# Patient Record
Sex: FEMALE | Race: WHITE | NOT HISPANIC OR LATINO | Employment: OTHER | URBAN - NONMETROPOLITAN AREA
[De-identification: names, ages, dates, MRNs, and addresses within clinical notes are randomized per-mention and may not be internally consistent; named-entity substitution may affect disease eponyms.]

---

## 2017-01-12 ENCOUNTER — HOSPITAL ENCOUNTER (OUTPATIENT)
Dept: LAB | Facility: HOSPITAL | Age: 57
Discharge: HOME | End: 2017-01-12
Attending: INTERNAL MEDICINE

## 2017-01-12 LAB
ALBUMIN SERPL-MCNC: 3.7 G/DL (ref 3.5–5)
ANION GAP SERPL CALC-SCNC: 12 MMOL/L (ref 10–20)
APPEARANCE UR: CLEAR
BASOPHILS # BLD AUTO: 0.07 THOUS (ref 0–0.2)
BASOPHILS NFR BLD AUTO: 1.2 % (ref 0–2.5)
BILIRUB UR QL STRIP: NEGATIVE
BUN SERPL-MCNC: 12 MG/DL (ref 7–20)
CALCIUM SERPL-MCNC: 9.7 MG/DL (ref 8.4–10.2)
CHLORIDE SERPL-SCNC: 103 MMOL/L (ref 98–107)
COLOR UR: YELLOW
CONV ABS IMM GRAN: 0.06 THOUS (ref 0–0.6)
CONV BACTERIA IN URINE MICRO: NORMAL
CONV CO2: 31 MMOL/L (ref 26–30)
CONV HYALINE CASTS IN URINE MICRO: NORMAL
CONV IMMATURE GRAN: 1.1 % (ref 0–2.5)
CONV PROTEIN IN URINE BY AUTOMATED TEST STRIP: NEGATIVE
CONV UROBILINOGEN IN URINE BY AUTOMATED TEST STRIP: 0.2
CREAT 24H UR-MCNC: 172.8 MG/DL
CREAT UR-MCNC: 1.1 MG/DL (ref 0.6–1.3)
EOSINOPHIL # BLD AUTO: 0.19 THOUS (ref 0–0.7)
EOSINOPHIL # BLD AUTO: 3.4 % (ref 0–7)
ERYTHROCYTE [DISTWIDTH] IN BLOOD BY AUTOMATED COUNT: 12.6 % (ref 11.5–14.5)
GFR SERPL CREATININE-BSD FRML MDRD: 51 ML/MIN
GLUCOSE SERPL-MCNC: 96 MG/DL (ref 74–98)
GLUCOSE UR QL: NEGATIVE
HCT VFR BLD AUTO: 46 % (ref 37–47)
HGB BLD-MCNC: 15.6 G/DL (ref 12–16)
HGB UR QL STRIP: NEGATIVE
KETONES UR QL STRIP: NEGATIVE
LEUKOCYTE ESTERASE UR QL STRIP: NORMAL
LYMPHOCYTES # BLD AUTO: 2.01 THOUS (ref 0.6–3.4)
LYMPHOCYTES NFR BLD AUTO: 35.8 % (ref 10–50)
MCH RBC QN AUTO: 30.5 UUG (ref 27–31)
MCHC RBC AUTO-ENTMCNC: 34.3 G/DL (ref 30–37)
MCV RBC AUTO: 88.9 FL (ref 81–99)
MONOCYTES # BLD AUTO: 0.49 THOUS (ref 0–0.9)
MONOCYTES NFR BLD MANUAL: 8.7 % (ref 0–12)
NEUTROPHILS # BLD MANUAL: 2.8 THOUS (ref 2–6.9)
NEUTROPHILS NFR BLD AUTO: 49.8 % (ref 37–80)
NITRITE UR QL STRIP: NEGATIVE
PH UR: 5.5 [PH] (ref 5–8)
PHOSPHATE SERPL-MCNC: 3.6 MG/DL (ref 2.5–4.5)
PLATELET # BLD AUTO: 268 THOUS (ref 130–400)
POTASSIUM SERPL-SCNC: 4.4 MMOL/L (ref 3.5–5.1)
RBC # BLD AUTO: 5.12 M/UL (ref 4.2–5.4)
RBC #/AREA URNS HPF: NORMAL /[HPF] (ref 0–3)
SODIUM SERPL-SCNC: 142 MMOL/L (ref 137–145)
SP GR UR: 1.02 (ref 1–1.03)
SQUAMOUS SPT QL MICRO: NORMAL (ref 0–3)
WBC # BLD AUTO: 5.6 THOUS (ref 4.8–10.8)
WBC #/AREA URNS HPF: NORMAL /[HPF] (ref 0–3)

## 2017-01-13 LAB — REF LAB TEST RESULTS: NORMAL

## 2017-01-17 ENCOUNTER — OFFICE VISIT (OUTPATIENT)
Dept: UROLOGY | Facility: CLINIC | Age: 57
End: 2017-01-17

## 2017-01-17 VITALS
BODY MASS INDEX: 38.77 KG/M2 | HEIGHT: 67 IN | OXYGEN SATURATION: 97 % | HEART RATE: 86 BPM | DIASTOLIC BLOOD PRESSURE: 83 MMHG | WEIGHT: 247 LBS | SYSTOLIC BLOOD PRESSURE: 138 MMHG

## 2017-01-17 DIAGNOSIS — N20.0 NEPHROLITHIASIS: ICD-10-CM

## 2017-01-17 DIAGNOSIS — R35.0 URINARY FREQUENCY: Primary | ICD-10-CM

## 2017-01-17 LAB
BILIRUB BLD-MCNC: NEGATIVE MG/DL
CLARITY, POC: CLEAR
COLOR UR: YELLOW
GLUCOSE UR STRIP-MCNC: NEGATIVE MG/DL
KETONES UR QL: NEGATIVE
LEUKOCYTE EST, POC: NEGATIVE
NITRITE UR-MCNC: NEGATIVE MG/ML
PH UR: 6 [PH] (ref 5–8)
PROT UR STRIP-MCNC: NEGATIVE MG/DL
RBC # UR STRIP: NEGATIVE /UL
SP GR UR: 1.01 (ref 1–1.03)
UROBILINOGEN UR QL: NORMAL

## 2017-01-17 PROCEDURE — 99203 OFFICE O/P NEW LOW 30 MIN: CPT | Performed by: UROLOGY

## 2017-01-17 PROCEDURE — 81003 URINALYSIS AUTO W/O SCOPE: CPT | Performed by: UROLOGY

## 2017-01-17 RX ORDER — LOSARTAN POTASSIUM AND HYDROCHLOROTHIAZIDE 25; 100 MG/1; MG/1
1 TABLET ORAL DAILY
COMMUNITY
Start: 2017-01-06 | End: 2019-03-15

## 2017-01-17 RX ORDER — WARFARIN SODIUM 5 MG/1
5 TABLET ORAL TAKE AS DIRECTED
COMMUNITY
Start: 2017-01-06 | End: 2018-09-20

## 2017-01-17 RX ORDER — CYCLOSPORINE 0.5 MG/ML
1 EMULSION OPHTHALMIC EVERY 12 HOURS
COMMUNITY
Start: 2017-01-12

## 2017-01-17 RX ORDER — SITAGLIPTIN 50 MG/1
50 TABLET, FILM COATED ORAL DAILY
COMMUNITY
Start: 2017-01-01 | End: 2021-05-03

## 2017-01-17 RX ORDER — INSULIN GLARGINE 100 [IU]/ML
60 INJECTION, SOLUTION SUBCUTANEOUS 2 TIMES DAILY
Status: ON HOLD | COMMUNITY
Start: 2017-01-02 | End: 2019-02-18 | Stop reason: ALTCHOICE

## 2017-01-17 NOTE — MR AVS SNAPSHOT
Sue Lynn Collett   2017 1:30 PM   Office Visit    Dept Phone:  [phone]   Encounter #:  [id number]    Provider:  Paul Torres MD   Department:  [facility] UROLOGY                Your Full Care Plan              Your Updated Medication List          This list is accurate as of: 17  2:50 PM.  Always use your most recent med list.                JANUVIA 50 MG tablet   Generic drug:  SITagliptin       LANTUS 100 UNIT/ML injection   Generic drug:  insulin glargine       losartan-hydrochlorothiazide 100-25 MG per tablet   Commonly known as:  HYZAAR       metoprolol tartrate 25 MG tablet   Commonly known as:  LOPRESSOR       RESTASIS 0.05 % ophthalmic emulsion   Generic drug:  cycloSPORINE       warfarin 5 MG tablet   Commonly known as:  COUMADIN               We Performed the Following     CT Abdomen Pelvis Stone Protocol     POC Urinalysis Dipstick, Automated       You Were Diagnosed With        Codes Comments    Urinary frequency    -  Primary ICD-10-CM: R35.0  ICD-9-CM: 788.41     Nephrolithiasis     ICD-10-CM: N20.0  ICD-9-CM: 592.0       Instructions     None    Patient Instructions History      Upcoming Appointments     Visit Type Date Time Department    NEW PATIENT 2017  1:30 PM [facility]      [employer] Signup     [facility] allows you to send messages to your doctor, view your test results, renew your prescriptions, schedule appointments, and more. To sign up, go to [employer] and click on the Sign Up Now link in the New User? box. Enter your [employer] Activation Code exactly as it appears below along with the last four digits of your Social Security Number and your Date of Birth () to complete the sign-up process. If you do not sign up before the expiration date, you must request a new code.    [employer] Activation Code: [email or web address]  Expires: 2017  2:49 PM    If you have questions, you can email  [email or web address] or call [phone] to talk to our MyChart staff. Remember, [employer] is NOT to be used for urgent needs. For medical emergencies, dial 911.               Other Info from Your Visit           Allergies     No Known Allergies      Reason for Visit     Nephrolithiasis PATIENT WAS REF FOR POSSIBLE KIDNEY STONE SEEN ON RENAL ULTRASOUND. PATIENT STATES SHE HAS HAD RECENT URINARY FREQUENCY.      Vital Signs     Blood Pressure Pulse Height Weight Oxygen Saturation Body Mass Index    138/83 86 67\" (170.2 cm) 247 lb (112 kg) 97% 38.69 kg/m2    Smoking Status                   Never Smoker           Problems and Diagnoses Noted     Urinary frequency    -  Primary    Nephrolithiasis          Results     POC Urinalysis Dipstick, Automated      Component Value Standard Range & Units    Color Yellow Yellow, Straw, Dark Yellow, Dayana    Clarity, UA Clear Clear    Glucose, UA Negative Negative, 1000 mg/dL (3+) mg/dL    Bilirubin Negative Negative    Ketones, UA Negative Negative    Specific Gravity  1.015 1.005 - 1.030    Blood, UA Negative Negative    pH, Urine 6.0 5.0 - 8.0    Protein, POC Negative Negative mg/dL    Urobilinogen, UA Normal Normal    Leukocytes Negative Negative    Nitrite, UA Negative Negative                    "

## 2017-01-17 NOTE — PROGRESS NOTES
Chief Complaint  Nephrolithiasis (PATIENT WAS REF FOR POSSIBLE KIDNEY STONE SEEN ON RENAL ULTRASOUND. PATIENT STATES SHE HAS HAD RECENT URINARY FREQUENCY.)      HPI Sue Lynn Collett is a 56 y.o.female who is referred for evaluation of kidney stones.  She's never had a kidney stone in the past but currently has multiple medical complaints and recently had a renal ultrasound.  This revealed a 13 mm echogenic focus in the mid left kidney that might be due to a kidney stone there was no hydronephrosis suggesting obstruction and therefore this is likely not the etiology of her low back pain.  The patient also has a past history of chronic renal insufficiency and has been warned to avoid intravenous contrast.    Vitals:    01/17/17 1340   BP: 138/83   Pulse: 86   SpO2: 97%       Past Medical History  Past Medical History   Diagnosis Date   • Diabetes mellitus    • High cholesterol    • Hypertension        Past Surgical History  Past Surgical History   Procedure Laterality Date   • [place] tooth extraction     • Tubal abdominal ligation     • Gallbladder surgery         Medications  has a current medication list which includes the following prescription(s): januvia, lantus, losartan-hydrochlorothiazide, metoprolol tartrate, restasis, and warfarin.    Allergies  No Known Allergies    Social History  Social History     Social History   • Marital status:      Spouse name: N/A   • Number of children: N/A   • Years of education: N/A     Occupational History   •  Disabled     Social History Main Topics   • Smoking status: Never Smoker   • Smokeless tobacco: Never Used   • Alcohol use No   • Drug use: No   • Sexual activity: Defer     Other Topics Concern   • Not on file     Social History Narrative   • No narrative on file       Family History  Family History   Problem Relation Age of Onset   • Heart disease Father    • Heart attack Father    • Stroke Mother    • Hypertension Mother    • Hyperlipidemia Mother    • Kidney  disease Mother        Review of Systems  Review of Systems  Positive for fatigue and weight gain weight loss itching visual decrease in disturbances wheezing urinary frequency back pain joint pain joint swelling muscle weakness muscle pain anxiety depression appetite changes easy bruising.  Physical Exam  Physical Exam    Labs recent and today in the office:  Results for orders placed or performed in visit on 01/17/17   POC Urinalysis Dipstick, Automated   Result Value Ref Range    Color Yellow Yellow, Straw, Dark Yellow, Dayana    Clarity, UA Clear Clear    Glucose, UA Negative Negative, 1000 mg/dL (3+) mg/dL    Bilirubin Negative Negative    Ketones, UA Negative Negative    Specific Gravity  1.015 1.005 - 1.030    Blood, UA Negative Negative    pH, Urine 6.0 5.0 - 8.0    Protein, POC Negative Negative mg/dL    Urobilinogen, UA Normal Normal    Leukocytes Negative Negative    Nitrite, UA Negative Negative         Assessment & Plan  The patient's urine is negative for blood despite the fact she is taking Coumadin.  This would be evidence against a renal calculus but she certainly deserves definitive evaluation as recommended by the renal ultrasound.  CT scan without contrast is ordered and she will return for treatment of kidney stones if present.  Otherwise she needs to see nephrology for renal insufficiency.

## 2017-07-12 ENCOUNTER — HOSPITAL ENCOUNTER (OUTPATIENT)
Dept: GENERAL RADIOLOGY | Facility: HOSPITAL | Age: 57
Discharge: HOME | End: 2017-07-12
Admitting: NURSE PRACTITIONER

## 2017-07-12 ENCOUNTER — TRANSCRIBE ORDERS (OUTPATIENT)
Dept: ADMINISTRATIVE | Facility: HOSPITAL | Age: 57
End: 2017-07-12

## 2017-07-12 ENCOUNTER — APPOINTMENT (OUTPATIENT)
Dept: LAB | Facility: HOSPITAL | Age: 57
End: 2017-07-12

## 2017-07-12 DIAGNOSIS — G89.29 CHRONIC LOW BACK PAIN WITH SCIATICA, SCIATICA LATERALITY UNSPECIFIED, UNSPECIFIED BACK PAIN LATERALITY: Primary | ICD-10-CM

## 2017-07-12 DIAGNOSIS — N18.30 CHRONIC KIDNEY DISEASE, STAGE III (MODERATE) (HCC): ICD-10-CM

## 2017-07-12 DIAGNOSIS — M54.40 CHRONIC LOW BACK PAIN WITH SCIATICA, SCIATICA LATERALITY UNSPECIFIED, UNSPECIFIED BACK PAIN LATERALITY: Primary | ICD-10-CM

## 2017-07-12 DIAGNOSIS — R32 UNSPECIFIED URINARY INCONTINENCE: ICD-10-CM

## 2017-07-12 LAB
ALBUMIN SERPL-MCNC: 3.7 G/DL (ref 3.5–5)
ALBUMIN/GLOB SERPL: 1.4 G/DL (ref 1–2)
ALP SERPL-CCNC: 70 U/L (ref 38–126)
ALT SERPL W P-5'-P-CCNC: 47 U/L (ref 13–69)
ANION GAP SERPL CALCULATED.3IONS-SCNC: 15 MMOL/L
AST SERPL-CCNC: 25 U/L (ref 15–46)
BASOPHILS # BLD AUTO: 0.06 10*3/MM3 (ref 0–0.2)
BASOPHILS NFR BLD AUTO: 1.2 % (ref 0–2.5)
BILIRUB SERPL-MCNC: 0.6 MG/DL (ref 0.2–1.3)
BILIRUB UR QL STRIP: NEGATIVE
BUN BLD-MCNC: 11 MG/DL (ref 7–20)
BUN/CREAT SERPL: 9.2 (ref 7.1–23.5)
CALCIUM SPEC-SCNC: 9.6 MG/DL (ref 8.4–10.2)
CHLORIDE SERPL-SCNC: 104 MMOL/L (ref 98–107)
CLARITY UR: CLEAR
CO2 SERPL-SCNC: 29 MMOL/L (ref 26–30)
COLOR UR: YELLOW
CREAT BLD-MCNC: 1.2 MG/DL (ref 0.6–1.3)
DEPRECATED RDW RBC AUTO: 42 FL (ref 37–54)
EOSINOPHIL # BLD AUTO: 0.16 10*3/MM3 (ref 0–0.7)
EOSINOPHIL NFR BLD AUTO: 3.3 % (ref 0–7)
ERYTHROCYTE [DISTWIDTH] IN BLOOD BY AUTOMATED COUNT: 13.2 % (ref 11.5–14.5)
GFR SERPL CREATININE-BSD FRML MDRD: 46 ML/MIN/1.73
GLOBULIN UR ELPH-MCNC: 2.7 GM/DL
GLUCOSE BLD-MCNC: 110 MG/DL (ref 74–98)
GLUCOSE UR STRIP-MCNC: NEGATIVE MG/DL
HCT VFR BLD AUTO: 44 % (ref 37–47)
HGB BLD-MCNC: 14.9 G/DL (ref 12–16)
HGB UR QL STRIP.AUTO: NEGATIVE
IMM GRANULOCYTES # BLD: 0.02 10*3/MM3 (ref 0–0.06)
IMM GRANULOCYTES NFR BLD: 0.4 % (ref 0–0.6)
KETONES UR QL STRIP: NEGATIVE
LEUKOCYTE ESTERASE UR QL STRIP.AUTO: NEGATIVE
LYMPHOCYTES # BLD AUTO: 1.87 10*3/MM3 (ref 0.6–3.4)
LYMPHOCYTES NFR BLD AUTO: 38.7 % (ref 10–50)
MCH RBC QN AUTO: 29.9 PG (ref 27–31)
MCHC RBC AUTO-ENTMCNC: 33.9 G/DL (ref 30–37)
MCV RBC AUTO: 88.2 FL (ref 81–99)
MONOCYTES # BLD AUTO: 0.38 10*3/MM3 (ref 0–0.9)
MONOCYTES NFR BLD AUTO: 7.9 % (ref 0–12)
NEUTROPHILS # BLD AUTO: 2.34 10*3/MM3 (ref 2–6.9)
NEUTROPHILS NFR BLD AUTO: 48.5 % (ref 37–80)
NITRITE UR QL STRIP: NEGATIVE
NRBC BLD MANUAL-RTO: 0 /100 WBC (ref 0–0)
PH UR STRIP.AUTO: 5.5 [PH] (ref 5–8)
PLATELET # BLD AUTO: 243 10*3/MM3 (ref 130–400)
PMV BLD AUTO: 10.7 FL (ref 6–12)
POTASSIUM BLD-SCNC: 4 MMOL/L (ref 3.5–5.1)
PROT SERPL-MCNC: 6.4 G/DL (ref 6.3–8.2)
PROT UR QL STRIP: NEGATIVE
RBC # BLD AUTO: 4.99 10*6/MM3 (ref 4.2–5.4)
SODIUM BLD-SCNC: 144 MMOL/L (ref 137–145)
SP GR UR STRIP: 1.02 (ref 1–1.03)
UROBILINOGEN UR QL STRIP: NORMAL
WBC NRBC COR # BLD: 4.83 10*3/MM3 (ref 4.8–10.8)

## 2017-07-12 PROCEDURE — 80053 COMPREHEN METABOLIC PANEL: CPT | Performed by: NURSE PRACTITIONER

## 2017-07-12 PROCEDURE — 81003 URINALYSIS AUTO W/O SCOPE: CPT | Performed by: NURSE PRACTITIONER

## 2017-07-12 PROCEDURE — 85025 COMPLETE CBC W/AUTO DIFF WBC: CPT | Performed by: NURSE PRACTITIONER

## 2017-07-12 PROCEDURE — 72100 X-RAY EXAM L-S SPINE 2/3 VWS: CPT

## 2017-07-12 PROCEDURE — 36415 COLL VENOUS BLD VENIPUNCTURE: CPT | Performed by: NURSE PRACTITIONER

## 2017-07-14 ENCOUNTER — TRANSCRIBE ORDERS (OUTPATIENT)
Dept: ADMINISTRATIVE | Facility: HOSPITAL | Age: 57
End: 2017-07-14

## 2017-07-14 DIAGNOSIS — N18.30 CHRONIC KIDNEY DISEASE, STAGE 3 (MODERATE): ICD-10-CM

## 2017-07-14 DIAGNOSIS — M54.40 LUMBAGO WITH SCIATICA, UNSPECIFIED SIDE: Primary | ICD-10-CM

## 2017-07-14 DIAGNOSIS — R32 URINARY INCONTINENCE, UNSPECIFIED TYPE: ICD-10-CM

## 2017-11-13 ENCOUNTER — HOSPITAL ENCOUNTER (OUTPATIENT)
Dept: MRI IMAGING | Facility: HOSPITAL | Age: 57
Discharge: HOME | End: 2017-11-13

## 2017-11-13 ENCOUNTER — HOSPITAL ENCOUNTER (OUTPATIENT)
Dept: MRI IMAGING | Facility: HOSPITAL | Age: 57
Discharge: HOME | End: 2017-11-13
Admitting: NURSE PRACTITIONER

## 2017-11-13 ENCOUNTER — OFFICE VISIT (OUTPATIENT)
Dept: NEUROLOGY | Facility: CLINIC | Age: 57
End: 2017-11-13

## 2017-11-13 VITALS
HEART RATE: 85 BPM | HEIGHT: 66 IN | WEIGHT: 245.8 LBS | DIASTOLIC BLOOD PRESSURE: 74 MMHG | SYSTOLIC BLOOD PRESSURE: 118 MMHG | OXYGEN SATURATION: 98 % | BODY MASS INDEX: 39.5 KG/M2

## 2017-11-13 DIAGNOSIS — M54.42 CHRONIC BILATERAL LOW BACK PAIN WITH BILATERAL SCIATICA: ICD-10-CM

## 2017-11-13 DIAGNOSIS — G40.109 LOCALIZATION-RELATED SYMPTOMATIC EPILEPSY AND EPILEPTIC SYNDROMES WITH SIMPLE PARTIAL SEIZURES, NOT INTRACTABLE, WITHOUT STATUS EPILEPTICUS (HCC): ICD-10-CM

## 2017-11-13 DIAGNOSIS — M54.41 CHRONIC BILATERAL LOW BACK PAIN WITH BILATERAL SCIATICA: ICD-10-CM

## 2017-11-13 DIAGNOSIS — I69.30 SEQUELAE, POST-STROKE: ICD-10-CM

## 2017-11-13 DIAGNOSIS — G89.29 CHRONIC BILATERAL LOW BACK PAIN WITH BILATERAL SCIATICA: ICD-10-CM

## 2017-11-13 DIAGNOSIS — I69.30 SEQUELAE, POST-STROKE: Primary | ICD-10-CM

## 2017-11-13 LAB
CREAT BLD-MCNC: 1.3 MG/DL (ref 0.6–1.3)
GFR SERPL CREATININE-BSD FRML MDRD: 42 ML/MIN/1.73

## 2017-11-13 PROCEDURE — A9577 INJ MULTIHANCE: HCPCS | Performed by: NURSE PRACTITIONER

## 2017-11-13 PROCEDURE — 70553 MRI BRAIN STEM W/O & W/DYE: CPT

## 2017-11-13 PROCEDURE — 82565 ASSAY OF CREATININE: CPT | Performed by: NURSE PRACTITIONER

## 2017-11-13 PROCEDURE — 0 GADOBENATE DIMEGLUMINE 529 MG/ML SOLUTION: Performed by: NURSE PRACTITIONER

## 2017-11-13 PROCEDURE — 72148 MRI LUMBAR SPINE W/O DYE: CPT

## 2017-11-13 PROCEDURE — 99214 OFFICE O/P EST MOD 30 MIN: CPT | Performed by: NURSE PRACTITIONER

## 2017-11-13 RX ORDER — ZINC GLUCONATE 50 MG
TABLET ORAL
Refills: 0 | COMMUNITY
Start: 2017-10-01 | End: 2017-11-29

## 2017-11-13 RX ORDER — BENZONATATE 100 MG/1
CAPSULE ORAL
Refills: 0 | COMMUNITY
Start: 2017-09-08 | End: 2017-11-29

## 2017-11-13 RX ORDER — CIPROFLOXACIN 500 MG/1
TABLET, FILM COATED ORAL
Refills: 0 | COMMUNITY
Start: 2017-11-07 | End: 2017-11-21

## 2017-11-13 RX ORDER — ERGOCALCIFEROL 1.25 MG/1
50000 CAPSULE ORAL WEEKLY
COMMUNITY

## 2017-11-13 RX ORDER — CYANOCOBALAMIN (VITAMIN B-12) 1000 MCG
TABLET, SUBLINGUAL SUBLINGUAL
Refills: 0 | COMMUNITY
Start: 2017-10-01 | End: 2017-11-29

## 2017-11-13 RX ADMIN — GADOBENATE DIMEGLUMINE 15 ML: 529 INJECTION, SOLUTION INTRAVENOUS at 14:30

## 2017-11-13 RX ADMIN — GADOBENATE DIMEGLUMINE 5 ML: 529 INJECTION, SOLUTION INTRAVENOUS at 14:30

## 2017-11-13 NOTE — PROGRESS NOTES
Subjective   Sue Lynn Collett is a 57 y.o. female     Chief Complaint   Patient presents with   • Pain     NP/Pt is here for Neuropathy. Pt states that when she stands for long periods of time she gets a pain down her head and into her neck and she states that the pain is also in her right leg but the pain does not go above her knee.    • Numbness     Pt states that within the last 2 to 3 months she has lost the feeling of urination. Pt states that she will occasionally be standing and feel the numbness and will urinate on herself. Pt states that she does not feel the urge to urinate.        History of Present Illness     Pt new to neurology est for intermittent loss of sensation hip to knees but not below knees.  Episodes weakness now using a walker Sudden onset 2-3 months.  + incontinence with twisting to the right has genital numbness. + LE loss of sensation can lay flat with back pillow and feeling will return  C/O headache January or February severe states occipital bone still sore and sxs have evoloved since.   + Lupus + Factor V uses warfarin    The following portions of the patient's history were reviewed and updated as appropriate: allergies, current medications, past family history, past medical history, past social history, past surgical history and problem list.    Review of Systems   Constitutional: Positive for activity change and fatigue. Negative for chills and fever.   HENT: Negative for congestion, ear pain, hearing loss, rhinorrhea and sore throat.    Eyes: Negative for pain, discharge and redness.   Respiratory: Positive for shortness of breath. Negative for cough, wheezing and stridor.    Cardiovascular: Negative for chest pain, palpitations and leg swelling.        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   Endocrine: Negative for cold intolerance, heat intolerance and polyphagia.   Genitourinary: Positive for  "urgency. Negative for dysuria, flank pain and frequency.        Incontinence   Musculoskeletal: Positive for arthralgias, back pain, gait problem, neck pain and neck stiffness. Negative for joint swelling and myalgias.   Skin: Negative for pallor, rash and wound.   Allergic/Immunologic: Negative for environmental allergies.   Neurological: Positive for weakness, numbness and headaches. Negative for dizziness, tremors, seizures, syncope, facial asymmetry, speech difficulty and light-headedness.   Hematological: Negative for adenopathy.   Psychiatric/Behavioral: Positive for decreased concentration. Negative for confusion and hallucinations. The patient is nervous/anxious.        Objective         Vitals:    11/13/17 1016   BP: 118/74   Pulse: 85   SpO2: 98%   Weight: 245 lb 12.8 oz (111 kg)   Height: 66\" (167.6 cm)     GENERAL: Patient is pleasant, cooperative, appears to be stated age.  Body habitus is endomorphic.  HEAD:  Head is normocephalic and atraumatic.    NECK: Neck are non-tender without thyromegaly or adenopathy.  Carotic upstrokes are 1+/4.  No cranial or cervical bruits.  The neck is supple with a full range of motion.   CARDIOVASCULAR:  Regular rate and rhythm with normal S1 and S2 without rub or gallop.  RESPIRATORY:  Clear to auscultation without wheezes or crackle   PSYCH:  Higher cortical function/mental status:  The patient is alert.  She  is oriented x3 to time, place and person.  Recent and the remote memory appear normal.  The patient has a good fund of knowledge.  There is no visual or auditory hallucination or suicidal or homicidal ideation.  SPEECH:There is no gross evidence of aphasia, dysarthria or agnosia.      CRANIAL NERVES:   Extraocular movements During the exam positive nystagmus noted patient quit following the finger correctly quit speaking and had multiple rapid eye blinking.  This appears the patient has had a seizure in clinic today she has resolved symptoms.  The face is " symmetric. palate elevate symmetrically, Tongue midline, positive gag reflex. The remainder of the cranial nerves are intact and symmetrical.    MOTOR: Decreased strength bilateral lower extremities.  Decreased strength bilateral hands.  DTR: Decreased DTR right patella left patellar within normal limits.  SENSATION: Decreased light touch, vibration and pinprick touch bilateral knee to hip area.    COORDINATION AND GAIT:  The patient walks with a wide based gait shuffled walk with limp.     Results for orders placed or performed in visit on 07/12/17   Comprehensive Metabolic Panel   Result Value Ref Range    Glucose 110 (H) 74 - 98 mg/dL    BUN 11 7 - 20 mg/dL    Creatinine 1.20 0.60 - 1.30 mg/dL    Sodium 144 137 - 145 mmol/L    Potassium 4.0 3.5 - 5.1 mmol/L    Chloride 104 98 - 107 mmol/L    CO2 29.0 26.0 - 30.0 mmol/L    Calcium 9.6 8.4 - 10.2 mg/dL    Total Protein 6.4 6.3 - 8.2 g/dL    Albumin 3.70 3.50 - 5.00 g/dL    ALT (SGPT) 47 13 - 69 U/L    AST (SGOT) 25 15 - 46 U/L    Alkaline Phosphatase 70 38 - 126 U/L    Total Bilirubin 0.6 0.2 - 1.3 mg/dL    eGFR Non African Amer 46 (L) >60 mL/min/1.73    Globulin 2.7 gm/dL    A/G Ratio 1.4 1.0 - 2.0 g/dL    BUN/Creatinine Ratio 9.2 7.1 - 23.5    Anion Gap 15.0 mmol/L   Urinalysis With / Culture If Indicated   Result Value Ref Range    Color, UA Yellow Yellow, Straw    Appearance, UA Clear Clear    pH, UA 5.5 5.0 - 8.0    Specific Gravity, UA 1.020 1.005 - 1.030    Glucose, UA Negative Negative    Ketones, UA Negative Negative    Bilirubin, UA Negative Negative    Blood, UA Negative Negative    Protein, UA Negative Negative    Leuk Esterase, UA Negative Negative    Nitrite, UA Negative Negative    Urobilinogen, UA 0.2 E.U./dL 0.2 - 1.0 E.U./dL   CBC Auto Differential   Result Value Ref Range    WBC 4.83 4.80 - 10.80 10*3/mm3    RBC 4.99 4.20 - 5.40 10*6/mm3    Hemoglobin 14.9 12.0 - 16.0 g/dL    Hematocrit 44.0 37.0 - 47.0 %    MCV 88.2 81.0 - 99.0 fL    MCH 29.9  27.0 - 31.0 pg    MCHC 33.9 30.0 - 37.0 g/dL    RDW 13.2 11.5 - 14.5 %    RDW-SD 42.0 37.0 - 54.0 fl    MPV 10.7 6.0 - 12.0 fL    Platelets 243 130 - 400 10*3/mm3    Neutrophil % 48.5 37.0 - 80.0 %    Lymphocyte % 38.7 10.0 - 50.0 %    Monocyte % 7.9 0.0 - 12.0 %    Eosinophil % 3.3 0.0 - 7.0 %    Basophil % 1.2 0.0 - 2.5 %    Immature Grans % 0.4 0.0 - 0.6 %    Neutrophils, Absolute 2.34 2.00 - 6.90 10*3/mm3    Lymphocytes, Absolute 1.87 0.60 - 3.40 10*3/mm3    Monocytes, Absolute 0.38 0.00 - 0.90 10*3/mm3    Eosinophils, Absolute 0.16 0.00 - 0.70 10*3/mm3    Basophils, Absolute 0.06 0.00 - 0.20 10*3/mm3    Immature Grans, Absolute 0.02 0.00 - 0.06 10*3/mm3    nRBC 0.0 0.0 - 0.0 /100 WBC         I have personally reviewed the above labs and imaging studies.    Assessment/Plan   1.  Status post CVA appears patient had seizure in clinic today.  We will schedule an MRI of her brain stat.  Patient has resolved Dr. Oconnor I did see patient in clinic as well.  Schedule and EEG    2.  Lumbar pain with lower extremity neuropathy/radiculopathy inability to control urine certain movements.  We will get an MRI of her lumbar stat.  This did start again 2-3 months ago.    Note: Patient was provided with a wheelchair her daughter is with her and they're going to the hospital now to have these procedures performed.  Thank you

## 2017-11-16 ENCOUNTER — HOSPITAL ENCOUNTER (OUTPATIENT)
Dept: SLEEP MEDICINE | Facility: HOSPITAL | Age: 57
Setting detail: THERAPIES SERIES
End: 2017-11-16

## 2017-11-16 DIAGNOSIS — M54.41 CHRONIC BILATERAL LOW BACK PAIN WITH BILATERAL SCIATICA: Primary | ICD-10-CM

## 2017-11-16 DIAGNOSIS — G89.29 CHRONIC BILATERAL LOW BACK PAIN WITH BILATERAL SCIATICA: Primary | ICD-10-CM

## 2017-11-16 DIAGNOSIS — G40.109 LOCALIZATION-RELATED SYMPTOMATIC EPILEPSY AND EPILEPTIC SYNDROMES WITH SIMPLE PARTIAL SEIZURES, NOT INTRACTABLE, WITHOUT STATUS EPILEPTICUS (HCC): ICD-10-CM

## 2017-11-16 DIAGNOSIS — M54.42 CHRONIC BILATERAL LOW BACK PAIN WITH BILATERAL SCIATICA: Primary | ICD-10-CM

## 2017-11-16 DIAGNOSIS — R32 INCONTINENCE OF URINE IN FEMALE: ICD-10-CM

## 2017-11-16 PROCEDURE — 95816 EEG AWAKE AND DROWSY: CPT

## 2017-11-16 PROCEDURE — 95816 EEG AWAKE AND DROWSY: CPT | Performed by: PSYCHIATRY & NEUROLOGY

## 2017-11-21 ENCOUNTER — OFFICE VISIT (OUTPATIENT)
Dept: NEUROLOGY | Facility: CLINIC | Age: 57
End: 2017-11-21

## 2017-11-21 VITALS
OXYGEN SATURATION: 97 % | HEART RATE: 83 BPM | HEIGHT: 66 IN | DIASTOLIC BLOOD PRESSURE: 80 MMHG | BODY MASS INDEX: 39.37 KG/M2 | WEIGHT: 245 LBS | SYSTOLIC BLOOD PRESSURE: 132 MMHG

## 2017-11-21 DIAGNOSIS — M54.41 CHRONIC BILATERAL LOW BACK PAIN WITH BILATERAL SCIATICA: ICD-10-CM

## 2017-11-21 DIAGNOSIS — I63.39 CEREBROVASCULAR ACCIDENT (CVA) DUE TO THROMBOSIS OF OTHER CEREBRAL ARTERY (HCC): Primary | ICD-10-CM

## 2017-11-21 DIAGNOSIS — M54.42 CHRONIC BILATERAL LOW BACK PAIN WITH BILATERAL SCIATICA: ICD-10-CM

## 2017-11-21 DIAGNOSIS — G89.29 CHRONIC BILATERAL LOW BACK PAIN WITH BILATERAL SCIATICA: ICD-10-CM

## 2017-11-21 PROCEDURE — 99214 OFFICE O/P EST MOD 30 MIN: CPT | Performed by: NURSE PRACTITIONER

## 2017-11-21 RX ORDER — TOPIRAMATE 100 MG/1
100 CAPSULE, EXTENDED RELEASE ORAL DAILY
Qty: 30 CAPSULE | Refills: 3 | Status: SHIPPED | OUTPATIENT
Start: 2017-11-21 | End: 2018-03-20

## 2017-11-21 NOTE — PROGRESS NOTES
Subjective   Sue L Collett is a 57 y.o. female     Chief Complaint   Patient presents with   • Follow-up     Pt is here for a 1 week Fu for post stroke. Pt states that she has some numbness and tingling in the right side of her face and states that she has noticed the right eye wants to wander off at times. Pt states that she has a MRI of the lumbar and brain, and an EEG completed and is here for the test results.        History of Present Illness   Patient is a pleasant 57-year-old female in clinic today to follow-up for her MRI of her brain and lumbar.  The brain was within acceptable limits and did not show a stroke.  Patient still having numbness and tingling episodically on the right side of her face and her eyes will stare off.  We will plan to get an EEG for patient.  I reviewed patient's lumbar MRI in clinic as noted below patient has been having significant difficulties walking lifting her knee and having urinary incontinence.      PAST HX: Pt new to neurology est for intermittent loss of sensation hip to knees but not below knees.  Episodes weakness now using a walker Sudden onset 2-3 months.  + incontinence with twisting to the right has genital numbness. + LE loss of sensation can lay flat with back pillow and feeling will return  C/O headache January or February severe states occipital bone still sore and sxs have evoloved since.   + Lupus + Factor V uses warfarin    The following portions of the patient's history were reviewed and updated as appropriate: allergies, current medications, past family history, past medical history, past social history, past surgical history and problem list.    Review of Systems  Constitutional: Positive for activity change and fatigue. Negative for chills and fever.   HENT: Negative for congestion, ear pain, hearing loss, rhinorrhea and sore throat.    Eyes: Negative for pain, discharge and redness.   Respiratory: Positive for shortness of breath. Negative for cough,  "wheezing and stridor.    Cardiovascular: Negative for chest pain, palpitations and leg swelling.        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   Endocrine: Negative for cold intolerance, heat intolerance and polyphagia.   Genitourinary: Positive for urgency. Negative for dysuria, flank pain and frequency.        Incontinence   Musculoskeletal: Positive for arthralgias, back pain, gait problem, neck pain and neck stiffness. Negative for joint swelling and myalgias.   Skin: Negative for pallor, rash and wound.   Allergic/Immunologic: Negative for environmental allergies.   Neurological: Positive for weakness, numbness and headaches. Negative for dizziness, tremors, seizures, syncope, facial asymmetry, speech difficulty and light-headedness.   Hematological: Negative for adenopathy.   Psychiatric/Behavioral: Positive for decreased concentration. Negative for confusion and hallucinations. The patient is nervous/anxious.    Objective         Vitals:    11/21/17 0955   BP: 132/80   Pulse: 83   SpO2: 97%   Weight: 245 lb (111 kg)   Height: 66\" (167.6 cm)     GENERAL: Patient is pleasant, cooperative, appears to be stated age.  Body habitus is endomorphic.  HEAD:  Head is normocephalic and atraumatic.    NECK: Neck are non-tender without thyromegaly or adenopathy.  Carotic upstrokes are 1+/4.  No cranial or cervical bruits.  The neck is supple with a full range of motion.   CARDIOVASCULAR:  Regular rate and rhythm with normal S1 and S2 without rub or gallop.  RESPIRATORY:  Clear to auscultation without wheezes or crackle   PSYCH:  Higher cortical function/mental status:  The patient is alert.  She  is oriented x3 to time, place and person.  Recent and the remote memory appear normal.  The patient has a good fund of knowledge.  There is no visual or auditory hallucination or suicidal or homicidal ideation.  SPEECH:There is no gross " evidence of aphasia, dysarthria or agnosia.      MOTOR: Patient difficult ambulation secondary to pain and inability to lift feet well without shuffling No involuntary movements noted.        Results for orders placed or performed during the hospital encounter of 11/13/17   Creatinine, Serum   Result Value Ref Range    Creatinine 1.30 0.60 - 1.30 mg/dL    eGFR Non African Amer 42 (L) >60 mL/min/1.73       Mri Brain With & Without Contrast    Result Date: 11/13/2017  Narrative: PROCEDURE: MRI BRAIN W WO CONTRAST-  HISTORY: + stroke, probable sz; I69.30-Unspecified sequelae of cerebral infarction; G40.297-Yilevogmyjkl-nfddyxg (focal) (partial) symptomatic epilepsy and epileptic syndromes with simple partial seizures, not intractable, without status epilepticus  PROCEDURE: Multiplanar multisequence imaging of the brain was performed with and without intravenous contrast.  COMPARISON: No prior imaging is available at this time for comparison.  FINDINGS: The midbrain, theodore, cerebellum and craniocervical junction are unremarkable. The sella and pituitary gland are within normal limits.  There is a nonspecific 3 mm focus of increased T2 signal in the left frontal lobe. There is no mass, mass effect or midline shift. There is no hydrocephalus. There are no areas of restricted diffusion. There is no pathologic contrast enhancement.   The major intracranial vasculature demonstrates the expected flow related signal. The paranasal sinuses are clear.      Impression: Small nonspecific focus of increased T2 signal in the left frontal lobe.       This report was finalized on 11/13/2017 3:02 PM by González King DO.    Mri Lumbar Spine Without Contrast    Result Date: 11/13/2017  Narrative: PROCEDURE: MRI LUMBAR SPINE WO CONTRAST-  HISTORY: +lumbar pain with LE radiculopathy; M54.42-Lumbago with sciatica, left side; M54.41-Lumbago with sciatica, right side; G89.29-Other chronic pain  COMPARISON:July 12, 2017  TECHNIQUE: Multiplanar  multisequence imaging of the lumbar spine was performed without intravenous contrast.  FINDINGS: The lumbar vertebral bodies maintain a normal height, alignment and signal intensity.  Mild degenerative endplate changes are noted. The spinal cord terminates at the T12-L1 level. No conus lesions are present. The paraspinal soft tissues are unremarkable.  L1-2: The disc is normal. The central canal is patent. The neural foramen are patent.  L2-3: The disc is normal. The central canal is patent. The neural foramen are patent.  L3-4: The disc is normal. The central canal is patent. The neural foramen are patent. Mild degenerative facet changes.  L4-5: There is a broad-based disc extrusion which abuts thecal sac anteriorly and contributes to a moderate to severe central canal stenosis. Mild degenerative facet changes and thickening of the ligament flavum. Mild narrowing of the bilateral neural foramen.  L5-S1: Mild disc bulge, mild degenerative facets. Central canal is patent, mild narrowing of the right neural foramen.      Impression: Spondylosis and stenosis most pronounced with disc herniation at L4-5.  This report was finalized on 11/13/2017 4:16 PM by González King DO.      I have personally reviewed the above labs and imaging studies.    Assessment/Plan     1.  Status post CVA appears patient had seizure in clinic today.  EEG was within normal limits.  Trokendi for 50 mg      2.  Lumbar pain with lower extremity neuropathy/radiculopathy inability to control urine certain movements.   patient with severe stenosis in the lumbar on MRI.  Neurosurgery consult placed.

## 2017-11-29 ENCOUNTER — OFFICE VISIT (OUTPATIENT)
Dept: NEUROSURGERY | Facility: CLINIC | Age: 57
End: 2017-11-29

## 2017-11-29 VITALS — WEIGHT: 244 LBS | HEIGHT: 66 IN | TEMPERATURE: 97.2 F | BODY MASS INDEX: 39.21 KG/M2

## 2017-11-29 DIAGNOSIS — M48.062 SPINAL STENOSIS OF LUMBAR REGION WITH NEUROGENIC CLAUDICATION: Primary | ICD-10-CM

## 2017-11-29 DIAGNOSIS — M51.36 BULGING LUMBAR DISC: ICD-10-CM

## 2017-11-29 DIAGNOSIS — M51.36 DEGENERATIVE DISC DISEASE, LUMBAR: ICD-10-CM

## 2017-11-29 DIAGNOSIS — M47.816 FACET ARTHRITIS OF LUMBAR REGION: ICD-10-CM

## 2017-11-29 PROCEDURE — 99203 OFFICE O/P NEW LOW 30 MIN: CPT | Performed by: NEUROLOGICAL SURGERY

## 2017-11-29 RX ORDER — WARFARIN SODIUM 7.5 MG/1
7.5 TABLET ORAL TAKE AS DIRECTED
COMMUNITY
End: 2021-01-04

## 2017-11-29 NOTE — PROGRESS NOTES
"Patient: Sue L Collett  : 1960    Primary Care Provider: Kvng Alarcon MD    Requesting Provider: As above      History    Chief Complaint: Back pain with numbness in the thighs associated with walking and standing intolerance.    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  Her symptoms began in January of this year and there is concern that she may have had a light stroke is precipitated some generalized weakness on the right side.  She notes a \"drawing\" in her right hand.  If she stands or walks her low back pain extends all the way up into her head.  She's had several bouts of urinary incontinence but it is not an ongoing issue.  When she stands or walks too long she gets numbness in her thighs.  Sitting and lying down or well-tolerated.    Review of Systems   Constitutional: Positive for fatigue. Negative for activity change, appetite change, chills, diaphoresis, fever and unexpected weight change.   HENT: Positive for rhinorrhea. Negative for congestion, dental problem, drooling, ear discharge, ear pain, facial swelling, hearing loss, mouth sores, nosebleeds, postnasal drip, sinus pressure, sneezing, sore throat, tinnitus, trouble swallowing and voice change.    Eyes: Positive for photophobia. Negative for pain, discharge, redness, itching and visual disturbance.   Respiratory: Positive for cough and shortness of breath. Negative for apnea, choking, chest tightness, wheezing and stridor.    Cardiovascular: Positive for leg swelling. Negative for chest pain and palpitations.   Gastrointestinal: Negative for abdominal distention, abdominal pain, anal bleeding, blood in stool, constipation, diarrhea, nausea, rectal pain and vomiting.   Endocrine: Positive for polydipsia and polyuria. Negative for cold intolerance, heat intolerance and polyphagia.   Genitourinary: Negative for " "decreased urine volume, difficulty urinating, dysuria, enuresis, flank pain, frequency, genital sores, hematuria and urgency.   Musculoskeletal: Positive for arthralgias, back pain, joint swelling, myalgias and neck stiffness. Negative for gait problem and neck pain.   Skin: Negative for color change, pallor, rash and wound.   Allergic/Immunologic: Positive for food allergies. Negative for environmental allergies and immunocompromised state.   Neurological: Positive for dizziness, weakness, light-headedness and numbness. Negative for tremors, seizures, syncope, facial asymmetry, speech difficulty and headaches.   Hematological: Negative for adenopathy. Does not bruise/bleed easily.   Psychiatric/Behavioral: Positive for confusion. Negative for agitation, behavioral problems, decreased concentration, dysphoric mood, hallucinations, self-injury, sleep disturbance and suicidal ideas. The patient is not nervous/anxious and is not hyperactive.        The patient's past medical history, past surgical history, family history, and social history have been reviewed at length in the electronic medical record.    Physical Exam:   Temp 97.2 °F (36.2 °C)  Ht 66\" (167.6 cm)  Wt 244 lb (111 kg)  BMI 39.38 kg/m2  CONSTITUTIONAL: Patient is well-nourished, pleasant and appears stated age.  CV: Heart regular rate and rhythm without murmur, rub, or gallop.  PULMONARY: Lungs are clear to ascultation.  MUSCULOSKELETAL:  Straight leg raising is negative.  Devin's Sign is negative.  ROM in back is normal.  Tenderness in the back to palpation is not observed.  NEUROLOGICAL:  Orientation, memory, attention span, language function, and cognition have been examined and are intact.  Strength is intact in the lower extremities to direct testing.  Muscle tone is normal throughout.  Station and gait are normal.  Sensation is intact to light touch testing throughout.  Deep tendon reflexes are 1+ in the upper extremities, 2+ at the right knee " and absent elsewhere in her legs.  Yumiko signs are negative.  Coordination is intact.      Medical Decision Making    Data Review:   MRI of the lumbar spine dated 11/13/17 demonstrates degenerative disc disease and some facet arthropathy.  There is a generous disc extrusion at L4-5 that in conjunction with facet and ligamentous hypertrophy fashions a significant amount of lumbar stenosis.  MRI of the brain is unrevealing.    Diagnosis:   1.  Lumbar stenosis with neurogenic claudication.  2.  Lumbar degenerative disc disease.  3.  Lumbar degenerative joint disease.    Treatment Options:   I have referred the patient for physical therapy.  She'll follow-up in several weeks to check on her progress.  Surgery is an option but would not be an uncomplicated scenario given her chronic anticoagulation and history of DVT.       Diagnosis Plan   1. Spinal stenosis of lumbar region with neurogenic claudication  Ambulatory Referral to Physical Therapy Evaluate and treat   2. Bulging lumbar disc     3. Degenerative disc disease, lumbar     4. Facet arthritis of lumbar region         Scribed for Fantasma Huitron MD by Kami Dial CMA on 11/29/2017 at 9:51 AM    I, Dr. Huitron, personally performed the services described in the documentation, as scribed in my presence, and it is both accurate and complete.

## 2017-11-30 PROBLEM — M54.41 CHRONIC BILATERAL LOW BACK PAIN WITH BILATERAL SCIATICA: Status: ACTIVE | Noted: 2017-11-30

## 2017-11-30 PROBLEM — I63.9 CEREBROVASCULAR ACCIDENT (CVA) DUE TO THROMBOSIS: Status: ACTIVE | Noted: 2017-11-30

## 2017-11-30 PROBLEM — G89.29 CHRONIC BILATERAL LOW BACK PAIN WITH BILATERAL SCIATICA: Status: ACTIVE | Noted: 2017-11-30

## 2017-11-30 PROBLEM — M54.42 CHRONIC BILATERAL LOW BACK PAIN WITH BILATERAL SCIATICA: Status: ACTIVE | Noted: 2017-11-30

## 2017-12-04 ENCOUNTER — TELEPHONE (OUTPATIENT)
Dept: NEUROLOGY | Facility: CLINIC | Age: 57
End: 2017-12-04

## 2017-12-04 NOTE — TELEPHONE ENCOUNTER
Patient called stating that all weekend she has been smelling chemical smells in her head  And heartburn she wanted to see if Tronkendi causes this side effect.     Please advise

## 2017-12-05 ENCOUNTER — TELEPHONE (OUTPATIENT)
Dept: NEUROLOGY | Facility: CLINIC | Age: 57
End: 2017-12-05

## 2017-12-05 NOTE — TELEPHONE ENCOUNTER
Pt noitfied. Pt states that she has seen her PCP today and was given a medication to help with ulcers. Pt verbalized understanding to discontinue the Trokendi until her symptoms resolve.

## 2017-12-05 NOTE — TELEPHONE ENCOUNTER
Topamax was to change the taste of foods.  It may end up causing different types of smells.  Patient may stop the Trokendi until symptoms resolve.  As for heartburn patient is a 57-year-old female and she needs to be evaluated by her PCP or the ER to assure that there is no coronary artery disease.

## 2017-12-15 ENCOUNTER — TREATMENT (OUTPATIENT)
Dept: PHYSICAL THERAPY | Facility: CLINIC | Age: 57
End: 2017-12-15

## 2017-12-15 DIAGNOSIS — G89.29 CHRONIC MIDLINE LOW BACK PAIN WITHOUT SCIATICA: Primary | ICD-10-CM

## 2017-12-15 DIAGNOSIS — M54.50 CHRONIC MIDLINE LOW BACK PAIN WITHOUT SCIATICA: Primary | ICD-10-CM

## 2017-12-15 PROCEDURE — 97161 PT EVAL LOW COMPLEX 20 MIN: CPT | Performed by: PHYSICAL THERAPIST

## 2017-12-15 PROCEDURE — G8978 MOBILITY CURRENT STATUS: HCPCS | Performed by: PHYSICAL THERAPIST

## 2017-12-15 PROCEDURE — G8979 MOBILITY GOAL STATUS: HCPCS | Performed by: PHYSICAL THERAPIST

## 2017-12-15 PROCEDURE — 97530 THERAPEUTIC ACTIVITIES: CPT | Performed by: PHYSICAL THERAPIST

## 2017-12-15 NOTE — PROGRESS NOTES
Physical Therapy Initial Evaluation and Plan of Care      Patient: Sue L Collett   : 1960  Diagnosis/ICD-10 Code:  No primary diagnosis found.  Referring practitioner: Fantasma Huitron MD    Subjective Evaluation    History of Present Illness  Mechanism of injury: Pt reports she started having pain in her back in her 30s.  Pt has a blood clotting disease.  She had a stroke when she was about 50 years old.      Pt had injections in the back within the past 7 years.  Pt had minimal effectiveness.    Pt has only had HHPT after her stroke and has never had any OPPT for her back.    Pt reports she sits in a recliner at home.     Pt reports she can walk but has frequent falls.  She fell Tuesday.     Pt reports she sleeps on her belly.          Patient Occupation: disabled Pain  Current pain ratin (sitting at rest.  )  At worst pain rating: 10 (lumbar spine B,  In the R hip and LE. )  Location: Pt reports the pain is primarily in the lumbar spine.   Alleviating factors: Pt is non specific.  Exacerbated by: Pt is very non specific.  Progression: worsening    Treatments  Previous treatment: injection treatment and home therapy           Objective       Postural Observations  Seated posture: poor  Standing posture: poor  Correction of posture: makes symptoms worse        Neurological Testing   Sensation     Lumbar   Left   Diminished: light touch    Right   Diminished: light touch    Reflexes   Left   Patellar (L4): normal (2+)    Right   Patellar (L4): normal (2+)    Active Range of Motion     Additional Active Range of Motion Details  Trunk ROM is moderately + limited and guarded.  Pt is apprehensive and upset to perform testing.        Passive Range of Motion     Additional Passive Range of Motion Details  Pt with moderate elevated pain with testing.  Pt's guarding is not a limiting factor.  Pt with SKTC minimally restricted.     Strength/Myotome Testing     Left Ankle/Foot   Dorsiflexion: 4+    Right  Ankle/Foot   Dorsiflexion: 4    Tests     Lumbar     Left   Negative passive SLR.     Right   Negative passive SLR.     Additional Tests Details  Transfers are slow and moderately guarded.  She is independent with sit to stand transfers.    Ambulation   Weight-Bearing Status   Distance in feet: 100  Assistive device used: none    Ambulation: Level Surfaces   Ambulation without assistive device: independent    Observational Gait   Gait: antalgic and asymmetric   Decreased walking speed and stride length.   Base of support: increased         Assessment & Plan     Assessment  Impairments: abnormal muscle tone, abnormal or restricted ROM, activity intolerance, lacks appropriate home exercise program and pain with function  Assessment details: Patient is a 57 year old female who comes to physical therapy with chronic lumbar pain. She does not seem to be interested in PT and almost belligerent during the exam with history taking and pain questions. I instructed her on the purpose of OPPT and our strategy to improve her status and left it up to her if she would like to continue with PT treatments.    The patient currently has pain, decreased ROM, decreased strength, and inability to perform all essential functional activities. Pt will benefit from skilled PT services to address the above issues.     Prognosis: poor  Functional Limitations: carrying objects, lifting, pulling, pushing, uncomfortable because of pain, sitting, standing, stooping and unable to perform repetitive tasks  Goals  Plan Goals:   SHORT TERM GOALS:     2 weeks  1. Pt independent with HEP  2. Pt to demonstrate trunk AROM 25-50% of expected norms to allow for improved ability to perform ADL's  3. Pt to demonstrate bilateral hip strength 4-/5 in all planes to improved stability of the core/trunk     LONG TERM GOALS:   6 weeks  1. Pt to demonstrate trunk AROM 50-75% of expected norms to allow for improved ability to perform functional activities  2. Pt to  demonstrate ability to perform transfers without elevated pain.  3. Pt to report being able to work in the home for 20 minutes before rest needed.   4. Pt to report cessation of pain/numbness/tingling into the bilateral leg to show decreased nerve compression      Plan  Therapy options: will be seen for skilled physical therapy services  Planned modality interventions: cryotherapy, thermotherapy (hydrocollator packs) and electrical stimulation/Russian stimulation  Planned therapy interventions: abdominal trunk stabilization, manual therapy, spinal/joint mobilization, soft tissue mobilization, strengthening, stretching, therapeutic activities, functional ROM exercises, flexibility, body mechanics training, neuromuscular re-education and postural training  Treatment plan discussed with: patient  Plan details: Pt will be seen 1-2 x / week.  Assess Pt response to PREP, posture and body mechanics.         Manual Therapy:         mins  64376;  Therapeutic Exercise:    4     mins  77905;     Neuromuscular Valeria:        mins  90820;    Therapeutic Activity:     14     mins  55677;     Gait Training:           mins  11962;     Ultrasound:          mins  12418;    Electrical Stimulation:         mins  43256 ( );  Dry Needling          mins self-pay    Timed Treatment:   18   mins   Total Treatment:     48   mins    PT SIGNATURE: Barry Valerio, PT   DATE TREATMENT INITIATED: 12/15/2017    Medicare Initial Certification  Certification Period: 3/15/2018  I certify that the therapy services are furnished while this patient is under my care.  The services outlined above are required by this patient, and will be reviewed every 90 days.     PHYSICIAN: Fantasma Huitron MD      DATE:     Please sign and return via fax to  .. Thank you, [facility].

## 2017-12-20 ENCOUNTER — TRANSCRIBE ORDERS (OUTPATIENT)
Dept: ADMINISTRATIVE | Facility: HOSPITAL | Age: 57
End: 2017-12-20

## 2017-12-20 ENCOUNTER — LAB (OUTPATIENT)
Dept: LAB | Facility: HOSPITAL | Age: 57
End: 2017-12-20

## 2017-12-20 DIAGNOSIS — N39.0 URINARY TRACT INFECTION WITHOUT HEMATURIA, SITE UNSPECIFIED: Primary | ICD-10-CM

## 2017-12-20 DIAGNOSIS — M54.40 LUMBAGO WITH SCIATICA, UNSPECIFIED SIDE: ICD-10-CM

## 2017-12-20 DIAGNOSIS — R32 URINARY INCONTINENCE, UNSPECIFIED TYPE: ICD-10-CM

## 2017-12-20 DIAGNOSIS — N18.30 CHRONIC KIDNEY DISEASE, STAGE 3 (MODERATE): ICD-10-CM

## 2017-12-20 LAB
BILIRUB UR QL STRIP: NEGATIVE
CLARITY UR: CLEAR
COLOR UR: YELLOW
GLUCOSE UR STRIP-MCNC: ABNORMAL MG/DL
HGB UR QL STRIP.AUTO: NEGATIVE
KETONES UR QL STRIP: NEGATIVE
LEUKOCYTE ESTERASE UR QL STRIP.AUTO: NEGATIVE
NITRITE UR QL STRIP: NEGATIVE
PH UR STRIP.AUTO: 5.5 [PH] (ref 5–8)
PROT UR QL STRIP: NEGATIVE
SP GR UR STRIP: >=1.03 (ref 1–1.03)
UROBILINOGEN UR QL STRIP: ABNORMAL

## 2017-12-20 PROCEDURE — 81003 URINALYSIS AUTO W/O SCOPE: CPT

## 2017-12-29 ENCOUNTER — TREATMENT (OUTPATIENT)
Dept: PHYSICAL THERAPY | Facility: CLINIC | Age: 57
End: 2017-12-29

## 2017-12-29 DIAGNOSIS — M54.50 CHRONIC MIDLINE LOW BACK PAIN WITHOUT SCIATICA: Primary | ICD-10-CM

## 2017-12-29 DIAGNOSIS — G89.29 CHRONIC MIDLINE LOW BACK PAIN WITHOUT SCIATICA: Primary | ICD-10-CM

## 2017-12-29 PROCEDURE — 97110 THERAPEUTIC EXERCISES: CPT | Performed by: PHYSICAL THERAPIST

## 2017-12-29 NOTE — PROGRESS NOTES
Physical Therapy Daily Progress Note      Visit # : 2    Sue Collett reports 3/10 pain today at rest.  Pt reports she has been trying to do more around the house that can help her back.         Objective Pt present to PT today with minimal distress.    Pt ambulation with moderate antalgia noted in the R LE.      Pt able to perform all exercises.        See Exercise, Manual, and Modality Logs for complete treatment.     Assessment/Plan  Pt is able to perform exercises today.       Progress per Plan of Care             Manual Therapy:         mins  45490;  Therapeutic Exercise:    42     mins  57146;     Neuromuscular Valeria:        mins  96562;    Therapeutic Activity:          mins  22137;     Gait Training:        ___  mins  99943;     Ultrasound:          mins  57390;    Electrical Stimulation:         mins  82630 ( );  Dry Needling          mins self-pay    Timed Treatment:   42   mins   Total Treatment:     49   mins    Barry Valerio, PT  Physical Therapist

## 2018-03-20 ENCOUNTER — OFFICE VISIT (OUTPATIENT)
Dept: NEUROLOGY | Facility: CLINIC | Age: 58
End: 2018-03-20

## 2018-03-20 VITALS
HEIGHT: 66 IN | SYSTOLIC BLOOD PRESSURE: 118 MMHG | BODY MASS INDEX: 39.37 KG/M2 | DIASTOLIC BLOOD PRESSURE: 68 MMHG | WEIGHT: 245 LBS | OXYGEN SATURATION: 98 % | HEART RATE: 72 BPM

## 2018-03-20 DIAGNOSIS — R56.9 SEIZURE-LIKE ACTIVITY (HCC): Primary | ICD-10-CM

## 2018-03-20 PROCEDURE — 99214 OFFICE O/P EST MOD 30 MIN: CPT | Performed by: NURSE PRACTITIONER

## 2018-03-20 RX ORDER — ZINC GLUCONATE 50 MG
400 TABLET ORAL DAILY
Refills: 0 | COMMUNITY
Start: 2018-01-30 | End: 2019-02-05

## 2018-03-20 RX ORDER — FLUTICASONE PROPIONATE 50 MCG
1 SPRAY, SUSPENSION (ML) NASAL DAILY
Refills: 0 | COMMUNITY
Start: 2018-01-02

## 2018-03-20 RX ORDER — CHOLECALCIFEROL (VITAMIN D3) 125 MCG
500 CAPSULE ORAL DAILY
Refills: 0 | COMMUNITY
Start: 2018-01-30 | End: 2018-09-20

## 2018-03-20 NOTE — PROGRESS NOTES
"Subjective   Sue L Collett is a 57 y.o. female     Chief Complaint   Patient presents with   • Follow-up     Pt is here for a 3 month FU since her stroke. Pt states that she smells odd smells from time to time and still experiences \"episodes\". She c/o memory problems and confusion at times.        History of Present Illness     PAST HX:Patient is a pleasant 57-year-old female in clinic today to follow-up for her MRI of her brain and lumbar.  The brain was within acceptable limits and did not show a stroke.  Patient still having numbness and tingling episodically on the right side of her face and her eyes will stare off.  We will plan to get an EEG for patient.  I reviewed patient's lumbar MRI in clinic as noted below patient has been having significant difficulties walking lifting her knee and having urinary incontinence.         Pt new to neurology est for intermittent loss of sensation hip to knees but not below knees.  Episodes weakness now using a walker Sudden onset 2-3 months.  + incontinence with twisting to the right has genital numbness. + LE loss of sensation can lay flat with back pillow and feeling will return  C/O headache January or February severe states occipital bone still sore and sxs have evoloved since.   + Lupus + Factor V uses warfarin       The following portions of the patient's history were reviewed and updated as appropriate: allergies, current medications, past family history, past medical history, past social history, past surgical history and problem list.    Review of Systems  Constitutional: Positive for activity change and fatigue. Negative for chills and fever.   HENT: Negative for congestion, ear pain, hearing loss, rhinorrhea and sore throat.    Eyes: Negative for pain, discharge and redness.   Respiratory: Positive for shortness of breath. Negative for cough, wheezing and stridor.    Cardiovascular: Negative for chest pain, palpitations and leg swelling.        + Factor V with hx " "DVt. On anticoagulation   Gastrointestinal: Negative for abdominal pain, constipation, nausea and vomiting.   Endocrine: Negative for cold intolerance, heat intolerance and polyphagia.   Genitourinary: Positive for urgency. Negative for dysuria, flank pain and frequency.        Incontinence   Musculoskeletal: Positive for arthralgias, back pain, gait problem, neck pain and neck stiffness. Negative for joint swelling and myalgias.   Skin: Negative for pallor, rash and wound.   Allergic/Immunologic: Negative for environmental allergies.   Neurological: Positive for weakness, numbness and headaches. Negative for dizziness, tremors, seizures, syncope, facial asymmetry, speech difficulty and light-headedness.   Hematological: Negative for adenopathy.   Psychiatric/Behavioral: Positive for decreased concentration. Negative for confusion and hallucinations. The patient is nervous/anxious.    Objective         Vitals:    03/20/18 0924   BP: 118/68   Pulse: 72   SpO2: 98%   Weight: 111 kg (245 lb)   Height: 167.6 cm (66\")     GENERAL: Patient is pleasant, cooperative, appears to be stated age.  Body habitus is endomorphic.  SKIN AND EXTREMITIES:  No skin rashes or lesions are noted.  No cyanosis, clubbing or edema of the extremities.    HEAD:  Head is normocephalic and atraumatic.    NECK: Neck are non-tender without thyromegaly or adenopathy.  Carotic upstrokes are 1+/4.  No cranial or cervical bruits.  The neck is supple with a full range of motion.   ENT: palate elevate symmetrically, no evidence of high arch palate, tongue midline erythema in posterior pharynx, Mallampati Classification Class III   CARDIOVASCULAR:  Regular rate and rhythm with normal S1 and S2 without rub or gallop.  RESPIRATORY:  Clear to auscultation without wheezes or crackle   BACK:  Back is straight without midline defect.    PSYCH:  Higher cortical function/mental status:  The patient is alert.  She  is oriented x3 to time, place and person.  " Recent and the remote memory appear normal.  The patient has a good fund of knowledge.  There is no visual or auditory hallucination or suicidal or homicidal ideation.  SPEECH:There is no gross evidence of aphasia, dysarthria or agnosia.      CRANIAL NERVES:  Pupils are 4mm, equal round reactive to light, reacting briskly to 2mm without afferent pupillary defect.  Visual fields are intact to confrontation testing.  Fundoscopic examination reveals sharp disk margins with normal vasculature.  No papilledema, hemorrhages or exudates.  Extraocular movements are full and smooth with normal pursuits and saccades.  No nystagmus noted.  The face is symmetric. palate elevate symmetrically, Tongue midline, positive gag reflex. The remainder of the cranial nerves are intact and symmetrical.    MOTOR: Strength is 5/5 throughout with normal tone and bulk  No involuntary movements noted.    DTR: are 2/4 and symmetrical in the upper extremities, 2/4 and symmetrical at the knees and 1/4 and symmetrical at the Achilles tendon.  Plantar responses were down-going bilaterally.    SENSATION:  Intact to pinprick, light touch, vibration and proprioception.  Coordination:  The patient normally performs finger-nose-finger, heel-to-knee-to-shin and rapid alternating movements in symmetrical fashion.    COORDINATION AND GAIT:  The patient walks with a narrow-based gait.  Patient is able to heel-toe and tandem walk forward and backwards without difficulty.  Romberg and monopedal  Romberg are negative.    MUSCULOSKELETAL: Range of motion normal, no clubbing, cyanosis, or edema.  No joint swelling.     Results for orders placed or performed in visit on 12/20/17   Urinalysis With / Culture If Indicated   Result Value Ref Range    Color, UA Yellow Yellow, Straw    Appearance, UA Clear Clear    pH, UA 5.5 5.0 - 8.0    Specific Gravity, UA >=1.030 1.005 - 1.030    Glucose,  mg/dL (1+) (A) Negative    Ketones, UA Negative Negative    Bilirubin,  UA Negative Negative    Blood, UA Negative Negative    Protein, UA Negative Negative    Leuk Esterase, UA Negative Negative    Nitrite, UA Negative Negative    Urobilinogen, UA 0.2 E.U./dL 0.2 - 1.0 E.U./dL         I have personally reviewed the above labs and imaging studies.    Assessment/Plan   1.  Status post CVA appears patient had seizure in clinic today.  EEG was within normal limits.  Trokendi for 50 mg      2.  Lumbar pain with lower extremity neuropathy/radiculopathy inability to control urine certain movements.   patient with severe stenosis in the lumbar on MRI.  Neurosurgery consult placed.

## 2018-05-17 ENCOUNTER — PRIOR AUTHORIZATION (OUTPATIENT)
Dept: NEUROLOGY | Facility: CLINIC | Age: 58
End: 2018-05-17

## 2018-05-17 DIAGNOSIS — R56.9 SEIZURE-LIKE ACTIVITY (HCC): Primary | ICD-10-CM

## 2018-05-17 NOTE — TELEPHONE ENCOUNTER
"Can you order another EEG taht is \"awake or sleep\" and/or video EEG monitoring. Once this is ordered please let me know and I will cancel the old order.     Thank you.   "

## 2018-05-17 NOTE — TELEPHONE ENCOUNTER
"THIS PT HAD AN ORDER FOR AN AMBULATORY EEG, WE ARE UNABLE TO SCHEDULE THIS TYPE OF EEG. IT WILL NEED TO BE CANCELED AND REORDERED AS EITHER AN \"EEG AWAKE OR ASLEEP\" OR \"VIDEO MONITORING EEG\" DEPENDING ON WHICH ONE YOU ARE WANTING THE PATIENT TO HAVE.  "

## 2018-09-20 ENCOUNTER — OFFICE VISIT (OUTPATIENT)
Dept: SURGERY | Facility: CLINIC | Age: 58
End: 2018-09-20

## 2018-09-20 VITALS
TEMPERATURE: 97.8 F | OXYGEN SATURATION: 98 % | BODY MASS INDEX: 40.18 KG/M2 | DIASTOLIC BLOOD PRESSURE: 64 MMHG | WEIGHT: 250 LBS | HEART RATE: 75 BPM | HEIGHT: 66 IN | SYSTOLIC BLOOD PRESSURE: 100 MMHG

## 2018-09-20 DIAGNOSIS — E04.1 THYROID NODULE: Primary | ICD-10-CM

## 2018-09-20 PROCEDURE — 99204 OFFICE O/P NEW MOD 45 MIN: CPT | Performed by: SURGERY

## 2018-09-25 ENCOUNTER — PROCEDURE VISIT (OUTPATIENT)
Dept: SURGERY | Facility: CLINIC | Age: 58
End: 2018-09-25

## 2018-09-25 VITALS
SYSTOLIC BLOOD PRESSURE: 118 MMHG | BODY MASS INDEX: 40.18 KG/M2 | HEART RATE: 64 BPM | OXYGEN SATURATION: 98 % | TEMPERATURE: 97.8 F | HEIGHT: 66 IN | WEIGHT: 250 LBS | DIASTOLIC BLOOD PRESSURE: 78 MMHG | RESPIRATION RATE: 18 BRPM

## 2018-09-25 DIAGNOSIS — E04.1 THYROID NODULE: Primary | ICD-10-CM

## 2018-09-26 ENCOUNTER — TELEPHONE (OUTPATIENT)
Dept: SURGERY | Facility: CLINIC | Age: 58
End: 2018-09-26

## 2018-09-26 NOTE — TELEPHONE ENCOUNTER
----- Message from Janeth Tan MD sent at 9/26/2018  3:03 PM EDT -----  Please have this patient come back for follow up next week as I will need to discuss her FNA with her.  Emphasize that it is not bad news.

## 2018-10-02 ENCOUNTER — OFFICE VISIT (OUTPATIENT)
Dept: SURGERY | Facility: CLINIC | Age: 58
End: 2018-10-02

## 2018-10-02 VITALS
HEART RATE: 90 BPM | RESPIRATION RATE: 18 BRPM | HEIGHT: 66 IN | BODY MASS INDEX: 40.18 KG/M2 | DIASTOLIC BLOOD PRESSURE: 68 MMHG | TEMPERATURE: 99 F | WEIGHT: 250 LBS | SYSTOLIC BLOOD PRESSURE: 122 MMHG | OXYGEN SATURATION: 98 %

## 2018-10-02 DIAGNOSIS — E04.1 THYROID NODULE: Primary | ICD-10-CM

## 2018-10-02 PROCEDURE — 99213 OFFICE O/P EST LOW 20 MIN: CPT | Performed by: SURGERY

## 2018-10-02 NOTE — PROGRESS NOTES
Subjective   Sue L Collett is a 58 y.o. female.   Chief Complaint   Patient presents with   • Follow-up     fine needle aspiration thyroid.     History of Present Illness   Mrs. Collett returns to the office today for follow-up after undergoing an ultrasound-guided fine-needle aspiration of the thyroid gland on 9/25/2018.  Her symptoms are unchanged.  Pathology from the right-sided nodule demonstrates an atypical follicular lesion of undetermined significance.  There was scant/absent colloid material which is a worrisome feature.  Close follow-up with a repeat FNA was recommended.  Alternatively, PCR testing for oncogene mutations were also offered.  FNA of the isthmus was nondiagnostic.  The patient has had no postprocedural complications.    The following portions of the patient's history were reviewed and updated as appropriate: allergies, current medications, past family history, past medical history, past social history, past surgical history and problem list.    Review of Systems   Constitutional: Negative for chills, fever and unexpected weight change.   HENT: Positive for trouble swallowing and voice change. Negative for hearing loss.    Eyes: Negative for visual disturbance.   Respiratory: Negative for apnea, cough, chest tightness, shortness of breath and wheezing.    Cardiovascular: Negative for chest pain, palpitations and leg swelling.   Gastrointestinal: Negative for abdominal distention, abdominal pain, anal bleeding, blood in stool, constipation, diarrhea, nausea, rectal pain and vomiting.   Endocrine: Negative for cold intolerance and heat intolerance.   Genitourinary: Negative for difficulty urinating, dysuria and flank pain.   Musculoskeletal: Negative for back pain and gait problem.   Skin: Negative for color change, rash and wound.   Neurological: Negative for dizziness, syncope, speech difficulty, weakness, light-headedness, numbness and headaches.   Hematological: Negative for adenopathy. Does  "not bruise/bleed easily.   Psychiatric/Behavioral: Negative for confusion. The patient is not nervous/anxious.        Objective    /68   Pulse 90   Temp 99 °F (37.2 °C) (Temporal Artery )   Resp 18   Ht 167.6 cm (66\")   Wt 113 kg (250 lb)   SpO2 98%   BMI 40.35 kg/m²     Physical Exam   Constitutional: She is oriented to person, place, and time. She appears well-developed and well-nourished.   HENT:   Head: Normocephalic and atraumatic.   Eyes: No scleral icterus.   Neck: Neck supple. No tracheal deviation present. No thyromegaly present.   Cardiovascular: Regular rhythm.    Pulmonary/Chest: Effort normal.   Neurological: She is alert and oriented to person, place, and time.   Skin: Skin is warm and dry.   Psychiatric: She has a normal mood and affect. Her behavior is normal.       Pathology: Reviewed.    Assessment/Plan   Claire was seen today for follow-up.    Diagnoses and all orders for this visit:    Thyroid nodule     I discussed the pathology results with the patient.  We discussed the usual management of a follicular lesion of undetermined significance.  I discussed with the patient the options of a repeat fine-needle aspiration versus a diagnostic lobectomy.  We discussed the pros and cons of both approaches.  Alternatively, it does seem our local pathology lab is now able to offer advanced PCR molecular testing which will shed light on the additional features of the lesion in question.  I have requested these from [facility], and once these are available, I will be able to offer further recommendations to the patient.  She prefers to wait for additional testing before making a decision regarding the possibility of surgery.  I think that this is very reasonable.  I will see her back once the additional molecular studies are available.       "

## 2018-10-08 ENCOUNTER — TELEPHONE (OUTPATIENT)
Dept: SURGERY | Facility: CLINIC | Age: 58
End: 2018-10-08

## 2018-10-09 NOTE — PROGRESS NOTES
The patient presented to the office today for a scheduled ultrasound guided thyroid fine-needle aspiration.  This was done separately from the previous evaluation and management service due to the patient being on therapeutic anticoagulation.  Today, her INR was 1.3 on point of care testing.  Please refer to the separately dictated ultrasound procedure report.

## 2018-10-17 ENCOUNTER — TELEPHONE (OUTPATIENT)
Dept: SURGERY | Facility: CLINIC | Age: 58
End: 2018-10-17

## 2018-10-17 NOTE — TELEPHONE ENCOUNTER
Pt is on schedule for tomorrow and therefore she got the automated call reminding her of her appt, so she called and said Dr Tan said she would call call her with the results and she did not have to come in

## 2018-10-17 NOTE — TELEPHONE ENCOUNTER
I haven't seen her additional results yet/  [facility] was supposed to be doing molecular testing on her FNA specimen.  I called them myself the day of her last appointment.  I was expecting an addendum from them.  She does not have to come in.

## 2018-10-29 NOTE — PROGRESS NOTES
Please inform the patient that we have received her additional molecular testing from the thyroid aspirate.  This indicates a slightly increased risk of malignancy somewhere in the neighborhood of 45-60%.  The options at this point would be close follow-up with repeat biopsy in 2-3 months versus surgery to remove half of the thyroid gland.  If she wishes to follow this with repeat ultrasound and aspiration, please schedule her for a follow-up visit in 12 weeks.  If she wishes to proceed with surgery, she will need to return to the office to be counseled appropriately regarding this procedure.

## 2018-10-31 ENCOUNTER — TELEPHONE (OUTPATIENT)
Dept: SURGERY | Facility: CLINIC | Age: 58
End: 2018-10-31

## 2018-10-31 NOTE — TELEPHONE ENCOUNTER
I spoke with patient, she stated she would like to discuss results with family and call me back in 1-2 days with a decision.     ----- Message from Janeth Tan MD sent at 10/29/2018  4:38 PM EDT -----  Please inform the patient that we have received her additional molecular testing from the thyroid aspirate.  This indicates a slightly increased risk of malignancy somewhere in the neighborhood of 45-60%.  The options at this point would be close fol_  low-up with repeat biopsy in 2-3 months versus surgery to remove half of the thyroid gland.  If she wishes to follow this with repeat ultrasound and aspiration, please schedule her for a follow-up visit in 12 weeks.  If she wishes to proceed with emily_  frankie, she will need to return to the office to be counseled appropriately regarding this procedure.

## 2018-11-16 ENCOUNTER — OFFICE VISIT (OUTPATIENT)
Dept: SURGERY | Facility: CLINIC | Age: 58
End: 2018-11-16

## 2018-11-16 ENCOUNTER — HOSPITAL ENCOUNTER (OUTPATIENT)
Dept: MAMMOGRAPHY | Facility: HOSPITAL | Age: 58
Discharge: HOME | End: 2018-11-16
Payer: MEDICARE

## 2018-11-16 VITALS
SYSTOLIC BLOOD PRESSURE: 128 MMHG | OXYGEN SATURATION: 98 % | HEIGHT: 66 IN | WEIGHT: 246 LBS | HEART RATE: 89 BPM | TEMPERATURE: 97.7 F | BODY MASS INDEX: 39.53 KG/M2 | DIASTOLIC BLOOD PRESSURE: 76 MMHG

## 2018-11-16 DIAGNOSIS — E04.1 THYROID NODULE: Primary | ICD-10-CM

## 2018-11-16 DIAGNOSIS — Z12.39 BREAST CANCER SCREENING: ICD-10-CM

## 2018-11-16 PROCEDURE — 99214 OFFICE O/P EST MOD 30 MIN: CPT | Performed by: SURGERY

## 2018-11-16 PROCEDURE — 77063 BREAST TOMOSYNTHESIS BI: CPT

## 2018-11-16 RX ORDER — SODIUM CHLORIDE 9 MG/ML
50 INJECTION, SOLUTION INTRAVENOUS CONTINUOUS
Status: CANCELLED | OUTPATIENT
Start: 2018-11-16

## 2018-11-16 NOTE — PROGRESS NOTES
"Subjective   Sue L Collett is a 58 y.o. female.   Chief Complaint   Patient presents with   • Follow-up     f/u thyroid   .     History of Present Illness    Ms. Collett comes to the office today accompanied by her family, for follow-up discussion regarding the fine-needle aspiration of results from her thyroid nodule.  Her initial cytology showed FLUS.  Additional molecular testing was requested on the aspirate and this revealed a moderate risk of malignancy estimated at 45-60%.  She is here today to discuss surgery.  She continues to complain of frequent \"throat clearing\".  She does not have episodes of choking but does complain of occasional fullness in her throat when swallowing.  She reports no changes to her medical history in he interim.        The following portions of the patient's history were reviewed and updated as appropriate: allergies, current medications, past family history, past medical history, past social history, past surgical history and problem list.     Patient Active Problem List   Diagnosis   • Cerebrovascular accident (CVA) due to thrombosis (CMS/HCC)   • Chronic bilateral low back pain with bilateral sciatica   • Seizure-like activity (CMS/HCC)       Past Medical History:   Diagnosis Date   • Allergic    • Arthritis    • Asthma    • Chronic kidney disease    • Diabetes mellitus (CMS/HCC)    • DVT of leg (deep venous thrombosis) (CMS/HCC)    • Factor V deficiency (CMS/HCC)    • Headache    • High cholesterol    • Hypertension    • Lupus    • Seizures (CMS/HCC)    • Shortness of breath    • Stroke (CMS/HCC)        Past Surgical History:   Procedure Laterality Date   • ENDOSCOPY     • GALLBLADDER SURGERY     • TUBAL ABDOMINAL LIGATION     • WISDOM TOOTH EXTRACTION         Medications:     Current Outpatient Medications:   •  fluticasone (FLONASE) 50 MCG/ACT nasal spray, instill 2 sprays into each nostril once daily, Disp: , Rfl: 0  •  JANUVIA 50 MG tablet, , Disp: , Rfl:   •  LANTUS 100 " UNIT/ML injection, , Disp: , Rfl:   •  losartan-hydrochlorothiazide (HYZAAR) 100-25 MG per tablet, , Disp: , Rfl:   •  RA FOLIC ACID 400 MCG tablet, Take 400 mcg by mouth Daily., Disp: , Rfl: 0  •  RESTASIS 0.05 % ophthalmic emulsion, , Disp: , Rfl:   •  vitamin D (ERGOCALCIFEROL) 56001 units capsule capsule, Take 50,000 Units by mouth 1 (One) Time Per Week., Disp: , Rfl:   •  warfarin (COUMADIN) 7.5 MG tablet, Take 7.5 mg by mouth Take As Directed. Takes Mon, Wed, Fri, Sat, Disp: , Rfl:     Allergies:   Allergies   Allergen Reactions   • Red Dye Hives   • Azithromycin    • Erythromycin    • Prednisone    • Sulfa Antibiotics          Family History   Problem Relation Age of Onset   • Heart disease Father    • Heart attack Father    • Stroke Mother    • Hypertension Mother    • Hyperlipidemia Mother    • Kidney disease Mother        Social History     Socioeconomic History   • Marital status:      Spouse name: Not on file   • Number of children: Not on file   • Years of education: Not on file   • Highest education level: Not on file   Occupational History     Employer: DISABLED   Tobacco Use   • Smoking status: Never Smoker   • Smokeless tobacco: Never Used   Substance and Sexual Activity   • Alcohol use: No   • Drug use: No   • Sexual activity: Defer         Review of Systems   Constitutional: Negative for chills, fever and unexpected weight change.   HENT: Positive for trouble swallowing. Negative for hearing loss and voice change.    Eyes: Negative for visual disturbance.   Respiratory: Negative for apnea, cough, chest tightness, shortness of breath and wheezing.    Cardiovascular: Negative for chest pain, palpitations and leg swelling.   Gastrointestinal: Negative for abdominal distention, abdominal pain, anal bleeding, blood in stool, constipation, diarrhea, nausea, rectal pain and vomiting.   Endocrine: Negative for cold intolerance and heat intolerance.   Genitourinary: Negative for difficulty  "urinating, dysuria and flank pain.   Musculoskeletal: Positive for neck pain. Negative for back pain and gait problem.   Skin: Negative for color change, rash and wound.   Neurological: Negative for dizziness, syncope, speech difficulty, weakness, light-headedness, numbness and headaches.   Hematological: Negative for adenopathy. Does not bruise/bleed easily.   Psychiatric/Behavioral: Negative for confusion. The patient is not nervous/anxious.        Objective    /76   Pulse 89   Temp 97.7 °F (36.5 °C)   Ht 167.6 cm (66\")   Wt 112 kg (246 lb)   SpO2 98%   BMI 39.71 kg/m²     Physical Exam   Constitutional: She is oriented to person, place, and time. She appears well-developed and well-nourished.   HENT:   Head: Normocephalic and atraumatic.   Eyes: No scleral icterus.   Neck: Neck supple.   Cardiovascular: Regular rhythm.   Pulmonary/Chest: Effort normal.   Abdominal: Soft. She exhibits no distension. There is no tenderness.   Neurological: She is alert and oriented to person, place, and time.   Skin: Skin is warm and dry.   Psychiatric: She has a normal mood and affect. Her behavior is normal.     Outside Records:  I have taken the opportunity to review the patient's available outside records in paper format, scanned format and those available on Care Everywhere    Results Review:  I have reviewed the entirety of the patient's clinical lab results.  I have also personally reviewed the relevant patient imaging.       Assessment/Plan   Claire was seen today for follow-up.    Diagnoses and all orders for this visit:    Thyroid nodule  -     Case Request; Standing  -     ceFAZolin (ANCEF) 2 g in sodium chloride 0.9 % 100 mL IVPB; Infuse 2 g into a venous catheter 1 (One) Time.  -     sodium chloride 0.9 % infusion; Infuse 50 mL/hr into a venous catheter Continuous.  -     Case Request    Other orders  -     Follow Anesthesia Guidelines / Standing Orders; Future  -     Provide NPO Instructions to Patient  -     " Clorhexidine Skin Prep  -     Follow Anesthesia Guidelines / Standing Orders; Standing  -     Verify NPO Status; Standing  -     SCD (Sequential Compression Device) - Place on Patient in Pre-Op; Standing  -     Obtain Informed Consent       I had a long discussion with the patient and her family regarding the options for management at this point.  She has a nodule in the right lobe of the thyroid gland which has been proven on fine-needle aspiration to be a follicular lesion of undetermined significance with atypia noted.  Additional molecular testing on the aspirated specimen estimated the risk of malignancy be 45-60%.  This is in the moderate range, but higher than predicted based on the aspiration alone.  I discussed with the patient the option of repeating the aspiration and 3-6 months versus hemithyroidectomy.  We had a long discussion about removal of half of the thyroid gland plus the isthmus with the understanding that if final pathology demonstrated evidence of malignancy, she may require a completion thyroidectomy plus or minus a jessie dissection.  We discussed in detail the risks, benefits, and alternatives to hemithyroidectomy plus isthmusectomy.  I did explain to her that this would, in her case, removed the right side of the gland plus the isthmus, and leave the left side in place pending final pathology.  I discussed with her that if half of the thyroid gland remains in situ , the likelihood of requiring additional thyroid hormone supplementation is much lower, but is not guaranteed.  I was emphatic in explaining to her that removal of the entire thyroid gland does commit her to lifelong thyroid hormone supplementation.  We had a long discussion about the postoperative recovery, and the fact that she would need to remain in the hospital overnight.  We also discussed the risk of injury to the recurrent laryngeal nerve, and the possibility of neck hematoma requiring reoperation.  At the conclusion of  our discussion, the patient indicated that she wished to proceed with surgery.  All of her additional questions were answered to her satisfaction.  She again verbalized understanding and her desire to go forward.  Her family also verbalizes a desire to proceed with surgery rather than observation. The patient was advised to remain NPO after midnight the evening prior to surgery.  Detailed written medication instructions were provided in the after visit summary.   The patient was advised that my office will make arrangements for scheduling and preadmission testing.   She was instructed to call me 1 week before the scheduled surgery with her INR value.  At that time, I will be able to instructed regarding the holding of her Coumadin.

## 2018-11-27 ENCOUNTER — TELEPHONE (OUTPATIENT)
Dept: SURGERY | Facility: CLINIC | Age: 58
End: 2018-11-27

## 2018-11-27 NOTE — TELEPHONE ENCOUNTER
Patient called concerned about her mammogram done on 11/16/2018 that showed a small nodule @ right breast.  I spoke to Neptali at [facility] she says they are waiting on her past mammogram films to compare images. I explained to the patient that [facility] would be contacting her if she needed any addlt.views. She would not need to see Dr Tan until all test where complete. I also told her if she had not heard from them in the next few days to call us.

## 2018-11-30 ENCOUNTER — TRANSCRIBE ORDERS (OUTPATIENT)
Dept: ULTRASOUND IMAGING | Facility: HOSPITAL | Age: 58
End: 2018-11-30

## 2018-11-30 DIAGNOSIS — N63.0 BREAST NODULE: Primary | ICD-10-CM

## 2018-12-04 ENCOUNTER — HOSPITAL ENCOUNTER (OUTPATIENT)
Dept: ULTRASOUND IMAGING | Facility: HOSPITAL | Age: 58
End: 2018-12-04

## 2018-12-04 ENCOUNTER — HOSPITAL ENCOUNTER (OUTPATIENT)
Dept: ULTRASOUND IMAGING | Facility: HOSPITAL | Age: 58
Discharge: HOME | End: 2018-12-04
Admitting: NURSE PRACTITIONER

## 2018-12-04 ENCOUNTER — HOSPITAL ENCOUNTER (OUTPATIENT)
Dept: MAMMOGRAPHY | Facility: HOSPITAL | Age: 58
Discharge: HOME | End: 2018-12-04

## 2018-12-04 DIAGNOSIS — N63.0 BREAST NODULE: ICD-10-CM

## 2018-12-04 PROCEDURE — G0279 TOMOSYNTHESIS, MAMMO: HCPCS

## 2018-12-04 PROCEDURE — 77065 DX MAMMO INCL CAD UNI: CPT

## 2018-12-04 PROCEDURE — 76642 ULTRASOUND BREAST LIMITED: CPT

## 2018-12-11 PROBLEM — E04.1 THYROID NODULE: Status: ACTIVE | Noted: 2018-12-11

## 2018-12-14 ENCOUNTER — APPOINTMENT (OUTPATIENT)
Dept: PREADMISSION TESTING | Facility: HOSPITAL | Age: 58
End: 2018-12-14

## 2018-12-14 ENCOUNTER — TELEPHONE (OUTPATIENT)
Dept: SURGERY | Facility: CLINIC | Age: 58
End: 2018-12-14

## 2018-12-14 VITALS
HEART RATE: 61 BPM | BODY MASS INDEX: 39.53 KG/M2 | WEIGHT: 246 LBS | HEIGHT: 66 IN | DIASTOLIC BLOOD PRESSURE: 77 MMHG | SYSTOLIC BLOOD PRESSURE: 130 MMHG | OXYGEN SATURATION: 97 %

## 2018-12-14 LAB
ANION GAP SERPL CALCULATED.3IONS-SCNC: 9.3 MMOL/L (ref 10–20)
BUN BLD-MCNC: 17 MG/DL (ref 7–20)
BUN/CREAT SERPL: 15.5 (ref 7.1–23.5)
CALCIUM SPEC-SCNC: 9.7 MG/DL (ref 8.4–10.2)
CHLORIDE SERPL-SCNC: 100 MMOL/L (ref 98–107)
CO2 SERPL-SCNC: 31 MMOL/L (ref 26–30)
CREAT BLD-MCNC: 1.1 MG/DL (ref 0.6–1.3)
DEPRECATED RDW RBC AUTO: 39.9 FL (ref 37–54)
ERYTHROCYTE [DISTWIDTH] IN BLOOD BY AUTOMATED COUNT: 12.9 % (ref 11.5–14.5)
GFR SERPL CREATININE-BSD FRML MDRD: 51 ML/MIN/1.73
GLUCOSE BLD-MCNC: 275 MG/DL (ref 74–98)
HCT VFR BLD AUTO: 46.1 % (ref 37–47)
HGB BLD-MCNC: 15.6 G/DL (ref 12–16)
INR PPP: 1.05 (ref 0.9–1.1)
MCH RBC QN AUTO: 29.2 PG (ref 27–31)
MCHC RBC AUTO-ENTMCNC: 33.8 G/DL (ref 30–37)
MCV RBC AUTO: 86.3 FL (ref 81–99)
PLATELET # BLD AUTO: 238 10*3/MM3 (ref 130–400)
PMV BLD AUTO: 10.6 FL (ref 6–12)
POTASSIUM BLD-SCNC: 4.3 MMOL/L (ref 3.5–5.1)
PROTHROMBIN TIME: 11.7 SECONDS (ref 9.3–12.1)
RBC # BLD AUTO: 5.34 10*6/MM3 (ref 4.2–5.4)
SODIUM BLD-SCNC: 136 MMOL/L (ref 137–145)
WBC NRBC COR # BLD: 5.37 10*3/MM3 (ref 4.8–10.8)

## 2018-12-14 PROCEDURE — 85610 PROTHROMBIN TIME: CPT | Performed by: SURGERY

## 2018-12-14 PROCEDURE — 93005 ELECTROCARDIOGRAM TRACING: CPT | Performed by: SURGERY

## 2018-12-14 PROCEDURE — 85027 COMPLETE CBC AUTOMATED: CPT | Performed by: SURGERY

## 2018-12-14 PROCEDURE — 80048 BASIC METABOLIC PNL TOTAL CA: CPT | Performed by: SURGERY

## 2018-12-14 PROCEDURE — 36415 COLL VENOUS BLD VENIPUNCTURE: CPT

## 2018-12-14 NOTE — PAT
PT VERBALIZED SHE CHECKED PT/INR AT HOME YESTERDAY (12/13/2018). I SPOKE WITH PT REGARDING IN DR. LOZANO'S NOTE FROM 11/16/2018, DR. LOZANO INSTRUCTED PT TO CALL ONE WEEK PRIOR TO SURGERY WITH PT/INR RESULTS TO INSTRUCT HER FURTHER ON COUMADIN THERAPY. PT VERBALIZED UNDERSTANDING AND STATED SHE WOULD CALL TODAY. THIS RN ALSO ORDERED PT/INR PER ANESTHESIA PROTOCOL, RESULTS PENDING.

## 2018-12-21 ENCOUNTER — HOSPITAL ENCOUNTER (OUTPATIENT)
Facility: HOSPITAL | Age: 58
Discharge: HOME | End: 2018-12-23
Attending: SURGERY | Admitting: SURGERY

## 2018-12-21 ENCOUNTER — ANESTHESIA (OUTPATIENT)
Dept: PERIOP | Facility: HOSPITAL | Age: 58
End: 2018-12-21

## 2018-12-21 ENCOUNTER — ANESTHESIA EVENT (OUTPATIENT)
Dept: PERIOP | Facility: HOSPITAL | Age: 58
End: 2018-12-21

## 2018-12-21 DIAGNOSIS — I63.39 CEREBROVASCULAR ACCIDENT (CVA) DUE TO THROMBOSIS OF OTHER CEREBRAL ARTERY (HCC): Primary | ICD-10-CM

## 2018-12-21 DIAGNOSIS — E04.1 THYROID NODULE: ICD-10-CM

## 2018-12-21 LAB
GLUCOSE BLDC GLUCOMTR-MCNC: 266 MG/DL (ref 70–130)
GLUCOSE BLDC GLUCOMTR-MCNC: 289 MG/DL (ref 70–130)

## 2018-12-21 PROCEDURE — 25010000002 DEXAMETHASONE PER 1 MG: Performed by: NURSE ANESTHETIST, CERTIFIED REGISTERED

## 2018-12-21 PROCEDURE — 63710000001 INSULIN DETEMIR PER 5 UNITS: Performed by: SURGERY

## 2018-12-21 PROCEDURE — 82962 GLUCOSE BLOOD TEST: CPT

## 2018-12-21 PROCEDURE — 25010000002 FENTANYL CITRATE (PF) 250 MCG/5ML SOLUTION: Performed by: NURSE ANESTHETIST, CERTIFIED REGISTERED

## 2018-12-21 PROCEDURE — G0378 HOSPITAL OBSERVATION PER HR: HCPCS

## 2018-12-21 PROCEDURE — S0260 H&P FOR SURGERY: HCPCS | Performed by: SURGERY

## 2018-12-21 PROCEDURE — 25010000002 FENTANYL CITRATE (PF) 100 MCG/2ML SOLUTION: Performed by: NURSE ANESTHETIST, CERTIFIED REGISTERED

## 2018-12-21 PROCEDURE — 94799 UNLISTED PULMONARY SVC/PX: CPT

## 2018-12-21 PROCEDURE — A9270 NON-COVERED ITEM OR SERVICE: HCPCS | Performed by: SURGERY

## 2018-12-21 PROCEDURE — 25010000002 SUCCINYLCHOLINE PER 20 MG: Performed by: NURSE ANESTHETIST, CERTIFIED REGISTERED

## 2018-12-21 PROCEDURE — A9270 NON-COVERED ITEM OR SERVICE: HCPCS | Performed by: NURSE ANESTHETIST, CERTIFIED REGISTERED

## 2018-12-21 PROCEDURE — 25010000002 PROMETHAZINE PER 50 MG

## 2018-12-21 PROCEDURE — 63710000001 SCOPOLAMINE 1.5 MG/3DAYS PATCH 72 HOUR: Performed by: NURSE ANESTHETIST, CERTIFIED REGISTERED

## 2018-12-21 PROCEDURE — 25010000002 PROPOFOL 200 MG/20ML EMULSION: Performed by: NURSE ANESTHETIST, CERTIFIED REGISTERED

## 2018-12-21 PROCEDURE — 25010000002 ONDANSETRON PER 1 MG: Performed by: NURSE ANESTHETIST, CERTIFIED REGISTERED

## 2018-12-21 PROCEDURE — 63710000001 HYDROCODONE-ACETAMINOPHEN 7.5-325 MG TABLET: Performed by: SURGERY

## 2018-12-21 PROCEDURE — 25010000003 CEFAZOLIN SODIUM-DEXTROSE 2-3 GM-%(50ML) RECONSTITUTED SOLUTION: Performed by: SURGERY

## 2018-12-21 PROCEDURE — 60220 PARTIAL REMOVAL OF THYROID: CPT | Performed by: SURGERY

## 2018-12-21 PROCEDURE — 25010000002 ONDANSETRON PER 1 MG

## 2018-12-21 RX ORDER — SODIUM CHLORIDE 9 MG/ML
100 INJECTION, SOLUTION INTRAVENOUS CONTINUOUS
Status: DISCONTINUED | OUTPATIENT
Start: 2018-12-21 | End: 2018-12-23 | Stop reason: HOSPADM

## 2018-12-21 RX ORDER — NICOTINE POLACRILEX 4 MG
1 LOZENGE BUCCAL
Status: DISCONTINUED | OUTPATIENT
Start: 2018-12-21 | End: 2018-12-23 | Stop reason: HOSPADM

## 2018-12-21 RX ORDER — SODIUM CHLORIDE 0.9 % (FLUSH) 0.9 %
3 SYRINGE (ML) INJECTION AS NEEDED
Status: DISCONTINUED | OUTPATIENT
Start: 2018-12-21 | End: 2018-12-21 | Stop reason: HOSPADM

## 2018-12-21 RX ORDER — LIDOCAINE HYDROCHLORIDE 10 MG/ML
INJECTION, SOLUTION INFILTRATION; PERINEURAL AS NEEDED
Status: DISCONTINUED | OUTPATIENT
Start: 2018-12-21 | End: 2018-12-21 | Stop reason: HOSPADM

## 2018-12-21 RX ORDER — ONDANSETRON 2 MG/ML
4 INJECTION INTRAMUSCULAR; INTRAVENOUS ONCE AS NEEDED
Status: COMPLETED | OUTPATIENT
Start: 2018-12-21 | End: 2018-12-21

## 2018-12-21 RX ORDER — SODIUM CHLORIDE 0.9 % (FLUSH) 0.9 %
3-10 SYRINGE (ML) INJECTION AS NEEDED
Status: DISCONTINUED | OUTPATIENT
Start: 2018-12-21 | End: 2018-12-23 | Stop reason: HOSPADM

## 2018-12-21 RX ORDER — ONDANSETRON 2 MG/ML
INJECTION INTRAMUSCULAR; INTRAVENOUS
Status: COMPLETED
Start: 2018-12-21 | End: 2018-12-21

## 2018-12-21 RX ORDER — PROMETHAZINE HYDROCHLORIDE 25 MG/ML
INJECTION, SOLUTION INTRAMUSCULAR; INTRAVENOUS
Status: COMPLETED
Start: 2018-12-21 | End: 2018-12-21

## 2018-12-21 RX ORDER — DEXTROSE MONOHYDRATE 25 G/50ML
25 INJECTION, SOLUTION INTRAVENOUS
Status: DISCONTINUED | OUTPATIENT
Start: 2018-12-21 | End: 2018-12-23 | Stop reason: HOSPADM

## 2018-12-21 RX ORDER — NALOXONE HCL 0.4 MG/ML
0.1 VIAL (ML) INJECTION
Status: DISCONTINUED | OUTPATIENT
Start: 2018-12-21 | End: 2018-12-23 | Stop reason: HOSPADM

## 2018-12-21 RX ORDER — PROMETHAZINE HYDROCHLORIDE 25 MG/ML
12.5 INJECTION, SOLUTION INTRAMUSCULAR; INTRAVENOUS EVERY 6 HOURS PRN
Status: DISCONTINUED | OUTPATIENT
Start: 2018-12-21 | End: 2018-12-21 | Stop reason: HOSPADM

## 2018-12-21 RX ORDER — MORPHINE SULFATE 2 MG/ML
2 INJECTION, SOLUTION INTRAMUSCULAR; INTRAVENOUS
Status: DISCONTINUED | OUTPATIENT
Start: 2018-12-21 | End: 2018-12-21 | Stop reason: HOSPADM

## 2018-12-21 RX ORDER — FENTANYL CITRATE 50 UG/ML
INJECTION, SOLUTION INTRAMUSCULAR; INTRAVENOUS AS NEEDED
Status: DISCONTINUED | OUTPATIENT
Start: 2018-12-21 | End: 2018-12-21 | Stop reason: SURG

## 2018-12-21 RX ORDER — KETAMINE HYDROCHLORIDE 50 MG/ML
INJECTION, SOLUTION, CONCENTRATE INTRAMUSCULAR; INTRAVENOUS AS NEEDED
Status: DISCONTINUED | OUTPATIENT
Start: 2018-12-21 | End: 2018-12-21 | Stop reason: SURG

## 2018-12-21 RX ORDER — PROMETHAZINE HYDROCHLORIDE 12.5 MG/1
12.5 TABLET ORAL EVERY 6 HOURS PRN
Status: DISCONTINUED | OUTPATIENT
Start: 2018-12-21 | End: 2018-12-21 | Stop reason: HOSPADM

## 2018-12-21 RX ORDER — ONDANSETRON 2 MG/ML
INJECTION INTRAMUSCULAR; INTRAVENOUS AS NEEDED
Status: DISCONTINUED | OUTPATIENT
Start: 2018-12-21 | End: 2018-12-21 | Stop reason: SURG

## 2018-12-21 RX ORDER — HYDROCODONE BITARTRATE AND ACETAMINOPHEN 7.5; 325 MG/1; MG/1
1 TABLET ORAL EVERY 4 HOURS PRN
Status: DISCONTINUED | OUTPATIENT
Start: 2018-12-21 | End: 2018-12-23 | Stop reason: HOSPADM

## 2018-12-21 RX ORDER — SUCCINYLCHOLINE CHLORIDE 20 MG/ML
INJECTION INTRAMUSCULAR; INTRAVENOUS AS NEEDED
Status: DISCONTINUED | OUTPATIENT
Start: 2018-12-21 | End: 2018-12-21 | Stop reason: SURG

## 2018-12-21 RX ORDER — MEPERIDINE HYDROCHLORIDE 50 MG/ML
25 INJECTION INTRAMUSCULAR; INTRAVENOUS; SUBCUTANEOUS
Status: DISCONTINUED | OUTPATIENT
Start: 2018-12-21 | End: 2018-12-21 | Stop reason: HOSPADM

## 2018-12-21 RX ORDER — ONDANSETRON 2 MG/ML
4 INJECTION INTRAMUSCULAR; INTRAVENOUS EVERY 6 HOURS PRN
Status: DISCONTINUED | OUTPATIENT
Start: 2018-12-21 | End: 2018-12-23 | Stop reason: HOSPADM

## 2018-12-21 RX ORDER — SODIUM CHLORIDE 9 MG/ML
50 INJECTION, SOLUTION INTRAVENOUS CONTINUOUS
Status: DISCONTINUED | OUTPATIENT
Start: 2018-12-21 | End: 2018-12-21

## 2018-12-21 RX ORDER — DEXAMETHASONE SODIUM PHOSPHATE 4 MG/ML
INJECTION, SOLUTION INTRA-ARTICULAR; INTRALESIONAL; INTRAMUSCULAR; INTRAVENOUS; SOFT TISSUE AS NEEDED
Status: DISCONTINUED | OUTPATIENT
Start: 2018-12-21 | End: 2018-12-21 | Stop reason: SURG

## 2018-12-21 RX ORDER — ACETAMINOPHEN 325 MG/1
650 TABLET ORAL EVERY 4 HOURS PRN
Status: DISCONTINUED | OUTPATIENT
Start: 2018-12-21 | End: 2018-12-23 | Stop reason: HOSPADM

## 2018-12-21 RX ORDER — CEFAZOLIN SODIUM 2 G/50ML
2 SOLUTION INTRAVENOUS ONCE
Status: COMPLETED | OUTPATIENT
Start: 2018-12-21 | End: 2018-12-21

## 2018-12-21 RX ORDER — PROMETHAZINE HYDROCHLORIDE 25 MG/ML
6.25 INJECTION, SOLUTION INTRAMUSCULAR; INTRAVENOUS EVERY 4 HOURS PRN
Status: DISCONTINUED | OUTPATIENT
Start: 2018-12-21 | End: 2018-12-23 | Stop reason: HOSPADM

## 2018-12-21 RX ORDER — SODIUM CHLORIDE 0.9 % (FLUSH) 0.9 %
3 SYRINGE (ML) INJECTION EVERY 12 HOURS SCHEDULED
Status: DISCONTINUED | OUTPATIENT
Start: 2018-12-21 | End: 2018-12-23 | Stop reason: HOSPADM

## 2018-12-21 RX ORDER — PROPOFOL 10 MG/ML
INJECTION, EMULSION INTRAVENOUS AS NEEDED
Status: DISCONTINUED | OUTPATIENT
Start: 2018-12-21 | End: 2018-12-21 | Stop reason: SURG

## 2018-12-21 RX ORDER — PROMETHAZINE HYDROCHLORIDE 25 MG/1
12.5 SUPPOSITORY RECTAL EVERY 6 HOURS PRN
Status: DISCONTINUED | OUTPATIENT
Start: 2018-12-21 | End: 2018-12-21 | Stop reason: HOSPADM

## 2018-12-21 RX ORDER — TRIAMCINOLONE ACETONIDE 0.25 MG/G
CREAM TOPICAL 2 TIMES DAILY
COMMUNITY
End: 2021-11-09

## 2018-12-21 RX ORDER — BUPIVACAINE HCL/0.9 % NACL/PF 0.125 %
PLASTIC BAG, INJECTION (ML) EPIDURAL AS NEEDED
Status: DISCONTINUED | OUTPATIENT
Start: 2018-12-21 | End: 2018-12-21 | Stop reason: SURG

## 2018-12-21 RX ORDER — BUPIVACAINE HYDROCHLORIDE 5 MG/ML
INJECTION, SOLUTION EPIDURAL; INTRACAUDAL AS NEEDED
Status: DISCONTINUED | OUTPATIENT
Start: 2018-12-21 | End: 2018-12-21 | Stop reason: HOSPADM

## 2018-12-21 RX ORDER — MAGNESIUM HYDROXIDE 1200 MG/15ML
LIQUID ORAL AS NEEDED
Status: DISCONTINUED | OUTPATIENT
Start: 2018-12-21 | End: 2018-12-21 | Stop reason: HOSPADM

## 2018-12-21 RX ORDER — SCOLOPAMINE TRANSDERMAL SYSTEM 1 MG/1
1 PATCH, EXTENDED RELEASE TRANSDERMAL CONTINUOUS
Status: DISCONTINUED | OUTPATIENT
Start: 2018-12-21 | End: 2018-12-22

## 2018-12-21 RX ORDER — BETAMETHASONE DIPROPIONATE 0.5 MG/G
CREAM TOPICAL WEEKLY
COMMUNITY
End: 2021-10-21

## 2018-12-21 RX ADMIN — SODIUM CHLORIDE 50 ML/HR: 9 INJECTION, SOLUTION INTRAVENOUS at 12:12

## 2018-12-21 RX ADMIN — KETAMINE HYDROCHLORIDE 25 MG: 50 INJECTION, SOLUTION INTRAMUSCULAR; INTRAVENOUS at 16:41

## 2018-12-21 RX ADMIN — PROMETHAZINE HYDROCHLORIDE 12.5 MG: 25 INJECTION INTRAMUSCULAR; INTRAVENOUS at 19:27

## 2018-12-21 RX ADMIN — Medication 200 MCG: at 15:26

## 2018-12-21 RX ADMIN — Medication 200 MCG: at 17:07

## 2018-12-21 RX ADMIN — SODIUM CHLORIDE 100 ML/HR: 9 INJECTION, SOLUTION INTRAVENOUS at 20:47

## 2018-12-21 RX ADMIN — Medication 200 MCG: at 15:22

## 2018-12-21 RX ADMIN — INSULIN DETEMIR 30 UNITS: 100 INJECTION, SOLUTION SUBCUTANEOUS at 21:11

## 2018-12-21 RX ADMIN — PROPOFOL 200 MG: 10 INJECTION, EMULSION INTRAVENOUS at 14:52

## 2018-12-21 RX ADMIN — Medication 100 MCG: at 16:27

## 2018-12-21 RX ADMIN — KETAMINE HYDROCHLORIDE 25 MG: 50 INJECTION, SOLUTION INTRAMUSCULAR; INTRAVENOUS at 16:28

## 2018-12-21 RX ADMIN — FENTANYL CITRATE 250 MCG: 50 INJECTION, SOLUTION INTRAMUSCULAR; INTRAVENOUS at 15:12

## 2018-12-21 RX ADMIN — ONDANSETRON 4 MG: 2 INJECTION INTRAMUSCULAR; INTRAVENOUS at 14:52

## 2018-12-21 RX ADMIN — Medication 200 MCG: at 17:15

## 2018-12-21 RX ADMIN — Medication 200 MCG: at 15:19

## 2018-12-21 RX ADMIN — ONDANSETRON 4 MG: 2 INJECTION INTRAMUSCULAR; INTRAVENOUS at 19:09

## 2018-12-21 RX ADMIN — LIDOCAINE HYDROCHLORIDE 60 MG: 20 INJECTION, SOLUTION INTRAVENOUS at 14:52

## 2018-12-21 RX ADMIN — FENTANYL CITRATE 50 MCG: 50 INJECTION, SOLUTION INTRAMUSCULAR; INTRAVENOUS at 16:41

## 2018-12-21 RX ADMIN — CEFAZOLIN SODIUM 2 G: 2 SOLUTION INTRAVENOUS at 14:50

## 2018-12-21 RX ADMIN — DEXAMETHASONE SODIUM PHOSPHATE 4 MG: 4 INJECTION, SOLUTION INTRAMUSCULAR; INTRAVENOUS at 14:52

## 2018-12-21 RX ADMIN — Medication 200 MCG: at 16:51

## 2018-12-21 RX ADMIN — SODIUM CHLORIDE: 9 INJECTION, SOLUTION INTRAVENOUS at 15:55

## 2018-12-21 RX ADMIN — KETAMINE HYDROCHLORIDE 25 MG: 50 INJECTION, SOLUTION INTRAMUSCULAR; INTRAVENOUS at 15:54

## 2018-12-21 RX ADMIN — SUCCINYLCHOLINE CHLORIDE 100 MG: 20 INJECTION, SOLUTION INTRAMUSCULAR; INTRAVENOUS at 14:52

## 2018-12-21 RX ADMIN — HYDROCODONE BITARTRATE AND ACETAMINOPHEN 1 TABLET: 7.5; 325 TABLET ORAL at 21:10

## 2018-12-21 RX ADMIN — SCOPALAMINE 1 PATCH: 1 PATCH, EXTENDED RELEASE TRANSDERMAL at 12:15

## 2018-12-21 RX ADMIN — Medication 200 MCG: at 16:42

## 2018-12-21 RX ADMIN — Medication 100 MCG: at 17:32

## 2018-12-21 RX ADMIN — PROMETHAZINE HYDROCHLORIDE 12.5 MG: 25 INJECTION, SOLUTION INTRAMUSCULAR; INTRAVENOUS at 19:27

## 2018-12-21 NOTE — ANESTHESIA POSTPROCEDURE EVALUATION
Patient: Sue L Collett    Procedure Summary     Date:  12/21/18 Room / Location:  [facility] OR  /  [facility]    Anesthesia Start:  1457 Anesthesia Stop:  1753    Procedure:  Right thyroid lobectomy with isthmusectomy (Right Neck) Diagnosis:       Thyroid nodule      (Thyroid nodule [E04.1])    Surgeon:  Janeth Tan MD Provider:  Anthony Lazo CRNA    Anesthesia Type:  general ASA Status:  3          Anesthesia Type: general  Last vitals  BP   150/78 @ 1754 12/21/18   Temp 97.4   Pulse 73   Resp 13   SpO2 99%     Post Anesthesia Care and Evaluation    Patient location during evaluation: PACU  Patient participation: complete - patient participated  Level of consciousness: awake and sleepy but conscious  Pain management: adequate  Airway patency: patent  Anesthetic complications: No anesthetic complications  PONV Status: none  Cardiovascular status: acceptable  Respiratory status: acceptable, spontaneous ventilation and face mask  Hydration status: acceptable

## 2018-12-21 NOTE — ANESTHESIA PREPROCEDURE EVALUATION
Anesthesia Evaluation     Patient summary reviewed and Nursing notes reviewed   history of anesthetic complications: PONV  NPO Solid Status: > 8 hours  NPO Liquid Status: > 8 hours           Airway   Mallampati: II  TM distance: >3 FB  Neck ROM: full  Possible difficult intubation  Dental - normal exam   (+) poor dentition        Pulmonary - normal exam   (+) asthma, shortness of breath,   Cardiovascular - normal exam  Exercise tolerance: poor (<4 METS)    ECG reviewed  PT is on anticoagulation therapy    (+) hypertension well controlled 2 medications or greater, PVD, DVT,       Neuro/Psych  (+) seizures (lst seizure 8 months ago), TIA, CVA (mild right sided weaknes ) residual symptoms, headaches, numbness (sciatica ), psychiatric history Anxiety and Depression,     GI/Hepatic/Renal/Endo    (+)  GERD well controlled,  diabetes mellitus (FSBS 289) type 2 well controlled using insulin,     Musculoskeletal     Abdominal  - normal exam    Bowel sounds: normal.   Substance History - negative use     OB/GYN negative ob/gyn ROS         Other   (+) arthritis   history of cancer                  Anesthesia Plan    ASA 3     general   (Risks and benefits discussed including risk of aspiration, recall and dental damage. All patient questions answered. Will continue with POC.)  intravenous induction   Anesthetic plan, all risks, benefits, and alternatives have been provided, discussed and informed consent has been obtained with: patient.

## 2018-12-21 NOTE — ANESTHESIA PROCEDURE NOTES
ANESTHESIA INTUBATION  Urgency: elective    Airway not difficult    General Information and Staff    Patient location during procedure: OR  CRNA: Justice Lopez CRNA    Indications and Patient Condition  Indications for airway management: airway protection    Preoxygenated: yes  Mask difficulty assessment: 1 - vent by mask    Final Airway Details  Final airway type: endotracheal airway      Successful airway: ETT  Cuffed: yes   Successful intubation technique: direct laryngoscopy  Endotracheal tube insertion site: oral  Blade: Garret  Blade size: 3  ETT size (mm): 7.5  Cormack-Lehane Classification: grade I - full view of glottis  Placement verified by: chest auscultation and capnometry   Measured from: lips  ETT to lips (cm): 21  Number of attempts at approach: 1

## 2018-12-22 LAB
ANION GAP SERPL CALCULATED.3IONS-SCNC: 9.8 MMOL/L (ref 10–20)
BASOPHILS # BLD AUTO: 0.04 10*3/MM3 (ref 0–0.2)
BASOPHILS NFR BLD AUTO: 0.7 % (ref 0–2.5)
BUN BLD-MCNC: 11 MG/DL (ref 7–20)
BUN/CREAT SERPL: 11 (ref 7.1–23.5)
CALCIUM SPEC-SCNC: 8.1 MG/DL (ref 8.4–10.2)
CHLORIDE SERPL-SCNC: 105 MMOL/L (ref 98–107)
CO2 SERPL-SCNC: 26 MMOL/L (ref 26–30)
CREAT BLD-MCNC: 1 MG/DL (ref 0.6–1.3)
DEPRECATED RDW RBC AUTO: 41 FL (ref 37–54)
EOSINOPHIL # BLD AUTO: 0.07 10*3/MM3 (ref 0–0.7)
EOSINOPHIL NFR BLD AUTO: 1.2 % (ref 0–7)
ERYTHROCYTE [DISTWIDTH] IN BLOOD BY AUTOMATED COUNT: 13.2 % (ref 11.5–14.5)
GFR SERPL CREATININE-BSD FRML MDRD: 57 ML/MIN/1.73
GLUCOSE BLD-MCNC: 252 MG/DL (ref 74–98)
GLUCOSE BLDC GLUCOMTR-MCNC: 206 MG/DL (ref 70–130)
GLUCOSE BLDC GLUCOMTR-MCNC: 220 MG/DL (ref 70–130)
GLUCOSE BLDC GLUCOMTR-MCNC: 225 MG/DL (ref 70–130)
GLUCOSE BLDC GLUCOMTR-MCNC: 269 MG/DL (ref 70–130)
HCT VFR BLD AUTO: 40.2 % (ref 37–47)
HGB BLD-MCNC: 13.8 G/DL (ref 12–16)
IMM GRANULOCYTES # BLD AUTO: 0.03 10*3/MM3 (ref 0–0.06)
IMM GRANULOCYTES NFR BLD AUTO: 0.5 % (ref 0–0.6)
INR PPP: 1.14 (ref 0.9–1.1)
LYMPHOCYTES # BLD AUTO: 1.76 10*3/MM3 (ref 0.6–3.4)
LYMPHOCYTES NFR BLD AUTO: 30.7 % (ref 10–50)
MCH RBC QN AUTO: 29.7 PG (ref 27–31)
MCHC RBC AUTO-ENTMCNC: 34.3 G/DL (ref 30–37)
MCV RBC AUTO: 86.5 FL (ref 81–99)
MONOCYTES # BLD AUTO: 0.5 10*3/MM3 (ref 0–0.9)
MONOCYTES NFR BLD AUTO: 8.7 % (ref 0–12)
NEUTROPHILS # BLD AUTO: 3.34 10*3/MM3 (ref 2–6.9)
NEUTROPHILS NFR BLD AUTO: 58.2 % (ref 37–80)
NRBC BLD AUTO-RTO: 0 /100 WBC (ref 0–0)
PLATELET # BLD AUTO: 210 10*3/MM3 (ref 130–400)
PMV BLD AUTO: 11 FL (ref 6–12)
POTASSIUM BLD-SCNC: 3.8 MMOL/L (ref 3.5–5.1)
PROTHROMBIN TIME: 12.7 SECONDS (ref 9.3–12.1)
RBC # BLD AUTO: 4.65 10*6/MM3 (ref 4.2–5.4)
SODIUM BLD-SCNC: 137 MMOL/L (ref 137–145)
WBC NRBC COR # BLD: 5.74 10*3/MM3 (ref 4.8–10.8)

## 2018-12-22 PROCEDURE — A9270 NON-COVERED ITEM OR SERVICE: HCPCS | Performed by: SURGERY

## 2018-12-22 PROCEDURE — 63710000001 BENZOCAINE-MENTHOL 15-3.6 MG LOZENGE 18 EACH BOX: Performed by: SURGERY

## 2018-12-22 PROCEDURE — 83036 HEMOGLOBIN GLYCOSYLATED A1C: CPT | Performed by: SURGERY

## 2018-12-22 PROCEDURE — 63710000001 CALCIUM CARBONATE 500 MG CHEWABLE TABLET: Performed by: SURGERY

## 2018-12-22 PROCEDURE — 63710000001 INSULIN DETEMIR PER 5 UNITS: Performed by: SURGERY

## 2018-12-22 PROCEDURE — 80048 BASIC METABOLIC PNL TOTAL CA: CPT | Performed by: SURGERY

## 2018-12-22 PROCEDURE — 63710000001 WARFARIN 5 MG TABLET: Performed by: SURGERY

## 2018-12-22 PROCEDURE — 63710000001 HYDROCODONE-ACETAMINOPHEN 7.5-325 MG TABLET: Performed by: SURGERY

## 2018-12-22 PROCEDURE — 82962 GLUCOSE BLOOD TEST: CPT

## 2018-12-22 PROCEDURE — G0378 HOSPITAL OBSERVATION PER HR: HCPCS

## 2018-12-22 PROCEDURE — 63710000001 DIPHENHYDRAMINE PER 50 MG: Performed by: SURGERY

## 2018-12-22 PROCEDURE — 63710000001 INSULIN ASPART PER 5 UNITS: Performed by: SURGERY

## 2018-12-22 PROCEDURE — 85025 COMPLETE CBC W/AUTO DIFF WBC: CPT | Performed by: SURGERY

## 2018-12-22 PROCEDURE — 85610 PROTHROMBIN TIME: CPT | Performed by: SURGERY

## 2018-12-22 PROCEDURE — 99024 POSTOP FOLLOW-UP VISIT: CPT | Performed by: SURGERY

## 2018-12-22 PROCEDURE — 94799 UNLISTED PULMONARY SVC/PX: CPT

## 2018-12-22 RX ORDER — DIPHENHYDRAMINE HCL 12.5MG/5ML
25 LIQUID (ML) ORAL EVERY 6 HOURS PRN
Status: DISCONTINUED | OUTPATIENT
Start: 2018-12-22 | End: 2018-12-23 | Stop reason: HOSPADM

## 2018-12-22 RX ORDER — WARFARIN SODIUM 5 MG/1
5 TABLET ORAL
Status: DISCONTINUED | OUTPATIENT
Start: 2018-12-22 | End: 2018-12-23 | Stop reason: HOSPADM

## 2018-12-22 RX ORDER — CALCIUM CARBONATE 200(500)MG
3 TABLET,CHEWABLE ORAL ONCE
Status: COMPLETED | OUTPATIENT
Start: 2018-12-22 | End: 2018-12-22

## 2018-12-22 RX ADMIN — SODIUM CHLORIDE, PRESERVATIVE FREE 3 ML: 5 INJECTION INTRAVENOUS at 21:08

## 2018-12-22 RX ADMIN — INSULIN ASPART 4 UNITS: 100 INJECTION, SOLUTION INTRAVENOUS; SUBCUTANEOUS at 21:07

## 2018-12-22 RX ADMIN — WARFARIN SODIUM 5 MG: 5 TABLET ORAL at 18:04

## 2018-12-22 RX ADMIN — HYDROCODONE BITARTRATE AND ACETAMINOPHEN 1 TABLET: 7.5; 325 TABLET ORAL at 21:07

## 2018-12-22 RX ADMIN — INSULIN ASPART 4 UNITS: 100 INJECTION, SOLUTION INTRAVENOUS; SUBCUTANEOUS at 16:08

## 2018-12-22 RX ADMIN — INSULIN DETEMIR 30 UNITS: 100 INJECTION, SOLUTION SUBCUTANEOUS at 09:30

## 2018-12-22 RX ADMIN — SODIUM CHLORIDE 100 ML/HR: 9 INJECTION, SOLUTION INTRAVENOUS at 12:05

## 2018-12-22 RX ADMIN — BENZOCAINE AND MENTHOL 1 LOZENGE: 15; 3.6 LOZENGE ORAL at 16:58

## 2018-12-22 RX ADMIN — SODIUM CHLORIDE, PRESERVATIVE FREE 3 ML: 5 INJECTION INTRAVENOUS at 09:29

## 2018-12-22 RX ADMIN — DIPHENHYDRAMINE HYDROCHLORIDE 25 MG: 12.5 SOLUTION ORAL at 11:06

## 2018-12-22 RX ADMIN — HYDROCODONE BITARTRATE AND ACETAMINOPHEN 1 TABLET: 7.5; 325 TABLET ORAL at 03:42

## 2018-12-22 RX ADMIN — INSULIN DETEMIR 45 UNITS: 100 INJECTION, SOLUTION SUBCUTANEOUS at 21:08

## 2018-12-22 RX ADMIN — CALCIUM CARBONATE (ANTACID) CHEW TAB 500 MG 3 TABLET: 500 CHEW TAB at 16:08

## 2018-12-23 VITALS
HEIGHT: 66 IN | TEMPERATURE: 98.7 F | BODY MASS INDEX: 41.85 KG/M2 | OXYGEN SATURATION: 97 % | WEIGHT: 260.4 LBS | RESPIRATION RATE: 16 BRPM | HEART RATE: 79 BPM | SYSTOLIC BLOOD PRESSURE: 127 MMHG | DIASTOLIC BLOOD PRESSURE: 75 MMHG

## 2018-12-23 LAB
ANION GAP SERPL CALCULATED.3IONS-SCNC: 8.8 MMOL/L (ref 10–20)
BASOPHILS # BLD AUTO: 0.04 10*3/MM3 (ref 0–0.2)
BASOPHILS NFR BLD AUTO: 0.8 % (ref 0–2.5)
BUN BLD-MCNC: 8 MG/DL (ref 7–20)
BUN/CREAT SERPL: 8 (ref 7.1–23.5)
CALCIUM SPEC-SCNC: 8.6 MG/DL (ref 8.4–10.2)
CHLORIDE SERPL-SCNC: 107 MMOL/L (ref 98–107)
CO2 SERPL-SCNC: 27 MMOL/L (ref 26–30)
CREAT BLD-MCNC: 1 MG/DL (ref 0.6–1.3)
DEPRECATED RDW RBC AUTO: 43.1 FL (ref 37–54)
EOSINOPHIL # BLD AUTO: 0.17 10*3/MM3 (ref 0–0.7)
EOSINOPHIL NFR BLD AUTO: 3.5 % (ref 0–7)
ERYTHROCYTE [DISTWIDTH] IN BLOOD BY AUTOMATED COUNT: 13.2 % (ref 11.5–14.5)
GFR SERPL CREATININE-BSD FRML MDRD: 57 ML/MIN/1.73
GLUCOSE BLD-MCNC: 171 MG/DL (ref 74–98)
GLUCOSE BLDC GLUCOMTR-MCNC: 162 MG/DL (ref 70–130)
GLUCOSE BLDC GLUCOMTR-MCNC: 272 MG/DL (ref 70–130)
HCT VFR BLD AUTO: 42.4 % (ref 37–47)
HGB BLD-MCNC: 14.1 G/DL (ref 12–16)
IMM GRANULOCYTES # BLD AUTO: 0.01 10*3/MM3 (ref 0–0.06)
IMM GRANULOCYTES NFR BLD AUTO: 0.2 % (ref 0–0.6)
INR PPP: 1.09 (ref 0.9–1.1)
LYMPHOCYTES # BLD AUTO: 1.5 10*3/MM3 (ref 0.6–3.4)
LYMPHOCYTES NFR BLD AUTO: 31.1 % (ref 10–50)
MCH RBC QN AUTO: 29.7 PG (ref 27–31)
MCHC RBC AUTO-ENTMCNC: 33.3 G/DL (ref 30–37)
MCV RBC AUTO: 89.3 FL (ref 81–99)
MONOCYTES # BLD AUTO: 0.39 10*3/MM3 (ref 0–0.9)
MONOCYTES NFR BLD AUTO: 8.1 % (ref 0–12)
NEUTROPHILS # BLD AUTO: 2.72 10*3/MM3 (ref 2–6.9)
NEUTROPHILS NFR BLD AUTO: 56.3 % (ref 37–80)
NRBC BLD AUTO-RTO: 0 /100 WBC (ref 0–0)
PLATELET # BLD AUTO: 205 10*3/MM3 (ref 130–400)
PMV BLD AUTO: 10.3 FL (ref 6–12)
POTASSIUM BLD-SCNC: 3.8 MMOL/L (ref 3.5–5.1)
PROTHROMBIN TIME: 12.2 SECONDS (ref 9.3–12.1)
RBC # BLD AUTO: 4.75 10*6/MM3 (ref 4.2–5.4)
SODIUM BLD-SCNC: 139 MMOL/L (ref 137–145)
WBC NRBC COR # BLD: 4.83 10*3/MM3 (ref 4.8–10.8)

## 2018-12-23 PROCEDURE — 63710000001 INSULIN ASPART PER 5 UNITS: Performed by: SURGERY

## 2018-12-23 PROCEDURE — 63710000001 INSULIN DETEMIR PER 5 UNITS: Performed by: SURGERY

## 2018-12-23 PROCEDURE — G0378 HOSPITAL OBSERVATION PER HR: HCPCS

## 2018-12-23 PROCEDURE — 85025 COMPLETE CBC W/AUTO DIFF WBC: CPT | Performed by: SURGERY

## 2018-12-23 PROCEDURE — 85610 PROTHROMBIN TIME: CPT | Performed by: SURGERY

## 2018-12-23 PROCEDURE — 82962 GLUCOSE BLOOD TEST: CPT

## 2018-12-23 PROCEDURE — 80048 BASIC METABOLIC PNL TOTAL CA: CPT | Performed by: SURGERY

## 2018-12-23 PROCEDURE — 99024 POSTOP FOLLOW-UP VISIT: CPT | Performed by: SURGERY

## 2018-12-23 PROCEDURE — A9270 NON-COVERED ITEM OR SERVICE: HCPCS | Performed by: SURGERY

## 2018-12-23 RX ORDER — ACETAMINOPHEN 325 MG/1
650 TABLET ORAL EVERY 4 HOURS PRN
COMMUNITY
Start: 2018-12-23 | End: 2022-03-02 | Stop reason: SDUPTHER

## 2018-12-23 RX ORDER — DIPHENHYDRAMINE HCL 12.5MG/5ML
25 LIQUID (ML) ORAL EVERY 6 HOURS PRN
Qty: 180 ML | Refills: 0 | Status: SHIPPED | OUTPATIENT
Start: 2018-12-23 | End: 2019-02-05

## 2018-12-23 RX ORDER — DIAPER,BRIEF,INFANT-TODD,DISP
EACH MISCELLANEOUS 3 TIMES DAILY
Qty: 56 G | Refills: 1 | Status: SHIPPED | OUTPATIENT
Start: 2018-12-23 | End: 2021-11-09

## 2018-12-23 RX ADMIN — INSULIN ASPART 2 UNITS: 100 INJECTION, SOLUTION INTRAVENOUS; SUBCUTANEOUS at 06:48

## 2018-12-23 RX ADMIN — INSULIN ASPART 6 UNITS: 100 INJECTION, SOLUTION INTRAVENOUS; SUBCUTANEOUS at 11:43

## 2018-12-23 RX ADMIN — BENZOCAINE AND MENTHOL 1 LOZENGE: 15; 3.6 LOZENGE ORAL at 07:34

## 2018-12-23 RX ADMIN — INSULIN DETEMIR 45 UNITS: 100 INJECTION, SOLUTION SUBCUTANEOUS at 09:00

## 2018-12-24 LAB — HBA1C MFR BLD: 10.8 % (ref 3–6)

## 2018-12-28 ENCOUNTER — TELEPHONE (OUTPATIENT)
Dept: SURGERY | Facility: CLINIC | Age: 58
End: 2018-12-28

## 2018-12-28 NOTE — TELEPHONE ENCOUNTER
"Padma Ortega with [facility] called the office, she stated \"I was just out to see Ms. Collett and she is complaining of numbness on her right thigh which began on the day of her surgery and she wanted me to call your office, she is otherwise doing well from her surgery.\"  I told Padma that I will call Ms. Collett and talk with her about the situation.  I called Ms. Collett, she stated \"it's just the skin that it numb and the numbness started after I had my thyroid surgery, it is on the outside of my right leg from my thigh down to my knee.\"  Pt denied difficulty walking. Pt does have post op follow-up appt on 01/15/2019.   "

## 2018-12-29 LAB
LAB AP CASE REPORT: NORMAL
PATH REPORT.FINAL DX SPEC: NORMAL

## 2018-12-31 NOTE — TELEPHONE ENCOUNTER
She will need to keep her follow up appointment with me.  She stayed in the hospital for several days after her thyroidectomy and did not complain of any numbness or tingling anywhere.  Her calcium levels were also normal at discharge.  Please ask thr fron office staff to  her appointment up to next week.  If her symptoms worsen, she should contact our office or her PCP.

## 2019-01-08 ENCOUNTER — HOSPITAL ENCOUNTER (OUTPATIENT)
Facility: HOSPITAL | Age: 59
Discharge: HOME | End: 2019-01-08
Payer: MEDICARE

## 2019-01-08 LAB
A/G RATIO: 1.9 (ref 0.8–2)
ALBUMIN SERPL-MCNC: 4.2 G/DL (ref 3.4–4.8)
ALP BLD-CCNC: 108 U/L (ref 25–100)
ALT SERPL-CCNC: 30 U/L (ref 4–36)
ANION GAP SERPL CALCULATED.3IONS-SCNC: 11 MMOL/L (ref 3–16)
AST SERPL-CCNC: 25 U/L (ref 8–33)
BILIRUB SERPL-MCNC: 0.4 MG/DL (ref 0.3–1.2)
BUN BLDV-MCNC: 19 MG/DL (ref 6–20)
CALCIUM SERPL-MCNC: 10.3 MG/DL (ref 8.5–10.5)
CHLORIDE BLD-SCNC: 96 MMOL/L (ref 98–107)
CO2: 30 MMOL/L (ref 20–30)
CREAT SERPL-MCNC: 1.2 MG/DL (ref 0.4–1.2)
GFR AFRICAN AMERICAN: 55
GFR NON-AFRICAN AMERICAN: 46
GLOBULIN: 2.2 G/DL
GLUCOSE BLD-MCNC: 256 MG/DL (ref 74–106)
INR BLD: 2 (ref 0.94–1.06)
MAGNESIUM: 1.9 MG/DL (ref 1.7–2.4)
POTASSIUM SERPL-SCNC: 4.2 MMOL/L (ref 3.4–5.1)
PROTHROMBIN TIME: 19.9 SEC (ref 9.4–10.6)
SODIUM BLD-SCNC: 137 MMOL/L (ref 136–145)
TOTAL PROTEIN: 6.4 G/DL (ref 6.4–8.3)

## 2019-01-08 PROCEDURE — 83735 ASSAY OF MAGNESIUM: CPT

## 2019-01-08 PROCEDURE — 80053 COMPREHEN METABOLIC PANEL: CPT

## 2019-01-08 PROCEDURE — 36415 COLL VENOUS BLD VENIPUNCTURE: CPT

## 2019-01-08 PROCEDURE — 85610 PROTHROMBIN TIME: CPT

## 2019-01-08 NOTE — OP NOTE
PROCEDURE DATE: 12/21/2018    SURGEON: Janeth Tan MD, FACS    PREOPERATIVE DIAGNOSIS: Suspicious thyroid nodule.    POSTOPERATIVE DIAGNOSIS: Same    PROCEDURE: Right thyroid lobectomy with isthmusectomy    ANESTHESIA: Gen. endotracheal anesthesia    EBL: 25mL    SPECIMENS:    ID Type Source Tests Collected by Time   A : RIGHT THYROID LOBE AND ISTHMUS Tissue Thyroid TISSUE PATHOLOGY EXAM Dav Ortega RN 12/21/2018 1536       INDICATIONS FOR THE PROCEDURE:  Ms. Collett is a 58-year-old female patient who presented to the office with a newly discovered thyroid nodule in the right lobe.  Her workup included a fine-needle aspiration which initially showed a follicular lesion of undetermined significance with atypia noted on cytology.  Additional molecular testing was performed on the aspirate and she was found to be a moderate risk of malignancy, estimated at 45-60%.  She was seen and evaluated several times in my office and ultimately it was recommended that she undergo diagnostic lobectomy.  We discussed the risks, benefits, and alternatives to the proposed surgical procedure.  We had multiple detailed discussions regarding the usual surgical risks.  Special attention was given to her chronic anticoagulation therapy.  Ultimately, the patient provided her informed consent.  She presents today for the planned surgical procedure.    DESCRIPTION OF THE PROCEDURE:  The patient was seen and examined preoperatively in the holding area on the day of the surgical procedure.  Her history and physical examination was updated as appropriate.  The patient received appropriate preoperative antibiotics.  SCDs were placed on her legs for DVT prophylaxis.  DVT chemoprophylaxis withheld due to the high risk of postoperative hematoma.  The patient was taken to the operating room and placed supine on the operating table.  A timeout was performed using the WHO checklist.  Following the satisfactory induction of general  anesthesia, the patient's arms were tucked at her side and all bony prominences were padded.  A shoulder roll was placed and the patient was positioned in a modified beach chair position with the neck extended.  The neck was prepped and draped in the usual sterile fashion.    A skin crease incision was then planned approximately 2 fingerbreadths superior to the sternal notch.  This was placed in an existing skin fold.  Preemptive analgesia was established in the planned collar type incision with 1% lidocaine.  Once the incision was marked, the skin was incised with a #15 blade knife and this was deepened until the subcutaneous tissue was reached.  The subcutaneous tissues and the platysma were then divided with electrocautery.  Subplatysmal skin flaps were raised inferiorly extending to the level of the sternal notch, and superiorly extending to the thyroid cartilage.  The strap muscles were divided in the midline and retracted laterally.      The right thyroid lobe was then mobilized from its areolar attachments using blunt dissection and Bovie electrocautery.  The thyroid was gently retracted medially and the middle thyroid vein was identified.  There was noted to be a dense fibrotic type reaction in this area and dissection was difficult.  Careful dissection continued in this area until the middle thyroid vein couldn't be confidently dissected away from any surrounding tissue.  It was then doubly ligated with 4-0 silk sutures and divided close to the thyroid gland.  Thyroid gland was then gently retracted inferomedially.  The superior pole vessels were identified and carefully ligated with 4-0 silk sutures, and divided close to the thyroid gland.  Careful attention was taken not to injure the external branch of the superior laryngeal nerve.      Next, the thyroid gland was retracted medially and the muscles were retracted laterally.  The inferior thyroid artery was identified.  Using blunt dissection, the  recurrent nerve was identified and followed superficially along its path to aid in continued safe dissection.  The vessels to the thyroid in this vicinity were ligated with 4-0 silk ties and divided with care to avoid injury to the recurrent nerve and preserve their distal branches.  The superior and inferior parathyroid glands were identified and carefully mobilized off the thyroid gland to preserve their blood supply as well.    Was the recurrent laryngeal nerve and parathyroid glands were safely dissected free, attention was turned to mobilizing the remaining portions of the inferior pole using blunt dissection.  The trachea was exposed and the thyroid was dissected medially from its attachments to the trachea up to the ligament of Manzano using a combination of sharp dissection and electrocautery.  Small vessels were ligated using 4-0 silk suture.  Dissection was extended to the isthmus which was clamped with a Sierra clamp, divided, and then suture ligated with a 3-0 silk suture.  The edge of the transected portion of the gland was oversewn with the suture.  The specimen was then passed off the field as specimen and sent to pathology for further evaluation.    The wound was copiously irrigated and hemostasis was achieved.  The strap muscles were reapproximated with a running, locking 2-0 Vicryl suture.  The inferior most aspect of this was left open slightly to permit drainage.  The platysma was then reapproximated using interrupted 3-0 Vicryl sutures.  Additional 0.25% Marcaine was injected subcutaneously.  This was done for postoperative analgesia.  The skin was reapproximated with a running 5-0 PDS placed in a subcuticular fashion.  Mastisol and Steri-Strips were applied.  The wound was covered with a dry sterile dressing.  The patient was then awakened from anesthesia and taken to the recovery room in good condition.  At the conclusion of the procedure, the patient's cords were visualized using the glide scope  and were noted to exhibit normal vocal cord motion.  At the conclusion of the procedure, the patient seemed to have tolerated the procedure very well.  All sponge, needle, and instrument counts were correct at the conclusion of the procedure.  Dr. Tan was present and scrubbed for the procedure in its entirety.  The patient's voice was noted to be intact when she was evaluated in the recovery room.  She will be admitted to the regular nursing floor for routine postoperative care.

## 2019-01-10 ENCOUNTER — OFFICE VISIT (OUTPATIENT)
Dept: SURGERY | Facility: CLINIC | Age: 59
End: 2019-01-10

## 2019-01-10 VITALS
DIASTOLIC BLOOD PRESSURE: 78 MMHG | WEIGHT: 242.8 LBS | SYSTOLIC BLOOD PRESSURE: 130 MMHG | BODY MASS INDEX: 39.02 KG/M2 | OXYGEN SATURATION: 98 % | HEIGHT: 66 IN | HEART RATE: 75 BPM | TEMPERATURE: 97.4 F

## 2019-01-10 DIAGNOSIS — C73 PAPILLARY THYROID CARCINOMA (HCC): Primary | ICD-10-CM

## 2019-01-10 PROCEDURE — 99024 POSTOP FOLLOW-UP VISIT: CPT | Performed by: SURGERY

## 2019-01-10 NOTE — PROGRESS NOTES
Subjective   Sue L Collett is a 58 y.o. female.   Chief Complaint   Patient presents with   • Post-op     thyroidectomy     History of Present Illness   Ms.Collet returns to the office today for routine postoperative evaluation after undergoing right hemithyroidectomy with isthmusectomy on 12/21/2018.  Recall that she is a 58-year-old female patient who initially presented with a solid nodule involving the right thyroid lobe.  On repeat ultrasound she was found have multi-nodular disease and subsequently underwent fine-needle aspiration.  This revealed FLUS with atypia and further molecular testing suggested up to a 60% risk of malignancy.  She is now status post right hemithyroidectomy with isthmusectomy for diagnostic purposes s She reported no particular postoperative difficulties after being discharged home from the hospital.  She denies any tingling of her lips or face.  She reports that her previously noted a globus sensation and frequent need to clear her throat/cough has completely resolved.  She is taking Tylenol for pain and did not require a prescription for narcotic pain medication upon discharge.  She reports that she is tolerating a diet without difficulty.  She has no complaints related to her incision and she denies fever and chills.  She denies any changes related to her voice.  She reports that she is back on her home regimen of Coumadin and is following her INRs with point of care testing as instructed by her PCP.  She also reports having lab work done via her PCPs office since her surgery was completed.  She reports being told that her thyroid functions were normal.  These results are not immediately available to me.    Pathology from her lobectomy specimen revealed a multifocal papillary thyroid cancer, classic type of with the largest focus measuring 7 mm.  There was an additional 1 mm focus of tumor as well.  Margins are clear but there is a single area where the margin is 1 mm from a tumor  "focus.  There was no evidence of perineural invasion, lymphatic invasion, extrathyroidal extension.  2 incidentally discovered lymph nodes were also examined and one of these was found to have a 1 mm focus of papillary thyroid cancer.  This is [id number].      Unrelated to her recent thyroid surgery, she is complaining of an area of pain and \"numbness\" along the lateral aspect of the thigh that runs from the hip to the knee.  This has been present since hospital discharge.  She reports that this has been unchanged since its onset.  She denies any trauma to the area.  She does not have similar difficulties involving any other parts of her body.    The following portions of the patient's history were reviewed and updated as appropriate: allergies, current medications, past family history, past medical history, past social history, past surgical history and problem list.    Review of Systems    Objective    /78   Pulse 75   Temp 97.4 °F (36.3 °C)   Ht 167.6 cm (65.98\")   Wt 110 kg (242 lb 12.8 oz)   SpO2 98%   BMI 39.21 kg/m²     Physical Exam   Constitutional: She is oriented to person, place, and time. She appears well-developed and well-nourished.   HENT:   Head: Normocephalic and atraumatic.   Negative Chevostek's sign   Neck: Neck supple.   The Steri-Strips were removed from the patient's collar incision.  This is healing well without cellulitis or erythema.  There is no evidence of hematoma or seroma formation.   Cardiovascular: Regular rhythm.   Pulmonary/Chest: Effort normal.   Neurological: She is alert and oriented to person, place, and time.   Skin: Skin is warm and dry.   Psychiatric: She has a normal mood and affect. Her behavior is normal.     Pathology: Reviewed.      Assessment/Plan   Claire was seen today for post-op.    Diagnoses and all orders for this visit:    Papillary thyroid carcinoma (CMS/HCC)      Ms. Collett is a 58-year-old female patient who initially presented with a solid nodule " involving the right thyroid lobe.  On repeat ultrasound she was found have multi-nodular disease and subsequently underwent fine-needle aspiration.  This revealed FLUS with atypia and further molecular testing suggested up to a 60% risk of malignancy.  She is now status post right hemithyroidectomy with isthmusectomy for diagnostic purposes and has been found to have a classic papillary thyroid cancer, [id number].  Surgically, she is recovering as expected with no apparent postprocedural complications.  She will need thyroid function tests checked at the one month of postoperative linda, although I suspect these will be normal given that she has her left lobe completely intact.  Additionally we will need to measure a stimulated thyroglobulin level and anti-thyroglobulin antibody levels.  I have discussed with her that the next step in management for her could be approached one of 2 ways.  MARTI and NCCN and guidelines would suggest that she may be a candidate for aggressive surveillance versus completion thyroidectomy alone.  In the most aggressive scenario, he could also be argued that she should have a completion thyroidectomy with formal central neck dissection plus radioactive iodine ablation postoperatively. It is also reasonable to do only the completion thyroidectomy WITHOUT node dissection given the increased risk of RLN injury, followed by FITCH.  I personally think that the latter is preferred.  I am not comfortable with surveillance as she would have a much higher risk of recurrence and having N1a disease is associated with micronodal disease elsewhere. Obviously, FITCH is only an option to treat micronodal disease if there is no remaining thyroid gland.  I have advised her that I would like to discuss her case with my colleagues from endocrinology, and ultimately we will need to get her set up to see one of the medical endocrinologists to be prepared to provide FITCH and to manage her over the long term.   I  discussed this with her and her family in detail in the office.  I will see her back in 2 weeks, at which time she will be nearly due for her postoperative lab work.  I'm hopeful that that time I have been able to make arrangements to have her seen by endocrinology.  We will finalize our plan for ongoing management when she returns.

## 2019-01-21 ENCOUNTER — TELEPHONE (OUTPATIENT)
Dept: SURGERY | Facility: CLINIC | Age: 59
End: 2019-01-21

## 2019-01-21 NOTE — TELEPHONE ENCOUNTER
PT called has appt on Thurs , afraid of the weather conditions said Dr Tan was talking about sending her to another Dr. Can you call her and let her know what to do

## 2019-01-23 PROBLEM — E04.1 THYROID NODULE: Status: RESOLVED | Noted: 2018-12-11 | Resolved: 2019-01-23

## 2019-01-23 PROBLEM — C73 PAPILLARY THYROID CARCINOMA (HCC): Status: ACTIVE | Noted: 2019-01-23

## 2019-01-28 ENCOUNTER — TELEPHONE (OUTPATIENT)
Dept: SURGERY | Facility: CLINIC | Age: 59
End: 2019-01-28

## 2019-02-05 ENCOUNTER — OFFICE VISIT (OUTPATIENT)
Dept: SURGERY | Facility: CLINIC | Age: 59
End: 2019-02-05

## 2019-02-05 VITALS
TEMPERATURE: 96.9 F | DIASTOLIC BLOOD PRESSURE: 78 MMHG | SYSTOLIC BLOOD PRESSURE: 128 MMHG | BODY MASS INDEX: 40.05 KG/M2 | HEART RATE: 82 BPM | HEIGHT: 66 IN | OXYGEN SATURATION: 98 % | WEIGHT: 249.2 LBS

## 2019-02-05 DIAGNOSIS — C73 PAPILLARY THYROID CARCINOMA (HCC): Primary | ICD-10-CM

## 2019-02-05 PROCEDURE — 99214 OFFICE O/P EST MOD 30 MIN: CPT | Performed by: SURGERY

## 2019-02-05 RX ORDER — SODIUM CHLORIDE, SODIUM LACTATE, POTASSIUM CHLORIDE, CALCIUM CHLORIDE 600; 310; 30; 20 MG/100ML; MG/100ML; MG/100ML; MG/100ML
50 INJECTION, SOLUTION INTRAVENOUS CONTINUOUS
Status: CANCELLED | OUTPATIENT
Start: 2019-02-05

## 2019-02-05 RX ORDER — PANTOPRAZOLE SODIUM 40 MG/1
TABLET, DELAYED RELEASE ORAL
Refills: 0 | COMMUNITY
Start: 2019-01-28 | End: 2021-04-28

## 2019-02-05 NOTE — H&P (VIEW-ONLY)
Subjective   Sue L Collett is a 58 y.o. female.   Chief Complaint   Patient presents with   • Post-op     s/p thyroidectomy    .    History of Present Illness   Ms. Collett is a 58-year-old female patient who returns to the office today status post right thyroid lobectomy with isthmusectomy on 12/21/2018.  Final pathology demonstrated a papillary thyroid cancer which was of the classical type that was multifocal in nature and was found to involve 1 of 2 lymph nodes that were removed.  Margins were clear on the excised surgical specimen and she returns to discuss further treatment options.  She reports no new complaints related to her neck incision.  She reports that her preoperative globus sensation and difficulties swallowing have completely resolved.  She has no reported symptoms of hypocalcemia.      The following portions of the patient's history were reviewed and updated as appropriate: allergies, current medications, past family history, past medical history, past social history, past surgical history and problem list.     Patient Active Problem List   Diagnosis   • Cerebrovascular accident (CVA) due to thrombosis (CMS/HCC)   • Chronic bilateral low back pain with bilateral sciatica   • Seizure-like activity (CMS/HCC)   • Papillary thyroid carcinoma (CMS/HCC)       Past Medical History:   Diagnosis Date   • Allergic    • Anxiety    • Arthritis    • Asthma    • Bruises easily    • Bulging lumbar disc    • Cancer (CMS/HCC)     THYROID CANCER (MALIGNANCY)    • Chronic kidney disease    • Constipation    • COPD (chronic obstructive pulmonary disease) (CMS/HCC)    • DDD (degenerative disc disease), lumbosacral    • Depression    • Diabetes mellitus (CMS/HCC)    • Diverticulitis    • DVT of leg (deep venous thrombosis) (CMS/HCC)     X 4    • Dysphagia    • Factor V deficiency (CMS/HCC)    • GERD (gastroesophageal reflux disease)    • Headache    • History of echocardiogram    • History of exercise stress test      "WNL PER PT    • History of pneumonia 2015   • History of transfusion    • Hypertension    • Lupus    • Nausea    • Numbness in right leg     FROM HIP TO JUST ABOVE THE KNEE   • Piercing     EARS ONLY   • PONV (postoperative nausea and vomiting)     SCOPOLAMINE PATCH DIDN'T WORK   • PVD (peripheral vascular disease) (CMS/HCC)    • Sleep apnea     DOES NOT USE CPAP   • SOB (shortness of breath) on exertion    • Stroke (CMS/HCC) 2008    MILD RIGHT SIDED WEAKNESS    • Ulcer, stomach peptic    • Wears glasses     RX   • Wears glasses     READING GLASSES ONLY       Past Surgical History:   Procedure Laterality Date   • BREAST SURGERY Right     LUMPECTOMY    • COLONOSCOPY      WITH POLYP REMOVAL    • ENDOSCOPY     • ERCP     • FINE NEEDLE ASPIRATION  2018    THYROID    • GALLBLADDER SURGERY     • SKIN BIOPSY      HAND    • THYROIDECTOMY Right 12/21/2018    Procedure: Right thyroid lobectomy with isthmusectomy;  Surgeon: Janeth Tan MD;  Location: [facility];  Service: General   • TUBAL ABDOMINAL LIGATION     • WISDOM TOOTH EXTRACTION         Medications:   No current facility-administered medications for this visit.   No current outpatient medications on file.    Facility-Administered Medications Ordered in Other Visits:   •  CeFAZolin Sodium-Dextrose (ANCEF) IVPB (duplex) 2 g, 2 g, Intravenous, Once, Janeth Tan MD  •  lactated ringers infusion, 50 mL/hr, Intravenous, Continuous, Janeth Tan MD, Last Rate: 50 mL/hr at 02/18/19 0910, 50 mL/hr at 02/18/19 0910  •  Scopolamine (TRANSDERM-SCOP) 1.5 MG/3DAYS patch 1 patch, 1 patch, Transdermal, Once, Sammy Sorensen CRNA, 1 patch at 02/18/19 1037  •  sodium chloride 0.9 % flush 3 mL, 3 mL, Intravenous, PRN, Janeth Tan MD    Allergies:   Allergies   Allergen Reactions   • Prednisone Other (See Comments)     ACUTE KIDNEY FAILURE   • Azithromycin Hives   • Erythromycin Hives   • Other Nausea And Vomiting     PT. REPORTS THAT SHE IS \"ALLERGIC TO ALL " "INSULINS EXCEPT LANTUS\"   • Red Dye Hives   • Sulfa Antibiotics Hives         Family History   Problem Relation Age of Onset   • Heart disease Father    • Heart attack Father    • Stroke Mother    • Hypertension Mother    • Hyperlipidemia Mother    • Kidney disease Mother        Social History     Socioeconomic History   • Marital status:      Spouse name: Not on file   • Number of children: Not on file   • Years of education: Not on file   • Highest education level: Not on file   Occupational History     Employer: DISABLED   Tobacco Use   • Smoking status: Never Smoker   • Smokeless tobacco: Never Used   Substance and Sexual Activity   • Alcohol use: No   • Drug use: No   • Sexual activity: Defer         Review of Systems   Constitutional: Negative for chills, fever and unexpected weight change.   HENT: Negative for hearing loss, trouble swallowing and voice change.    Eyes: Negative for visual disturbance.   Respiratory: Negative for apnea, cough, chest tightness, shortness of breath and wheezing.    Cardiovascular: Negative for chest pain, palpitations and leg swelling.   Gastrointestinal: Negative for abdominal distention, abdominal pain, anal bleeding, blood in stool, constipation, diarrhea, nausea, rectal pain and vomiting.   Endocrine: Negative for cold intolerance and heat intolerance.   Genitourinary: Negative for difficulty urinating, dysuria and flank pain.   Musculoskeletal: Negative for back pain and gait problem.   Skin: Negative for color change, rash and wound.   Neurological: Negative for dizziness, syncope, speech difficulty, weakness, light-headedness, numbness and headaches.   Hematological: Negative for adenopathy. Does not bruise/bleed easily.   Psychiatric/Behavioral: Negative for confusion. The patient is not nervous/anxious.        Objective    /78   Pulse 82   Temp 96.9 °F (36.1 °C)   Ht 167.6 cm (65.98\")   Wt 113 kg (249 lb 3.2 oz)   SpO2 98%   BMI 40.25 kg/m² "     Physical Exam   Constitutional: She is oriented to person, place, and time. She appears well-developed and well-nourished.   HENT:   Head: Normocephalic and atraumatic.   Eyes: No scleral icterus.   Neck: Neck supple.   Well-healed collar incision   Cardiovascular: Regular rhythm.   Pulmonary/Chest: Effort normal.   Abdominal: Soft. She exhibits no distension. There is no tenderness.   Neurological: She is alert and oriented to person, place, and time.   Skin: Skin is warm and dry.   Psychiatric: She has a normal mood and affect. Her behavior is normal.       Assessment/Plan   Claire was seen today for post-op.    Diagnoses and all orders for this visit:    Papillary thyroid carcinoma (CMS/HCC)  -     Case Request; Standing  -     lactated ringers infusion; Infuse 50 mL/hr into a venous catheter Continuous.  -     ceFAZolin (ANCEF) 2 g in sodium chloride 0.9 % 100 mL IVPB; Infuse 2 g into a venous catheter 1 (One) Time.  -     Case Request    Other orders  -     Follow Anesthesia Guidelines / Standing Orders; Future  -     Provide NPO Instructions to Patient; Future  -     Clorhexidine Skin Prep  -     Follow Anesthesia Guidelines / Standing Orders; Standing  -     Verify NPO Status; Standing  -     SCD (Sequential Compression Device) - Place on Patient in Pre-Op; Standing  -     Verify / Perform Chlorhexidine Skin Prep if Indicated (If Not Already Completed); Standing  -     Obtain Informed Consent    I had a long and detailed discussion with Ms. Collett, regarding the options for the next step in management.  As I previously discussed with her, I think the best option for optimal care at this point would be to perform a completion thyroidectomy followed by radioactive iodine ablation.  Given that she did have positive lymph nodes, her risk for micro-metastasis in other lymph nodes is known to be high.  I think she is high risk for recurrence if we do not proceed with completion thyroidectomy followed by ablation.   I did explain to her that radioactive iodine ablation is not effective unless she has the remainder of her thyroid gland removed.  I do not think she requires central neck dissection given that this has a much higher risk of recurrent nerve injury in a reoperative neck, and I do not think that it will add much to her overall care given that she does not have obvious lymphadenopathy on either ultrasound or at the time of the initial surgical procedure.  We discussed the risks, benefits, and alternatives to this approach.  I did discuss with the patient that her case has been reviewed by endocrinology as well and we are in agreement that this is the best course of therapy for her.  This also is in line with guidelines from both the NCCN and the [employer] Thy[employer] Association.  The patient verbalized her understanding of this and is willing to proceed.  I have placed a referral to Dr. Louise Urban of endocrinology for postoperative management of radioactive iodine ablation as well as long-term thyroid hormone supplementation.  I did briefly explained to the patient that it is typically 6-8 weeks before any ablative therapy is performed and this will all be coordinated with Dr. Urban's office.  We again discussed the usual risks, benefits, and alternatives to the thyroidectomy procedure.  The patient understands these having recently undergone hemithyroidectomy.  We will plan for postoperative admission to the hospital given the need to resume the patient's chronic anticoagulation.  She knows to check her INR one-week to schedule surgery and contact me with these results so that we can make decisions regarding her Coumadin therapy.  She understands that she will need to be NPO midnight the evening prior to the surgical procedure.

## 2019-02-05 NOTE — PROGRESS NOTES
Subjective   Sue L Collett is a 58 y.o. female.   Chief Complaint   Patient presents with   • Post-op     s/p thyroidectomy    .    History of Present Illness   Ms. Collett is a 58-year-old female patient who returns to the office today status post right thyroid lobectomy with isthmusectomy on 12/21/2018.  Final pathology demonstrated a papillary thyroid cancer which was of the classical type that was multifocal in nature and was found to involve 1 of 2 lymph nodes that were removed.  Margins were clear on the excised surgical specimen and she returns to discuss further treatment options.  She reports no new complaints related to her neck incision.  She reports that her preoperative globus sensation and difficulties swallowing have completely resolved.  She has no reported symptoms of hypocalcemia.      The following portions of the patient's history were reviewed and updated as appropriate: allergies, current medications, past family history, past medical history, past social history, past surgical history and problem list.     Patient Active Problem List   Diagnosis   • Cerebrovascular accident (CVA) due to thrombosis (CMS/HCC)   • Chronic bilateral low back pain with bilateral sciatica   • Seizure-like activity (CMS/HCC)   • Papillary thyroid carcinoma (CMS/HCC)       Past Medical History:   Diagnosis Date   • Allergic    • Anxiety    • Arthritis    • Asthma    • Bruises easily    • Bulging lumbar disc    • Cancer (CMS/HCC)     THYROID CANCER ([facility]NCY)    • Chronic kidney disease    • Constipation    • COPD (chronic obstructive pulmonary disease) (CMS/HCC)    • DDD (degenerative disc disease), lumbosacral    • Depression    • Diabetes mellitus (CMS/HCC)    • Diverticulitis    • DVT of leg (deep venous thrombosis) (CMS/HCC)     X 4    • Dysphagia    • Factor V deficiency (CMS/HCC)    • GERD (gastroesophageal reflux disease)    • Headache    • History of echocardiogram    • History of exercise stress test      "WNL PER PT    • History of pneumonia 2015   • History of transfusion    • Hypertension    • Lupus    • Nausea    • Numbness in right leg     FROM HIP TO JUST ABOVE THE KNEE   • Piercing     EARS ONLY   • PONV (postoperative nausea and vomiting)     SCOPOLAMINE PATCH DIDN'T WORK   • PVD (peripheral vascular disease) (CMS/HCC)    • Sleep apnea     DOES NOT USE CPAP   • SOB (shortness of breath) on exertion    • Stroke (CMS/HCC) 2008    MILD RIGHT SIDED WEAKNESS    • Ulcer, stomach peptic    • Wears glasses     RX   • Wears glasses     READING GLASSES ONLY       Past Surgical History:   Procedure Laterality Date   • BREAST SURGERY Right     LUMPECTOMY    • COLONOSCOPY      WITH POLYP REMOVAL    • ENDOSCOPY     • ERCP     • FINE NEEDLE ASPIRATION  2018    THYROID    • GALLBLADDER SURGERY     • SKIN BIOPSY      HAND    • THYROIDECTOMY Right 12/21/2018    Procedure: Right thyroid lobectomy with isthmusectomy;  Surgeon: Janeth Tan MD;  Location: [facility];  Service: General   • TUBAL ABDOMINAL LIGATION     • WISDOM TOOTH EXTRACTION         Medications:   No current facility-administered medications for this visit.   No current outpatient medications on file.    Facility-Administered Medications Ordered in Other Visits:   •  CeFAZolin Sodium-Dextrose (ANCEF) IVPB (duplex) 2 g, 2 g, Intravenous, Once, Janeth Tan MD  •  lactated ringers infusion, 50 mL/hr, Intravenous, Continuous, Janeth Tan MD, Last Rate: 50 mL/hr at 02/18/19 0910, 50 mL/hr at 02/18/19 0910  •  Scopolamine (TRANSDERM-SCOP) 1.5 MG/3DAYS patch 1 patch, 1 patch, Transdermal, Once, Sammy Sorensen CRNA, 1 patch at 02/18/19 1037  •  sodium chloride 0.9 % flush 3 mL, 3 mL, Intravenous, PRN, Janeth Tan MD    Allergies:   Allergies   Allergen Reactions   • Prednisone Other (See Comments)     ACUTE KIDNEY FAILURE   • Azithromycin Hives   • Erythromycin Hives   • Other Nausea And Vomiting     PT. REPORTS THAT SHE IS \"ALLERGIC TO ALL " "INSULINS EXCEPT LANTUS\"   • Red Dye Hives   • Sulfa Antibiotics Hives         Family History   Problem Relation Age of Onset   • Heart disease Father    • Heart attack Father    • Stroke Mother    • Hypertension Mother    • Hyperlipidemia Mother    • Kidney disease Mother        Social History     Socioeconomic History   • Marital status:      Spouse name: Not on file   • Number of children: Not on file   • Years of education: Not on file   • Highest education level: Not on file   Occupational History     Employer: DISABLED   Tobacco Use   • Smoking status: Never Smoker   • Smokeless tobacco: Never Used   Substance and Sexual Activity   • Alcohol use: No   • Drug use: No   • Sexual activity: Defer         Review of Systems   Constitutional: Negative for chills, fever and unexpected weight change.   HENT: Negative for hearing loss, trouble swallowing and voice change.    Eyes: Negative for visual disturbance.   Respiratory: Negative for apnea, cough, chest tightness, shortness of breath and wheezing.    Cardiovascular: Negative for chest pain, palpitations and leg swelling.   Gastrointestinal: Negative for abdominal distention, abdominal pain, anal bleeding, blood in stool, constipation, diarrhea, nausea, rectal pain and vomiting.   Endocrine: Negative for cold intolerance and heat intolerance.   Genitourinary: Negative for difficulty urinating, dysuria and flank pain.   Musculoskeletal: Negative for back pain and gait problem.   Skin: Negative for color change, rash and wound.   Neurological: Negative for dizziness, syncope, speech difficulty, weakness, light-headedness, numbness and headaches.   Hematological: Negative for adenopathy. Does not bruise/bleed easily.   Psychiatric/Behavioral: Negative for confusion. The patient is not nervous/anxious.        Objective    /78   Pulse 82   Temp 96.9 °F (36.1 °C)   Ht 167.6 cm (65.98\")   Wt 113 kg (249 lb 3.2 oz)   SpO2 98%   BMI 40.25 kg/m² "     Physical Exam   Constitutional: She is oriented to person, place, and time. She appears well-developed and well-nourished.   HENT:   Head: Normocephalic and atraumatic.   Eyes: No scleral icterus.   Neck: Neck supple.   Well-healed collar incision   Cardiovascular: Regular rhythm.   Pulmonary/Chest: Effort normal.   Abdominal: Soft. She exhibits no distension. There is no tenderness.   Neurological: She is alert and oriented to person, place, and time.   Skin: Skin is warm and dry.   Psychiatric: She has a normal mood and affect. Her behavior is normal.       Assessment/Plan   Claire was seen today for post-op.    Diagnoses and all orders for this visit:    Papillary thyroid carcinoma (CMS/HCC)  -     Case Request; Standing  -     lactated ringers infusion; Infuse 50 mL/hr into a venous catheter Continuous.  -     ceFAZolin (ANCEF) 2 g in sodium chloride 0.9 % 100 mL IVPB; Infuse 2 g into a venous catheter 1 (One) Time.  -     Case Request    Other orders  -     Follow Anesthesia Guidelines / Standing Orders; Future  -     Provide NPO Instructions to Patient; Future  -     Clorhexidine Skin Prep  -     Follow Anesthesia Guidelines / Standing Orders; Standing  -     Verify NPO Status; Standing  -     SCD (Sequential Compression Device) - Place on Patient in Pre-Op; Standing  -     Verify / Perform Chlorhexidine Skin Prep if Indicated (If Not Already Completed); Standing  -     Obtain Informed Consent    I had a long and detailed discussion with Ms. Collett, regarding the options for the next step in management.  As I previously discussed with her, I think the best option for optimal care at this point would be to perform a completion thyroidectomy followed by radioactive iodine ablation.  Given that she did have positive lymph nodes, her risk for micro-metastasis in other lymph nodes is known to be high.  I think she is high risk for recurrence if we do not proceed with completion thyroidectomy followed by ablation.   I did explain to her that radioactive iodine ablation is not effective unless she has the remainder of her thyroid gland removed.  I do not think she requires central neck dissection given that this has a much higher risk of recurrent nerve injury in a reoperative neck, and I do not think that it will add much to her overall care given that she does not have obvious lymphadenopathy on either ultrasound or at the time of the initial surgical procedure.  We discussed the risks, benefits, and alternatives to this approach.  I did discuss with the patient that her case has been reviewed by endocrinology as well and we are in agreement that this is the best course of therapy for her.  This also is in line with guidelines from both the NCCN and the American Thyroid Association.  The patient verbalized her understanding of this and is willing to proceed.  I have placed a referral to Dr. Louise Urban of endocrinology for postoperative management of radioactive iodine ablation as well as long-term thyroid hormone supplementation.  I did briefly explained to the patient that it is typically 6-8 weeks before any ablative therapy is performed and this will all be coordinated with Dr. Urban's office.  We again discussed the usual risks, benefits, and alternatives to the thyroidectomy procedure.  The patient understands these having recently undergone hemithyroidectomy.  We will plan for postoperative admission to the hospital given the need to resume the patient's chronic anticoagulation.  She knows to check her INR one-week to schedule surgery and contact me with these results so that we can make decisions regarding her Coumadin therapy.  She understands that she will need to be NPO midnight the evening prior to the surgical procedure.

## 2019-02-08 ENCOUNTER — TELEPHONE (OUTPATIENT)
Dept: SURGERY | Facility: CLINIC | Age: 59
End: 2019-02-08

## 2019-02-08 NOTE — TELEPHONE ENCOUNTER
Dayday from [facility] called asking when patient's surgery is scheduled and when her appointment is with Louise Urban I gave her the info

## 2019-02-11 ENCOUNTER — APPOINTMENT (OUTPATIENT)
Dept: PREADMISSION TESTING | Facility: HOSPITAL | Age: 59
End: 2019-02-11

## 2019-02-11 ENCOUNTER — HOSPITAL ENCOUNTER (OUTPATIENT)
Dept: GENERAL RADIOLOGY | Facility: HOSPITAL | Age: 59
Discharge: HOME | End: 2019-02-11
Admitting: SURGERY

## 2019-02-11 ENCOUNTER — TELEPHONE (OUTPATIENT)
Dept: SURGERY | Facility: CLINIC | Age: 59
End: 2019-02-11

## 2019-02-11 VITALS — BODY MASS INDEX: 39.7 KG/M2 | WEIGHT: 247 LBS | HEIGHT: 66 IN

## 2019-02-11 DIAGNOSIS — C73 PAPILLARY THYROID CARCINOMA (HCC): Primary | ICD-10-CM

## 2019-02-11 LAB
ANION GAP SERPL CALCULATED.3IONS-SCNC: 11.8 MMOL/L (ref 10–20)
BUN BLD-MCNC: 17 MG/DL (ref 7–20)
BUN/CREAT SERPL: 17 (ref 7.1–23.5)
CALCIUM SPEC-SCNC: 9.7 MG/DL (ref 8.4–10.2)
CHLORIDE SERPL-SCNC: 99 MMOL/L (ref 98–107)
CO2 SERPL-SCNC: 29 MMOL/L (ref 26–30)
CREAT BLD-MCNC: 1 MG/DL (ref 0.6–1.3)
DEPRECATED RDW RBC AUTO: 40.2 FL (ref 37–54)
ERYTHROCYTE [DISTWIDTH] IN BLOOD BY AUTOMATED COUNT: 12.6 % (ref 11.5–14.5)
GFR SERPL CREATININE-BSD FRML MDRD: 57 ML/MIN/1.73
GLUCOSE BLD-MCNC: 310 MG/DL (ref 74–98)
HCT VFR BLD AUTO: 46.8 % (ref 37–47)
HGB BLD-MCNC: 16 G/DL (ref 12–16)
MCH RBC QN AUTO: 29.7 PG (ref 27–31)
MCHC RBC AUTO-ENTMCNC: 34.2 G/DL (ref 30–37)
MCV RBC AUTO: 87 FL (ref 81–99)
PLATELET # BLD AUTO: 256 10*3/MM3 (ref 130–400)
PMV BLD AUTO: 10.7 FL (ref 6–12)
POTASSIUM BLD-SCNC: 3.8 MMOL/L (ref 3.5–5.1)
RBC # BLD AUTO: 5.38 10*6/MM3 (ref 4.2–5.4)
SODIUM BLD-SCNC: 136 MMOL/L (ref 137–145)
WBC NRBC COR # BLD: 5.7 10*3/MM3 (ref 4.8–10.8)

## 2019-02-11 PROCEDURE — 71045 X-RAY EXAM CHEST 1 VIEW: CPT

## 2019-02-11 PROCEDURE — 80048 BASIC METABOLIC PNL TOTAL CA: CPT | Performed by: SURGERY

## 2019-02-11 PROCEDURE — 85027 COMPLETE CBC AUTOMATED: CPT | Performed by: SURGERY

## 2019-02-11 PROCEDURE — 36415 COLL VENOUS BLD VENIPUNCTURE: CPT

## 2019-02-11 RX ORDER — POLYETHYLENE GLYCOL 3350 17 G/17G
17 POWDER, FOR SOLUTION ORAL DAILY PRN
COMMUNITY

## 2019-02-11 RX ORDER — INSULIN GLARGINE 100 [IU]/ML
64 INJECTION, SOLUTION SUBCUTANEOUS 2 TIMES DAILY
COMMUNITY

## 2019-02-11 NOTE — NURSING NOTE
HAD ORDERED A PT/INR ON PT DUE TO TAKING WARFARIN PER PROTOCOL. WHEN BLOOD WAS DRAWN THERE WAS INSUFFICIENT BLOOD IN THE TUBE FOR TEST. CALLED TO DR LOZANO'S OFFICE TO INQUIRE IF SHE WOULD LIKE PT TO RETURN TO HAVE BLOOD REDRAWN OR WAIT UNTIL DOS TO HAVE TEST DONE. AWAIT CALL BACK.

## 2019-02-11 NOTE — TELEPHONE ENCOUNTER
Pt is scheduled for thyroidectomy on 02/18/2019 @ , she had pt/inr drawn for pre admission testing today. Quantity of blood in the vacutainer was insufficient for testing.  Do you want pt to have another venipuncture for pt/inr at this time or wait until the am of the surgery?

## 2019-02-11 NOTE — DISCHARGE INSTRUCTIONS
Patient instructed on PAT PASS information.  Patient/family member verbalized understanding of instruction/education.Patient to apply Chlorhexadine wipes  to surgical area (as instructed) the night before procedure and the AM of procedure. Wipes provided.Chlorhexidine wipes given to patient with verification sheet. Patient/Family member verbalized understanding to all teaching and instruction.Chlorhexidine wipes given to patient with verification sheet. Patient/Family member verbalized understanding to all teaching and instruction.PAT PASS GIVEN/REVIEWED WITH PT.  VERBALIZED UNDERSTANDING OF THE FOLLOWING:  DO NOT EAT, DRINK, SMOKE, USE SMOKELESS TOBACCO OR CHEW GUM AFTER MIDNIGHT THE NIGHT BEFORE SURGERY.  THIS ALSO INCLUDES HARD CANDIES AND MINTS.    DO NOT SHAVE THE AREA TO BE OPERATED ON AT LEAST 48 HOURS PRIOR TO THE PROCEDURE.  DO NOT WEAR MAKE UP OR NAIL POLISH.  DO NOT LEAVE IN ANY PIERCING OR WEAR JEWELRY THE DAY OF SURGERY.      DO NOT USE ADHESIVES IF YOU WEAR DENTURES.    DO NOT WEAR EYE CONTACTS; BRING IN YOUR GLASSES.    ONLY TAKE MEDICATION THE MORNING OF YOUR PROCEDURE IF INSTRUCTED BY YOUR SURGEON WITH ENOUGH WATER TO SWALLOW THE MEDICATION.  IF YOUR SURGEON DID NOT SPECIFY WHICH MEDICATIONS TO TAKE, YOU WILL NEED TO CALL THEIR OFFICE FOR FURTHER INSTRUCTIONS AND DO AS THEY INSTRUCT.    LEAVE ANYTHING YOU CONSIDER VALUABLE AT HOME.    YOU WILL NEED TO ARRANGE FOR SOMEONE TO DRIVE YOU HOME AFTER SURGERY.  IT IS RECOMMENDED THAT YOU DO NOT DRIVE, WORK, DRINK ALCOHOL OR MAKE MAJOR DECISIONS FOR AT LEAST 24 HOURS AFTER YOUR PROCEDURE IS COMPLETE.      THE DAY OF YOUR PROCEDURE, BRING IN THE FOLLOWING IF APPLICABLE:   PICTURE ID AND INSURANCE/MEDICARE OR MEDICAID CARDS   COPY OF ADVANCED DIRECTIVE/LIVING WILL/POWER OR    CPAP/BIPAP/INHALERS   SKIN PREP SHEET   YOUR PREADMISSION TESTING PASS (IF NOT A PHONE HISTORY)

## 2019-02-12 NOTE — NURSING NOTE
RECEIVED CALL FROM DR ZOEY WOODRUFF'S MA. WITH REGARD TO PT'S PT/INR BEING INSUFFICIENT FROM BLOOD DRAW IN THE LAB. PT HAS OWN PT/INR MACHINE AND WILL CHECK THIS HERSELF AND INFORM DR LOZANO OF RESULTS. DR LOZANO WAS OK WITH THIS SO NO FURTHER BLOOD TESTS ARE NEEDED FOR PT PRIOR TO SURGERY.

## 2019-02-12 NOTE — TELEPHONE ENCOUNTER
The patient has a point of care INR machine a home.  She can check it and call me with the results.

## 2019-02-12 NOTE — TELEPHONE ENCOUNTER
I talked with pt, I informed her that Dr. Tan asked her to check her pt/inr and call with results.  Ms. Collett stated that she will check it on 02/14/19 and call with results.

## 2019-02-18 ENCOUNTER — HOSPITAL ENCOUNTER (INPATIENT)
Facility: HOSPITAL | Age: 59
LOS: 2 days | Discharge: HOME HEALTH | End: 2019-02-20
Attending: SURGERY | Admitting: SURGERY

## 2019-02-18 ENCOUNTER — ANESTHESIA (OUTPATIENT)
Dept: PERIOP | Facility: HOSPITAL | Age: 59
End: 2019-02-18

## 2019-02-18 ENCOUNTER — ANESTHESIA EVENT (OUTPATIENT)
Dept: PERIOP | Facility: HOSPITAL | Age: 59
End: 2019-02-18

## 2019-02-18 DIAGNOSIS — C73 PAPILLARY THYROID CARCINOMA (HCC): ICD-10-CM

## 2019-02-18 LAB
CALCIUM SPEC-SCNC: 8.4 MG/DL (ref 8.4–10.2)
GLUCOSE BLDC GLUCOMTR-MCNC: 215 MG/DL (ref 70–130)
GLUCOSE BLDC GLUCOMTR-MCNC: 228 MG/DL (ref 70–130)
GLUCOSE BLDC GLUCOMTR-MCNC: 236 MG/DL (ref 70–130)

## 2019-02-18 PROCEDURE — 25010000002 ONDANSETRON PER 1 MG: Performed by: NURSE ANESTHETIST, CERTIFIED REGISTERED

## 2019-02-18 PROCEDURE — 25010000002 FENTANYL CITRATE (PF) 100 MCG/2ML SOLUTION: Performed by: NURSE ANESTHETIST, CERTIFIED REGISTERED

## 2019-02-18 PROCEDURE — 25010000002 METOCLOPRAMIDE PER 10 MG: Performed by: NURSE ANESTHETIST, CERTIFIED REGISTERED

## 2019-02-18 PROCEDURE — 63710000001 INSULIN ASPART PER 5 UNITS

## 2019-02-18 PROCEDURE — 60260 REPEAT THYROID SURGERY: CPT | Performed by: SURGERY

## 2019-02-18 PROCEDURE — 25010000002 HALOPERIDOL LACTATE PER 5 MG: Performed by: NURSE ANESTHETIST, CERTIFIED REGISTERED

## 2019-02-18 PROCEDURE — 63710000001 INSULIN DETEMIR PER 5 UNITS: Performed by: SURGERY

## 2019-02-18 PROCEDURE — 25010000003 CEFAZOLIN SODIUM-DEXTROSE 2-3 GM-%(50ML) RECONSTITUTED SOLUTION: Performed by: SURGERY

## 2019-02-18 PROCEDURE — 82310 ASSAY OF CALCIUM: CPT | Performed by: SURGERY

## 2019-02-18 PROCEDURE — 0GTG0ZZ RESECTION OF LEFT THYROID GLAND LOBE, OPEN APPROACH: ICD-10-PCS | Performed by: SURGERY

## 2019-02-18 PROCEDURE — 25010000002 SUCCINYLCHOLINE PER 20 MG: Performed by: NURSE ANESTHETIST, CERTIFIED REGISTERED

## 2019-02-18 PROCEDURE — 25010000002 PROPOFOL 200 MG/20ML EMULSION: Performed by: NURSE ANESTHETIST, CERTIFIED REGISTERED

## 2019-02-18 PROCEDURE — 82962 GLUCOSE BLOOD TEST: CPT

## 2019-02-18 PROCEDURE — 25010000002 PROMETHAZINE PER 50 MG: Performed by: NURSE ANESTHETIST, CERTIFIED REGISTERED

## 2019-02-18 PROCEDURE — 94799 UNLISTED PULMONARY SVC/PX: CPT

## 2019-02-18 RX ORDER — PANTOPRAZOLE SODIUM 40 MG/1
40 TABLET, DELAYED RELEASE ORAL
Status: DISCONTINUED | OUTPATIENT
Start: 2019-02-19 | End: 2019-02-20 | Stop reason: HOSPADM

## 2019-02-18 RX ORDER — ROCURONIUM BROMIDE 10 MG/ML
INJECTION, SOLUTION INTRAVENOUS AS NEEDED
Status: DISCONTINUED | OUTPATIENT
Start: 2019-02-18 | End: 2019-02-18 | Stop reason: SURG

## 2019-02-18 RX ORDER — FENTANYL CITRATE 50 UG/ML
INJECTION, SOLUTION INTRAMUSCULAR; INTRAVENOUS AS NEEDED
Status: DISCONTINUED | OUTPATIENT
Start: 2019-02-18 | End: 2019-02-18 | Stop reason: SURG

## 2019-02-18 RX ORDER — ONDANSETRON 2 MG/ML
4 INJECTION INTRAMUSCULAR; INTRAVENOUS EVERY 6 HOURS PRN
Status: DISCONTINUED | OUTPATIENT
Start: 2019-02-18 | End: 2019-02-20 | Stop reason: HOSPADM

## 2019-02-18 RX ORDER — MAGNESIUM HYDROXIDE 1200 MG/15ML
LIQUID ORAL AS NEEDED
Status: DISCONTINUED | OUTPATIENT
Start: 2019-02-18 | End: 2019-02-18 | Stop reason: HOSPADM

## 2019-02-18 RX ORDER — METOCLOPRAMIDE HYDROCHLORIDE 5 MG/ML
INJECTION INTRAMUSCULAR; INTRAVENOUS AS NEEDED
Status: DISCONTINUED | OUTPATIENT
Start: 2019-02-18 | End: 2019-02-18 | Stop reason: SURG

## 2019-02-18 RX ORDER — BUPIVACAINE HYDROCHLORIDE 2.5 MG/ML
INJECTION, SOLUTION EPIDURAL; INFILTRATION; INTRACAUDAL AS NEEDED
Status: DISCONTINUED | OUTPATIENT
Start: 2019-02-18 | End: 2019-02-18 | Stop reason: HOSPADM

## 2019-02-18 RX ORDER — HYDROCODONE BITARTRATE AND ACETAMINOPHEN 7.5; 325 MG/1; MG/1
1 TABLET ORAL EVERY 4 HOURS PRN
Status: DISCONTINUED | OUTPATIENT
Start: 2019-02-18 | End: 2019-02-20 | Stop reason: HOSPADM

## 2019-02-18 RX ORDER — SUCCINYLCHOLINE CHLORIDE 20 MG/ML
INJECTION INTRAMUSCULAR; INTRAVENOUS AS NEEDED
Status: DISCONTINUED | OUTPATIENT
Start: 2019-02-18 | End: 2019-02-18 | Stop reason: SURG

## 2019-02-18 RX ORDER — PROPOFOL 10 MG/ML
INJECTION, EMULSION INTRAVENOUS AS NEEDED
Status: DISCONTINUED | OUTPATIENT
Start: 2019-02-18 | End: 2019-02-18 | Stop reason: SURG

## 2019-02-18 RX ORDER — SUFENTANIL CITRATE 50 UG/ML
INJECTION EPIDURAL; INTRAVENOUS AS NEEDED
Status: DISCONTINUED | OUTPATIENT
Start: 2019-02-18 | End: 2019-02-18 | Stop reason: SURG

## 2019-02-18 RX ORDER — BUDESONIDE AND FORMOTEROL FUMARATE DIHYDRATE 160; 4.5 UG/1; UG/1
2 AEROSOL RESPIRATORY (INHALATION)
Status: DISCONTINUED | OUTPATIENT
Start: 2019-02-18 | End: 2019-02-20 | Stop reason: HOSPADM

## 2019-02-18 RX ORDER — HALOPERIDOL 5 MG/ML
INJECTION INTRAMUSCULAR AS NEEDED
Status: DISCONTINUED | OUTPATIENT
Start: 2019-02-18 | End: 2019-02-18 | Stop reason: SURG

## 2019-02-18 RX ORDER — NICOTINE POLACRILEX 4 MG
1 LOZENGE BUCCAL
Status: DISCONTINUED | OUTPATIENT
Start: 2019-02-18 | End: 2019-02-18 | Stop reason: HOSPADM

## 2019-02-18 RX ORDER — DEXTROSE MONOHYDRATE 25 G/50ML
25 INJECTION, SOLUTION INTRAVENOUS
Status: DISCONTINUED | OUTPATIENT
Start: 2019-02-18 | End: 2019-02-20 | Stop reason: HOSPADM

## 2019-02-18 RX ORDER — DEXTROSE MONOHYDRATE 25 G/50ML
25 INJECTION, SOLUTION INTRAVENOUS
Status: DISCONTINUED | OUTPATIENT
Start: 2019-02-18 | End: 2019-02-18 | Stop reason: HOSPADM

## 2019-02-18 RX ORDER — PROMETHAZINE HYDROCHLORIDE 25 MG/ML
6.25 INJECTION, SOLUTION INTRAMUSCULAR; INTRAVENOUS ONCE AS NEEDED
Status: COMPLETED | OUTPATIENT
Start: 2019-02-18 | End: 2019-02-18

## 2019-02-18 RX ORDER — PROMETHAZINE HYDROCHLORIDE 25 MG/ML
12.5 INJECTION, SOLUTION INTRAMUSCULAR; INTRAVENOUS EVERY 6 HOURS PRN
Status: DISCONTINUED | OUTPATIENT
Start: 2019-02-18 | End: 2019-02-20 | Stop reason: HOSPADM

## 2019-02-18 RX ORDER — SODIUM CHLORIDE 0.9 % (FLUSH) 0.9 %
3 SYRINGE (ML) INJECTION AS NEEDED
Status: DISCONTINUED | OUTPATIENT
Start: 2019-02-18 | End: 2019-02-18 | Stop reason: HOSPADM

## 2019-02-18 RX ORDER — MORPHINE SULFATE 4 MG/ML
4 INJECTION, SOLUTION INTRAMUSCULAR; INTRAVENOUS
Status: DISCONTINUED | OUTPATIENT
Start: 2019-02-18 | End: 2019-02-19

## 2019-02-18 RX ORDER — BUDESONIDE AND FORMOTEROL FUMARATE DIHYDRATE 160; 4.5 UG/1; UG/1
2 AEROSOL RESPIRATORY (INHALATION)
Status: DISCONTINUED | OUTPATIENT
Start: 2019-02-18 | End: 2019-02-18 | Stop reason: HOSPADM

## 2019-02-18 RX ORDER — NICOTINE POLACRILEX 4 MG
1 LOZENGE BUCCAL
Status: DISCONTINUED | OUTPATIENT
Start: 2019-02-18 | End: 2019-02-20 | Stop reason: HOSPADM

## 2019-02-18 RX ORDER — CEFAZOLIN SODIUM 2 G/50ML
2 SOLUTION INTRAVENOUS ONCE
Status: COMPLETED | OUTPATIENT
Start: 2019-02-18 | End: 2019-02-18

## 2019-02-18 RX ORDER — ACETAMINOPHEN 325 MG/1
650 TABLET ORAL EVERY 4 HOURS PRN
Status: DISCONTINUED | OUTPATIENT
Start: 2019-02-18 | End: 2019-02-18 | Stop reason: HOSPADM

## 2019-02-18 RX ORDER — LIDOCAINE HYDROCHLORIDE 10 MG/ML
INJECTION, SOLUTION EPIDURAL; INFILTRATION; INTRACAUDAL; PERINEURAL AS NEEDED
Status: DISCONTINUED | OUTPATIENT
Start: 2019-02-18 | End: 2019-02-18 | Stop reason: HOSPADM

## 2019-02-18 RX ORDER — ONDANSETRON 2 MG/ML
4 INJECTION INTRAMUSCULAR; INTRAVENOUS ONCE
Status: COMPLETED | OUTPATIENT
Start: 2019-02-18 | End: 2019-02-18

## 2019-02-18 RX ORDER — ONDANSETRON 2 MG/ML
INJECTION INTRAMUSCULAR; INTRAVENOUS AS NEEDED
Status: DISCONTINUED | OUTPATIENT
Start: 2019-02-18 | End: 2019-02-18 | Stop reason: SURG

## 2019-02-18 RX ORDER — ACETAMINOPHEN 325 MG/1
650 TABLET ORAL EVERY 4 HOURS PRN
Status: DISCONTINUED | OUTPATIENT
Start: 2019-02-18 | End: 2019-02-20 | Stop reason: HOSPADM

## 2019-02-18 RX ORDER — SCOLOPAMINE TRANSDERMAL SYSTEM 1 MG/1
1 PATCH, EXTENDED RELEASE TRANSDERMAL ONCE
Status: DISCONTINUED | OUTPATIENT
Start: 2019-02-18 | End: 2019-02-18

## 2019-02-18 RX ORDER — SODIUM CHLORIDE 9 MG/ML
50 INJECTION, SOLUTION INTRAVENOUS CONTINUOUS
Status: DISCONTINUED | OUTPATIENT
Start: 2019-02-18 | End: 2019-02-20

## 2019-02-18 RX ORDER — SODIUM CHLORIDE, SODIUM LACTATE, POTASSIUM CHLORIDE, CALCIUM CHLORIDE 600; 310; 30; 20 MG/100ML; MG/100ML; MG/100ML; MG/100ML
50 INJECTION, SOLUTION INTRAVENOUS CONTINUOUS
Status: DISCONTINUED | OUTPATIENT
Start: 2019-02-18 | End: 2019-02-18

## 2019-02-18 RX ORDER — PANTOPRAZOLE SODIUM 40 MG/1
40 TABLET, DELAYED RELEASE ORAL
Status: DISCONTINUED | OUTPATIENT
Start: 2019-02-19 | End: 2019-02-18 | Stop reason: HOSPADM

## 2019-02-18 RX ORDER — DEXAMETHASONE SODIUM PHOSPHATE 4 MG/ML
INJECTION, SOLUTION INTRA-ARTICULAR; INTRALESIONAL; INTRAMUSCULAR; INTRAVENOUS; SOFT TISSUE AS NEEDED
Status: DISCONTINUED | OUTPATIENT
Start: 2019-02-18 | End: 2019-02-18

## 2019-02-18 RX ORDER — NALOXONE HCL 0.4 MG/ML
0.4 VIAL (ML) INJECTION
Status: DISCONTINUED | OUTPATIENT
Start: 2019-02-18 | End: 2019-02-19

## 2019-02-18 RX ORDER — BUPIVACAINE HCL/0.9 % NACL/PF 0.125 %
PLASTIC BAG, INJECTION (ML) EPIDURAL AS NEEDED
Status: DISCONTINUED | OUTPATIENT
Start: 2019-02-18 | End: 2019-02-18 | Stop reason: SURG

## 2019-02-18 RX ADMIN — ROCURONIUM BROMIDE 5 MG: 10 INJECTION INTRAVENOUS at 12:17

## 2019-02-18 RX ADMIN — SUCCINYLCHOLINE CHLORIDE 140 MG: 20 INJECTION, SOLUTION INTRAMUSCULAR; INTRAVENOUS at 12:21

## 2019-02-18 RX ADMIN — Medication 100 MCG: at 12:30

## 2019-02-18 RX ADMIN — HALOPERIDOL LACTATE 0.5 MG: 5 INJECTION, SOLUTION INTRAMUSCULAR at 12:33

## 2019-02-18 RX ADMIN — PROMETHAZINE HYDROCHLORIDE 6.25 MG: 25 INJECTION INTRAMUSCULAR; INTRAVENOUS at 10:38

## 2019-02-18 RX ADMIN — SODIUM CHLORIDE, POTASSIUM CHLORIDE, SODIUM LACTATE AND CALCIUM CHLORIDE: 600; 310; 30; 20 INJECTION, SOLUTION INTRAVENOUS at 12:39

## 2019-02-18 RX ADMIN — SUFENTANIL CITRATE 10 MCG: 50 INJECTION EPIDURAL; INTRAVENOUS at 13:40

## 2019-02-18 RX ADMIN — SUFENTANIL CITRATE 10 MCG: 50 INJECTION EPIDURAL; INTRAVENOUS at 14:12

## 2019-02-18 RX ADMIN — LIDOCAINE HYDROCHLORIDE 60 MG: 20 INJECTION, SOLUTION INTRAVENOUS at 12:16

## 2019-02-18 RX ADMIN — SCOPALAMINE 1 PATCH: 1 PATCH, EXTENDED RELEASE TRANSDERMAL at 10:37

## 2019-02-18 RX ADMIN — HALOPERIDOL LACTATE 0.5 MG: 5 INJECTION, SOLUTION INTRAMUSCULAR at 13:45

## 2019-02-18 RX ADMIN — METOCLOPRAMIDE 10 MG: 5 INJECTION, SOLUTION INTRAMUSCULAR; INTRAVENOUS at 12:33

## 2019-02-18 RX ADMIN — CEFAZOLIN SODIUM 2 G: 2 SOLUTION INTRAVENOUS at 12:19

## 2019-02-18 RX ADMIN — PROPOFOL 150 MG: 10 INJECTION, EMULSION INTRAVENOUS at 12:21

## 2019-02-18 RX ADMIN — INSULIN DETEMIR 30 UNITS: 100 INJECTION, SOLUTION SUBCUTANEOUS at 22:12

## 2019-02-18 RX ADMIN — SUFENTANIL CITRATE 10 MCG: 50 INJECTION EPIDURAL; INTRAVENOUS at 13:35

## 2019-02-18 RX ADMIN — SUFENTANIL CITRATE 10 MCG: 50 INJECTION EPIDURAL; INTRAVENOUS at 13:28

## 2019-02-18 RX ADMIN — FENTANYL CITRATE 100 MCG: 50 INJECTION, SOLUTION INTRAMUSCULAR; INTRAVENOUS at 12:19

## 2019-02-18 RX ADMIN — SUFENTANIL CITRATE 10 MCG: 50 INJECTION EPIDURAL; INTRAVENOUS at 13:54

## 2019-02-18 RX ADMIN — Medication 100 MCG: at 12:36

## 2019-02-18 RX ADMIN — Medication 100 MCG: at 12:44

## 2019-02-18 RX ADMIN — SODIUM CHLORIDE 100 ML/HR: 9 INJECTION, SOLUTION INTRAVENOUS at 16:35

## 2019-02-18 RX ADMIN — ONDANSETRON 4 MG: 2 INJECTION INTRAMUSCULAR; INTRAVENOUS at 12:33

## 2019-02-18 RX ADMIN — EPHEDRINE SULFATE 10 MG: 50 INJECTION INTRAMUSCULAR; INTRAVENOUS; SUBCUTANEOUS at 13:00

## 2019-02-18 RX ADMIN — INSULIN ASPART 5 UNITS: 100 INJECTION, SOLUTION INTRAVENOUS; SUBCUTANEOUS at 16:30

## 2019-02-18 RX ADMIN — SODIUM CHLORIDE, POTASSIUM CHLORIDE, SODIUM LACTATE AND CALCIUM CHLORIDE 50 ML/HR: 600; 310; 30; 20 INJECTION, SOLUTION INTRAVENOUS at 09:10

## 2019-02-18 RX ADMIN — ONDANSETRON 4 MG: 2 INJECTION INTRAMUSCULAR; INTRAVENOUS at 10:37

## 2019-02-18 NOTE — BRIEF OP NOTE
THYROIDECTOMY  Progress Note    Sue L Collett  2/18/2019    Pre-op Diagnosis:   Papillary thyroid carcinoma (CMS/HCC) [C73]       Post-Op Diagnosis Codes:     * Papillary thyroid carcinoma (CMS/HCC) [C73]    Procedure/CPT® Codes: 68625      Procedure(s):  THYROIDECTOMY, COMPLETION    Attending Surgeon: Janeth Tan MD, FACS    First Assistant: William Estrada MD, FACS    Anesthesia: General    Staff:   Circulator: Bernie El RN; Kristy Chawla RN  Scrub Person: No Robison; Marylou Nieves; Kaylene Verdin CSA; María Tyson    Estimated Blood Loss: 50mL    Urine Voided: * No values recorded between 2/18/2019 12:11 PM and 2/18/2019  2:48 PM *    Specimens:                ID Type Source Tests Collected by Time   A : left thyroid lobe Tissue Thyroid TISSUE PATHOLOGY EXAM Bernie El RN 2/18/2019 1421         Drains:  none    Findings: dense fibrosis and scarring in the midline from prior hemithyroidectomy.  Vocal cords visualized at the conclusion of the case and noted to have normal motion bilaterally.     Complications: none      Janeth Tan MD     Date: 2/18/2019  Time: 3:18 PM

## 2019-02-18 NOTE — ANESTHESIA PROCEDURE NOTES
Airway  Urgency: elective    Airway not difficult    General Information and Staff    Patient location during procedure: OR  CRNA: Omar Vasquez CRNA    Indications and Patient Condition  Indications for airway management: airway protection    Preoxygenated: yes      Final Airway Details  Final airway type: endotracheal airway      Successful airway: ETT  Cuffed: yes   Successful intubation technique: direct laryngoscopy  Endotracheal tube insertion site: oral  Blade: Dial  Blade size: 2  ETT size (mm): 7.0  Cormack-Lehane Classification: grade I - full view of glottis  Placement verified by: chest auscultation and capnometry   Number of attempts at approach: 1

## 2019-02-18 NOTE — ANESTHESIA POSTPROCEDURE EVALUATION
Patient: Sue L Collett    Procedure Summary     Date:  02/18/19 Room / Location:  [facility] OR 3 /  [facility]    Anesthesia Start:  1217 Anesthesia Stop:  1455    Procedure:  THYROIDECTOMY, COMPLETION (N/A Neck) Diagnosis:       Papillary thyroid carcinoma (CMS/HCC)      (Papillary thyroid carcinoma (CMS/HCC) [C73])    Surgeon:  Janeth Tan MD Provider:  Omar Vasquez CRNA    Anesthesia Type:  general ASA Status:  3          Anesthesia Type: general  Last vitals  BP   138/87 (02/18/19 0838)   Temp   97.3 °F (36.3 °C) (02/18/19 0838)   Pulse   62 (02/18/19 0838)   Resp   17 (02/18/19 0838)     SpO2   99 % (02/18/19 0838)     Post Anesthesia Care and Evaluation    Patient location during evaluation: bedside  Patient participation: complete - patient participated  Level of consciousness: awake and alert  Pain score: 0  Pain management: adequate  Airway patency: patent  Anesthetic complications: No anesthetic complications  PONV Status: none  Cardiovascular status: acceptable  Respiratory status: acceptable  Hydration status: acceptable

## 2019-02-18 NOTE — ANESTHESIA POSTPROCEDURE EVALUATION
Patient: Sue L Collett    Procedure Summary     Date:  02/18/19 Room / Location:  [facility] OR 3 /  [facility]    Anesthesia Start:  1217 Anesthesia Stop:  1455    Procedure:  THYROIDECTOMY, COMPLETION (N/A Neck) Diagnosis:       Papillary thyroid carcinoma (CMS/HCC)      (Papillary thyroid carcinoma (CMS/HCC) [C73])    Surgeon:  Janeth Tan MD Provider:  Omar Vasquez CRNA    Anesthesia Type:  general ASA Status:  3          Anesthesia Type: general  Last vitals  BP   161/80 (02/18/19 1720)   Temp   97.8 °F (36.6 °C) (02/18/19 1720)   Pulse   98 (02/18/19 1720)   Resp   15 (02/18/19 1720)     SpO2   97 % (02/18/19 1720)     Anesthesia Post Evaluation

## 2019-02-19 LAB
ANION GAP SERPL CALCULATED.3IONS-SCNC: 9.9 MMOL/L (ref 10–20)
BASOPHILS # BLD AUTO: 0.02 10*3/MM3 (ref 0–0.2)
BASOPHILS NFR BLD AUTO: 0.3 % (ref 0–2.5)
BUN BLD-MCNC: 15 MG/DL (ref 7–20)
BUN/CREAT SERPL: 15 (ref 7.1–23.5)
CALCIUM SPEC-SCNC: 8.4 MG/DL (ref 8.4–10.2)
CHLORIDE SERPL-SCNC: 107 MMOL/L (ref 98–107)
CO2 SERPL-SCNC: 25 MMOL/L (ref 26–30)
CREAT BLD-MCNC: 1 MG/DL (ref 0.6–1.3)
DEPRECATED RDW RBC AUTO: 42.3 FL (ref 37–54)
EOSINOPHIL # BLD AUTO: 0 10*3/MM3 (ref 0–0.7)
EOSINOPHIL NFR BLD AUTO: 0 % (ref 0–7)
ERYTHROCYTE [DISTWIDTH] IN BLOOD BY AUTOMATED COUNT: 12.9 % (ref 11.5–14.5)
GFR SERPL CREATININE-BSD FRML MDRD: 57 ML/MIN/1.73
GLUCOSE BLD-MCNC: 240 MG/DL (ref 74–98)
GLUCOSE BLDC GLUCOMTR-MCNC: 187 MG/DL (ref 70–130)
GLUCOSE BLDC GLUCOMTR-MCNC: 218 MG/DL (ref 70–130)
GLUCOSE BLDC GLUCOMTR-MCNC: 223 MG/DL (ref 70–130)
GLUCOSE BLDC GLUCOMTR-MCNC: 238 MG/DL (ref 70–130)
HCT VFR BLD AUTO: 40.4 % (ref 37–47)
HGB BLD-MCNC: 13.7 G/DL (ref 12–16)
IMM GRANULOCYTES # BLD AUTO: 0.03 10*3/MM3 (ref 0–0.06)
IMM GRANULOCYTES NFR BLD AUTO: 0.4 % (ref 0–0.6)
INR PPP: 1.08 (ref 0.9–1.1)
LYMPHOCYTES # BLD AUTO: 1.35 10*3/MM3 (ref 0.6–3.4)
LYMPHOCYTES NFR BLD AUTO: 17.9 % (ref 10–50)
MCH RBC QN AUTO: 30.2 PG (ref 27–31)
MCHC RBC AUTO-ENTMCNC: 33.9 G/DL (ref 30–37)
MCV RBC AUTO: 89 FL (ref 81–99)
MONOCYTES # BLD AUTO: 0.37 10*3/MM3 (ref 0–0.9)
MONOCYTES NFR BLD AUTO: 4.9 % (ref 0–12)
NEUTROPHILS # BLD AUTO: 5.76 10*3/MM3 (ref 2–6.9)
NEUTROPHILS NFR BLD AUTO: 76.5 % (ref 37–80)
NRBC BLD AUTO-RTO: 0 /100 WBC (ref 0–0)
PLATELET # BLD AUTO: 229 10*3/MM3 (ref 130–400)
PMV BLD AUTO: 11 FL (ref 6–12)
POTASSIUM BLD-SCNC: 3.9 MMOL/L (ref 3.5–5.1)
PROTHROMBIN TIME: 12.1 SECONDS (ref 9.3–12.1)
RBC # BLD AUTO: 4.54 10*6/MM3 (ref 4.2–5.4)
SODIUM BLD-SCNC: 138 MMOL/L (ref 137–145)
WBC NRBC COR # BLD: 7.53 10*3/MM3 (ref 4.8–10.8)

## 2019-02-19 PROCEDURE — 63710000001 INSULIN ASPART PER 5 UNITS: Performed by: SURGERY

## 2019-02-19 PROCEDURE — 85610 PROTHROMBIN TIME: CPT | Performed by: SURGERY

## 2019-02-19 PROCEDURE — 85025 COMPLETE CBC W/AUTO DIFF WBC: CPT | Performed by: SURGERY

## 2019-02-19 PROCEDURE — 80048 BASIC METABOLIC PNL TOTAL CA: CPT | Performed by: SURGERY

## 2019-02-19 PROCEDURE — 99024 POSTOP FOLLOW-UP VISIT: CPT | Performed by: SURGERY

## 2019-02-19 PROCEDURE — 82962 GLUCOSE BLOOD TEST: CPT

## 2019-02-19 PROCEDURE — 63710000001 INSULIN DETEMIR PER 5 UNITS: Performed by: SURGERY

## 2019-02-19 RX ORDER — WARFARIN SODIUM 7.5 MG/1
7.5 TABLET ORAL
Status: DISCONTINUED | OUTPATIENT
Start: 2019-02-19 | End: 2019-02-20 | Stop reason: HOSPADM

## 2019-02-19 RX ORDER — CALCIUM CARBONATE 200(500)MG
3 TABLET,CHEWABLE ORAL 2 TIMES DAILY
Status: DISCONTINUED | OUTPATIENT
Start: 2019-02-19 | End: 2019-02-20 | Stop reason: HOSPADM

## 2019-02-19 RX ADMIN — INSULIN ASPART 5 UNITS: 100 INJECTION, SOLUTION INTRAVENOUS; SUBCUTANEOUS at 14:20

## 2019-02-19 RX ADMIN — WARFARIN SODIUM 7.5 MG: 7.5 TABLET ORAL at 18:13

## 2019-02-19 RX ADMIN — ACETAMINOPHEN 650 MG: 325 TABLET, FILM COATED ORAL at 10:50

## 2019-02-19 RX ADMIN — INSULIN DETEMIR 60 UNITS: 100 INJECTION, SOLUTION SUBCUTANEOUS at 21:55

## 2019-02-19 RX ADMIN — CALCIUM CARBONATE (ANTACID) CHEW TAB 500 MG 3 TABLET: 500 CHEW TAB at 21:57

## 2019-02-19 RX ADMIN — INSULIN ASPART 5 UNITS: 100 INJECTION, SOLUTION INTRAVENOUS; SUBCUTANEOUS at 21:56

## 2019-02-19 RX ADMIN — LINAGLIPTIN 5 MG: 5 TABLET, FILM COATED ORAL at 10:51

## 2019-02-19 RX ADMIN — INSULIN ASPART 3 UNITS: 100 INJECTION, SOLUTION INTRAVENOUS; SUBCUTANEOUS at 06:56

## 2019-02-19 RX ADMIN — PANTOPRAZOLE SODIUM 40 MG: 40 TABLET, DELAYED RELEASE ORAL at 06:56

## 2019-02-19 RX ADMIN — LOSARTAN POTASSIUM: 50 TABLET, FILM COATED ORAL at 11:12

## 2019-02-19 RX ADMIN — SODIUM CHLORIDE 100 ML/HR: 9 INJECTION, SOLUTION INTRAVENOUS at 14:16

## 2019-02-19 RX ADMIN — CALCIUM CARBONATE (ANTACID) CHEW TAB 500 MG 3 TABLET: 500 CHEW TAB at 11:12

## 2019-02-19 RX ADMIN — INSULIN ASPART 5 UNITS: 100 INJECTION, SOLUTION INTRAVENOUS; SUBCUTANEOUS at 16:54

## 2019-02-19 NOTE — PROGRESS NOTES
Continued Stay Note   [place]     Patient Name: Sue L Collett  MRN: [id number]  Today's Date: 2/19/2019    Admit Date: 2/18/2019    Discharge Plan     Row Name 02/19/19 1308       Plan    Plan  Spoke to pt and daughter in room.  Permission granted to speak in front of daughter.  Has no POA or Living Will.  Address and phone no verified.  Independent with ADLS.  Has a rollator and no other medical equipment.  Shares a home with son but pays rent of have of it.  Has no trouble with getting food in the home or paying utilites.  Plans to go home at discharge and son or daughter will transport. Denies discharge needs.          Discharge Codes    No documentation.             Therese Parker

## 2019-02-19 NOTE — PROGRESS NOTES
Adult Nutrition  Assessment/PES    Patient Name:  Sue L Collett  YOB: 1960  MRN: [id number]  Admit Date:  2/18/2019    Assessment Date:  2/19/2019    Comments:  Rec #1: Continue current diet order; Establishing and encouraging intake. Pt with Swallowing/Chewing noted on nutrition exam. Pt may benefit from SLP consult. Rec #2: Consider MVI with minerals daily. RD to follow pt. Consult RD PRN.       Reason for Assessment     Row Name 02/19/19 1413          Reason for Assessment    Reason For Assessment  diagnosis/disease state;identified at risk by screening criteria     Diagnosis  diabetes diagnosis/complications;other (see comments);renal disease Thyroid Ca, DM, CKD     Identified At Risk by Screening Criteria  BMI;difficulty chewing/swallowing             Labs/Tests/Procedures/Meds     Row Name 02/19/19 1414          Labs/Procedures/Meds    Lab Results Reviewed  reviewed, pertinent     Lab Results Comments  High: Glu        Medications    Pertinent Medications Reviewed  reviewed           Estimated/Assessed Needs     Row Name 02/19/19 1414          Calculation Measurements    Weight Used For Calculations  59.6 kg (131 lb 5.6 oz) IBW        Estimated/Assessed Needs    Additional Documentation  Fluid Requirements (Group);[employer]-St. Jeor Equation (Group);Protein Requirements (Group);Calorie Requirements (Group)        Calorie Requirements    Weight Used For Calorie Calculations  -- AF 1.2     Estimated Calorie Need Method  [employer]-St Jeor     Estimated Calorie Requirement Comment  ~5340-1102        KCAL/KG    14 Kcal/Kg (kcal)  834.12     15 Kcal/Kg (kcal)  893.7     18 Kcal/Kg (kcal)  1072.44     20 Kcal/Kg (kcal)  1191.6     25 Kcal/Kg (kcal)  1489.5     30 Kcal/Kg (kcal)  1787.4     35 Kcal/Kg (kcal)  2085.3     40 Kcal/Kg (kcal)  2383.2     45 Kcal/Kg (kcal)  2681.1     50 Kcal/Kg (kcal)  2979        [employer]-St. Jeor Equation    RMR ([employer]-St. Jeor Equation)  1182.55        Protein Requirements     Est Protein Requirement Amount (gms/kg)  1.2 gm protein 36-72 gm     Estimated Protein Requirements (gms/day)  71.5        Fluid Requirements    Estimated Fluid Requirement Method  [place]-Segar Formula     [place]-Segar Method (over 20 kg)  2691.6         Nutrition Prescription Ordered     Row Name 02/19/19 1416          Nutrition Prescription PO    Current PO Diet  Regular     Common Modifiers  Consistent Carbohydrate         Evaluation of Received Nutrient/Fluid Intake     Row Name 02/19/19 1414          Calculation Measurements    Weight Used For Calculations  59.6 kg (131 lb 5.6 oz) IBW        PO Evaluation    Number of Days PO Intake Evaluated  Insufficient Data         Evaluation of Prescribed Nutrient/Fluid Intake     Row Name 02/19/19 1414          Calculation Measurements    Weight Used For Calculations  59.6 kg (131 lb 5.6 oz) IBW             Problem/Interventions:  Problem 1     Row Name 02/19/19 1416          Nutrition Diagnoses Problem 1    Problem 1  Overweight/Obesity     Etiology (related to)  Factors Affecting Nutrition     Food Habit/Preferences  Large Meals     Signs/Symptoms (evidenced by)  BMI     BMI  Greater than 40         Problem 2     Row Name 02/19/19 1417          Nutrition Diagnoses Problem 2    Problem 2  Impaired Nutrient Utilization     Etiology (related to)  Medical Diagnosis     Endocrine  DM Type 2     Signs/Symptoms (evidenced by)  Biochemical     Specific Labs Noted  Glucose               Intervention Goal     Row Name 02/19/19 1417          Intervention Goal    General  Meet nutritional needs for age/condition     PO  Meet estimated needs;Establish PO     Weight  No significant weight loss         Nutrition Intervention     Row Name 02/19/19 1417          Nutrition Intervention    RD/Tech Action  Follow Tx progress;Care plan reviewd;Encourage intake         Nutrition Prescription     Row Name 02/19/19 1417          Nutrition Prescription PO    PO Prescription  Other  (comment) Continue current diet order     New PO Prescription Ordered?  No, recommended        Other Orders    Obtain Weight  Daily     Obtain Weight Ordered?  No, recommended         Education/Evaluation     Row Name 02/19/19 1418          Education    Education  Will Instruct as appropriate        Monitor/Evaluation    Monitor  Per protocol;I&O;PO intake;Pertinent labs;Weight           Electronically signed by:  Marguerite Cruz RD  02/19/19 2:18 PM

## 2019-02-19 NOTE — PROGRESS NOTES
LOS: 1 day   Patient Care Team:  Mattie Saldaña APRN as PCP - General (Family Medicine)  Janeth Tan MD as Surgeon (General Surgery)    Chief Complaint:  POD#1 completion thyroidectomy    Subjective     Interval History:   No events overnight.  Denies facial numbness or tingling.  Tolerating clear liquids.  Denies voice changes or hoarseness.  No nausea or vomiting.        Objective     Vital Signs  Temp:  [97.2 °F (36.2 °C)-98.4 °F (36.9 °C)] 97.7 °F (36.5 °C)  Heart Rate:  [71-98] 71  Resp:  [10-18] 16  BP: (115-166)/(63-86) 141/80       Intake/Output Summary (Last 24 hours) at 2/19/2019 0937  Last data filed at 2/19/2019 0333  Gross per 24 hour   Intake 3800 ml   Output 900 ml   Net 2900 ml         Physical Exam:  General Appearance: alert, appears stated age and cooperative  Head: normocephalic, without obvious abnormality and atraumatic. Negative Chevostek's sign  Eyes: lids and lashes normal, conjunctivae and sclerae normal, no icterus, no pallor, corneas clear and PERRLA  Neck: supple, trachea midline and no evidence of hematoma   Lungs: clear to auscultation, respirations regular, respirations even and respirations unlabored  Heart: regular rhythm & normal rate, normal S1, S2, no murmur, no gallop, no rub and no click  Extremities: moves extremities well, no edema, no cyanosis and no redness  Neurologic: Mental Status orientated to person, place, time and situation and alertness alert and awake, Cranial Nerves cranial nerves 2 - 12 grossly intact as examined  Psych: normal     Results Review:    I reviewed the patient's new clinical results.      Results from last 7 days   Lab Units 02/19/19 0536 02/18/19 2059   SODIUM mmol/L 138  --    POTASSIUM mmol/L 3.9  --    CHLORIDE mmol/L 107  --    CO2 mmol/L 25.0*  --    BUN mg/dL 15  --    CREATININE mg/dL 1.00  --    CALCIUM mg/dL 8.4 8.4   GLUCOSE mg/dL 240*  --        Results from last 7 days   Lab Units 02/19/19  0536   WBC 10*3/mm3 7.53    HEMOGLOBIN g/dL 13.7   HEMATOCRIT % 40.4   PLATELETS 10*3/mm3 229         Medication Review:     Scheduled Meds:  budesonide-formoterol 2 puff Inhalation BID - RT   calcium carbonate 3 tablet Oral BID   insulin aspart 0-14 Units Subcutaneous 4x Daily AC & at Bedtime   insulin detemir 60 Units Subcutaneous Nightly   linagliptin 5 mg Oral Daily   losartan-HCTZ (HYZAAR) 100-25 combo dose  Oral Daily   pantoprazole 40 mg Oral Q AM   warfarin 7.5 mg Oral Daily     Continuous Infusions:  sodium chloride 100 mL/hr Last Rate: 100 mL/hr (02/19/19 1416)     PRN Meds:•  acetaminophen  •  benzocaine-menthol  •  dextrose  •  dextrose  •  glucagon (human recombinant)  •  HYDROcodone-acetaminophen  •  ondansetron  •  promethazine      Assessment/Plan       Papillary thyroid carcinoma (CMS/HCC)      POD#1 from completion thyroidectomy, doing well overall.  Will supplement calcium orally and recheck tomorrow AM.  Advance diet to CC/ADA diet.  Increase levemir to home dose.  Restart lovenox/coumadin tonight and monitor closely for development of bleeding.  Continue SCDs.  Continue home meds as ordered.  Home tomorrow assuming that there is no evidence of neck hematoma and that the patient's calcium level is appropriate.      Janeth Tan MD  02/19/19  9:35 AM

## 2019-02-19 NOTE — PLAN OF CARE
Problem: Patient Care Overview  Goal: Plan of Care Review  Outcome: Ongoing (interventions implemented as appropriate)   02/19/19 0128   Coping/Psychosocial   Plan of Care Reviewed With patient   Plan of Care Review   Progress improving

## 2019-02-19 NOTE — PLAN OF CARE
Problem: Fall Risk (Adult)  Goal: Identify Related Risk Factors and Signs and Symptoms  Outcome: Outcome(s) achieved Date Met: 02/19/19

## 2019-02-20 VITALS
OXYGEN SATURATION: 100 % | BODY MASS INDEX: 40.92 KG/M2 | SYSTOLIC BLOOD PRESSURE: 138 MMHG | DIASTOLIC BLOOD PRESSURE: 78 MMHG | HEART RATE: 63 BPM | WEIGHT: 254.63 LBS | HEIGHT: 66 IN | RESPIRATION RATE: 16 BRPM | TEMPERATURE: 98.9 F

## 2019-02-20 LAB
ANION GAP SERPL CALCULATED.3IONS-SCNC: 7.4 MMOL/L (ref 10–20)
BUN BLD-MCNC: 14 MG/DL (ref 7–20)
BUN/CREAT SERPL: 12.7 (ref 7.1–23.5)
CALCIUM SPEC-SCNC: 8.9 MG/DL (ref 8.4–10.2)
CHLORIDE SERPL-SCNC: 109 MMOL/L (ref 98–107)
CO2 SERPL-SCNC: 27 MMOL/L (ref 26–30)
CREAT BLD-MCNC: 1.1 MG/DL (ref 0.6–1.3)
GFR SERPL CREATININE-BSD FRML MDRD: 51 ML/MIN/1.73
GLUCOSE BLD-MCNC: 143 MG/DL (ref 74–98)
GLUCOSE BLDC GLUCOMTR-MCNC: 123 MG/DL (ref 70–130)
GLUCOSE BLDC GLUCOMTR-MCNC: 152 MG/DL (ref 70–130)
POTASSIUM BLD-SCNC: 3.4 MMOL/L (ref 3.5–5.1)
SODIUM BLD-SCNC: 140 MMOL/L (ref 137–145)

## 2019-02-20 PROCEDURE — 99024 POSTOP FOLLOW-UP VISIT: CPT | Performed by: SURGERY

## 2019-02-20 PROCEDURE — 82962 GLUCOSE BLOOD TEST: CPT

## 2019-02-20 PROCEDURE — 80048 BASIC METABOLIC PNL TOTAL CA: CPT | Performed by: SURGERY

## 2019-02-20 RX ORDER — IBUPROFEN 600 MG/1
600 TABLET ORAL ONCE
Status: COMPLETED | OUTPATIENT
Start: 2019-02-20 | End: 2019-02-20

## 2019-02-20 RX ORDER — CALCIUM CARBONATE 200(500)MG
3 TABLET,CHEWABLE ORAL 2 TIMES DAILY
Qty: 42 TABLET | Refills: 0 | Status: SHIPPED | OUTPATIENT
Start: 2019-02-20 | End: 2019-02-27

## 2019-02-20 RX ADMIN — IBUPROFEN 600 MG: 600 TABLET ORAL at 10:39

## 2019-02-20 RX ADMIN — CALCIUM CARBONATE (ANTACID) CHEW TAB 500 MG 3 TABLET: 500 CHEW TAB at 08:32

## 2019-02-20 RX ADMIN — LINAGLIPTIN 5 MG: 5 TABLET, FILM COATED ORAL at 08:32

## 2019-02-20 RX ADMIN — ACETAMINOPHEN 650 MG: 325 TABLET, FILM COATED ORAL at 05:18

## 2019-02-20 RX ADMIN — PANTOPRAZOLE SODIUM 40 MG: 40 TABLET, DELAYED RELEASE ORAL at 05:18

## 2019-02-20 RX ADMIN — LOSARTAN POTASSIUM: 50 TABLET, FILM COATED ORAL at 08:32

## 2019-02-20 NOTE — PLAN OF CARE
Problem: Patient Care Overview  Goal: Plan of Care Review  Outcome: Ongoing (interventions implemented as appropriate)   02/20/19 0256   Coping/Psychosocial   Plan of Care Reviewed With patient   Plan of Care Review   Progress improving   OTHER   Outcome Summary No pain this shift, calcium within normal limits

## 2019-02-20 NOTE — DISCHARGE SUMMARY
Date of Discharge:  2/20/2019    Discharge Diagnosis: Papillary thyroid carcinoma (CMS/HCC) [C73]  Papillary thyroid carcinoma (CMS/HCC) [C73]    History of Present Illness: Ms. Collett is a 58-year-old female patient who underwent a right thyroid lobectomy with isthmusectomy on 12/21/2018 for diagnostic purposes after having an abnormal fine-needle aspiration of a thyroid nodule with abnormal molecular testing.  Her final pathology demonstrated papillary thyroid cancer of the classical type that was multifocal in nature and found to involve one to excise lymph nodes.  Margins were clear on the excised surgical specimen, and she was seen in follow-up in the office.  She was counseled regarding the need for completion thyroidectomy by radioactive iodine ablation for definitive management of her thyroid malignancy.         Hospital Course:  On 2/18/2019, Ms. Collett underwent a completion thyroidectomy, which was quite difficult due to fibrosis and scarring from the previous surgery.  She was admitted postoperatively with plans for close observation given the difficulty of her surgical procedure, the need for calcium monitoring, and the need to resume her chronic anticoagulation.  The first postoperative night, the patient had no immediate postoperative complications, specifically no evidence of symptomatic hypocalcemia or neck hematoma.  Her calcium levels were monitored serially.  The following morning, on postoperative day 1, the patient was started on oral calcium supplementation for mild hypocalcemia.  She was restarted on anticoagulation with Lovenox, and her home dose of Coumadin and was continued on IV fluids and pain medications.  She continued to do well without evidence of postoperative hematoma formation, even in the setting of anticoagulation.  By postoperative day #2, it was felt that she was stable for discharge home.  Arrangements were made, and was discharged home in good condition with her family.   She will follow up with me in 7-10 days, endocrinology as an outpatient.        Procedures Performed    Procedure(s):  THYROIDECTOMY, COMPLETION  -------------------       Consults:   Consults     No orders found from 1/20/2019 to 2/19/2019.          Pertinent Test Results:     Results from last 7 days   Lab Units 02/20/19  0611 02/19/19  0536 02/18/19  2059   SODIUM mmol/L 140 138  --    POTASSIUM mmol/L 3.4* 3.9  --    CHLORIDE mmol/L 109* 107  --    CO2 mmol/L 27.0 25.0*  --    BUN mg/dL 14 15  --    CREATININE mg/dL 1.10 1.00  --    CALCIUM mg/dL 8.9 8.4 8.4   GLUCOSE mg/dL 143* 240*  --        Results from last 7 days   Lab Units 02/19/19  0536   WBC 10*3/mm3 7.53   HEMOGLOBIN g/dL 13.7   HEMATOCRIT % 40.4   PLATELETS 10*3/mm3 229           Condition on Discharge:  improved    Vital Signs  Temp:  [97.4 °F (36.3 °C)-98.9 °F (37.2 °C)] 98.9 °F (37.2 °C)  Heart Rate:  [60-75] 63  Resp:  [16-18] 16  BP: ()/(50-78) 138/78    Physical Exam:  General Appearance: alert, appears stated age and cooperative  Head: normocephalic, without obvious abnormality and atraumatic  Eyes: lids and lashes normal, conjunctivae and sclerae normal, no icterus, no pallor, corneas clear and PERRLA  Neck: trachea midline, no thyromegaly and incision c/d/i with streri strips in place.  No evidence of hematoma   Lungs: clear to auscultation, respirations regular, respirations even and respirations unlabored  Heart: regular rhythm & normal rate, normal S1, S2, no murmur, no gallop, no rub and no click  Abdomen:  soft non-tender, no guarding and no rebound tenderness  Extremities: moves extremities well, no edema, no cyanosis and no redness  Neurologic: Mental Status orientated to person, place, time and situation, Cranial Nerves cranial nerves 2 - 12 grossly intact as examined  Psych: normal    Discharge Disposition  Home or Self Care    Discharge Medications     Discharge Medications      New Medications      Instructions Start Date    benzocaine-menthol 15-3.6 MG lozenge lozenge  Commonly known as:  CEPACOL   1 lozenge, Mouth/Throat, Every 2 Hours PRN      calcium carbonate 500 MG chewable tablet  Commonly known as:  TUMS   3 tablets, Oral, 2 Times Daily         Changes to Medications      Instructions Start Date   hydrocortisone 1 % ointment  What changed:    · when to take this  · reasons to take this  · additional instructions   Topical, 3 Times Daily, Apply to abdominal rash 3 times a day for itching         Continue These Medications      Instructions Start Date   acetaminophen 325 MG tablet  Commonly known as:  TYLENOL   650 mg, Oral, Every 4 Hours PRN      ADVAIR DISKUS 250-50 MCG/DOSE DISKUS  Generic drug:  fluticasone-salmeterol   inhale 1 dose by mouth twice a day, PRN, Rinse mouth after use      betamethasone dipropionate 0.05 % cream  Commonly known as:  DIPROLENE   Topical, Weekly      fluticasone 50 MCG/ACT nasal spray  Commonly known as:  FLONASE   instill 2 sprays into each nostril once daily PRN      Insulin Glargine 100 UNIT/ML injection pen  Commonly known as:  BASAGLAR KWIKPEN   64 Units, Subcutaneous, 2 Times Daily      JANUVIA 50 MG tablet  Generic drug:  SITagliptin   50 mg, Oral, Daily      losartan-hydrochlorothiazide 100-25 MG per tablet  Commonly known as:  HYZAAR   1 tablet, Oral, Daily      metroNIDAZOLE 0.75 % cream  Commonly known as:  METROCREAM   Topical, Nightly      pantoprazole 40 MG EC tablet  Commonly known as:  PROTONIX   take 1 tablet by mouth twice a day for 7 days or as directed      polyethylene glycol packet  Commonly known as:  MIRALAX   17 g, Oral, Daily PRN      RESTASIS 0.05 % ophthalmic emulsion  Generic drug:  cycloSPORINE   1 drop, Both Eyes, Every 12 Hours      triamcinolone 0.025 % cream  Commonly known as:  KENALOG   Topical, 2 Times Daily      vitamin D 76423 units capsule capsule  Commonly known as:  ERGOCALCIFEROL   50,000 Units, Oral, Weekly, ON HOLD FOR SURGERY SINCE 11/2018       warfarin 7.5 MG tablet  Commonly known as:  COUMADIN   7.5 mg, Oral, Take As Directed, Takes Mon, Tues, Wed, Thurs 7.5 mg Takes Fri, Sat, Sun 5 mg             Discharge Diet:   Diet Instructions     Advance Diet as Tolerated            Activity at Discharge:   Activity Instructions     Discharge Activity      1) No driving while taking narcotic pain medications (i.e. lortab, vicodin, percocet, hydrocodone, oxycodone)   2) Return to school / work in as tolerated.  3) May shower.  No tub soaks, submersion in water or baths for 2 weeks.  4) Do not lift / push / pull/ tug more than 10 lbs x 4 weeks.          Follow-up Appointments  Future Appointments   Date Time Provider Department [place]   3/15/2019  3:00 PM Louise Urban MD [facility] None     Additional Instructions for the Follow-ups that You Need to Schedule     Discharge Follow-up with Specialty: Dr. Tan   As directed      Specialty:  Dr. Tan    Follow Up Details:  schedule for 7-10 days in [place] office.               Test Results Pending at Discharge   Order Current Status    Tissue Pathology Exam In process           Janeth Tan MD  02/20/19  1:23 PM    Time: Discharge 25 min

## 2019-02-20 NOTE — PLAN OF CARE
Problem: Patient Care Overview  Goal: Plan of Care Review   02/20/19 1339   OTHER   Outcome Summary pt to be d/c today

## 2019-02-20 NOTE — PROGRESS NOTES
Discharge Planning Assessment  [facility]     Patient Name: Sue L Collett  MRN: [id number]  Today's Date: 2/20/2019    Admit Date: 2/18/2019    Discharge Needs Assessment    No documentation.       Discharge Plan     Row Name 02/20/19 1400       Plan    Plan  Rcvd order for home health continuation.  Called and Spoke to Nighat with [facility] and advised of patients DC.  Faxed DC summary to [phone].    Patient/Family in Agreement with Plan  yes        Destination      No service coordination in this encounter.      Durable Medical Equipment      No service coordination in this encounter.      Dialysis/Infusion      No service coordination in this encounter.      Home Medical Care - Selection Complete      Service Provider Request Status Selected Services Address Phone Number Fax Number    [facility] Selected Home Health Services [address] [place] [address] [place] [address] [phone]      [facility]      No service coordination in this encounter.        Expected Discharge Date and Time     Expected Discharge Date Expected Discharge Time    Feb 20, 2019         Demographic Summary    No documentation.       Functional Status    No documentation.       Psychosocial    No documentation.       Abuse/Neglect    No documentation.       Legal    No documentation.       Substance Abuse    No documentation.       Patient Forms    No documentation.           Zulema Foster

## 2019-02-20 NOTE — PROGRESS NOTES
Case Management Discharge Note         Destination      No service has been selected for the patient.      Durable Medical Equipment      No service has been selected for the patient.      Dialysis/Infusion      No service has been selected for the patient.      Home Medical Care - Selection Complete      Service Provider Request Status Selected Services Address Phone Number Fax Number    [facility] Selected Home Health Services [address] [place] [address] [place], [place] [id number] [phone]      [facility]      No service has been selected for the patient.             Final Discharge Disposition Code: 06 - home with home health care   [facility]   no chills/no fever

## 2019-02-20 NOTE — DISCHARGE PLACEMENT REQUEST
"  DC summary  New order    Collett, Sue L (58 y.o. Female)     Date of Birth Social Security Number Address Home Phone MRN    1960  [facility] [place] [address] [phone] [id number]    Protestant Marital Status          Evangelical        Admission Date Admission Type Admitting Provider Attending Provider Department, Room/Bed    2/18/19 Elective Janeth Tan MD Moore, Kristin M, MD [facility] SURG  4, 431/1    Discharge Date Discharge Disposition Discharge Destination         Home or Self Care              Attending Provider:  Janeth Tan MD    Allergies:  Prednisone, Azithromycin, Erythromycin, Other, Red Dye, Sulfa Antibiotics    Isolation:  None   Infection:  None   Code Status:  CPR    Ht:  167.6 cm (66\")   Wt:  115 kg (254 lb 10 oz)    Admission Cmt:  None   Principal Problem:  Papillary thyroid carcinoma (CMS/HCC) [C73]                 Active Insurance as of 2/18/2019     Primary Coverage     Payor Plan Insurance Group Employer/Plan Group    MEDICARE MEDICARE A & B      Payor Plan Address Payor Plan Phone Number Payor Plan Fax Number Effective Dates    [address] [phone]  8/1/2010 - None Entered    [facility] [id number]       Subscriber Name Subscriber Birth Date Member ID       COLLETT,SUE L 1960 [id number]           Secondary Coverage     Payor Plan Insurance Group Employer/Plan Group    [place]      Payor Plan Address Payor Plan Phone Number Payor Plan Fax Number Effective Dates    [address] [address] [phone]  1/17/2017 - None Entered    [facility] [id number]       Subscriber Name Subscriber Birth Date Member ID       COLLETT,SUE L 1960 [id number]                 Emergency Contacts      (Rel.) Home Phone Work Phone Mobile Phone    FÉLIX LUJAN (Son) [phone] -- --    Olamide Cook (Friend) [phone] -- --    FELIZ LARIOS (Daughter) [phone] -- --    LASHANDA LANE (Daughter) [phone] -- " --            Insurance Information                MEDICARE/MEDICARE A & B Phone: [phone]    Subscriber: Collett, Sue L Subscriber#: [id number]    Group#:  Precert#:         [facility]/WELLCARE MEDICAID Phone: [phone]    Subscriber: Collett, Sue L Subscriber#: [id number]    Group#:  Precert#:              Discharge Summary      Janeth Tan MD at 2/20/2019  1:23 PM            Date of Discharge:  2/20/2019    Discharge Diagnosis: Papillary thyroid carcinoma (CMS/HCC) [C73]  Papillary thyroid carcinoma (CMS/HCC) [C73]    History of Present Illness: Ms. Collett is a 58-year-old female patient who underwent a right thyroid lobectomy with isthmusectomy on 12/21/2018 for diagnostic purposes after having an abnormal fine-needle aspiration of a thyroid nodule with abnormal molecular testing.  Her final pathology demonstrated papillary thyroid cancer of the classical type that was multifocal in nature and found to involve one to excise lymph nodes.  Margins were clear on the excised surgical specimen, and she was seen in follow-up in the office.  She was counseled regarding the need for completion thyroidectomy by radioactive iodine ablation for definitive management of her thyroid malignancy.         Hospital Course:  On 2/18/2019, Ms. Collett underwent a completion thyroidectomy, which was quite difficult due to fibrosis and scarring from the previous surgery.  She was admitted postoperatively with plans for close observation given the difficulty of her surgical procedure, the need for calcium monitoring, and the need to resume her chronic anticoagulation.  The first postoperative night, the patient had no immediate postoperative complications, specifically no evidence of symptomatic hypocalcemia or neck hematoma.  Her calcium levels were monitored serially.  The following morning, on postoperative day 1, the patient was started on oral calcium supplementation for mild hypocalcemia.  She was  restarted on anticoagulation with Lovenox, and her home dose of Coumadin and was continued on IV fluids and pain medications.  She continued to do well without evidence of postoperative hematoma formation, even in the setting of anticoagulation.  By postoperative day #2, it was felt that she was stable for discharge home.  Arrangements were made, and was discharged home in good condition with her family.  She will follow up with me in 7-10 days, endocrinology as an outpatient.        Procedures Performed    Procedure(s):  THYROIDECTOMY, COMPLETION  -------------------       Consults:   Consults     No orders found from 1/20/2019 to 2/19/2019.          Pertinent Test Results:     Results from last 7 days   Lab Units 02/20/19  0611 02/19/19  0536 02/18/19  2059   SODIUM mmol/L 140 138  --    POTASSIUM mmol/L 3.4* 3.9  --    CHLORIDE mmol/L 109* 107  --    CO2 mmol/L 27.0 25.0*  --    BUN mg/dL 14 15  --    CREATININE mg/dL 1.10 1.00  --    CALCIUM mg/dL 8.9 8.4 8.4   GLUCOSE mg/dL 143* 240*  --        Results from last 7 days   Lab Units 02/19/19  0536   WBC 10*3/mm3 7.53   HEMOGLOBIN g/dL 13.7   HEMATOCRIT % 40.4   PLATELETS 10*3/mm3 229           Condition on Discharge:  improved    Vital Signs  Temp:  [97.4 °F (36.3 °C)-98.9 °F (37.2 °C)] 98.9 °F (37.2 °C)  Heart Rate:  [60-75] 63  Resp:  [16-18] 16  BP: ()/(50-78) 138/78    Physical Exam:  General Appearance: alert, appears stated age and cooperative  Head: normocephalic, without obvious abnormality and atraumatic  Eyes: lids and lashes normal, conjunctivae and sclerae normal, no icterus, no pallor, corneas clear and PERRLA  Neck: trachea midline, no thyromegaly and incision c/d/i with streri strips in place.  No evidence of hematoma   Lungs: clear to auscultation, respirations regular, respirations even and respirations unlabored  Heart: regular rhythm & normal rate, normal S1, S2, no murmur, no gallop, no rub and no click  Abdomen:  soft non-tender, no  guarding and no rebound tenderness  Extremities: moves extremities well, no edema, no cyanosis and no redness  Neurologic: Mental Status orientated to person, place, time and situation, Cranial Nerves cranial nerves 2 - 12 grossly intact as examined  Psych: normal    Discharge Disposition  Home or Self Care    Discharge Medications     Discharge Medications      New Medications      Instructions Start Date   benzocaine-menthol 15-3.6 MG lozenge lozenge  Commonly known as:  CEPACOL   1 lozenge, Mouth/Throat, Every 2 Hours PRN      calcium carbonate 500 MG chewable tablet  Commonly known as:  TUMS   3 tablets, Oral, 2 Times Daily         Changes to Medications      Instructions Start Date   hydrocortisone 1 % ointment  What changed:    · when to take this  · reasons to take this  · additional instructions   Topical, 3 Times Daily, Apply to abdominal rash 3 times a day for itching         Continue These Medications      Instructions Start Date   acetaminophen 325 MG tablet  Commonly known as:  TYLENOL   650 mg, Oral, Every 4 Hours PRN      ADVAIR DISKUS 250-50 MCG/DOSE DISKUS  Generic drug:  fluticasone-salmeterol   inhale 1 dose by mouth twice a day, PRN, Rinse mouth after use      betamethasone dipropionate 0.05 % cream  Commonly known as:  DIPROLENE   Topical, Weekly      fluticasone 50 MCG/ACT nasal spray  Commonly known as:  FLONASE   instill 2 sprays into each nostril once daily PRN      Insulin Glargine 100 UNIT/ML injection pen  Commonly known as:  BASAGLAR KWIKPEN   64 Units, Subcutaneous, 2 Times Daily      JANUVIA 50 MG tablet  Generic drug:  SITagliptin   50 mg, Oral, Daily      losartan-hydrochlorothiazide 100-25 MG per tablet  Commonly known as:  HYZAAR   1 tablet, Oral, Daily      metroNIDAZOLE 0.75 % cream  Commonly known as:  METROCREAM   Topical, Nightly      pantoprazole 40 MG EC tablet  Commonly known as:  PROTONIX   take 1 tablet by mouth twice a day for 7 days or as directed      polyethylene  glycol packet  Commonly known as:  MIRALAX   17 g, Oral, Daily PRN      RESTASIS 0.05 % ophthalmic emulsion  Generic drug:  cycloSPORINE   1 drop, Both Eyes, Every 12 Hours      triamcinolone 0.025 % cream  Commonly known as:  KENALOG   Topical, 2 Times Daily      vitamin D 07166 units capsule capsule  Commonly known as:  ERGOCALCIFEROL   50,000 Units, Oral, Weekly, ON HOLD FOR SURGERY SINCE 2018      warfarin 7.5 MG tablet  Commonly known as:  COUMADIN   7.5 mg, Oral, Take As Directed, Takes Mon, Tu, Wed, Th 7.5 mg Takes Fri, Sat, Sun 5 mg             Discharge Diet:   Diet Instructions     Advance Diet as Tolerated            Activity at Discharge:   Activity Instructions     Discharge Activity      1) No driving while taking narcotic pain medications (i.e. lortab, vicodin, percocet, hydrocodone, oxycodone)   2) Return to school / work in as tolerated.  3) May shower.  No tub soaks, submersion in water or baths for 2 weeks.  4) Do not lift / push / pull/ tug more than 10 lbs x 4 weeks.          Follow-up Appointments  Future Appointments   Date Time Provider Department Center   3/15/2019  3:00 PM Louise Urban MD [facility] None     Additional Instructions for the Follow-ups that You Need to Schedule     Discharge Follow-up with Specialty: Dr. Tan   As directed      Specialty:  Dr. Tan    Follow Up Details:  schedule for 7-10 days in [place] office.               Test Results Pending at Discharge   Order Current Status    Tissue Pathology Exam In process           Janeth Tan MD  19  1:23 PM    Time: Discharge 25 min          Electronically signed by Janeth Tan MD at 2019  1:30 PM    [facility] MED SURG  4  [address] [facility] [address]  Phone:  [phone]  Fax:   Date: 2019      Ambulatory Referral to Home Health     Patient:  Sue L Collett MRN:  [id number]   [place] [address] :  1960  SSN:    Phone:  [phone] Sex:  F      INSURANCE PAYOR PLAN GROUP # SUBSCRIBER ID   Primary:  Secondary:    MEDICARE [place] [id number]  [id number]      [id number]  [id number]      Referring Provider Information:  DEMETRICE LOZANO Phone: [phone] Fax:       Referral Information:   # Visits:  1 Referral Type: Home Health [42]   Urgency:  Routine Referral Reason: Specialty Services Required   Start Date: Feb 20, 2019 End Date:  To be determined by Insurer   Diagnosis: Papillary thyroid carcinoma (CMS/HCC) (C73 [ICD-10-CM] 193 [ICD-9-CM])      Refer to Dept:   Refer to Provider:   Refer to Facility:       Face to Face Visit Date: 2/20/2019  Follow-up Provider for Plan of Care? I treated the patient in an acute care facility and will not continue treatment after discharge.  Follow-up Provider: AARON HIDALGO [[id number]]  Reason/Clinical Findings: resume prior services  Describe mobility limitations that make leaving home difficult: resume prior services  Nursing/Therapeutic Services Requested: Skilled Nursing  Skilled nursing orders: Other (resume prior services)  Frequency: 1 Week 1     This document serves as a request of services and does not constitute Insurance authorization or approval of services.  To determine eligibility, please contact the members Insurance carrier to verify and review coverage.     If you have medical questions regarding this request for services. Please contact [facility] MED SURG  4 at [phone] between the hours of 8:00am - 5:00pm (Mon-Fri).       Authorizing Provider:Demetrice Lozano MD  Authorizing Provider's NPI: [id number]  Order Entered By: Demetrice Lozano MD 2/20/2019  1:32 PM     Electronically signed by: Demetrice Lozano MD 2/20/2019  1:32 PM

## 2019-02-21 ENCOUNTER — TELEPHONE (OUTPATIENT)
Dept: SURGERY | Facility: CLINIC | Age: 59
End: 2019-02-21

## 2019-02-21 ENCOUNTER — READMISSION MANAGEMENT (OUTPATIENT)
Dept: CALL CENTER | Facility: HOSPITAL | Age: 59
End: 2019-02-21

## 2019-02-21 NOTE — OUTREACH NOTE
Prep Survey      Responses   Facility patient discharged from?  [facility]   Is patient eligible?  Yes   Discharge diagnosis  Papillary thyroid carcinoma-thyroidectomy this visit   Does the patient have one of the following disease processes/diagnoses(primary or secondary)?  General Surgery   Does the patient have Home health ordered?  Yes   What is the [facility] health agency?   [facility]    Is there a DME ordered?  No   Prep survey completed?  Yes          Cecile Ramires RN

## 2019-02-21 NOTE — TELEPHONE ENCOUNTER
Patient called complaining with swelling in her face and neck. Per Dr. Tan, patient was instructed to take OTC Benadryl and if swelling didn't improve in an hour to call the office back. Patient verbalized understanding.

## 2019-02-22 ENCOUNTER — READMISSION MANAGEMENT (OUTPATIENT)
Dept: CALL CENTER | Facility: HOSPITAL | Age: 59
End: 2019-02-22

## 2019-02-22 LAB
LAB AP CASE REPORT: NORMAL
PATH REPORT.FINAL DX SPEC: NORMAL

## 2019-02-22 NOTE — OUTREACH NOTE
General Surgery Week 1 Survey      Responses   Facility patient discharged from?  [facility]   Does the patient have one of the following disease processes/diagnoses(primary or secondary)?  General Surgery   Is there a successful TCM telephone encounter documented?  No   Week 1 attempt successful?  No   Unsuccessful attempts  Attempt 1          Nay Howard RN

## 2019-02-25 ENCOUNTER — READMISSION MANAGEMENT (OUTPATIENT)
Dept: CALL CENTER | Facility: HOSPITAL | Age: 59
End: 2019-02-25

## 2019-02-25 NOTE — OP NOTE
PROCEDURE DATE: 2/18/19    SURGEON: Janeth Tan MD, FACS    FIRST ASSISTANT: William Estrada MD, FACS    PREOPERATIVE DIAGNOSIS: Papillary thyroid carcinoma (CMS/HCC) [C73]    POSTOPERATIVE DIAGNOSIS: Same    PROCEDURE: Completion thyroidectomy    ANESTHESIA: GETA    EBL: see anesthesia record    SPECIMENS:     ID Type Source Tests Collected by Time   A : left thyroid lobe Tissue Thyroid TISSUE PATHOLOGY EXAM Nikko Bernie RN 2/18/2019 0971           INDICATIONS FOR THE PROCEDURE:      Ms. Collett is a 58-year-old female patient who recently underwent right thyroid lobectomy with isthmusectomy for what proved to be a multifocal papillary thyroid cancer with 1 positive lymph node.  She is now being brought back to the operating room for a scheduled completion thyroidectomy for not only definitive surgical management but also in anticipation of radioactive iodine ablation given the jessie involvement.  She has been counseled extensively regarding the completion thyroidectomy procedure especially the increased risk of nerve injury and a reoperative field.  This was also discussed with her family to assist in patient decision making.  Ultimately, the patient did provide her informed consent to proceed.    DESCRIPTION OF THE PROCEDURE:     The patient was seen and examined preoperatively in the holding area on the day of the surgical procedure.  Her history and physical examination was updated as appropriate.  The patient received appropriate preoperative antibiotics.  SCDs were placed on her legs for DVT prophylaxis.  DVT chemoprophylaxis withheld due to the high risk of postoperative hematoma.  The patient was taken to the operating room and placed supine on the operating table.  A timeout was performed using the WHO checklist.  Following the satisfactory induction of general anesthesia, the patient's arms were tucked at her side and all bony prominences were padded.  A shoulder roll was placed and the patient  was positioned in a modified beach chair position with the neck extended.  The neck was prepped and draped in the usual sterile fashion.     A skin crease incision was then planned approximately 2 fingerbreadths superior to the sternal notch.  This was placed in the area of the existing scar from her recent right thyroid lobectomy with isthmusectomy.   Preemptive analgesia was established in the planned collar type incision with 1% lidocaine.  Once the incision was marked, the skin was incised with a #15 blade knife and this was deepened until the subcutaneous tissue was reached.  The subcutaneous tissues and the platysma were then divided with electrocautery.  Subplatysmal skin flaps were raised inferiorly extending to the level of the sternal notch, and superiorly extending to the thyroid cartilage.   in the midline there was dense scarring from the patient's previous procedure.  This extended slightly to the left of midline, lateral to the trachea.  This necessitated that the strap muscles be divided just off the midline and retracted laterally.       The fibrosis and scarring in the midline restricted mobility of the tissues and made the dissection increasingly difficult.  The left thyroid lobe was very small but was densely adherent to the surrounding tissue with fibrosis and vascular hypertrophy despite the fact that this compartment of the neck had not been entered during the previous operation.  Due to the difficulty of dissection, I did elect to ask for assistance from my partner, Dr. William Estrada, who served as a first assistant.  Ultimately,the left thyroid lobe was then mobilized from its dense areolar attachments using blunt dissection and Bovie electrocautery.  The thyroid was gently retracted medially and the middle thyroid vein was identified.  There was noted to be a dense fibrotic type reaction in this area and dissection was again difficult.  Careful dissection continued in this area until the  middle thyroid vein could be confidently dissected away from any surrounding tissue.   the recurrent nerve was identified and protected throughout this dissection.  It was then doubly ligated with 4-0 silk sutures and divided close to the thyroid gland.  Thyroid gland was then gently retracted inferomedially.  The superior pole vessels, which were quite enlarged, were identified and carefully ligated with 4-0 silk sutures, and divided close to the thyroid gland.  Careful attention was taken not to injure the external branch of the superior laryngeal nerve.       Next, the thyroid gland was retracted medially and the muscles were retracted laterally.  The inferior thyroid artery was identified.  Using blunt dissection, the recurrent nerve was identified and followed superficially along its path to aid in continued safe dissection.  The vessels to the thyroid in this vicinity were ligated with 4-0 silk ties and divided with care to avoid injury to the recurrent nerve and preserve their distal branches.  The superior and inferior parathyroid glands were identified and carefully mobilized off the thyroid gland to preserve their blood supply as well.  Notably, there was no gross lymphadenopathy identified.     Once the recurrent laryngeal nerve and parathyroid glands were safely dissected free, attention was turned to mobilizing the remaining portions of the inferior pole using blunt dissection.  The remaining attachments of the thyroid to the left lateral side of the trachea were divided using a combination of sharp dissection and the Harmonic scalpel. Small vessels were ligated using 4-0 silk suture.   The specimen was then passed off the field as specimen and sent to pathology for further evaluation.     The wound was copiously irrigated and hemostasis was achieved.  The strap muscles were reapproximated with a running, locking 2-0 Vicryl suture.  The inferior most aspect of this was left open slightly to permit  drainage.  The platysma was then reapproximated using interrupted 3-0 Vicryl sutures.  Additional 0.25% Marcaine was injected subcutaneously.  This was done for postoperative analgesia.  The skin was reapproximated with a running 5-0 PDS placed in a subcuticular fashion.  Mastisol and Steri-Strips were applied.  The wound was covered with a dry sterile dressing.  The patient was then awakened from anesthesia and taken to the recovery room in good condition.  At the conclusion of the procedure, the patient's cords were visualized using the glide scope and were noted to exhibit normal vocal cord motion.  At the conclusion of the procedure, the patient seemed to have tolerated the procedure very well.  All sponge, needle, and instrument counts were correct at the conclusion of the procedure.  Dr. Tan was present and scrubbed for the procedure in its entirety.  The patient's voice was noted to be intact when she was evaluated in the recovery room.  She will be admitted to the regular nursing floor for routine postoperative care.

## 2019-02-25 NOTE — OUTREACH NOTE
General Surgery Week 1 Survey      Responses   Facility patient discharged from?  [facility]   Does the patient have one of the following disease processes/diagnoses(primary or secondary)?  General Surgery   Is there a successful TCM telephone encounter documented?  No   Week 1 attempt successful?  No   Unsuccessful attempts  Attempt 2          Leonie Rousseau RN

## 2019-02-27 ENCOUNTER — HOSPITAL ENCOUNTER (OUTPATIENT)
Facility: HOSPITAL | Age: 59
Discharge: HOME | End: 2019-02-27
Payer: MEDICARE

## 2019-02-27 LAB
A/G RATIO: 1.6 (ref 0.8–2)
ALBUMIN SERPL-MCNC: 3.6 G/DL (ref 3.4–4.8)
ALP BLD-CCNC: 91 U/L (ref 25–100)
ALT SERPL-CCNC: 18 U/L (ref 4–36)
ANION GAP SERPL CALCULATED.3IONS-SCNC: 11 MMOL/L (ref 3–16)
AST SERPL-CCNC: 16 U/L (ref 8–33)
BASOPHILS ABSOLUTE: 0.1 K/UL (ref 0–0.1)
BASOPHILS RELATIVE PERCENT: 1.1 %
BILIRUB SERPL-MCNC: 0.4 MG/DL (ref 0.3–1.2)
BUN BLDV-MCNC: 10 MG/DL (ref 6–20)
CALCIUM SERPL-MCNC: 9.6 MG/DL (ref 8.5–10.5)
CHLORIDE BLD-SCNC: 97 MMOL/L (ref 98–107)
CO2: 32 MMOL/L (ref 20–30)
CREAT SERPL-MCNC: 1.2 MG/DL (ref 0.4–1.2)
EOSINOPHILS ABSOLUTE: 0.2 K/UL (ref 0–0.4)
EOSINOPHILS RELATIVE PERCENT: 3.8 %
GFR AFRICAN AMERICAN: 55
GFR NON-AFRICAN AMERICAN: 46
GLOBULIN: 2.2 G/DL
GLUCOSE BLD-MCNC: 148 MG/DL (ref 74–106)
HCT VFR BLD CALC: 42.3 % (ref 37–47)
HEMOGLOBIN: 13.7 G/DL (ref 11.5–16.5)
IMMATURE GRANULOCYTES #: 0 K/UL
IMMATURE GRANULOCYTES %: 0.5 % (ref 0–5)
LYMPHOCYTES ABSOLUTE: 2 K/UL (ref 1.5–4)
LYMPHOCYTES RELATIVE PERCENT: 36.5 %
MCH RBC QN AUTO: 29 PG (ref 27–32)
MCHC RBC AUTO-ENTMCNC: 32.4 G/DL (ref 31–35)
MCV RBC AUTO: 89.6 FL (ref 80–100)
MONOCYTES ABSOLUTE: 0.4 K/UL (ref 0.2–0.8)
MONOCYTES RELATIVE PERCENT: 8 %
NEUTROPHILS ABSOLUTE: 2.8 K/UL (ref 2–7.5)
NEUTROPHILS RELATIVE PERCENT: 50.1 %
PDW BLD-RTO: 13 % (ref 11–16)
PLATELET # BLD: 271 K/UL (ref 150–400)
PMV BLD AUTO: 10.6 FL (ref 6–10)
POTASSIUM SERPL-SCNC: 4.1 MMOL/L (ref 3.4–5.1)
RBC # BLD: 4.72 M/UL (ref 3.8–5.8)
SODIUM BLD-SCNC: 140 MMOL/L (ref 136–145)
TOTAL PROTEIN: 5.8 G/DL (ref 6.4–8.3)
WBC # BLD: 5.5 K/UL (ref 4–11)

## 2019-02-27 PROCEDURE — 85025 COMPLETE CBC W/AUTO DIFF WBC: CPT

## 2019-02-27 PROCEDURE — 80053 COMPREHEN METABOLIC PANEL: CPT

## 2019-02-28 ENCOUNTER — OFFICE VISIT (OUTPATIENT)
Dept: SURGERY | Facility: CLINIC | Age: 59
End: 2019-02-28

## 2019-02-28 ENCOUNTER — READMISSION MANAGEMENT (OUTPATIENT)
Dept: CALL CENTER | Facility: HOSPITAL | Age: 59
End: 2019-02-28

## 2019-02-28 VITALS
BODY MASS INDEX: 40.66 KG/M2 | WEIGHT: 253 LBS | HEART RATE: 57 BPM | SYSTOLIC BLOOD PRESSURE: 132 MMHG | TEMPERATURE: 97.6 F | DIASTOLIC BLOOD PRESSURE: 80 MMHG | OXYGEN SATURATION: 99 % | HEIGHT: 66 IN

## 2019-02-28 DIAGNOSIS — C73 PAPILLARY THYROID CARCINOMA (HCC): Primary | ICD-10-CM

## 2019-02-28 PROCEDURE — 99024 POSTOP FOLLOW-UP VISIT: CPT | Performed by: SURGERY

## 2019-02-28 NOTE — OUTREACH NOTE
General Surgery Week 2 Survey      Responses   Facility patient discharged from?  [facility]   Does the patient have one of the following disease processes/diagnoses(primary or secondary)?  General Surgery   Week 2 attempt successful?  Yes   Call start time  1407   Call end time  1409   Discharge diagnosis  Papillary thyroid carcinoma-thyroidectomy this visit   Meds reviewed with patient/caregiver?  Yes   Is the patient having any side effects they believe may be caused by any medication additions or changes?  No   Does the patient have all medications related to this admission filled (includes all antibiotics, pain medications, etc.)  Yes   Is the patient taking all medications as directed (includes completed medication regime)?  Yes   Does the patient have a follow up appointment scheduled with their surgeon?  Yes   Has the patient kept scheduled appointments due by today?  Yes   What is the Home health agency?   [facility]    Has home health visited the patient within 72 hours of discharge?  Yes   Home health comments  coming once a week.   Psychosocial issues?  No   Did the patient receive a copy of their discharge instructions?  Yes   Nursing interventions  Reviewed instructions with patient   What is the patient's perception of their health status since discharge?  Improving   Nursing interventions  Nurse provided patient education   Is the patient /caregiver able to teach back basic post-op care?  Lifting as instructed by MD in discharge instructions, Continue use of incentive spirometry at least 1 week post discharge   Is the patient/caregiver able to teach back signs and symptoms of incisional infection?  Increased redness, swelling or pain at the incisonal site, Incisional warmth, Fever, Pus or odor from incision, Increased drainage or bleeding   Is the patient/caregiver able to teach back steps to recovery at home?  Set small, achievable goals for return to baseline health, Weigh daily, Make a list of  questions for surgeon's appointment, Rest and rebuild strength, gradually increase activity   Is the patient/caregiver able to teach back the hierarchy of who to call/visit for symptoms/problems? PCP, Specialist, Home health nurse, Urgent Care, ED, 911  Yes   Week 2 call completed?  Yes          Regina Brown, RN

## 2019-03-06 ENCOUNTER — TELEPHONE (OUTPATIENT)
Dept: SURGERY | Facility: CLINIC | Age: 59
End: 2019-03-06

## 2019-03-06 NOTE — TELEPHONE ENCOUNTER
Padma, home health nurse called stating over all patient is doing ok, howver there is a golf ball sized mass on the left side where her inc is. There is no drainage or heat. Patient does start that she has trouble swallowing. Please advise.

## 2019-03-07 ENCOUNTER — OFFICE VISIT (OUTPATIENT)
Dept: SURGERY | Facility: CLINIC | Age: 59
End: 2019-03-07

## 2019-03-07 VITALS
WEIGHT: 249 LBS | HEART RATE: 83 BPM | BODY MASS INDEX: 40.02 KG/M2 | TEMPERATURE: 98 F | OXYGEN SATURATION: 98 % | SYSTOLIC BLOOD PRESSURE: 120 MMHG | HEIGHT: 66 IN | DIASTOLIC BLOOD PRESSURE: 82 MMHG

## 2019-03-07 DIAGNOSIS — C73 PAPILLARY THYROID CARCINOMA (HCC): Primary | ICD-10-CM

## 2019-03-07 PROCEDURE — 99024 POSTOP FOLLOW-UP VISIT: CPT | Performed by: SURGERY

## 2019-03-07 NOTE — PROGRESS NOTES
"Subjective   Sue L Collett is a 58 y.o. female.   Chief Complaint   Patient presents with   • Post-op     thyroidectomy       History of Present Illness   Ms. Collett comes the office today for evaluation of her recent thyroidectomy incision.  The home health nurse called the office requesting that she be evaluated due to concern for \"swelling of the neck\" with a \"golf ball size mass.\"  The patient herself reports that her neck feels unchanged from previously.  She denies dysphasia.  She denies voice changes.  She does have some fatigue but otherwise feels well.  She does not have any symptoms of hypocalcemia.      The following portions of the patient's history were reviewed and updated as appropriate: allergies, current medications, past family history, past medical history, past social history, past surgical history and problem list.    Review of Systems    Objective    /82   Pulse 83   Temp 98 °F (36.7 °C)   Ht 167.6 cm (65.98\")   Wt 113 kg (249 lb)   SpO2 98%   BMI 40.21 kg/m²     Physical Exam   Constitutional: She is oriented to person, place, and time. She appears well-developed and well-nourished.   HENT:   Head: Normocephalic and atraumatic.   Voice normal.    Eyes: No scleral icterus.   Neck: Neck supple.   Collar incision healing well.  No residual ecchymosis.  Minimal swelling.  Unchanged from prior.     Cardiovascular: Regular rhythm.   Pulmonary/Chest: Effort normal.   Neurological: She is alert and oriented to person, place, and time.   Skin: Skin is warm and dry.   Psychiatric: She has a normal mood and affect. Her behavior is normal.       Assessment/Plan   Claire was seen today for post-op.    Diagnoses and all orders for this visit:    Papillary thyroid carcinoma (CMS/HCC)      Sue Collett continues to do well following completion thyroidectomy.  She is scheduled to see Dr. Urban from endocrinology in mid March for potential radioactive iodine ablation.  I will see her back once she has " followed up from this visit with Dr. Urban.  We will schedule that appointment today, however, if it conflicts with her radioactive iodine treatments, we may certainly reschedule this without issue.

## 2019-03-11 RX ORDER — LIOTHYRONINE SODIUM 25 UG/1
12.5 TABLET ORAL 2 TIMES DAILY
Qty: 30 TABLET | Refills: 0 | Status: SHIPPED | OUTPATIENT
Start: 2019-03-11 | End: 2019-03-15

## 2019-03-15 ENCOUNTER — OFFICE VISIT (OUTPATIENT)
Dept: ENDOCRINOLOGY | Facility: CLINIC | Age: 59
End: 2019-03-15

## 2019-03-15 ENCOUNTER — TELEPHONE (OUTPATIENT)
Dept: INTERNAL MEDICINE | Facility: CLINIC | Age: 59
End: 2019-03-15

## 2019-03-15 VITALS
WEIGHT: 249.3 LBS | SYSTOLIC BLOOD PRESSURE: 124 MMHG | HEIGHT: 66 IN | HEART RATE: 94 BPM | OXYGEN SATURATION: 98 % | BODY MASS INDEX: 40.07 KG/M2 | DIASTOLIC BLOOD PRESSURE: 78 MMHG

## 2019-03-15 DIAGNOSIS — C73 PAPILLARY THYROID CARCINOMA (HCC): Primary | ICD-10-CM

## 2019-03-15 DIAGNOSIS — E89.0 POSTSURGICAL HYPOTHYROIDISM: ICD-10-CM

## 2019-03-15 PROCEDURE — 99205 OFFICE O/P NEW HI 60 MIN: CPT | Performed by: INTERNAL MEDICINE

## 2019-03-15 RX ORDER — TELMISARTAN AND HYDROCHLORTHIAZIDE 80; 25 MG/1; MG/1
1 TABLET ORAL DAILY
Qty: 30 TABLET | Refills: 6 | Status: SHIPPED | OUTPATIENT
Start: 2019-03-15 | End: 2020-03-14

## 2019-03-15 RX ORDER — PROMETHAZINE HYDROCHLORIDE 25 MG/1
TABLET ORAL
Refills: 0 | COMMUNITY
Start: 2019-03-09 | End: 2021-01-04

## 2019-03-15 RX ORDER — LEVOTHYROXINE SODIUM 137 UG/1
137 CAPSULE ORAL DAILY
Qty: 30 CAPSULE | Refills: 11 | Status: SHIPPED | OUTPATIENT
Start: 2019-03-15 | End: 2019-03-22 | Stop reason: CLARIF

## 2019-03-15 NOTE — PROGRESS NOTES
Thyroid Cancer (Consult )    Subjective Sue L Collett is a 58 y.o. female is being seen for consultation today at the request of Janeth Tan MD for newly diagnosed thyroid cancer,   Thyroid Cancer: papillary thyroid carcinoma.    Thyroid cancer was diagnosed in  12/2018 after thyroidectomy. Dec 21 she had first surgery and papillary thyroid cancer with lymph node involvement was discovered on surgical pathology. Multifocal papillary 7 mm and 1 mm on the right lobe and 1 mm additional focus in the left lobe. Completion thyroidectomy was done on Feb 2019. Patient recovered well from the surgery and presented for discussion on I-131 therapy     Patient didn't start taking medication in preparation for the therapy. She experiences fatigue, mood swings, swelling in the feet.         History of Present Illness  Medications    Current Outpatient Medications:   •  acetaminophen (TYLENOL) 325 MG tablet, Take 2 tablets by mouth Every 4 (Four) Hours As Needed for Mild Pain ., Disp: , Rfl:   •  ADVAIR DISKUS 250-50 MCG/DOSE DISKUS, inhale 1 dose by mouth twice a day, PRN, Rinse mouth after use, Disp: , Rfl: 0  •  benzocaine-menthol (CEPACOL) 15-3.6 MG lozenge lozenge, Dissolve 1 lozenge in the mouth Every 2 (Two) Hours As Needed for sore throat, Disp: 36 each, Rfl: 0  •  betamethasone dipropionate (DIPROLENE) 0.05 % cream, Apply  topically to the appropriate area as directed 1 (One) Time Per Week., Disp: , Rfl:   •  fluticasone (FLONASE) 50 MCG/ACT nasal spray, instill 2 sprays into each nostril once daily PRN, Disp: , Rfl: 0  •  hydrocortisone 1 % ointment, Apply  topically to the appropriate area as directed 3 (Three) Times a Day. Apply to abdominal rash 3 times a day for itching (Patient taking differently: Apply  topically to the appropriate area as directed 3 (Three) Times a Day As Needed (for itching). Apply to abdominal rash 3 times a day for itching), Disp: 56 g, Rfl: 1  •  Insulin Glargine (BASAGLAR KWIKPEN) 100  UNIT/ML injection pen, Inject 64 Units under the skin into the appropriate area as directed 2 (Two) Times a Day., Disp: , Rfl:   •  JANUVIA 50 MG tablet, Take 50 mg by mouth Daily., Disp: , Rfl:   •  metroNIDAZOLE (METROCREAM) 0.75 % cream, Apply  topically to the appropriate area as directed Every Night., Disp: , Rfl:   •  pantoprazole (PROTONIX) 40 MG EC tablet, take 1 tablet by mouth twice a day for 7 days or as directed, Disp: , Rfl: 0  •  polyethylene glycol (MIRALAX) packet, Take 17 g by mouth Daily As Needed., Disp: , Rfl:   •  RESTASIS 0.05 % ophthalmic emulsion, Administer 1 drop to both eyes Every 12 (Twelve) Hours., Disp: , Rfl:   •  triamcinolone (KENALOG) 0.025 % cream, Apply  topically to the appropriate area as directed 2 (Two) Times a Day., Disp: , Rfl:   •  vitamin D (ERGOCALCIFEROL) 60616 units capsule capsule, Take 50,000 Units by mouth 1 (One) Time Per Week. ON HOLD FOR SURGERY SINCE 11/2018, Disp: , Rfl:   •  warfarin (COUMADIN) 7.5 MG tablet, Take 7.5 mg by mouth Take As Directed. Takes Mon, Tues, Wed, Thurs 7.5 mg Takes Fri, Sat, Sun 5 mg, Disp: , Rfl:   •  levothyroxine sodium (TIROSINT) 137 MCG capsule, Take 1 capsule by mouth Daily. Start 24 hours after the radioiodine therapy/, Disp: 30 capsule, Rfl: 11  •  promethazine (PHENERGAN) 25 MG tablet, take 1/2-1 tablet by mouth every 6 hours if needed, Disp: , Rfl: 0  •  telmisartan-hydrochlorothiazide (MICARDIS HCT) 80-25 MG per tablet, Take 1 tablet by mouth Daily., Disp: 30 tablet, Rfl: 6    The following portions of the patient's history were reviewed and updated as appropriate: allergies, current medications, past family history, past medical history, past social history, past surgical history and problem list.    Review of Systems   Constitutional: Positive for fatigue and unexpected weight gain.   Eyes: Positive for visual disturbance.   Respiratory: Positive for shortness of breath.    Cardiovascular: Positive for leg swelling.    Gastrointestinal: Positive for indigestion.   Endocrine: Positive for cold intolerance.   Musculoskeletal: Positive for arthralgias, back pain and joint swelling.   Neurological: Positive for dizziness, weakness and memory problem.   All other systems reviewed and are negative.      Objective   Vitals:    03/15/19 1443   BP: 124/78   Pulse: 94   SpO2: 98%     Physical Exam   Constitutional: She is oriented to person, place, and time. She appears well-developed and well-nourished.   HENT:   Head: Normocephalic and atraumatic.   Eyes: Conjunctivae are normal.   Neck: No thyromegaly present.   Cardiovascular: Normal rate, regular rhythm, normal heart sounds and intact distal pulses.   Pulmonary/Chest: Effort normal and breath sounds normal.   Musculoskeletal: She exhibits edema (trace).   Lymphadenopathy:     She has no cervical adenopathy.   Neurological: She is alert and oriented to person, place, and time.   Psychiatric: She has a normal mood and affect. Thought content normal.         Results for orders placed or performed during the hospital encounter of 02/18/19   Calcium   Result Value Ref Range    Calcium 8.4 8.4 - 10.2 mg/dL   Protime-INR   Result Value Ref Range    Protime 12.1 9.3 - 12.1 Seconds    INR 1.08 0.90 - 1.10   Basic Metabolic Panel   Result Value Ref Range    Glucose 240 (H) 74 - 98 mg/dL    BUN 15 7 - 20 mg/dL    Creatinine 1.00 0.60 - 1.30 mg/dL    Sodium 138 137 - 145 mmol/L    Potassium 3.9 3.5 - 5.1 mmol/L    Chloride 107 98 - 107 mmol/L    CO2 25.0 (L) 26.0 - 30.0 mmol/L    Calcium 8.4 8.4 - 10.2 mg/dL    eGFR Non African Amer 57 (L) >60 mL/min/1.73    BUN/Creatinine Ratio 15.0 7.1 - 23.5    Anion Gap 9.9 (L) 10.0 - 20.0 mmol/L   CBC Auto Differential   Result Value Ref Range    WBC 7.53 4.80 - 10.80 10*3/mm3    RBC 4.54 4.20 - 5.40 10*6/mm3    Hemoglobin 13.7 12.0 - 16.0 g/dL    Hematocrit 40.4 37.0 - 47.0 %    MCV 89.0 81.0 - 99.0 fL    MCH 30.2 27.0 - 31.0 pg    MCHC 33.9 30.0 - 37.0  g/dL    RDW 12.9 11.5 - 14.5 %    RDW-SD 42.3 37.0 - 54.0 fl    MPV 11.0 6.0 - 12.0 fL    Platelets 229 130 - 400 10*3/mm3    Neutrophil % 76.5 37.0 - 80.0 %    Lymphocyte % 17.9 10.0 - 50.0 %    Monocyte % 4.9 0.0 - 12.0 %    Eosinophil % 0.0 0.0 - 7.0 %    Basophil % 0.3 0.0 - 2.5 %    Immature Grans % 0.4 0.0 - 0.6 %    Neutrophils, Absolute 5.76 2.00 - 6.90 10*3/mm3    Lymphocytes, Absolute 1.35 0.60 - 3.40 10*3/mm3    Monocytes, Absolute 0.37 0.00 - 0.90 10*3/mm3    Eosinophils, Absolute 0.00 0.00 - 0.70 10*3/mm3    Basophils, Absolute 0.02 0.00 - 0.20 10*3/mm3    Immature Grans, Absolute 0.03 0.00 - 0.06 10*3/mm3    nRBC 0.0 0.0 - 0.0 /100 WBC   Basic Metabolic Panel   Result Value Ref Range    Glucose 143 (H) 74 - 98 mg/dL    BUN 14 7 - 20 mg/dL    Creatinine 1.10 0.60 - 1.30 mg/dL    Sodium 140 137 - 145 mmol/L    Potassium 3.4 (L) 3.5 - 5.1 mmol/L    Chloride 109 (H) 98 - 107 mmol/L    CO2 27.0 26.0 - 30.0 mmol/L    Calcium 8.9 8.4 - 10.2 mg/dL    eGFR Non African Amer 51 (L) >60 mL/min/1.73    BUN/Creatinine Ratio 12.7 7.1 - 23.5    Anion Gap 7.4 (L) 10.0 - 20.0 mmol/L   POC Glucose Once   Result Value Ref Range    Glucose 228 (H) 70 - 130 mg/dL   POC Glucose Once   Result Value Ref Range    Glucose 215 (H) 70 - 130 mg/dL   POC Glucose Once   Result Value Ref Range    Glucose 236 (H) 70 - 130 mg/dL   POC Glucose Once   Result Value Ref Range    Glucose 187 (H) 70 - 130 mg/dL   POC Glucose Once   Result Value Ref Range    Glucose 218 (H) 70 - 130 mg/dL   POC Glucose Once   Result Value Ref Range    Glucose 223 (H) 70 - 130 mg/dL   POC Glucose Once   Result Value Ref Range    Glucose 238 (H) 70 - 130 mg/dL   POC Glucose Once   Result Value Ref Range    Glucose 123 70 - 130 mg/dL   POC Glucose Once   Result Value Ref Range    Glucose 152 (H) 70 - 130 mg/dL   Tissue Pathology Exam   Result Value Ref Range    Case Report       Surgical Pathology Report                         Case: [id number]                                   Authorizing Provider:  Janeth Tan MD       Collected:           02/18/2019 02:21 PM          Ordering Location:     [facility] OR Received:            02/19/2019 09:12 AM          Pathologist:           Jung Alvares DO                                                        Specimen:    Thyroid, left thyroid lobe                                                                 Final Diagnosis       See Scanned Report            Assessment/Plan   Problem List Items Addressed This Visit        Endocrine    Papillary thyroid carcinoma (CMS/HCC) - Primary    Overview     Right hemithyroidectomy 12/21/2018 showed papillary thyroid carcinoma measuring 7 mm and 1 mm with negative margins.  1 of 2+ perithyroidal lymph nodes were discovered as well. T1a(m)N1a  Completion Thyroidectomy 2/18/2019 - focal papillary thyroid carcinoma less than 1 mm in greatest dimension in the left lobe remaining thyroid demonstrated changes consistent with a multinodular goiter.         Relevant Medications    levothyroxine sodium (TIROSINT) 137 MCG capsule      Other Visit Diagnoses     Postsurgical hypothyroidism        Relevant Medications    levothyroxine sodium (TIROSINT) 137 MCG capsule        Surgical pathology results reviewed with the patient and prognosis explained, all her questions were answered.   She has multifocal disease with perythyroidal l/node involvement and I 131 therapy is reasonable. I have explained the low iodine and hypothyroid preparation. Patient is already off the medication. If she starts low iodine diet now we can schedule the test for 2 weeks from now.   We will plan the test for the week of March 25 with therapy on MOnday, followed by posttreatment scan.   Patient Instructions   Radioactive iodine therapy treatment instructions.     1)    3/15/2019    Start low-iodine diet and don’t take thyroid medication.         2)   3/25/2019 or another day that week please arrive to  [facility] Nuclear medicine department for Radioactive iodine therapy. You would also have a blood work on that day.       3)   3/29/2019 or 4/1/2019 you will have posttreatment scan that will help determine the extent of the disease    4)   Your radioactive precautions start on the day of treatment.  Please maintain radiation  safety precautions for 10-14 days. Do not exchange body fluids with anyone (no kissing, sharing utensils etc). You may use normal eating utensils and dishes/cups, but wash them before anyone else handles them. Dispose of your toothbrush after 14 days. Try to place a little bit of distance between yourself and others. One useful technique used to imagine that you have “strep throat”; the distance that others should keep from you would be similar. Do not sleep in the same bed as others for this time. There is no need for these precautions after 14 days.        5) 24 hours after the therapy you can start thyroid medication levothyroxine. Take this medication on an empty stomach at least 30 minutes prior to intake of food or hot drinks and 4 hours apart from calcium or iron supplements.  Stop low iodine diet and resume NORMAL DIET on the same day (24 hours after therapy).      I have given her written instructions on low iodine diet, schedule of the therapy and scan, radioiodine isolation precautions and postprocedural instructions.     Start levothyroxine 137 mcg daily after the therapy. Rx sent.     Follow-up in 2 months, will discuss results of the scan over the phone.     41  min  of  65 min face-to-face visit time spent for coordination of care and counselling regarding identified problems as outlined in the objective, assessment and discussion portions of the documentation.

## 2019-03-19 ENCOUNTER — HOSPITAL ENCOUNTER (OUTPATIENT)
Facility: HOSPITAL | Age: 59
Discharge: HOME | End: 2019-03-19
Payer: MEDICARE

## 2019-03-19 LAB
A/G RATIO: 1.9 (ref 0.8–2)
ALBUMIN SERPL-MCNC: 4.1 G/DL (ref 3.4–4.8)
ALP BLD-CCNC: 89 U/L (ref 25–100)
ALT SERPL-CCNC: 37 U/L (ref 4–36)
ANION GAP SERPL CALCULATED.3IONS-SCNC: 12 MMOL/L (ref 3–16)
AST SERPL-CCNC: 33 U/L (ref 8–33)
BILIRUB SERPL-MCNC: 0.6 MG/DL (ref 0.3–1.2)
BUN BLDV-MCNC: 16 MG/DL (ref 6–20)
CALCIUM SERPL-MCNC: 8.8 MG/DL (ref 8.5–10.5)
CHLORIDE BLD-SCNC: 96 MMOL/L (ref 98–107)
CO2: 27 MMOL/L (ref 20–30)
CREAT SERPL-MCNC: 1.5 MG/DL (ref 0.4–1.2)
GFR AFRICAN AMERICAN: 43
GFR NON-AFRICAN AMERICAN: 35
GLOBULIN: 2.2 G/DL
GLUCOSE BLD-MCNC: 240 MG/DL (ref 74–106)
MAGNESIUM: 2.3 MG/DL (ref 1.7–2.4)
POTASSIUM SERPL-SCNC: 4.4 MMOL/L (ref 3.4–5.1)
SODIUM BLD-SCNC: 135 MMOL/L (ref 136–145)
TOTAL PROTEIN: 6.3 G/DL (ref 6.4–8.3)

## 2019-03-19 PROCEDURE — 80053 COMPREHEN METABOLIC PANEL: CPT

## 2019-03-19 PROCEDURE — 83735 ASSAY OF MAGNESIUM: CPT

## 2019-03-22 ENCOUNTER — TELEPHONE (OUTPATIENT)
Dept: INTERNAL MEDICINE | Facility: CLINIC | Age: 59
End: 2019-03-22

## 2019-03-22 RX ORDER — LEVOTHYROXINE SODIUM 137 UG/1
137 TABLET ORAL DAILY
Qty: 30 TABLET | Refills: 11 | Status: SHIPPED | OUTPATIENT
Start: 2019-03-22 | End: 2019-06-27

## 2019-03-22 NOTE — TELEPHONE ENCOUNTER
Check w/ Dr. Urban.  I had two different dates on there referral.  When I spoke w/ Dr. Urban I thought she wanted this done In may as the posttreatment scan indicates on the referral.  I'm not sure we can get it on Monday.  I was under the the impression the pt gets the tracer dose on Monday & post treatment scan 5-7 days later.

## 2019-03-22 NOTE — TELEPHONE ENCOUNTER
Also, the date Dr. Urban gave for the post treatment scan is 5/25/2019.  She does have 3/25/2019 as getting the dose, but I just figured it was to be scheduled for 5/29/19.

## 2019-03-22 NOTE — TELEPHONE ENCOUNTER
PLEASE CALL TODAY SHE IS SUPPOSED TO HAVE RADIOACTIVE IODINE ON Monday SHE NEVER RECEIVED ANYTHING IN THE MAIL     PTS NUMBER [id number]

## 2019-03-22 NOTE — TELEPHONE ENCOUNTER
Spoke with patient and her son.  Both were very upset.  Patient stated she has been doing the Low Iodine diet for 2 weeks and is expecting to have the scan donel.  I read the notes from the office visit and it was the same as what patient was reading.  She stated she has not had any thyroid RX since her surgery,  From what I gather, Jono did not schedule the scan due to the conflicting dates?  Please advise as patient is very upset  (I will copy Kia as I am not here on Monday)

## 2019-03-26 DIAGNOSIS — C73 PAPILLARY THYROID CARCINOMA (HCC): Primary | ICD-10-CM

## 2019-03-26 NOTE — TELEPHONE ENCOUNTER
Dr. Urban,    The patient is scheduled on April 1 for dosing with the post tx scan to follow on April 8.    The patient's HH nurse questioned whether they should come out after the patient received the radioactive iodine or not.  Pt and the HH nurse would like to be advised of what precautions the pt should follow during this time.  Thank you.    Padma, [facility]  [phone]

## 2019-03-26 NOTE — TELEPHONE ENCOUNTER
Did she start eating normal food? Could you please ask what she had for dinner and what she had for breakfast? We may still be able to do the therapy if its just one meal. Otherwise if she had seafood or milk we would have to postpone by 1 week on the diet.

## 2019-03-26 NOTE — TELEPHONE ENCOUNTER
This meal have a lot of iodine, so my recommendation would be to stay on low iodine diet and off the thyroid medication for 1 more week and schedule treatment on or after April 1 with a scan 5-7 days later.     I-131 80 mCi    I have ordered [facility]BS>

## 2019-03-26 NOTE — TELEPHONE ENCOUNTER
The note has very detailed description of the date and recommendations. It was 3/25 to have treatment and 3/29 to have a scan. We can do treatment any time this week since she is prepared for it. No need for it to be Monday. Please make sure that patient still on low iodine and off thyroid.   Definitely NOT May.   Can we schedule her for tomorrow - order a dose of     Please check the note if my notes don't make sense.    Could you please call the patient asap and clarify the confusion.

## 2019-03-26 NOTE — TELEPHONE ENCOUNTER
SW pt.  Pt states last night she ate:  Cheeseburger on grill w/ a 90 Calorie Bun; A few potato chips, a couple vanilla wafers & some beans.    This morning pt age biscuit & gravy w/ two small slices of Salty cured ham.

## 2019-03-26 NOTE — TELEPHONE ENCOUNTER
Dr. Marino, Duane is saying that there should be an order for a post treatment scan.  We've got the pt set up for tomorrow for her 80 mCi.  But we need the order for the post treatment scan, which Duane said to put for April 3.

## 2019-03-27 ENCOUNTER — HOSPITAL ENCOUNTER (OUTPATIENT)
Dept: NUCLEAR MEDICINE | Facility: HOSPITAL | Age: 59
End: 2019-03-27

## 2019-03-28 ENCOUNTER — OFFICE VISIT (OUTPATIENT)
Dept: SURGERY | Facility: CLINIC | Age: 59
End: 2019-03-28

## 2019-03-28 VITALS
HEART RATE: 68 BPM | DIASTOLIC BLOOD PRESSURE: 76 MMHG | TEMPERATURE: 97.7 F | WEIGHT: 245 LBS | SYSTOLIC BLOOD PRESSURE: 126 MMHG | BODY MASS INDEX: 39.37 KG/M2 | HEIGHT: 66 IN | OXYGEN SATURATION: 98 %

## 2019-03-28 DIAGNOSIS — E89.0 S/P TOTAL THYROIDECTOMY: ICD-10-CM

## 2019-03-28 DIAGNOSIS — C73 PAPILLARY THYROID CARCINOMA (HCC): Primary | ICD-10-CM

## 2019-03-28 PROCEDURE — 99024 POSTOP FOLLOW-UP VISIT: CPT | Performed by: SURGERY

## 2019-03-29 NOTE — TELEPHONE ENCOUNTER
Yes, radiation precautions are suggesting isolation for the first few  Days at least. If she gets her dose on the 1st, then by 8th she should be fine. Before that recommendations are to limit the visits and exposure to body fluids. Radioactive iodine is excreted with sweat saliva and urine. Wear gloves and wash dishes/bedding in the end of two weeks. Soap and water take care of the radioactivity. In the first days its distance and time of exposure is important - short contacts are fine. Avoid contact with pregnant or kids.   I have included instructions to the paper she brought home after visit

## 2019-03-29 NOTE — TELEPHONE ENCOUNTER
Catherine,     I just called the patient & gave her the information from Dr. Urban below.  Can you please all the pt today & further explain.  She had more questions that I was not able to answer.  Thanks!    NCB

## 2019-04-01 ENCOUNTER — HOSPITAL ENCOUNTER (OUTPATIENT)
Dept: NUCLEAR MEDICINE | Facility: HOSPITAL | Age: 59
Discharge: HOME | End: 2019-04-01

## 2019-04-01 ENCOUNTER — LAB (OUTPATIENT)
Dept: LAB | Facility: HOSPITAL | Age: 59
End: 2019-04-01

## 2019-04-01 DIAGNOSIS — C73 PAPILLARY THYROID CARCINOMA (HCC): ICD-10-CM

## 2019-04-01 LAB
T4 FREE SERPL-MCNC: 0.47 NG/DL (ref 0.89–1.76)
TSH SERPL DL<=0.05 MIU/L-ACNC: 59.65 MIU/ML (ref 0.35–5.35)

## 2019-04-01 PROCEDURE — 84439 ASSAY OF FREE THYROXINE: CPT

## 2019-04-01 PROCEDURE — 84432 ASSAY OF THYROGLOBULIN: CPT

## 2019-04-01 PROCEDURE — 0 SODIUM IODIDE CAPSULE: Performed by: INTERNAL MEDICINE

## 2019-04-01 PROCEDURE — A9517 I131 IODIDE CAP, RX: HCPCS | Performed by: INTERNAL MEDICINE

## 2019-04-01 PROCEDURE — 79005 NUCLEAR RX ORAL ADMIN: CPT

## 2019-04-01 PROCEDURE — 84443 ASSAY THYROID STIM HORMONE: CPT

## 2019-04-01 PROCEDURE — 86800 THYROGLOBULIN ANTIBODY: CPT

## 2019-04-01 RX ADMIN — SODIUM IODIDE I 131 1 CAPSULE: 100 CAPSULE ORAL at 11:30

## 2019-04-02 LAB — REF LAB TEST RESULTS: NORMAL

## 2019-04-08 ENCOUNTER — HOSPITAL ENCOUNTER (OUTPATIENT)
Dept: NUCLEAR MEDICINE | Facility: HOSPITAL | Age: 59
Discharge: HOME | End: 2019-04-08

## 2019-04-08 DIAGNOSIS — C73 PAPILLARY THYROID CARCINOMA (HCC): ICD-10-CM

## 2019-04-08 PROCEDURE — 78018 THYROID MET IMAGING BODY: CPT

## 2019-04-09 ENCOUNTER — TELEPHONE (OUTPATIENT)
Dept: INTERNAL MEDICINE | Facility: CLINIC | Age: 59
End: 2019-04-09

## 2019-04-09 NOTE — TELEPHONE ENCOUNTER
PATIENT IS CALLING TO GET THE RESULTS OF A SCAN SHE HAD TO CHECK FOR CANCER. SHE WOULD LIKE A CALL BACK -[phone]

## 2019-04-10 PROBLEM — Z90.89 S/P TOTAL THYROIDECTOMY: Status: ACTIVE | Noted: 2019-04-10

## 2019-04-10 PROBLEM — E89.0 S/P TOTAL THYROIDECTOMY: Status: ACTIVE | Noted: 2019-04-10

## 2019-04-10 PROBLEM — Z98.890 S/P TOTAL THYROIDECTOMY: Status: ACTIVE | Noted: 2019-04-10

## 2019-04-16 ENCOUNTER — HOSPITAL ENCOUNTER (OUTPATIENT)
Facility: HOSPITAL | Age: 59
Discharge: HOME | End: 2019-04-16
Payer: MEDICARE

## 2019-04-16 LAB
A/G RATIO: 2 (ref 0.8–2)
ALBUMIN SERPL-MCNC: 4.2 G/DL (ref 3.4–4.8)
ALP BLD-CCNC: 76 U/L (ref 25–100)
ALT SERPL-CCNC: 23 U/L (ref 4–36)
ANION GAP SERPL CALCULATED.3IONS-SCNC: 10 MMOL/L (ref 3–16)
AST SERPL-CCNC: 20 U/L (ref 8–33)
BILIRUB SERPL-MCNC: 0.6 MG/DL (ref 0.3–1.2)
BUN BLDV-MCNC: 15 MG/DL (ref 6–20)
CALCIUM SERPL-MCNC: 9.6 MG/DL (ref 8.5–10.5)
CHLORIDE BLD-SCNC: 102 MMOL/L (ref 98–107)
CO2: 29 MMOL/L (ref 20–30)
CREAT SERPL-MCNC: 1.2 MG/DL (ref 0.4–1.2)
GFR AFRICAN AMERICAN: 55
GFR NON-AFRICAN AMERICAN: 46
GLOBULIN: 2.1 G/DL
GLUCOSE BLD-MCNC: 136 MG/DL (ref 74–106)
HCT VFR BLD CALC: 44.4 % (ref 37–47)
HEMOGLOBIN: 15.1 G/DL (ref 11.5–16.5)
MCH RBC QN AUTO: 29.8 PG (ref 27–32)
MCHC RBC AUTO-ENTMCNC: 34 G/DL (ref 31–35)
MCV RBC AUTO: 87.7 FL (ref 80–100)
PDW BLD-RTO: 14.3 % (ref 11–16)
PLATELET # BLD: 251 K/UL (ref 150–400)
PMV BLD AUTO: 10.7 FL (ref 6–10)
POTASSIUM SERPL-SCNC: 4.3 MMOL/L (ref 3.4–5.1)
RBC # BLD: 5.06 M/UL (ref 3.8–5.8)
SODIUM BLD-SCNC: 141 MMOL/L (ref 136–145)
TOTAL PROTEIN: 6.3 G/DL (ref 6.4–8.3)
WBC # BLD: 4.6 K/UL (ref 4–11)

## 2019-04-16 PROCEDURE — 36415 COLL VENOUS BLD VENIPUNCTURE: CPT

## 2019-04-16 PROCEDURE — 80053 COMPREHEN METABOLIC PANEL: CPT

## 2019-04-16 PROCEDURE — 85027 COMPLETE CBC AUTOMATED: CPT

## 2019-04-29 ENCOUNTER — HOSPITAL ENCOUNTER (OUTPATIENT)
Facility: HOSPITAL | Age: 59
Discharge: HOME | End: 2019-04-29
Payer: MEDICARE

## 2019-04-29 LAB
T4 FREE: 1.76 NG/DL (ref 0.89–1.76)
TSH SERPL DL<=0.05 MIU/L-ACNC: 10.64 UIU/ML (ref 0.35–5.5)
VITAMIN D 25-HYDROXY: 26.3 (ref 32–100)

## 2019-04-29 PROCEDURE — 84443 ASSAY THYROID STIM HORMONE: CPT

## 2019-04-29 PROCEDURE — 82306 VITAMIN D 25 HYDROXY: CPT

## 2019-04-29 PROCEDURE — 84439 ASSAY OF FREE THYROXINE: CPT

## 2019-04-30 LAB
T3 UPTAKE PERCENT: 1.1 TBI (ref 0.8–1.3)
T4 TOTAL: 10.4 UG/DL (ref 4.5–10.9)

## 2019-05-03 ENCOUNTER — HOSPITAL ENCOUNTER (OUTPATIENT)
Facility: HOSPITAL | Age: 59
Discharge: HOME | End: 2019-05-03
Payer: MEDICARE

## 2019-05-03 LAB
BACTERIA: ABNORMAL /HPF
BILIRUBIN URINE: NEGATIVE
BLOOD, URINE: NEGATIVE
CLARITY: ABNORMAL
COLOR: YELLOW
GLUCOSE URINE: 100 MG/DL
KETONES, URINE: NEGATIVE MG/DL
LEUKOCYTE ESTERASE, URINE: ABNORMAL
MICROSCOPIC EXAMINATION: YES
NITRITE, URINE: NEGATIVE
PH UA: 6 (ref 5–8)
PROTEIN UA: NEGATIVE MG/DL
RBC UA: ABNORMAL /HPF (ref 0–2)
SPECIFIC GRAVITY UA: 1.02 (ref 1–1.03)
URINE REFLEX TO CULTURE: YES
URINE TYPE: ABNORMAL
UROBILINOGEN, URINE: 0.2 E.U./DL
WBC UA: ABNORMAL /HPF (ref 0–5)

## 2019-05-03 PROCEDURE — 87086 URINE CULTURE/COLONY COUNT: CPT

## 2019-05-03 PROCEDURE — 81001 URINALYSIS AUTO W/SCOPE: CPT

## 2019-05-05 LAB
ORGANISM: ABNORMAL
URINE CULTURE, ROUTINE: ABNORMAL
URINE CULTURE, ROUTINE: ABNORMAL

## 2019-05-23 ENCOUNTER — APPOINTMENT (OUTPATIENT)
Dept: DIABETES SERVICES | Facility: HOSPITAL | Age: 59
End: 2019-05-23

## 2019-06-27 ENCOUNTER — OFFICE VISIT (OUTPATIENT)
Dept: ENDOCRINOLOGY | Facility: CLINIC | Age: 59
End: 2019-06-27

## 2019-06-27 VITALS
SYSTOLIC BLOOD PRESSURE: 126 MMHG | OXYGEN SATURATION: 99 % | DIASTOLIC BLOOD PRESSURE: 78 MMHG | HEART RATE: 72 BPM | BODY MASS INDEX: 40.18 KG/M2 | WEIGHT: 248.8 LBS

## 2019-06-27 DIAGNOSIS — E11.65 TYPE 2 DIABETES MELLITUS WITH HYPERGLYCEMIA, WITH LONG-TERM CURRENT USE OF INSULIN (HCC): ICD-10-CM

## 2019-06-27 DIAGNOSIS — E89.0 POSTSURGICAL HYPOTHYROIDISM: ICD-10-CM

## 2019-06-27 DIAGNOSIS — C73 PAPILLARY THYROID CARCINOMA (HCC): Primary | ICD-10-CM

## 2019-06-27 DIAGNOSIS — Z79.4 TYPE 2 DIABETES MELLITUS WITH HYPERGLYCEMIA, WITH LONG-TERM CURRENT USE OF INSULIN (HCC): ICD-10-CM

## 2019-06-27 PROBLEM — E11.9 DIABETES MELLITUS (HCC): Status: ACTIVE | Noted: 2019-06-27

## 2019-06-27 PROCEDURE — 99214 OFFICE O/P EST MOD 30 MIN: CPT | Performed by: INTERNAL MEDICINE

## 2019-06-27 RX ORDER — LEVOTHYROXINE SODIUM 0.15 MG/1
150 TABLET ORAL DAILY
Qty: 30 TABLET | Refills: 6
Start: 2019-06-27 | End: 2019-07-16 | Stop reason: DRUGHIGH

## 2019-06-27 NOTE — PROGRESS NOTES
Follow-up (Papillary thyroid carcinoma (CMS/HCC))    Subjective Sue L Collett is a 58 y.o. female is being seen for follow-up of thyroid cancer,   Thyroid Cancer: papillary thyroid carcinoma.    Thyroid cancer was diagnosed in  12/2018 after thyroidectomy. Dec 21 she had first surgery and papillary thyroid cancer with lymph node involvement was discovered on surgical pathology. Multifocal papillary 7 mm and 1 mm on the right lobe and 1 mm additional focus in the left lobe. Completion thyroidectomy was done on Feb 2019. Patient recovered well from the surgery and underwent I-131 therapy 80 mCi I-131 4/1/2019.   Postreatment scan showed thyroid bed uptake.    Levothyroxine 137 mcg was started after the procedure.   She took cytomel 10 mcg daily before that.     DM type 2 is uncontrolled. SHe is taking BAsaglar and januvia, glucose is variable.   108-109 in the morning. Late in the evening goes up to 210  Basaglar 64 units twice a day, Januvia 50 mg     C/o headaches sore throat, fatigue, hair loss, dry mouth, leg swelling and joint pains.  She doesn't feel better since the I-131 therapy. April 29 TSH was 10.3 and free T4 1.76. Patient reported that she was given a higher dose of levothyroxine 150 mcg but didn't start taking it.     .     History of Present Illness  Medications    Current Outpatient Medications:   •  acetaminophen (TYLENOL) 325 MG tablet, Take 2 tablets by mouth Every 4 (Four) Hours As Needed for Mild Pain ., Disp: , Rfl:   •  ADVAIR DISKUS 250-50 MCG/DOSE DISKUS, inhale 1 dose by mouth twice a day, PRN, Rinse mouth after use, Disp: , Rfl: 0  •  benzocaine-menthol (CEPACOL) 15-3.6 MG lozenge lozenge, Dissolve 1 lozenge in the mouth Every 2 (Two) Hours As Needed for sore throat, Disp: 36 each, Rfl: 0  •  betamethasone dipropionate (DIPROLENE) 0.05 % cream, Apply  topically to the appropriate area as directed 1 (One) Time Per Week., Disp: , Rfl:   •  fluticasone (FLONASE) 50 MCG/ACT nasal spray,  instill 2 sprays into each nostril once daily PRN, Disp: , Rfl: 0  •  hydrocortisone 1 % ointment, Apply  topically to the appropriate area as directed 3 (Three) Times a Day. Apply to abdominal rash 3 times a day for itching (Patient taking differently: Apply  topically to the appropriate area as directed 3 (Three) Times a Day As Needed (for itching). Apply to abdominal rash 3 times a day for itching), Disp: 56 g, Rfl: 1  •  Insulin Glargine (BASAGLAR KWIKPEN) 100 UNIT/ML injection pen, Inject 64 Units under the skin into the appropriate area as directed 2 (Two) Times a Day., Disp: , Rfl:   •  JANUVIA 50 MG tablet, Take 50 mg by mouth Daily., Disp: , Rfl:   •  levothyroxine (SYNTHROID, LEVOTHROID) 150 MCG tablet, Take 1 tablet by mouth Daily., Disp: 30 tablet, Rfl: 6  •  metroNIDAZOLE (METROCREAM) 0.75 % cream, Apply  topically to the appropriate area as directed Every Night., Disp: , Rfl:   •  pantoprazole (PROTONIX) 40 MG EC tablet, take 1 tablet by mouth twice a day for 7 days or as directed, Disp: , Rfl: 0  •  polyethylene glycol (MIRALAX) packet, Take 17 g by mouth Daily As Needed., Disp: , Rfl:   •  promethazine (PHENERGAN) 25 MG tablet, take 1/2-1 tablet by mouth every 6 hours if needed, Disp: , Rfl: 0  •  RESTASIS 0.05 % ophthalmic emulsion, Administer 1 drop to both eyes Every 12 (Twelve) Hours., Disp: , Rfl:   •  telmisartan-hydrochlorothiazide (MICARDIS HCT) 80-25 MG per tablet, Take 1 tablet by mouth Daily., Disp: 30 tablet, Rfl: 6  •  triamcinolone (KENALOG) 0.025 % cream, Apply  topically to the appropriate area as directed 2 (Two) Times a Day., Disp: , Rfl:   •  vitamin D (ERGOCALCIFEROL) 05779 units capsule capsule, Take 50,000 Units by mouth 1 (One) Time Per Week. ON HOLD FOR SURGERY SINCE 11/2018, Disp: , Rfl:   •  warfarin (COUMADIN) 7.5 MG tablet, Take 7.5 mg by mouth Take As Directed. Takes Mon, Tues, Wed, Thurs 7.5 mg Takes Fri, Sat, Sun 5 mg, Disp: , Rfl:     The following portions of the  patient's history were reviewed and updated as appropriate: allergies, current medications, past family history, past medical history, past social history, past surgical history and problem list.    Review of Systems   Constitutional: Positive for fatigue and unexpected weight gain.   HENT: Positive for sore throat.    Eyes: Positive for visual disturbance.   Respiratory: Positive for shortness of breath.    Cardiovascular: Positive for leg swelling.   Gastrointestinal: Positive for indigestion.   Endocrine: Positive for cold intolerance.   Musculoskeletal: Positive for arthralgias, back pain and joint swelling.   Neurological: Positive for dizziness, weakness and memory problem.   All other systems reviewed and are negative.      Objective   Vitals:    06/27/19 1127   BP: 126/78   Pulse: 72   SpO2: 99%     Physical Exam   Constitutional: She is oriented to person, place, and time. She appears well-developed and well-nourished.   HENT:   Head: Normocephalic and atraumatic.   Mouth/Throat: Posterior oropharyngeal edema and posterior oropharyngeal erythema present.   Eyes: Conjunctivae are normal.   Neck: No thyromegaly present.   Cardiovascular: Normal rate, regular rhythm, normal heart sounds and intact distal pulses.   Pulmonary/Chest: Effort normal and breath sounds normal.   Musculoskeletal: She exhibits edema (trace).   Lymphadenopathy:     She has no cervical adenopathy.   Neurological: She is alert and oriented to person, place, and time.   Psychiatric: She has a normal mood and affect. Thought content normal.         Results for orders placed or performed in visit on 04/01/19   TO  THYROGLOBULIN AND ANTI THYROGLOBULIN ANTIBODY - Miscellaneous Test   Result Value Ref Range    Miscellaneous Lab Test Result See attached report    T4, Free   Result Value Ref Range    Free T4 0.47 (L) 0.89 - 1.76 ng/dL   TSH   Result Value Ref Range    TSH 59.646 (H) 0.350 - 5.350 mIU/mL        Assessment/Plan   Problem List  Items Addressed This Visit        Endocrine    Papillary thyroid carcinoma (CMS/HCC) - Primary    Overview     Right hemithyroidectomy 12/21/2018 showed papillary thyroid carcinoma measuring 7 mm and 1 mm with negative margins.  1 of 2+ perithyroidal lymph nodes were discovered as well. T1a(m)N1a  Completion Thyroidectomy 2/18/2019 - focal papillary thyroid carcinoma less than 1 mm in greatest dimension in the left lobe remaining thyroid demonstrated changes consistent with a multinodular goiter.  3/29/2019 I-131 therapy with 85.1 mCi, thyroid bed uptake on the posttreatment scan. TG 14.2         Relevant Medications    levothyroxine (SYNTHROID, LEVOTHROID) 150 MCG tablet    Other Relevant Orders    T3    T4, Free    TSH    Diabetes mellitus (CMS/HCC)      Other Visit Diagnoses     Postsurgical hypothyroidism        Relevant Medications    levothyroxine (SYNTHROID, LEVOTHROID) 150 MCG tablet        Cont levothyroxine  At higher dose of 150  mcg daily. Repeat labs for dose adjustments at 6 weeks. Most likely her sx are related to hypothyroid state.     Repeat WBS in the end of the year with hypothyroid preparation.     Uncontrolled diabetes mellitus type 2 - continue Basaglar and start Trulicitity 0.75 mg weekly. Samples provided and side effects reviewed.   She has fasting hypoglycemia and postprandial hyperglycemia. I will see her in 2 months and simplify the regimen.

## 2019-07-09 ENCOUNTER — HOSPITAL ENCOUNTER (OUTPATIENT)
Facility: HOSPITAL | Age: 59
Discharge: HOME | End: 2019-07-09
Payer: MEDICARE

## 2019-07-09 LAB
A/G RATIO: 1.8 (ref 0.8–2)
ALBUMIN SERPL-MCNC: 4 G/DL (ref 3.4–4.8)
ALP BLD-CCNC: 84 U/L (ref 25–100)
ALT SERPL-CCNC: 26 U/L (ref 4–36)
ANION GAP SERPL CALCULATED.3IONS-SCNC: 11 MMOL/L (ref 3–16)
AST SERPL-CCNC: 20 U/L (ref 8–33)
BILIRUB SERPL-MCNC: 0.6 MG/DL (ref 0.3–1.2)
BUN BLDV-MCNC: 16 MG/DL (ref 6–20)
CALCIUM SERPL-MCNC: 9.7 MG/DL (ref 8.5–10.5)
CHLORIDE BLD-SCNC: 101 MMOL/L (ref 98–107)
CO2: 27 MMOL/L (ref 20–30)
CREAT SERPL-MCNC: 1.1 MG/DL (ref 0.4–1.2)
GFR AFRICAN AMERICAN: >59
GFR NON-AFRICAN AMERICAN: 51
GLOBULIN: 2.2 G/DL
GLUCOSE BLD-MCNC: 266 MG/DL (ref 74–106)
HBA1C MFR BLD: 8.8 %
HCT VFR BLD CALC: 44.2 % (ref 37–47)
HEMOGLOBIN: 15.3 G/DL (ref 11.5–16.5)
MCH RBC QN AUTO: 29.9 PG (ref 27–32)
MCHC RBC AUTO-ENTMCNC: 34.6 G/DL (ref 31–35)
MCV RBC AUTO: 86.5 FL (ref 80–100)
PDW BLD-RTO: 12.6 % (ref 11–16)
PLATELET # BLD: 256 K/UL (ref 150–400)
PMV BLD AUTO: 10.9 FL (ref 6–10)
POTASSIUM SERPL-SCNC: 4.6 MMOL/L (ref 3.4–5.1)
RBC # BLD: 5.11 M/UL (ref 3.8–5.8)
SODIUM BLD-SCNC: 139 MMOL/L (ref 136–145)
TOTAL PROTEIN: 6.2 G/DL (ref 6.4–8.3)
TSH SERPL DL<=0.05 MIU/L-ACNC: 0.44 UIU/ML (ref 0.35–5.5)
WBC # BLD: 4.8 K/UL (ref 4–11)

## 2019-07-09 PROCEDURE — 84443 ASSAY THYROID STIM HORMONE: CPT

## 2019-07-09 PROCEDURE — 83036 HEMOGLOBIN GLYCOSYLATED A1C: CPT

## 2019-07-09 PROCEDURE — 85027 COMPLETE CBC AUTOMATED: CPT

## 2019-07-09 PROCEDURE — 80053 COMPREHEN METABOLIC PANEL: CPT

## 2019-07-16 RX ORDER — LEVOTHYROXINE SODIUM 0.05 MG/1
50 TABLET ORAL DAILY
Qty: 30 TABLET | Refills: 5 | Status: SHIPPED | OUTPATIENT
Start: 2019-07-16 | End: 2021-04-28

## 2019-07-16 RX ORDER — LEVOTHYROXINE SODIUM 112 UG/1
112 TABLET ORAL DAILY
Qty: 30 TABLET | Refills: 5 | Status: SHIPPED | OUTPATIENT
Start: 2019-07-16 | End: 2021-04-28

## 2019-07-23 ENCOUNTER — HOSPITAL ENCOUNTER (OUTPATIENT)
Facility: HOSPITAL | Age: 59
Discharge: HOME | End: 2019-07-23
Payer: MEDICARE

## 2019-07-23 LAB
BILIRUBIN URINE: NEGATIVE
BLOOD, URINE: NEGATIVE
CLARITY: CLEAR
COLOR: YELLOW
EPITHELIAL CELLS, UA: ABNORMAL /HPF
GLUCOSE URINE: NEGATIVE MG/DL
KETONES, URINE: NEGATIVE MG/DL
LEUKOCYTE ESTERASE, URINE: ABNORMAL
MICROSCOPIC EXAMINATION: YES
NITRITE, URINE: NEGATIVE
PH UA: 5 (ref 5–8)
PROTEIN UA: NEGATIVE MG/DL
RBC UA: ABNORMAL /HPF (ref 0–2)
SPECIFIC GRAVITY UA: 1.01 (ref 1–1.03)
URINE TYPE: ABNORMAL
UROBILINOGEN, URINE: 0.2 E.U./DL
WBC UA: ABNORMAL /HPF (ref 0–5)

## 2019-07-23 PROCEDURE — 81001 URINALYSIS AUTO W/SCOPE: CPT

## 2019-07-23 PROCEDURE — 87186 SC STD MICRODIL/AGAR DIL: CPT

## 2019-07-23 PROCEDURE — 87086 URINE CULTURE/COLONY COUNT: CPT

## 2019-07-23 PROCEDURE — 87077 CULTURE AEROBIC IDENTIFY: CPT

## 2019-07-24 ENCOUNTER — TELEPHONE (OUTPATIENT)
Dept: INTERNAL MEDICINE | Facility: CLINIC | Age: 59
End: 2019-07-24

## 2019-07-24 NOTE — TELEPHONE ENCOUNTER
PATIENT IS REQUESTING A RETURN CALL FROM THIS OFFICE. SHE IS WANTING TO KNOW IF DR HARRINGTON HAS REVIEWED HER LAB RESULTS THAT WERE DONE THROUGH HER PCP OFFICE.

## 2019-07-24 NOTE — TELEPHONE ENCOUNTER
PRIMARY CARE OFFICE IS CALLING ABOUT PATIENTS INCREASED LEVOTHYROXINE MEDICATION  MCG. THEY NEED CLARIFICATION ON THIS MEDICATION. PATIENT CALLED THEIR OFFICE STATING PATIENT WAS EXPERIENCING  DIZZINESS/NAUSEA/ANXIETY SINCE WE INCREASED LEVOTHYROXINE. PCP PROVIDER TOLD PATIENT TO GO BACK TO TAKING 150 MCG TILL THEY HEAR BACK FROM OUR OFFICE ABOUT THIS MEDICATION. OFFICE NUMBER [phone]

## 2019-07-24 NOTE — TELEPHONE ENCOUNTER
Spoke with patient she stated that she recently had thyroid labs done by her PC and she changed it. Spoke with Dr. Montaño they stated patient called experiencing symptoms. I advised Dr. Montaño and Patient hat they needed to decried WHO was going to take over her Thyroidism going forward the it is difficult for 2 different drJonah Caring for the same DX with different results.  Either they needed to refer patient to us, or care for her their self. Patient expressed she wanted Dr. Urban to care for her thyroidism, I expressed they to the PCP. They stated she repeated the labs and faxed them on 7-10, we have not received the labs as of yet. I asked that they fax them directly to [phone] Endo fax  I have faxed recent notes and reason as to why Dr brandon changed her dosage

## 2019-07-24 NOTE — TELEPHONE ENCOUNTER
Just wanted to FYI you on this message. I have contacted the office to get their Fax number to fax notes and results.  Patient did NOT call or contact office about her Symptoms.    Returned Nicole call to ask for her fax number so I could send her the notes and results as to why Dr. Urban chose to increase her RX.

## 2019-07-25 NOTE — TELEPHONE ENCOUNTER
Patient notified of results, has been taking 162 for the past 6 weeks. Will continue to stay on the same dose.

## 2019-07-25 NOTE — TELEPHONE ENCOUNTER
Thyroid level is at goal of therapy for thyroid cancer (high end of thyroid function or suppressed TSH). Diabetes is uncontrolled. Cont same dose thyroid medication.

## 2019-07-27 LAB
ORGANISM: ABNORMAL
URINE CULTURE, ROUTINE: ABNORMAL
URINE CULTURE, ROUTINE: ABNORMAL

## 2019-08-08 ENCOUNTER — TELEPHONE (OUTPATIENT)
Dept: ENDOCRINOLOGY | Facility: CLINIC | Age: 59
End: 2019-08-08

## 2019-08-08 NOTE — TELEPHONE ENCOUNTER
----- Message from Louise CALDWELL MD sent at 8/7/2019 11:02 AM EDT -----  I have receive dthe updated info and labs for this patient. She does not need to incresae the dose as her last labs on 7/26/2019 showed TSH at goal of therapy at 0.44. Continue 150 mcg levothyroxine/synthroid

## 2019-08-22 PROCEDURE — 36415 COLL VENOUS BLD VENIPUNCTURE: CPT

## 2019-08-22 PROCEDURE — 84443 ASSAY THYROID STIM HORMONE: CPT

## 2019-08-23 ENCOUNTER — HOSPITAL ENCOUNTER (OUTPATIENT)
Facility: HOSPITAL | Age: 59
Discharge: HOME | End: 2019-08-23
Payer: MEDICARE

## 2019-08-23 LAB — TSH SERPL DL<=0.05 MIU/L-ACNC: 1.6 UIU/ML (ref 0.35–5.5)

## 2019-09-07 ENCOUNTER — HOSPITAL ENCOUNTER (OUTPATIENT)
Facility: HOSPITAL | Age: 59
Discharge: HOME | End: 2019-09-07
Payer: MEDICARE

## 2019-09-07 LAB
A/G RATIO: 1.8 (ref 0.8–2)
ALBUMIN SERPL-MCNC: 4.1 G/DL (ref 3.4–4.8)
ALP BLD-CCNC: 99 U/L (ref 25–100)
ALT SERPL-CCNC: 31 U/L (ref 4–36)
ANION GAP SERPL CALCULATED.3IONS-SCNC: 13 MMOL/L (ref 3–16)
AST SERPL-CCNC: 23 U/L (ref 8–33)
BILIRUB SERPL-MCNC: 0.6 MG/DL (ref 0.3–1.2)
BILIRUBIN URINE: NEGATIVE
BLOOD, URINE: NEGATIVE
BUN BLDV-MCNC: 12 MG/DL (ref 6–20)
CALCIUM SERPL-MCNC: 9.6 MG/DL (ref 8.5–10.5)
CHLORIDE BLD-SCNC: 100 MMOL/L (ref 98–107)
CLARITY: CLEAR
CO2: 26 MMOL/L (ref 20–30)
COLOR: YELLOW
CREAT SERPL-MCNC: 1.1 MG/DL (ref 0.4–1.2)
GFR AFRICAN AMERICAN: >59
GFR NON-AFRICAN AMERICAN: 50
GLOBULIN: 2.3 G/DL
GLUCOSE BLD-MCNC: 215 MG/DL (ref 74–106)
GLUCOSE URINE: NEGATIVE MG/DL
HCT VFR BLD CALC: 46.6 % (ref 37–47)
HEMOGLOBIN: 16 G/DL (ref 11.5–16.5)
KETONES, URINE: NEGATIVE MG/DL
LEUKOCYTE ESTERASE, URINE: NEGATIVE
MCH RBC QN AUTO: 30 PG (ref 27–32)
MCHC RBC AUTO-ENTMCNC: 34.3 G/DL (ref 31–35)
MCV RBC AUTO: 87.3 FL (ref 80–100)
MICROSCOPIC EXAMINATION: NORMAL
NITRITE, URINE: NEGATIVE
PDW BLD-RTO: 12.6 % (ref 11–16)
PH UA: 6.5 (ref 5–8)
PLATELET # BLD: 263 K/UL (ref 150–400)
PMV BLD AUTO: 10.8 FL (ref 6–10)
POTASSIUM SERPL-SCNC: 4.4 MMOL/L (ref 3.4–5.1)
PROTEIN UA: NEGATIVE MG/DL
RBC # BLD: 5.34 M/UL (ref 3.8–5.8)
SODIUM BLD-SCNC: 139 MMOL/L (ref 136–145)
SPECIFIC GRAVITY UA: 1.01 (ref 1–1.03)
TOTAL PROTEIN: 6.4 G/DL (ref 6.4–8.3)
URINE REFLEX TO CULTURE: NORMAL
URINE TYPE: NORMAL
UROBILINOGEN, URINE: 0.2 E.U./DL
WBC # BLD: 5.2 K/UL (ref 4–11)

## 2019-09-07 PROCEDURE — 85027 COMPLETE CBC AUTOMATED: CPT

## 2019-09-07 PROCEDURE — 36415 COLL VENOUS BLD VENIPUNCTURE: CPT

## 2019-09-07 PROCEDURE — 81003 URINALYSIS AUTO W/O SCOPE: CPT

## 2019-09-07 PROCEDURE — 80053 COMPREHEN METABOLIC PANEL: CPT

## 2019-09-23 ENCOUNTER — HOSPITAL ENCOUNTER (EMERGENCY)
Facility: HOSPITAL | Age: 59
Discharge: HOME | End: 2019-09-23
Attending: HOSPITALIST
Payer: MEDICARE

## 2019-09-23 ENCOUNTER — APPOINTMENT (OUTPATIENT)
Dept: ULTRASOUND IMAGING | Facility: HOSPITAL | Age: 59
End: 2019-09-23
Payer: MEDICARE

## 2019-09-23 VITALS
RESPIRATION RATE: 20 BRPM | HEART RATE: 82 BPM | WEIGHT: 242 LBS | OXYGEN SATURATION: 98 % | BODY MASS INDEX: 38.89 KG/M2 | HEIGHT: 66 IN | TEMPERATURE: 98.2 F | SYSTOLIC BLOOD PRESSURE: 122 MMHG | DIASTOLIC BLOOD PRESSURE: 67 MMHG

## 2019-09-23 DIAGNOSIS — I80.02 THROMBOPHLEBITIS OF SUPERFICIAL VEINS OF LEFT LOWER EXTREMITY: Primary | ICD-10-CM

## 2019-09-23 LAB
APTT: 23.6 SEC (ref 22.9–31.3)
INR BLD: 1.1 (ref 0.9–1.1)
PROTHROMBIN TIME: 10.5 SEC (ref 8.9–10.7)
REASON FOR REJECTION: NORMAL
REJECTED TEST: NORMAL

## 2019-09-23 PROCEDURE — 36415 COLL VENOUS BLD VENIPUNCTURE: CPT

## 2019-09-23 PROCEDURE — 85730 THROMBOPLASTIN TIME PARTIAL: CPT

## 2019-09-23 PROCEDURE — 99283 EMERGENCY DEPT VISIT LOW MDM: CPT

## 2019-09-23 PROCEDURE — 6360000002 HC RX W HCPCS: Performed by: HOSPITALIST

## 2019-09-23 PROCEDURE — 85610 PROTHROMBIN TIME: CPT

## 2019-09-23 PROCEDURE — 96372 THER/PROPH/DIAG INJ SC/IM: CPT

## 2019-09-23 PROCEDURE — 93971 EXTREMITY STUDY: CPT

## 2019-09-23 RX ORDER — ALBUTEROL SULFATE 90 UG/1
2 AEROSOL, METERED RESPIRATORY (INHALATION) EVERY 6 HOURS PRN
COMMUNITY
End: 2019-12-13 | Stop reason: ALTCHOICE

## 2019-09-23 RX ORDER — LEVOTHYROXINE SODIUM 0.15 MG/1
150 TABLET ORAL DAILY
COMMUNITY

## 2019-09-23 RX ORDER — FUROSEMIDE 20 MG/1
10 TABLET ORAL DAILY
COMMUNITY

## 2019-09-23 RX ORDER — WARFARIN SODIUM 6 MG/1
6 TABLET ORAL DAILY
COMMUNITY

## 2019-09-23 RX ADMIN — ENOXAPARIN SODIUM 105 MG: 120 INJECTION SUBCUTANEOUS at 12:37

## 2019-09-23 ASSESSMENT — PAIN DESCRIPTION - DESCRIPTORS: DESCRIPTORS: BURNING;SORE

## 2019-09-23 ASSESSMENT — PAIN DESCRIPTION - PAIN TYPE: TYPE: ACUTE PAIN

## 2019-09-23 ASSESSMENT — PAIN SCALES - GENERAL: PAINLEVEL_OUTOF10: 10

## 2019-09-23 ASSESSMENT — PAIN DESCRIPTION - FREQUENCY: FREQUENCY: CONTINUOUS

## 2019-09-23 ASSESSMENT — PAIN DESCRIPTION - LOCATION: LOCATION: LEG

## 2019-09-23 ASSESSMENT — PAIN DESCRIPTION - ORIENTATION: ORIENTATION: LEFT

## 2019-09-23 NOTE — ED PROVIDER NOTES
[address] [facility] ENCOUNTER      Pt Name: Song Esposito  MRN: [id number]  [place]: 1960  Date of evaluation: 9/23/2019  Provider: Bandar Coker, [facility]       Chief Complaint   Patient presents with    Leg Swelling         HISTORY OF PRESENT ILLNESS  (Location/Symptom, Timing/Onset, Context/Setting, Quality, Duration, Modifying Factors, Severity.)   Song Esposito is a 62 y.o. female who presents to the emergency department for evaluation of left lower extremity swelling and pain. Patient here for evaluation of a pain to the left lower extremity especially in the thigh inner aspect and around the knee. She states this started approximately 2 weeks ago. Patient states that she was recently taken off of her Coumadin for several days for dental procedures. Patient states that she was offered Coumadin for approximate 7 days and then the day that she actually started her Coumadin back was the day she started to develop these symptoms. His had a history of multiple clots before in the past. 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 She states a wandering where between 2 and 3 on her Coumadin. She states that she is now back on her Coumadin and that she checked her INR approximately 4 days ago at home and it was around 2.1. Patient denies any shortness of breath out of ordinary with symptoms of the leg pain. Denies any chest pain. Denies any nausea or vomiting. Denies any abdominal pain. Denies any numbness tingling weakness of extremity ordinary. She was actually sent here for evaluation by her home health nurse. Nursing notes were reviewed.     REVIEW OFSYSTEMS    (2-9 systems for level 4, 10 or more for level 5)   ROS:  General:  No fevers, no chills, no weakness  Cardiovascular:  No chest pain, no palpitations  Respiratory:  No shortness of breath, no cough, no wheezing  Gastrointestinal:  No pain, no nausea, discharged home in stable condition. Advised that she does need follow-up with her regular family physician within the next 1-2 days for reevaluation. Patient advised if her symptoms worsens or new symptoms arise she should return back to emergency department for further evaluation workup. The patient will follow-up with their PCP in 1-2 days for reevaluation. Patient advised of her symptoms worsens or new symptoms arise she should return back to emergency department for further evaluation workup. CONSULTS:  None    PROCEDURES:  Procedures    CRITICAL CARE TIME    Total Critical Care time was 0 minutes, excluding separately reportable procedures. There was a high probability of clinically significant/life threatening deterioration in the patient's condition which required my urgent intervention. FINAL IMPRESSION      1. Thrombophlebitis of superficial veins of left lower extremity          DISPOSITION/PLAN   DISPOSITION        PATIENT REFERRED TO:  NICKOLAS Dean - [facility] [place]  [address]  [phone]    In 2 days      [facility] Emergency Department  [facility] [address] [facility]  [phone]    As needed, If symptoms worsen      DISCHARGE MEDICATIONS:  New Prescriptions    No medications on file       Comment: Please note this report has been produced using speech recognition software and may contain errorsrelated to that system including errors in grammar, punctuation, and spelling, as well as words and phrases that may be inappropriate. If there are any questions or concerns please feel free to contact the dictating providerfor clarification.     Quang Madrigal DO  Attending Emergency Physician              Quang Madrigal DO  09/23/19 6032

## 2019-12-13 ENCOUNTER — HOSPITAL ENCOUNTER (EMERGENCY)
Facility: HOSPITAL | Age: 59
Discharge: HOME | End: 2019-12-13
Attending: HOSPITALIST
Payer: MEDICARE

## 2019-12-13 VITALS
BODY MASS INDEX: 38.57 KG/M2 | OXYGEN SATURATION: 94 % | HEIGHT: 66 IN | TEMPERATURE: 98.6 F | SYSTOLIC BLOOD PRESSURE: 152 MMHG | HEART RATE: 84 BPM | WEIGHT: 240 LBS | DIASTOLIC BLOOD PRESSURE: 93 MMHG | RESPIRATION RATE: 16 BRPM

## 2019-12-13 DIAGNOSIS — J10.1 INFLUENZA A: Primary | ICD-10-CM

## 2019-12-13 LAB
RAPID INFLUENZA  B AGN: NEGATIVE
RAPID INFLUENZA A AGN: POSITIVE
S PYO AG THROAT QL: NEGATIVE

## 2019-12-13 PROCEDURE — 87804 INFLUENZA ASSAY W/OPTIC: CPT

## 2019-12-13 PROCEDURE — 87880 STREP A ASSAY W/OPTIC: CPT

## 2019-12-13 PROCEDURE — 99283 EMERGENCY DEPT VISIT LOW MDM: CPT

## 2019-12-13 RX ORDER — ALBUTEROL SULFATE 90 UG/1
2 AEROSOL, METERED RESPIRATORY (INHALATION) EVERY 4 HOURS PRN
Qty: 1 INHALER | Refills: 1 | Status: SHIPPED | OUTPATIENT
Start: 2019-12-13 | End: 2020-01-12

## 2019-12-13 RX ORDER — ONDANSETRON 4 MG/1
4 TABLET, ORALLY DISINTEGRATING ORAL EVERY 4 HOURS PRN
Qty: 15 TABLET | Refills: 0 | Status: SHIPPED | OUTPATIENT
Start: 2019-12-13 | End: 2019-12-28

## 2020-03-10 ENCOUNTER — APPOINTMENT (OUTPATIENT)
Dept: DIABETES SERVICES | Facility: HOSPITAL | Age: 60
End: 2020-03-10

## 2020-07-13 ENCOUNTER — OFFICE VISIT (OUTPATIENT)
Dept: SURGERY | Facility: CLINIC | Age: 60
End: 2020-07-13

## 2020-07-13 VITALS
HEART RATE: 86 BPM | DIASTOLIC BLOOD PRESSURE: 86 MMHG | OXYGEN SATURATION: 98 % | TEMPERATURE: 97.1 F | HEIGHT: 66 IN | SYSTOLIC BLOOD PRESSURE: 134 MMHG | BODY MASS INDEX: 39.7 KG/M2 | WEIGHT: 247 LBS

## 2020-07-13 DIAGNOSIS — R20.0 RIGHT UPPER EXTREMITY NUMBNESS: Primary | ICD-10-CM

## 2020-07-13 PROCEDURE — 99214 OFFICE O/P EST MOD 30 MIN: CPT | Performed by: SURGERY

## 2020-07-13 NOTE — PROGRESS NOTES
Patient: Sue L Collett    YOB: 1960    Date: 07/13/2020    Primary Care Provider: Mattie Saldaña APRN    Chief Complaint   Patient presents with   • Numbness       SUBJECTIVE:    History of present illness:  Patient is here for an evaluation and treatment of numbness in the hands. She complains of tingling in her right hand that radiates into her arm. She has had a recent ultrasound. She complains of discoloration in her hand.     Apparently this is been present for the last several weeks to months, there is intermittent tingling in the right hand, this does not seem to be brought about by any motor movement.  She is unable to give any further history.    The following portions of the patient's history were reviewed and updated as appropriate: allergies, current medications, past family history, past medical history, past social history, past surgical history and problem list.      Review of Systems   Constitutional: Negative for chills, fever and unexpected weight change.   HENT: Negative for hearing loss, trouble swallowing and voice change.    Eyes: Negative for visual disturbance.   Respiratory: Negative for apnea, cough, chest tightness, shortness of breath and wheezing.    Cardiovascular: Negative for chest pain, palpitations and leg swelling.   Gastrointestinal: Negative for abdominal distention, abdominal pain, anal bleeding, blood in stool, constipation, diarrhea, nausea, rectal pain and vomiting.   Endocrine: Negative for cold intolerance and heat intolerance.   Genitourinary: Negative for difficulty urinating, dysuria and flank pain.   Musculoskeletal: Negative for back pain and gait problem.   Skin: Positive for color change. Negative for rash and wound.   Neurological: Negative for dizziness, syncope, speech difficulty, weakness, light-headedness, numbness and headaches.   Hematological: Negative for adenopathy. Does not bruise/bleed easily.   Psychiatric/Behavioral: Negative for  "confusion. The patient is not nervous/anxious.        Allergies:  Allergies   Allergen Reactions   • Prednisone Other (See Comments)     ACUTE KIDNEY FAILURE   • Azithromycin Hives   • Erythromycin Hives   • Other Nausea And Vomiting     PT. REPORTS THAT SHE IS \"ALLERGIC TO ALL INSULINS EXCEPT LANTUS\"   • Red Dye Hives   • Sulfa Antibiotics Hives       Medications:    Current Outpatient Medications:   •  ADVAIR DISKUS 250-50 MCG/DOSE DISKUS, inhale 1 dose by mouth twice a day, PRN, Rinse mouth after use, Disp: , Rfl: 0  •  apixaban (ELIQUIS) 2.5 MG tablet tablet, Take 2.5 mg by mouth 2 (Two) Times a Day., Disp: , Rfl:   •  Dulaglutide (TRULICITY) 0.75 MG/0.5ML solution pen-injector, Inject 0.75 mg under the skin into the appropriate area as directed 1 (One) Time Per Week., Disp: 4 pen, Rfl: 6  •  Insulin Glargine (BASAGLAR KWIKPEN) 100 UNIT/ML injection pen, Inject 64 Units under the skin into the appropriate area as directed 2 (Two) Times a Day., Disp: , Rfl:   •  levothyroxine (SYNTHROID, LEVOTHROID) 112 MCG tablet, Take 1 tablet by mouth Daily. In addition to 50 to equal 162 daily, Disp: 30 tablet, Rfl: 5  •  levothyroxine (SYNTHROID, LEVOTHROID) 50 MCG tablet, Take 1 tablet by mouth Daily. In addition to 112 to equal 162 daily, Disp: 30 tablet, Rfl: 5  •  vitamin D (ERGOCALCIFEROL) 54498 units capsule capsule, Take 50,000 Units by mouth 1 (One) Time Per Week. ON HOLD FOR SURGERY SINCE 11/2018, Disp: , Rfl:   •  acetaminophen (TYLENOL) 325 MG tablet, Take 2 tablets by mouth Every 4 (Four) Hours As Needed for Mild Pain ., Disp: , Rfl:   •  benzocaine-menthol (CEPACOL) 15-3.6 MG lozenge lozenge, Dissolve 1 lozenge in the mouth Every 2 (Two) Hours As Needed for sore throat, Disp: 36 each, Rfl: 0  •  betamethasone dipropionate (DIPROLENE) 0.05 % cream, Apply  topically to the appropriate area as directed 1 (One) Time Per Week., Disp: , Rfl:   •  fluticasone (FLONASE) 50 MCG/ACT nasal spray, instill 2 sprays into each " nostril once daily PRN, Disp: , Rfl: 0  •  hydrocortisone 1 % ointment, Apply  topically to the appropriate area as directed 3 (Three) Times a Day. Apply to abdominal rash 3 times a day for itching (Patient taking differently: Apply  topically to the appropriate area as directed 3 (Three) Times a Day As Needed (for itching). Apply to abdominal rash 3 times a day for itching), Disp: 56 g, Rfl: 1  •  JANUVIA 50 MG tablet, Take 50 mg by mouth Daily., Disp: , Rfl:   •  metroNIDAZOLE (METROCREAM) 0.75 % cream, Apply  topically to the appropriate area as directed Every Night., Disp: , Rfl:   •  pantoprazole (PROTONIX) 40 MG EC tablet, take 1 tablet by mouth twice a day for 7 days or as directed, Disp: , Rfl: 0  •  polyethylene glycol (MIRALAX) packet, Take 17 g by mouth Daily As Needed., Disp: , Rfl:   •  promethazine (PHENERGAN) 25 MG tablet, take 1/2-1 tablet by mouth every 6 hours if needed, Disp: , Rfl: 0  •  RESTASIS 0.05 % ophthalmic emulsion, Administer 1 drop to both eyes Every 12 (Twelve) Hours., Disp: , Rfl:   •  triamcinolone (KENALOG) 0.025 % cream, Apply  topically to the appropriate area as directed 2 (Two) Times a Day., Disp: , Rfl:   •  warfarin (COUMADIN) 7.5 MG tablet, Take 7.5 mg by mouth Take As Directed. Takes Mon, Tues, Wed, Thurs 7.5 mg Takes Fri, Sat, Sun 5 mg, Disp: , Rfl:     History:  Past Medical History:   Diagnosis Date   • Allergic    • Anxiety    • Arthritis    • Asthma    • Bruises easily    • Bulging lumbar disc    • Cancer (CMS/HCC)     THYROID CANCER (MALIGNANCY)    • Chronic kidney disease    • Constipation    • COPD (chronic obstructive pulmonary disease) (CMS/HCC)    • DDD (degenerative disc disease), lumbosacral    • Depression    • Diabetes mellitus (CMS/HCC)    • Diverticulitis    • DVT of leg (deep venous thrombosis) (CMS/HCC)     X 4    • Dysphagia    • Factor V deficiency (CMS/HCC)    • GERD (gastroesophageal reflux disease)    • Headache    • History of echocardiogram    •  "History of exercise stress test     WNL PER PT    • History of pneumonia 2015   • History of transfusion    • Hypertension    • Lupus (CMS/HCC)    • Nausea    • Numbness in right leg     FROM HIP TO JUST ABOVE THE KNEE   • Piercing     EARS ONLY   • PONV (postoperative nausea and vomiting)     SCOPOLAMINE PATCH DIDN'T WORK   • PVD (peripheral vascular disease) (CMS/HCC)    • Sleep apnea     DOES NOT USE CPAP   • SOB (shortness of breath) on exertion    • Stroke (CMS/HCC) 2008    MILD RIGHT SIDED WEAKNESS    • Ulcer, stomach peptic    • Wears glasses     RX   • Wears glasses     READING GLASSES ONLY       Past Surgical History:   Procedure Laterality Date   • BREAST SURGERY Right     LUMPECTOMY    • COLONOSCOPY      WITH POLYP REMOVAL    • ENDOSCOPY     • ERCP     • FINE NEEDLE ASPIRATION  2018    THYROID    • GALLBLADDER SURGERY     • SKIN BIOPSY      HAND    • THYROIDECTOMY Right 12/21/2018    Procedure: Right thyroid lobectomy with isthmusectomy;  Surgeon: Janeth Tan MD;  Location: [facility];  Service: General   • THYROIDECTOMY N/A 2/18/2019    Procedure: THYROIDECTOMY, COMPLETION;  Surgeon: Janeth aTn MD;  Location: [facility];  Service: General   • TUBAL ABDOMINAL LIGATION     • WISDOM TOOTH EXTRACTION         Family History   Problem Relation Age of Onset   • Heart disease Father    • Heart attack Father    • Stroke Mother    • Hypertension Mother    • Hyperlipidemia Mother    • Kidney disease Mother        Social History     Tobacco Use   • Smoking status: Never Smoker   • Smokeless tobacco: Never Used   Substance Use Topics   • Alcohol use: No   • Drug use: No        OBJECTIVE:    Vital Signs:   Vitals:    07/13/20 0917   BP: 134/86   Pulse: 86   Temp: 97.1 °F (36.2 °C)   SpO2: 98%   Weight: 112 kg (247 lb)   Height: 167.6 cm (65.98\")       Physical Exam:   General Appearance:    Alert, cooperative, in no acute distress   Head:    Normocephalic, without obvious abnormality, atraumatic   Eyes:     "        Lids and lashes normal, conjunctivae and sclerae normal, no   icterus, no pallor, corneas clear, PERRLA   Ears:    Ears appear intact with no abnormalities noted   Throat:   No oral lesions, no thrush, oral mucosa moist   Neck:   No adenopathy, supple, trachea midline, no thyromegaly, no   carotid bruit, no JVD   Lungs:     Clear to auscultation,respirations regular, even and                  unlabored    Heart:    Regular rhythm and normal rate, normal S1 and S2, no            murmur, no gallop, no rub, no click   Chest Wall:    No abnormalities observed   Abdomen:     Normal bowel sounds, no masses, no organomegaly, soft        non-tender, non-distended, no guarding, no rebound                Tenderness, no peritoneal signs   Extremities:   Moves all extremities well, no edema, no cyanosis, no             redness   Pulses:   Pulses palpable and equal bilaterally   Skin:   No bleeding, bruising or rash   Lymph nodes:   No palpable adenopathy   Neurologic:   Cranial nerves 2 - 12 grossly intact, sensation intact, DTR       present and equal bilaterally, certainly no evidence of neurological abnormalities on examination     Results Review:   I reviewed the patient's new clinical results.  I reviewed the patient's new imaging results and agree with the interpretation.  I reviewed the patient's other test results and agree with the interpretation    Review of Systems was reviewed and confirmed as accurate as documented by the MA.    ASSESSMENT/PLAN:    1. Right upper extremity numbness        In short, we did perform carotid ultrasound today in the office and this was normal.  I do not have a reason for the patient's intermittent right upper extremity tingling but I think she needs to follow-up with her primary care provider and I will see her back in the office only if she has further problems.  She does not need any intervention for her carotid arteries.    I discussed the patients findings and my  recommendations with patient        Electronically signed by William Estrada MD  07/14/20      Portions of this note has been scribed for William Estrada MD by Amanda Rahman. 7/14/2020  10:11

## 2020-11-16 ENCOUNTER — HOSPITAL ENCOUNTER (OUTPATIENT)
Dept: MAMMOGRAPHY | Facility: HOSPITAL | Age: 60
Discharge: HOME | End: 2020-11-16
Payer: MEDICARE

## 2020-11-16 PROCEDURE — 77063 BREAST TOMOSYNTHESIS BI: CPT

## 2020-11-20 ENCOUNTER — HOSPITAL ENCOUNTER (OUTPATIENT)
Dept: MAMMOGRAPHY | Facility: HOSPITAL | Age: 60
Discharge: HOME | End: 2020-11-20
Payer: MEDICARE

## 2020-11-20 ENCOUNTER — HOSPITAL ENCOUNTER (OUTPATIENT)
Dept: ULTRASOUND IMAGING | Facility: HOSPITAL | Age: 60
Discharge: HOME | End: 2020-11-20
Payer: MEDICARE

## 2020-11-20 PROCEDURE — 76642 ULTRASOUND BREAST LIMITED: CPT

## 2020-11-20 PROCEDURE — G0279 TOMOSYNTHESIS, MAMMO: HCPCS

## 2021-01-04 ENCOUNTER — OFFICE VISIT (OUTPATIENT)
Dept: NEUROLOGY | Facility: CLINIC | Age: 61
End: 2021-01-04

## 2021-01-04 VITALS
TEMPERATURE: 97.3 F | DIASTOLIC BLOOD PRESSURE: 80 MMHG | HEART RATE: 74 BPM | HEIGHT: 66 IN | OXYGEN SATURATION: 96 % | SYSTOLIC BLOOD PRESSURE: 130 MMHG | WEIGHT: 246.6 LBS | BODY MASS INDEX: 39.63 KG/M2

## 2021-01-04 DIAGNOSIS — E11.69 DIABETES MELLITUS TYPE 2 IN OBESE (HCC): ICD-10-CM

## 2021-01-04 DIAGNOSIS — Z86.73 HISTORY OF CVA (CEREBROVASCULAR ACCIDENT): ICD-10-CM

## 2021-01-04 DIAGNOSIS — E66.9 DIABETES MELLITUS TYPE 2 IN OBESE (HCC): ICD-10-CM

## 2021-01-04 DIAGNOSIS — Z85.850 HISTORY OF THYROID CANCER: ICD-10-CM

## 2021-01-04 DIAGNOSIS — G25.0 BENIGN HEAD TREMOR: ICD-10-CM

## 2021-01-04 DIAGNOSIS — G47.10 HYPERSOMNIA, UNSPECIFIED: ICD-10-CM

## 2021-01-04 DIAGNOSIS — R40.4 TRANSIENT ALTERATION OF AWARENESS: Primary | ICD-10-CM

## 2021-01-04 PROCEDURE — 99214 OFFICE O/P EST MOD 30 MIN: CPT | Performed by: NURSE PRACTITIONER

## 2021-01-04 RX ORDER — APIXABAN 5 MG/1
TABLET, FILM COATED ORAL 2 TIMES DAILY
COMMUNITY
Start: 2021-01-02 | End: 2021-05-03

## 2021-01-04 RX ORDER — LEVOTHYROXINE SODIUM 0.15 MG/1
150 TABLET ORAL DAILY
COMMUNITY
Start: 2020-12-31

## 2021-01-04 NOTE — PROGRESS NOTES
"     Follow Up Office Visit      Patient Name: Sue L Collett  : 1960   MRN: [id number]     Chief Complaint:    Chief Complaint   Patient presents with   • Follow-up     patient in office to follow up on migraines and weakness. patient c/o having a heavy sensation in the back of her head states she can always tell when the \"spell\" is going to happen. patient states it takes her about two weeks to recoperate states she is very weak. states she is unable to speak during the spell, hands go tingly feeling.    • Migraine     patient states she's having about 2-3 times a week, but states they don't truly feel like a migraine.  patient states these migraines are in the back of her head, vision issues trouble speaking.        History of Present Illness: Sue L Collett is a 60 y.o. female who is here today for multiple complaints.  She was last seen on 3/20/2018 by [facility] for atypical spells and weakness.  She says she is concerned mostly \"about my head and these episodes I have\".  She says she has episodes where she can't speak, has difficulty walking, very weak, inability to think and memory loss.  She says she can feel the episodes \"coming on\" at times and gets a bad headache.  Her last episode was a week ago and she was not able to talk for about 3-4 hours-she did not go to the ED for further evaluation and states \"Lord, not with all this COVID stuff going on, it scares me to death\".  She denies any tongue bites or urine incontinence with the episodes. She says when she has the episodes it takes her about 3 days to get back to normal.  She is extremely tired after the episodes and experiences photophobia and phonophobia.  She had about 8 episodes in 2020.  Some months she has only had 1 episode.  She feels she has more episodes when she is extremely tired.  MRI head with and without contrast on 2017 showed nonspecific focus of increased T2 signal in left frontal lobe.  Carotid " "ultrasound on 7/13/2020 showed no significant stenosis of either carotid artery.  She does not drive.  Additional risk factors- BMI 39, history of CVA, COPD, diabetes type 2, history of thyroid cancer, GERD, history of JELLY-says she was on CPAP for a while but then stopped using it.     She is worried about shaking to her head at times.  She states \"I don't even know I'm doing it until someone says something\".  She does not know how long she has had the shaking.      She says has tried a cholesterol medication in the past and had an allergic reaction to it and couldn't take it.  She is taking fish oil every now and then.  She says her most recent HgbA1c was 7.0% and her cholesterol levels \"were perfect\".  She is taking Eliquis 5mg BID.      She says she saw someone about her lumbar spine but she did not want to have back surgery.  MRI lumbar spine  On 11/13/2017 showed spondylosis and stenosis most pronounced with disc herniation at L4-5.  She had seen [facility] for complaints of leg weakness.      Subjective      Review of Systems:   Review of Systems   Constitutional: Negative for chills, fatigue, fever, unexpected weight gain and unexpected weight loss.   HENT: Negative for facial swelling, hearing loss, sore throat, swollen glands, tinnitus and trouble swallowing.    Eyes: Negative for blurred vision, double vision, photophobia and visual disturbance.   Respiratory: Negative for cough, chest tightness and shortness of breath.    Cardiovascular: Negative for chest pain, palpitations and leg swelling.   Gastrointestinal: Negative for abdominal pain, constipation, diarrhea, nausea and vomiting.   Endocrine: Negative for cold intolerance and heat intolerance.   Musculoskeletal: Negative for gait problem, neck pain and neck stiffness.   Skin: Negative for color change and rash.   Allergic/Immunologic: Negative for environmental allergies and food allergies.   Neurological: Positive for weakness, " numbness, headache and memory problem. Negative for dizziness, syncope, facial asymmetry, speech difficulty, light-headedness and confusion.   Psychiatric/Behavioral: Negative for agitation, behavioral problems, sleep disturbance and depressed mood. The patient is not nervous/anxious.        I have reviewed and the following portions of the patient's history were updated as appropriate: past family history, past medical history, past social history, past surgical history and problem list.    Medications:     Current Outpatient Medications:   •  acetaminophen (TYLENOL) 325 MG tablet, Take 2 tablets by mouth Every 4 (Four) Hours As Needed for Mild Pain ., Disp: , Rfl:   •  ADVAIR DISKUS 250-50 MCG/DOSE DISKUS, inhale 1 dose by mouth twice a day, PRN, Rinse mouth after use, Disp: , Rfl: 0  •  benzocaine-menthol (CEPACOL) 15-3.6 MG lozenge lozenge, Dissolve 1 lozenge in the mouth Every 2 (Two) Hours As Needed for sore throat, Disp: 36 each, Rfl: 0  •  betamethasone dipropionate (DIPROLENE) 0.05 % cream, Apply  topically to the appropriate area as directed 1 (One) Time Per Week., Disp: , Rfl:   •  Eliquis 5 MG tablet tablet, 2 (two) times a day., Disp: , Rfl:   •  fluticasone (FLONASE) 50 MCG/ACT nasal spray, instill 2 sprays into each nostril once daily PRN, Disp: , Rfl: 0  •  hydrocortisone 1 % ointment, Apply  topically to the appropriate area as directed 3 (Three) Times a Day. Apply to abdominal rash 3 times a day for itching (Patient taking differently: Apply  topically to the appropriate area as directed 3 (Three) Times a Day As Needed (for itching). Apply to abdominal rash 3 times a day for itching), Disp: 56 g, Rfl: 1  •  Insulin Glargine (BASAGLAR KWIKPEN) 100 UNIT/ML injection pen, Inject 64 Units under the skin into the appropriate area as directed 2 (Two) Times a Day., Disp: , Rfl:   •  JANUVIA 50 MG tablet, Take 50 mg by mouth Daily., Disp: , Rfl:   •  levothyroxine (SYNTHROID, LEVOTHROID) 150 MCG tablet, ,  "Disp: , Rfl:   •  metroNIDAZOLE (METROCREAM) 0.75 % cream, Apply  topically to the appropriate area as directed Every Night., Disp: , Rfl:   •  pantoprazole (PROTONIX) 40 MG EC tablet, take 1 tablet by mouth twice a day for 7 days or as directed, Disp: , Rfl: 0  •  polyethylene glycol (MIRALAX) packet, Take 17 g by mouth Daily As Needed., Disp: , Rfl:   •  RESTASIS 0.05 % ophthalmic emulsion, Administer 1 drop to both eyes Every 12 (Twelve) Hours., Disp: , Rfl:   •  triamcinolone (KENALOG) 0.025 % cream, Apply  topically to the appropriate area as directed 2 (Two) Times a Day., Disp: , Rfl:   •  vitamin D (ERGOCALCIFEROL) 89966 units capsule capsule, Take 50,000 Units by mouth 1 (One) Time Per Week. ON HOLD FOR SURGERY SINCE 11/2018, Disp: , Rfl:   •  apixaban (ELIQUIS) 2.5 MG tablet tablet, Take 2.5 mg by mouth 2 (Two) Times a Day., Disp: , Rfl:   •  Dulaglutide (TRULICITY) 0.75 MG/0.5ML solution pen-injector, Inject 0.75 mg under the skin into the appropriate area as directed 1 (One) Time Per Week., Disp: 4 pen, Rfl: 6  •  levothyroxine (SYNTHROID, LEVOTHROID) 112 MCG tablet, Take 1 tablet by mouth Daily. In addition to 50 to equal 162 daily, Disp: 30 tablet, Rfl: 5  •  levothyroxine (SYNTHROID, LEVOTHROID) 50 MCG tablet, Take 1 tablet by mouth Daily. In addition to 112 to equal 162 daily, Disp: 30 tablet, Rfl: 5    Allergies:   Allergies   Allergen Reactions   • Prednisone Other (See Comments)     ACUTE KIDNEY FAILURE   • Azithromycin Hives   • Erythromycin Hives   • Other Nausea And Vomiting     PT. REPORTS THAT SHE IS \"ALLERGIC TO ALL INSULINS EXCEPT LANTUS\"   • Red Dye Hives   • Sulfa Antibiotics Hives       Objective     Physical Exam:  Vital Signs:   Vitals:    01/04/21 1405   BP: 130/80   BP Location: Right arm   Patient Position: Sitting   Cuff Size: Adult   Pulse: 74   Temp: 97.3 °F (36.3 °C)   SpO2: 96%   Weight: 112 kg (246 lb 9.6 oz)   Height: 167.6 cm (66\")   PainSc: 0-No pain     Body mass index is " 39.8 kg/m².    Physical Exam  Vitals signs and nursing note reviewed.   Constitutional:       General: She is not in acute distress.     Appearance: She is well-developed. She is obese. She is not diaphoretic.   HENT:      Head: Normocephalic and atraumatic.   Eyes:      Conjunctiva/sclera: Conjunctivae normal.      Pupils: Pupils are equal, round, and reactive to light.   Neck:      Musculoskeletal: Normal range of motion and neck supple.   Cardiovascular:      Rate and Rhythm: Normal rate and regular rhythm.      Heart sounds: Normal heart sounds. No murmur. No friction rub. No gallop.    Pulmonary:      Effort: Pulmonary effort is normal. No respiratory distress.      Breath sounds: Normal breath sounds. No wheezing or rales.   Musculoskeletal: Normal range of motion.   Skin:     General: Skin is warm and dry.      Findings: No rash.   Neurological:      Mental Status: She is alert and oriented to person, place, and time.      Coordination: Finger-Nose-Finger Test normal.      Gait: Gait is intact.   Psychiatric:         Speech: Speech normal.         Behavior: Behavior normal.         Thought Content: Thought content normal.         Neurologic Exam     Mental Status   Oriented to person, place, and time.   Attention: normal. Concentration: normal.   Speech: speech is normal   Level of consciousness: alert  Knowledge: good.     Cranial Nerves   Cranial nerves II through XII intact.     CN II   Visual fields full to confrontation.     CN III, IV, VI   Pupils are equal, round, and reactive to light.  Right pupil: Size: 3 mm. Shape: regular. Reactivity: brisk.   Left pupil: Size: 3 mm. Shape: regular. Reactivity: brisk.   CN III: no CN III palsy  CN VI: no CN VI palsy  Nystagmus: none     CN V   Facial sensation intact.     CN VII   Facial expression full, symmetric.     CN VIII   CN VIII normal.     CN IX, X   CN IX normal.   CN X normal.     CN XI   CN XI normal.     CN XII   CN XII normal.     Motor Exam   Muscle  bulk: normal  Overall muscle tone: normal    Strength   Right biceps: 5/5  Left biceps: 5/5  Right triceps: 5/5  Left triceps: 5/5  Right quadriceps: 5/5  Left quadriceps: 5/5  Right hamstrin/5  Left hamstrin/5    Sensory Exam   Light touch normal.     Gait, Coordination, and Reflexes     Gait  Gait: normal    Coordination   Finger to nose coordination: normal    Tremor   Resting tremor: absent  Intention tremor: absent  Action tremor: absent    Reflexes   Reflexes 2+ except as noted. Low velocity, low frequency axial tremor-improved with distraction        Assessment / Plan      Assessment/Plan:   Diagnoses and all orders for this visit:    1. Transient alteration of awareness (Primary)  -     MRI Brain With & Without Contrast; Future  -     EEG; Future  - Printed patient education on TIA, CVA, JELLY and Seizures provided today.   - Patient does not drive. Seizure precautions discussed.   - May consider referral to Dr. Fontanez for EMU admission at follow up appointment.     2. History of CVA (cerebrovascular accident)  -     MRI Brain With & Without Contrast; Future  -     Polysomnography 4 or More Parameters; Future  - Discussed the importance of treatment of JELLY with CPAP in regards to cardiovascular health and stroke prevention.     3. History of thyroid cancer    4. Diabetes mellitus type 2 in obese (CMS/HCC)  -     Polysomnography 4 or More Parameters; Future  - Encouraged patient to keep her HgbA1c <6.0%.  - LDL goal <70.    5. BMI 39.0-39.9,adult  -     Polysomnography 4 or More Parameters; Future    6. Hypersomnia, unspecified   -     Polysomnography 4 or More Parameters; Future         Follow Up:   Return in about 3 weeks (around 2021) for Recheck.    CORINA Joyce, FNP-C  [facility]       Please note that portions of this note may have been completed with a voice recognition program. Efforts were made to edit the dictations, but  occasionally words are mistranscribed.

## 2021-01-04 NOTE — PATIENT INSTRUCTIONS
Ischemic Stroke    An ischemic stroke is the sudden death of brain tissue. Blood carries oxygen to all areas of the body. This type of stroke happens when your blood does not flow to your brain like normal. Your brain cannot get the oxygen it needs. This is an emergency. It must be treated right away.  Symptoms of a stroke usually happen all of a sudden. You may notice them when you wake up. They can include:  · Weakness or loss of feeling in your face, arm, or leg. This often happens on one side of the body.  · Trouble walking.  · Trouble moving your arms or legs.  · Loss of balance or coordination.  · Feeling confused.  · Trouble talking or understanding what people are saying.  · Slurred speech.  · Trouble seeing.  · Seeing two of one object (double vision).  · Feeling dizzy.  · Feeling sick to your stomach (nauseous) and throwing up (vomiting).  · A very bad headache for no reason.  Get help as soon as any of these problems start. This is important. Some treatments work better if they are given right away. These include:  · Aspirin.  · Medicines to control blood pressure.  · A shot (injection) of medicine to break up the blood clot.  · Treatments given in the blood vessel (artery) to take out the clot or break it up.  Other treatments may include:  · Oxygen.  · Fluids given through an IV tube.  · Medicines to thin out your blood.  · Procedures to help your blood flow better.  What increases the risk?  Certain things may make you more likely to have a stroke. Some of these are things that you can change, such as:  · Being very overweight (obesity).  · Smoking.  · Taking birth control pills.  · Not being active.  · Drinking too much alcohol.  · Using drugs.  Other risk factors include:  · High blood pressure.  · High cholesterol.  · Diabetes.  · Heart disease.  · Being , , , or .  · Being over age 60.  · Family history of stroke.  · Having had blood clots,  stroke, or warning stroke (transient ischemic attack, TIA) in the past.  · Sickle cell disease.  · Being a woman with a history of high blood pressure in pregnancy (preeclampsia).  · Migraine headache.  · Sleep apnea.  · Having an irregular heartbeat (atrial fibrillation).  · Long-term (chronic) diseases that cause soreness and swelling (inflammation).  · Disorders that affect how your blood clots.  Follow these instructions at home:  Medicines  · Take over-the-counter and prescription medicines only as told by your doctor.  · If you were told to take aspirin or another medicine to thin your blood, take it exactly as told by your doctor.  ? Taking too much of the medicine can cause bleeding.  ? If you do not take enough, it may not work as well.  · Know the side effects of your medicines. If you are taking a blood thinner, make sure you:  ? Hold pressure over any cuts for longer than usual.  ? Tell your dentist and other doctors that you take this medicine.  ? Avoid activities that may cause damage or injury to your body.  Eating and drinking  · Follow instructions from your doctor about what you cannot eat or drink.  · Eat healthy foods.  · If you have trouble with swallowing, do these things to avoid choking:  ? Take small bites when eating.  ? Eat foods that are soft or pureed.  Safety  · Follow instructions from your health care team about physical activity.  · Use a walker or cane as told by your doctor.  · Keep your home safe so you do not fall. This may include:  ? Having experts look at your home to make sure it is safe.  ? Putting grab bars in the bedroom and bathroom.  ? Using raised toilets.  ? Putting a seat in the shower.  General instructions  · Do not use any tobacco products.  ? Examples of these are cigarettes, chewing tobacco, and e-cigarettes.  ? If you need help quitting, ask your doctor.  · Limit how much alcohol you drink. This means no more than 1 drink a day for nonpregnant women and 2 drinks  "a day for men. One drink equals 12 oz of beer, 5 oz of wine, or 1½ oz of hard liquor.  · If you need help to stop using drugs or alcohol, ask your doctor to refer you to a program or specialist.  · Stay active. Exercise as told by your doctor.  · Keep all follow-up visits as told by your doctor. This is important.  Get help right away if:    · You have any signs of a stroke. \"BE FAST\" is an easy way to remember the main warning signs:  ? B - Balance. Signs are dizziness, sudden trouble walking, or loss of balance.  ? E - Eyes. Signs are trouble seeing or a change in how you see.  ? F - Face. Signs are sudden weakness or loss of feeling of the face, or the face or eyelid drooping on one side.  ? A - Arms. Signs are weakness or loss of feeling in an arm. This happens suddenly and usually on one side of the body.  ? S - Speech. Signs are sudden trouble speaking, slurred speech, or trouble understanding what people say.  ? T - Time. Time to call emergency services. Write down what time symptoms started.  · You have other signs of a stroke, such as:  ? A sudden, very bad headache with no known cause.  ? Feeling sick to your stomach (nausea).  ? Throwing up (vomiting).  ? Jerky movements you cannot control (seizure).  These symptoms may be an emergency. Do not wait to see if the symptoms will go away. Get medical help right away. Call your local emergency services (911 in the [place] Do not drive yourself to the hospital.  Summary  · An ischemic stroke is the sudden death of brain tissue.  · Symptoms of a stroke usually happen all of a sudden. You may notice them when you wake up.  · Get help if you have any warning signs of a stroke. This is important. Some treatments work better if they are given right away.  This information is not intended to replace advice given to you by your health care provider. Make sure you discuss any questions you have with your health care provider.  Document Revised: 05/29/2019 Document " "Reviewed: 03/15/2017  [employer] Patient Education © 2020 [employer]  Transient Ischemic Attack    A transient ischemic attack (TIA) is a \"warning stroke\" that causes stroke-like symptoms that go away quickly. A TIA does not cause lasting damage to the brain. But having a TIA is a sign that you may be at risk for a stroke. Lifestyle changes and medical treatments can help prevent a stroke.  It is important to know the symptoms of a TIA and what to do. Get help right away, even if your symptoms go away. The symptoms of a TIA are the same as those of a stroke. They can happen fast, and they usually go away within minutes or hours. They can include:  · Weakness or loss of feeling in your face, arm, or leg. This often happens on one side of your body.  · Trouble walking.  · Trouble moving your arms or legs.  · Trouble talking or understanding what people are saying.  · Trouble seeing.  · Seeing two of one object (double vision).  · Feeling dizzy.  · Feeling confused.  · Loss of balance or coordination.  · Feeling sick to your stomach (nauseous) and throwing up (vomiting).  · A very bad headache for no reason.  What increases the risk?  Certain things may make you more likely to have a TIA. Some of these are things that you can change, such as:  · Being very overweight (obese).  · Using products that contain nicotine or tobacco, such as cigarettes and e-cigarettes.  · Taking birth control pills.  · Not being active.  · Drinking too much alcohol.  · Using drugs.  Other risk factors include:  · Having an irregular heartbeat (atrial fibrillation).  · Being  or .  · Having had blood clots, stroke, TIA, or heart attack in the past.  · Being a woman with a history of high blood pressure in pregnancy (preeclampsia).  · Being over the age of 60.  · Being male.  · Having family history of stroke.  · Having the following diseases or conditions:  ? High blood pressure.  ? High " "cholesterol.  ? Diabetes.  ? Heart disease.  ? Sickle cell disease.  ? Sleep apnea.  ? Migraine headache.  ? Long-term (chronic) diseases that cause soreness and swelling (inflammation).  ? Disorders that affect how your blood clots.  Follow these instructions at home:  Medicines    · Take over-the-counter and prescription medicines only as told by your doctor.  · If you were told to take aspirin or another medicine to thin your blood, take it exactly as told by your doctor.  ? Taking too much of the medicine can cause bleeding.  ? Taking too little of the medicine may not work to treat the problem.  Eating and drinking    · Eat 5 or more servings of fruits and vegetables each day.  · Follow instructions from your doctor about your diet. You may need to follow a certain diet to help lower your risk of having a stroke. You may need to:  ? Eat a diet that is low in fat and salt.  ? Eat foods that contain a lot of fiber.  ? Limit the amount of carbohydrates and sugar in your diet.  · Limit alcohol intake to 1 drink a day for nonpregnant women and 2 drinks a day for men. One drink equals 12 oz of beer, 5 oz of wine, or 1½ oz of hard liquor.  General instructions  · Keep a healthy weight.  · Stay active. Try to get at least 30 minutes of activity on all or most days.  · Find out if you have a condition called sleep apnea. Get treatment if needed.  · Do not use any products that contain nicotine or tobacco, such as cigarettes and e-cigarettes. If you need help quitting, ask your doctor.  · Do not abuse drugs.  · Keep all follow-up visits as told by your doctor. This is important.  Get help right away if:  · You have any signs of stroke. \"BE FAST\" is an easy way to remember the main warning signs:  ? B - Balance. Signs are dizziness, sudden trouble walking, or loss of balance.  ? E - Eyes. Signs are trouble seeing or a sudden change in how you see.  ? F - Face. Signs are sudden weakness or loss of feeling of the face, or " "the face or eyelid drooping on one side.  ? A - Arms. Signs are weakness or loss of feeling in an arm. This happens suddenly and usually on one side of the body.  ? S - Speech. Signs are sudden trouble speaking, slurred speech, or trouble understanding what people say.  ? T - Time. Time to call emergency services. Write down what time symptoms started.  · You have other signs of stroke, such as:  ? A sudden, very bad headache with no known cause.  ? Feeling sick to your stomach (nausea).  ? Throwing up (vomiting).  ? Jerky movements that you cannot control (seizure).  These symptoms may be an emergency. Do not wait to see if the symptoms will go away. Get medical help right away. Call your local emergency services (911 in the [place] Do not drive yourself to the hospital.  Summary  · A transient ischemic attack (TIA) is a \"warning stroke\" that causes stroke-like symptoms that go away quickly.  · A TIA is a medical emergency. Get help right away, even if your symptoms go away.  · A TIA does not cause lasting damage to the brain.  · Having a TIA is a sign that you may be at risk for a stroke. Lifestyle changes and medical treatments can help prevent a stroke.  This information is not intended to replace advice given to you by your health care provider. Make sure you discuss any questions you have with your health care provider.  Document Revised: 09/13/2019 Document Reviewed: 03/21/2018  [employer] Patient Education © 2020 [employer]  Seizure, Adult  A seizure is a sudden burst of abnormal electrical activity in the brain. Seizures usually last from 30 seconds to 2 minutes. They can cause many different symptoms.  Usually, seizures are not harmful unless they last a long time.  What are the causes?  Common causes of this condition include:  · Fever or infection.  · Conditions that affect the brain, such as:  ? A brain abnormality that you were born with.  ? A brain or head injury.  ? Bleeding in the brain.  ? A " tumor.  ? Stroke.  ? Brain disorders such as autism or cerebral palsy.  · Low blood sugar.  · Conditions that are passed from parent to child (are inherited).  · Problems with substances, such as:  ? Having a reaction to a drug or a medicine.  ? Suddenly stopping the use of a substance (withdrawal).  In some cases, the cause may not be known. A person who has repeated seizures over time without a clear cause has a condition called epilepsy.  What increases the risk?  You are more likely to get this condition if you have:  · A family history of epilepsy.  · Had a seizure in the past.  · A brain disorder.  · A history of head injury, lack of oxygen at birth, or strokes.  What are the signs or symptoms?  There are many types of seizures. The symptoms vary depending on the type of seizure you have. Examples of symptoms during a seizure include:  · Shaking (convulsions).  · Stiffness in the body.  · Passing out (losing consciousness).  · Head nodding.  · Staring.  · Not responding to sound or touch.  · Loss of bladder control and bowel control.  Some people have symptoms right before and right after a seizure happens.  Symptoms before a seizure may include:  · Fear.  · Worry (anxiety).  · Feeling like you may vomit (nauseous).  · Feeling like the room is spinning (vertigo).  · Feeling like you saw or heard something before (déjà vu).  · Odd tastes or smells.  · Changes in how you see. You may see flashing lights or spots.  Symptoms after a seizure happens can include:  · Confusion.  · Sleepiness.  · Headache.  · Weakness on one side of the body.  How is this treated?  Most seizures will stop on their own in under 5 minutes. In these cases, no treatment is needed. Seizures that last longer than 5 minutes will usually need treatment. Treatment can include:  · Medicines given through an IV tube.  · Avoiding things that are known to cause your seizures. These can include medicines that you take for another  condition.  · Medicines to treat epilepsy.  · Surgery to stop the seizures. This may be needed if medicines do not help.  Follow these instructions at home:  Medicines  · Take over-the-counter and prescription medicines only as told by your doctor.  · Do not eat or drink anything that may keep your medicine from working, such as alcohol.  Activity  · Do not do any activities that would be dangerous if you had another seizure, like driving or swimming. Wait until your doctor says it is safe for you to do them.  · If you live in the [place] ask your local DMV (dep[employer] of [employer] when you can drive.  · Get plenty of rest.  Teaching others  Teach friends and family what to do when you have a seizure. They should:  · Lay you on the ground.  · Protect your head and body.  · Loosen any tight clothing around your neck.  · Turn you on your side.  · Not hold you down.  · Not put anything into your mouth.  · Know whether or not you need emergency care.  · Stay with you until you are better.    General instructions  · Contact your doctor each time you have a seizure.  · Avoid anything that gives you seizures.  · Keep a seizure diary. Write down:  ? What you think caused each seizure.  ? What you remember about each seizure.  · Keep all follow-up visits as told by your doctor. This is important.  Contact a doctor if:  · You have another seizure.  · You have seizures more often.  · There is any change in what happens during your seizures.  · You keep having seizures with treatment.  · You have symptoms of being sick or having an infection.  Get help right away if:  · You have a seizure that:  ? Lasts longer than 5 minutes.  ? Is different than seizures you had before.  ? Makes it harder to breathe.  ? Happens after you hurt your head.  · You have any of these symptoms after a seizure:  ? Not being able to speak.  ? Not being able to use a part of your body.  ? Confusion.  ? A bad headache.  · You have two or more  seizures in a row.  · You do not wake up right after a seizure.  · You get hurt during a seizure.  These symptoms may be an emergency. Do not wait to see if the symptoms will go away. Get medical help right away. Call your local emergency services (911 in the [place] Do not drive yourself to the hospital.  Summary  · Seizures usually last from 30 seconds to 2 minutes. Usually, they are not harmful unless they last a long time.  · Do not eat or drink anything that may keep your medicine from working, such as alcohol.  · Teach friends and family what to do when you have a seizure.  · Contact your doctor each time you have a seizure.  This information is not intended to replace advice given to you by your health care provider. Make sure you discuss any questions you have with your health care provider.  Document Revised: 03/06/2020 Document Reviewed: 03/06/2020  [employer] Patient Education © 2020 [employer]

## 2021-01-07 ENCOUNTER — TELEPHONE (OUTPATIENT)
Dept: NEUROLOGY | Facility: CLINIC | Age: 61
End: 2021-01-07

## 2021-01-14 ENCOUNTER — HOSPITAL ENCOUNTER (OUTPATIENT)
Dept: SLEEP MEDICINE | Facility: HOSPITAL | Age: 61
Discharge: HOME | End: 2021-01-14
Admitting: NURSE PRACTITIONER

## 2021-01-14 DIAGNOSIS — R40.4 TRANSIENT ALTERATION OF AWARENESS: ICD-10-CM

## 2021-01-14 PROCEDURE — 95816 EEG AWAKE AND DROWSY: CPT

## 2021-01-20 ENCOUNTER — HOSPITAL ENCOUNTER (OUTPATIENT)
Dept: SLEEP MEDICINE | Facility: HOSPITAL | Age: 61
Discharge: HOME | End: 2021-01-20
Admitting: NURSE PRACTITIONER

## 2021-01-20 DIAGNOSIS — G47.10 HYPERSOMNIA, UNSPECIFIED: ICD-10-CM

## 2021-01-20 DIAGNOSIS — Z86.73 HISTORY OF CVA (CEREBROVASCULAR ACCIDENT): ICD-10-CM

## 2021-01-20 DIAGNOSIS — E66.9 DIABETES MELLITUS TYPE 2 IN OBESE (HCC): ICD-10-CM

## 2021-01-20 DIAGNOSIS — E11.69 DIABETES MELLITUS TYPE 2 IN OBESE (HCC): ICD-10-CM

## 2021-01-20 PROCEDURE — 95810 POLYSOM 6/> YRS 4/> PARAM: CPT

## 2021-01-20 PROCEDURE — 95810 POLYSOM 6/> YRS 4/> PARAM: CPT | Performed by: INTERNAL MEDICINE

## 2021-01-27 ENCOUNTER — HOSPITAL ENCOUNTER (OUTPATIENT)
Dept: MRI IMAGING | Facility: HOSPITAL | Age: 61
Discharge: HOME | End: 2021-01-27
Admitting: NURSE PRACTITIONER

## 2021-01-27 DIAGNOSIS — R40.4 TRANSIENT ALTERATION OF AWARENESS: ICD-10-CM

## 2021-01-27 DIAGNOSIS — Z86.73 HISTORY OF CVA (CEREBROVASCULAR ACCIDENT): ICD-10-CM

## 2021-01-27 LAB — CREAT BLDA-MCNC: 1 MG/DL (ref 0.6–1.3)

## 2021-01-27 PROCEDURE — A9577 INJ MULTIHANCE: HCPCS | Performed by: NURSE PRACTITIONER

## 2021-01-27 PROCEDURE — 0 GADOBENATE DIMEGLUMINE 529 MG/ML SOLUTION: Performed by: NURSE PRACTITIONER

## 2021-01-27 PROCEDURE — 82565 ASSAY OF CREATININE: CPT

## 2021-01-27 PROCEDURE — 70553 MRI BRAIN STEM W/O & W/DYE: CPT

## 2021-01-27 RX ADMIN — GADOBENATE DIMEGLUMINE 22 ML: 529 INJECTION, SOLUTION INTRAVENOUS at 10:21

## 2021-02-03 ENCOUNTER — OFFICE VISIT (OUTPATIENT)
Dept: NEUROLOGY | Facility: CLINIC | Age: 61
End: 2021-02-03

## 2021-02-03 VITALS
DIASTOLIC BLOOD PRESSURE: 80 MMHG | TEMPERATURE: 97.3 F | HEIGHT: 66 IN | OXYGEN SATURATION: 98 % | BODY MASS INDEX: 40.95 KG/M2 | SYSTOLIC BLOOD PRESSURE: 130 MMHG | WEIGHT: 254.8 LBS | HEART RATE: 67 BPM

## 2021-02-03 DIAGNOSIS — R40.4 TRANSIENT ALTERATION OF AWARENESS: Primary | ICD-10-CM

## 2021-02-03 DIAGNOSIS — E03.8 OTHER SPECIFIED HYPOTHYROIDISM: ICD-10-CM

## 2021-02-03 DIAGNOSIS — G47.33 OSA (OBSTRUCTIVE SLEEP APNEA): ICD-10-CM

## 2021-02-03 DIAGNOSIS — R25.1 TREMOR: ICD-10-CM

## 2021-02-03 DIAGNOSIS — D68.51 FACTOR 5 LEIDEN MUTATION, HETEROZYGOUS (HCC): ICD-10-CM

## 2021-02-03 PROCEDURE — 99214 OFFICE O/P EST MOD 30 MIN: CPT | Performed by: NURSE PRACTITIONER

## 2021-02-03 RX ORDER — DIVALPROEX SODIUM 250 MG/1
250 TABLET, DELAYED RELEASE ORAL 3 TIMES DAILY
Qty: 60 TABLET | Refills: 1 | Status: SHIPPED | OUTPATIENT
Start: 2021-02-03 | End: 2021-05-03

## 2021-02-03 NOTE — PROGRESS NOTES
"     Follow Up Office Visit      Patient Name: Sue L Collett  : 1960   MRN: [id number]     Chief Complaint:    Chief Complaint   Patient presents with   • Follow-up     patient in office to follow up on test results.       History of Present Illness: Sue L Collett is a 60 y.o. female who is here today to follow up with atypical spells.  She says she is continuing to have atypical spells where she experiences difficulty speaking, weakness, difficulty walking, and decreased concentration.  She is having these episodes multiple times per week.  She states \"So, you don't think they could be seizures?\".  She continues to have a head tremor.  She says she was evaluated by cardiology about 6 years ago and states \"he told me my heart was perfect\".  She is taking Omega 3 fatty acid daily.  She is using MD2[facility] and says she has labs drawn frequently-those have not been reviewed.  Additional risk factors- BMI 41, history of CVA, COPD, diabetes type 2, history of thyroid cancer, GERD, history of JELLY, Factor V disorder, lupus.     Diagnostics: PSG on 2021 showed an overall AHI 4/hour, much worse in REM sleep AHI 20/hour.  EEG on 2021 was normal.  MRI brain with and without contrast on 2021 showed atrophy and chronic microvascular white matter changes; unremarkable MRI of the brain, with and without contrast administration.     Following taken from previous visit note: Sue L Collett is a 60 y.o. female who is here today for multiple complaints.  She was last seen on 3/20/2018 by [facility] neurology for atypical spells and weakness.  She says she is concerned mostly \"about my head and these episodes I have\".  She says she has episodes where she can't speak, has difficulty walking, very weak, inability to think and memory loss.  She says she can feel the episodes \"coming on\" at times and gets a bad headache.  Her last episode was a week ago and she was not able to talk for about 3-4 hours-she did not go " "to the ED for further evaluation and states \"Lord, not with all this COVID stuff going on, it scares me to death\".  She denies any tongue bites or urine incontinence with the episodes. She says when she has the episodes it takes her about 3 days to get back to normal.  She is extremely tired after the episodes and experiences photophobia and phonophobia.  She had about 8 episodes in December 2020.  Some months she has only had 1 episode.  She feels she has more episodes when she is extremely tired.  MRI head with and without contrast on 11/13/2017 showed nonspecific focus of increased T2 signal in left frontal lobe.  Carotid ultrasound on 7/13/2020 showed no significant stenosis of either carotid artery.  She does not drive.  Additional risk factors- BMI 39, history of CVA, COPD, diabetes type 2, history of thyroid cancer, GERD, history of JELLY-says she was on CPAP for a while but then stopped using it.      She is worried about shaking to her head at times.  She states \"I don't even know I'm doing it until someone says something\".  She does not know how long she has had the shaking.       She says has tried a cholesterol medication in the past and had an allergic reaction to it and couldn't take it.  She is taking fish oil every now and then.  She says her most recent HgbA1c was 7.0% and her cholesterol levels \"were perfect\".  She is taking Eliquis 5mg BID.       She says she saw someone about her lumbar spine but she did not want to have back surgery.  MRI lumbar spine  On 11/13/2017 showed spondylosis and stenosis most pronounced with disc herniation at L4-5.  She had seen [facility] neurology for complaints of leg weakness.      Subjective      Review of Systems:   Review of Systems   Constitutional: Negative for chills, fatigue and fever.   HENT: Negative for facial swelling, hearing loss, sore throat, tinnitus and trouble swallowing.    Eyes: Negative for blurred vision, double vision, photophobia and " visual disturbance.   Respiratory: Negative for cough, chest tightness and shortness of breath.    Cardiovascular: Negative for chest pain, palpitations and leg swelling.   Gastrointestinal: Negative for abdominal pain, nausea and vomiting.   Endocrine: Negative for cold intolerance and heat intolerance.   Musculoskeletal: Negative for gait problem, neck pain and neck stiffness.   Skin: Negative for color change and rash.   Allergic/Immunologic: Negative for environmental allergies and food allergies.   Neurological: Positive for dizziness, tremors, speech difficulty, headache and memory problem. Negative for syncope, weakness, light-headedness and numbness.   Psychiatric/Behavioral: Negative for behavioral problems, sleep disturbance and depressed mood. The patient is not nervous/anxious.        I have reviewed and the following portions of the patient's history were updated as appropriate: past family history, past medical history, past social history, past surgical history and problem list.    Medications:     Current Outpatient Medications:   •  acetaminophen (TYLENOL) 325 MG tablet, Take 2 tablets by mouth Every 4 (Four) Hours As Needed for Mild Pain ., Disp: , Rfl:   •  ADVAIR DISKUS 250-50 MCG/DOSE DISKUS, inhale 1 dose by mouth twice a day, PRN, Rinse mouth after use, Disp: , Rfl: 0  •  apixaban (ELIQUIS) 2.5 MG tablet tablet, Take 2.5 mg by mouth 2 (Two) Times a Day., Disp: , Rfl:   •  benzocaine-menthol (CEPACOL) 15-3.6 MG lozenge lozenge, Dissolve 1 lozenge in the mouth Every 2 (Two) Hours As Needed for sore throat, Disp: 36 each, Rfl: 0  •  betamethasone dipropionate (DIPROLENE) 0.05 % cream, Apply  topically to the appropriate area as directed 1 (One) Time Per Week., Disp: , Rfl:   •  Dulaglutide (TRULICITY) 0.75 MG/0.5ML solution pen-injector, Inject 0.75 mg under the skin into the appropriate area as directed 1 (One) Time Per Week., Disp: 4 pen, Rfl: 6  •  Eliquis 5 MG tablet tablet, 2 (two) times a  day., Disp: , Rfl:   •  fluticasone (FLONASE) 50 MCG/ACT nasal spray, instill 2 sprays into each nostril once daily PRN, Disp: , Rfl: 0  •  hydrocortisone 1 % ointment, Apply  topically to the appropriate area as directed 3 (Three) Times a Day. Apply to abdominal rash 3 times a day for itching (Patient taking differently: Apply  topically to the appropriate area as directed 3 (Three) Times a Day As Needed (for itching). Apply to abdominal rash 3 times a day for itching), Disp: 56 g, Rfl: 1  •  Insulin Glargine (BASAGLAR KWIKPEN) 100 UNIT/ML injection pen, Inject 64 Units under the skin into the appropriate area as directed 2 (Two) Times a Day., Disp: , Rfl:   •  JANUVIA 50 MG tablet, Take 50 mg by mouth Daily., Disp: , Rfl:   •  levothyroxine (SYNTHROID, LEVOTHROID) 112 MCG tablet, Take 1 tablet by mouth Daily. In addition to 50 to equal 162 daily, Disp: 30 tablet, Rfl: 5  •  levothyroxine (SYNTHROID, LEVOTHROID) 150 MCG tablet, , Disp: , Rfl:   •  levothyroxine (SYNTHROID, LEVOTHROID) 50 MCG tablet, Take 1 tablet by mouth Daily. In addition to 112 to equal 162 daily, Disp: 30 tablet, Rfl: 5  •  metroNIDAZOLE (METROCREAM) 0.75 % cream, Apply  topically to the appropriate area as directed Every Night., Disp: , Rfl:   •  pantoprazole (PROTONIX) 40 MG EC tablet, take 1 tablet by mouth twice a day for 7 days or as directed, Disp: , Rfl: 0  •  polyethylene glycol (MIRALAX) packet, Take 17 g by mouth Daily As Needed., Disp: , Rfl:   •  RESTASIS 0.05 % ophthalmic emulsion, Administer 1 drop to both eyes Every 12 (Twelve) Hours., Disp: , Rfl:   •  triamcinolone (KENALOG) 0.025 % cream, Apply  topically to the appropriate area as directed 2 (Two) Times a Day., Disp: , Rfl:   •  vitamin D (ERGOCALCIFEROL) 73102 units capsule capsule, Take 50,000 Units by mouth 1 (One) Time Per Week. ON HOLD FOR SURGERY SINCE 11/2018, Disp: , Rfl:   •  divalproex (Depakote) 250 MG DR tablet, Take 1 tablet by mouth 3 (Three) Times a Day., Disp:  "60 tablet, Rfl: 1    Allergies:   Allergies   Allergen Reactions   • Prednisone Other (See Comments)     ACUTE KIDNEY FAILURE   • Azithromycin Hives   • Erythromycin Hives   • Other Nausea And Vomiting     PT. REPORTS THAT SHE IS \"ALLERGIC TO ALL INSULINS EXCEPT LANTUS\"   • Red Dye Hives   • Sulfa Antibiotics Hives       Objective     Physical Exam:  Vital Signs:   Vitals:    02/03/21 1007   BP: 130/80   BP Location: Left arm   Patient Position: Sitting   Cuff Size: Adult   Pulse: 67   Temp: 97.3 °F (36.3 °C)   SpO2: 98%   Weight: 116 kg (254 lb 12.8 oz)   Height: 167.6 cm (66\")   PainSc: 0-No pain     Body mass index is 41.13 kg/m².    Physical Exam  Vitals signs and nursing note reviewed.   Constitutional:       General: She is not in acute distress.     Appearance: She is well-developed. She is obese. She is not diaphoretic.   HENT:      Head: Normocephalic and atraumatic.   Eyes:      Conjunctiva/sclera: Conjunctivae normal.      Pupils: Pupils are equal, round, and reactive to light.   Neck:      Musculoskeletal: Neck supple.   Cardiovascular:      Rate and Rhythm: Normal rate and regular rhythm.      Heart sounds: Normal heart sounds. No murmur. No friction rub. No gallop.    Pulmonary:      Effort: Pulmonary effort is normal. No respiratory distress.      Breath sounds: Normal breath sounds. No wheezing or rales.   Musculoskeletal: Normal range of motion.   Skin:     General: Skin is warm and dry.      Findings: No rash.   Neurological:      Mental Status: She is alert and oriented to person, place, and time.   Psychiatric:         Speech: Speech normal.         Behavior: Behavior normal.         Thought Content: Thought content normal.         Neurologic Exam     Mental Status   Oriented to person, place, and time.   Speech: speech is normal   Low velocity axial tremor; no facial droop; ambulatory without assistance     Cranial Nerves     CN III, IV, VI   Pupils are equal, round, and reactive to light.   " "    Assessment / Plan      Assessment/Plan:   Diagnoses and all orders for this visit:    1. Transient alteration of awareness (Primary)  -     Ambulatory Referral to Cardiology for further evaluation of cardiac etiology. Patient says she last saw cardiology about 6 years ago.   - Offered referral to Dr. Fontanez for possible EMU admission for identification of seizure disorder but she refuses saying \"that is too far away, no way I can go there\". I have advised patient an EMU visit is the only way to know for sure if the episodes she is having are seizures.   - Advised patient to avoid driving and [place] laws state no driving for 90 days following a seizure.   - Depakote 250mg BID. Indications and possible SEs discussed.     2. Factor 5 Leiden mutation, heterozygous (CMS/[facility])  -     Ambulatory Referral to Cardiology    3. Other specified hypothyroidism    4. JELLY (obstructive sleep apnea)  -     Ambulatory Referral to Cardiology  - Order for AutoPap 6/16cm sent to [facility] ([facility]I in REM 20/hour). Patient at increased risk of stroke due to Factor V disorder.   - Advised patient to avoid driving if drowsy.     5. Tremor    6. BMI 40.0-44.9, adult (CMS/HCC)  -     Ambulatory Referral to Cardiology       Follow Up:   Return in about 6 weeks (around 3/17/2021) for Recheck.    CORINA Joyce, FNP-C  [facility]       Please note that portions of this note may have been completed with a voice recognition program. Efforts were made to edit the dictations, but occasionally words are mistranscribed.   "

## 2021-02-24 ENCOUNTER — TELEPHONE (OUTPATIENT)
Dept: NEUROLOGY | Facility: CLINIC | Age: 61
End: 2021-02-24

## 2021-02-24 NOTE — TELEPHONE ENCOUNTER
Called and spoke with Ana at [facility] stated they did receive the order but is not sure why the patient was not set up. Stated she would contact the patient today and get her set up.

## 2021-02-24 NOTE — TELEPHONE ENCOUNTER
Provider: CONSUELO DUNN    Caller: SUE COLLETT    Relationship to Patient: SELF    Phone Number: [phone]    Reason for Call: THE PT SAW CONSUELO MICHAELVIANCARODRIGUEZ ON 2/3. THE PT STATED THAT CONSUELO TOLD HER IF SHE DID NOT HEAR BACK FROM THE OFFICE IN A WEEK TO GIVE US A CALL REGARDING GETTING A NEW CPAP MACHINE. SHE HAS NOT HEARD ANYTHING SINCE 2/3 AND SHE JUST WANTED TO CHECK UP ON THIS. PLEASE GIVE HER A CALL BACK TO DISCUSS HER GETTING A NEW CPAP MACHINE.

## 2021-02-25 NOTE — TELEPHONE ENCOUNTER
Spoke with patient stated she received her pap machine today. Scheduled patient for a 60 day follow up for tasia.

## 2021-03-02 ENCOUNTER — TELEPHONE (OUTPATIENT)
Dept: NEUROLOGY | Facility: CLINIC | Age: 61
End: 2021-03-02

## 2021-03-02 NOTE — TELEPHONE ENCOUNTER
----- Message from Chapis Almonte, CORINA sent at 3/2/2021  9:55 AM EST -----  Please let the patient know Jem has notified me her insurance company will not cover PAP therapy-I did not think it would because she does not meet the JELLY criteria but I wanted to try. Please have her follow up with her PCP about her symptoms. Thanks.

## 2021-03-02 NOTE — TELEPHONE ENCOUNTER
PT STATES THAT SHE SPOKE WITH A LADY AT [facility] WHO INFORMED HER THAT PT'S INSURANCE IS NOT GOING TO COVER THE COSTS OF THE CPAP MACHINE. PT STATES THAT SHE CANNOT AFFORD THE CPAP MACHINE AND WOULD LIKE TO GO AHEAD AND RETURN IT BEFORE SHE IS CHARGED FOR THE COST.    PT CAN BE REACHED AT [phone].    PLEASE REVIEW AND ADVISE.

## 2021-03-02 NOTE — TELEPHONE ENCOUNTER
Called and spoke with patient to let her know her insurance has denied her cpap machine. Patient states she received her machine last Thursday 2/25/21. Patient stated she was currently at [facility] to change her mask, she stated she would ask them if her insurance was going to cover the machine for sure or not. Please transfer patient to the office when she calls back.

## 2021-03-03 NOTE — TELEPHONE ENCOUNTER
Called and spoke with patient. Let the patient know Augustus with [employer] stated the patient did not qualify but ended up getting a machine anyway somehow. Augustus stated if they came back and say the patient owes for the machine [facility] would be responsible for the cost.     Patient stated she was worried she would get charged for it. Let patient know that she could contact Augustus with [facility] and discuss the situation.

## 2021-04-26 ENCOUNTER — TELEPHONE (OUTPATIENT)
Dept: NEUROLOGY | Facility: CLINIC | Age: 61
End: 2021-04-26

## 2021-04-28 RX ORDER — LOSARTAN POTASSIUM 25 MG/1
25 TABLET ORAL DAILY
COMMUNITY
End: 2021-10-20 | Stop reason: ALTCHOICE

## 2021-04-29 ENCOUNTER — CONSULT (OUTPATIENT)
Dept: CARDIOLOGY | Facility: CLINIC | Age: 61
End: 2021-04-29

## 2021-04-29 VITALS
SYSTOLIC BLOOD PRESSURE: 112 MMHG | DIASTOLIC BLOOD PRESSURE: 82 MMHG | OXYGEN SATURATION: 97 % | HEIGHT: 66 IN | HEART RATE: 75 BPM | BODY MASS INDEX: 41.14 KG/M2 | WEIGHT: 256 LBS

## 2021-04-29 DIAGNOSIS — R42 DIZZINESS: Primary | ICD-10-CM

## 2021-04-29 PROCEDURE — 93000 ELECTROCARDIOGRAM COMPLETE: CPT | Performed by: INTERNAL MEDICINE

## 2021-04-29 PROCEDURE — 99203 OFFICE O/P NEW LOW 30 MIN: CPT | Performed by: INTERNAL MEDICINE

## 2021-04-29 NOTE — PROGRESS NOTES
[facility] Cardiology OP Consult Note    Sue L Collett  [id number]  2021    Referred By: Chapis Almonte*    Chief Complaint: TIA    History of Present Illness:   Mrs. Sue L Collett is a 60 y.o. female who presents to the Cardiology Clinic for evaluation of TIA.  The patient has a past medical history significant for prior CVA in , chronic kidney disease, COPD, type 2 diabetes mellitus, factor V Leiden deficiency with prior DVT, and hypertension.  She has a recent medical history significant for TIA-like symptoms, evaluated by neurology.  She does not have any significant past cardiac history.  The patient reports a several month history of intermittent episodes of a headache associated with weakness and occasional difficulty speaking and walking.  No associated palpitations.  She does report mild dizziness and lightheadedness associated with the episodes.  Previously, the episodes were occurring multiple times per week.  She reports she was recently restarted on losartan (initially discontinued several years ago) and after initiation of losartan she has had resolution of her prior symptoms.  She does report rare episodes of mild dizziness and lightheadedness when rising from a seated to standing position or after standing upright from bending over scrubbing her floors.  She has not had any syncopal episodes.  She denies any orthopnea or PND.  She does report mild lower extremity edema, which fluctuates and is typically worse at the end of the day.  No exertional chest pain or anginal symptoms.  No other specific complaints at this time.    Past Cardiac Testin. Last Coronary Angio: None  2. Prior Stress Testing: None  3. Last Echo: None  4. Prior Holter Monitor: None    Review of Systems:   Review of Systems   Constitutional: Negative for activity change, appetite change, chills, diaphoresis, fatigue, fever, unexpected weight gain and unexpected weight loss.   Eyes: Negative  for blurred vision and double vision.   Respiratory: Negative for cough, chest tightness, shortness of breath and wheezing.    Cardiovascular: Negative for chest pain, palpitations and leg swelling.   Gastrointestinal: Negative for abdominal pain, anal bleeding, blood in stool and GERD.   Endocrine: Negative for cold intolerance and heat intolerance.   Genitourinary: Negative for hematuria.   Neurological: Positive for dizziness, weakness, light-headedness and headache. Negative for syncope.   Hematological: Does not bruise/bleed easily.   Psychiatric/Behavioral: Negative for depressed mood and stress. The patient is not nervous/anxious.        Past Medical History:   Past Medical History:   Diagnosis Date   • Allergic    • Anxiety    • Arthritis    • Asthma    • Bruises easily    • Bulging lumbar disc    • Cancer (CMS/[facility])     THYROID CANCER ([facility]Y)    • Chronic kidney disease    • Colon polyp    • Constipation    • COPD (chronic obstructive pulmonary disease) (CMS/[facility])    • DDD (degenerative disc disease), lumbosacral    • Depression    • Diabetes mellitus (CMS/[facility])    • Diverticulitis    • DVT of leg (deep venous thrombosis) (CMS/[facility])     X 4    • Dysphagia    • Factor V deficiency (CMS/[facility])    • GERD (gastroesophageal reflux disease)    • Headache    • History of blood clots    • History of echocardiogram    • History of exercise stress test     WNL PER PT    • History of pneumonia 2015   • History of transfusion    • Hypertension    • Lupus (CMS/[facility])    • Nausea    • Numbness in right leg     FROM HIP TO JUST ABOVE THE KNEE   • Piercing     EARS ONLY   • PONV (postoperative nausea and vomiting)     SCOPOLAMINE PATCH DIDN'T WORK   • PVD (peripheral vascular disease) (CMS/[facility])    • Sleep apnea     DOES NOT USE CPAP   • SOB (shortness of breath) on exertion    • Stroke (CMS/[facility]) 2008    MILD RIGHT SIDED WEAKNESS    • Ulcer, stomach peptic    • Wears glasses     RX   • Wears glasses     READING GLASSES ONLY        Past Surgical History:   Past Surgical History:   Procedure Laterality Date   • BREAST SURGERY Right     LUMPECTOMY    • COLONOSCOPY      WITH POLYP REMOVAL    • ENDOSCOPY     • ERCP     • FINE NEEDLE ASPIRATION  2018    THYROID    • GALLBLADDER SURGERY     • SKIN BIOPSY      HAND    • THYROIDECTOMY Right 12/21/2018    Procedure: Right thyroid lobectomy with isthmusectomy;  Surgeon: Janeth Tan MD;  Location: [facility] OR;  Service: General   • THYROIDECTOMY N/A 2/18/2019    Procedure: THYROIDECTOMY, COMPLETION;  Surgeon: Janeth Tan MD;  Location: [facility] OR;  Service: General   • TUBAL ABDOMINAL LIGATION     • WISDOM TOOTH EXTRACTION         Family History:   Family History   Problem Relation Age of Onset   • Heart disease Father    • Heart attack Father    • Arthritis Father    • Stroke Mother    • Hypertension Mother    • Hyperlipidemia Mother    • Kidney disease Mother    • Arthritis Mother    • Cancer Mother    • Cancer Sister        Social History:   Social History     Socioeconomic History   • Marital status:      Spouse name: Not on file   • Number of children: Not on file   • Years of education: Not on file   • Highest education level: Not on file   Tobacco Use   • Smoking status: Never Smoker   • Smokeless tobacco: Never Used   Substance and Sexual Activity   • Alcohol use: No   • Drug use: No   • Sexual activity: Defer       Medications:     Current Outpatient Medications:   •  acetaminophen (TYLENOL) 325 MG tablet, Take 2 tablets by mouth Every 4 (Four) Hours As Needed for Mild Pain ., Disp: , Rfl:   •  ADVAIR DISKUS 250-50 MCG/DOSE DISKUS, inhale 1 dose by mouth twice a day, PRN, Rinse mouth after use, Disp: , Rfl: 0  •  Cyanocobalamin (VITAMIN B 12 PO), Take  by mouth., Disp: , Rfl:   •  Eliquis 5 MG tablet tablet, 2 (two) times a day., Disp: , Rfl:   •  fluticasone (FLONASE) 50 MCG/ACT nasal spray, instill 2 sprays into each nostril once daily PRN, Disp: , Rfl: 0  •   hydrocortisone 1 % ointment, Apply  topically to the appropriate area as directed 3 (Three) Times a Day. Apply to abdominal rash 3 times a day for itching (Patient taking differently: Apply  topically to the appropriate area as directed 3 (Three) Times a Day As Needed (for itching). Apply to abdominal rash 3 times a day for itching), Disp: 56 g, Rfl: 1  •  Insulin Glargine (BASAGLAR KWIKPEN) 100 UNIT/ML injection pen, Inject 64 Units under the skin into the appropriate area as directed 2 (Two) Times a Day., Disp: , Rfl:   •  levothyroxine (SYNTHROID, LEVOTHROID) 150 MCG tablet, , Disp: , Rfl:   •  losartan (COZAAR) 25 MG tablet, Take 25 mg by mouth Daily., Disp: , Rfl:   •  metroNIDAZOLE (METROCREAM) 0.75 % cream, Apply  topically to the appropriate area as directed Every Night., Disp: , Rfl:   •  polyethylene glycol (MIRALAX) packet, Take 17 g by mouth Daily As Needed., Disp: , Rfl:   •  RESTASIS 0.05 % ophthalmic emulsion, Administer 1 drop to both eyes Every 12 (Twelve) Hours., Disp: , Rfl:   •  triamcinolone (KENALOG) 0.025 % cream, Apply  topically to the appropriate area as directed 2 (Two) Times a Day., Disp: , Rfl:   •  vitamin D (ERGOCALCIFEROL) 62752 units capsule capsule, Take 50,000 Units by mouth 1 (One) Time Per Week. ON HOLD FOR SURGERY SINCE 11/2018, Disp: , Rfl:   •  apixaban (ELIQUIS) 2.5 MG tablet tablet, Take 2.5 mg by mouth 2 (Two) Times a Day., Disp: , Rfl:   •  benzocaine-menthol (CEPACOL) 15-3.6 MG lozenge lozenge, Dissolve 1 lozenge in the mouth Every 2 (Two) Hours As Needed for sore throat, Disp: 36 each, Rfl: 0  •  betamethasone dipropionate (DIPROLENE) 0.05 % cream, Apply  topically to the appropriate area as directed 1 (One) Time Per Week., Disp: , Rfl:   •  divalproex (Depakote) 250 MG DR tablet, Take 1 tablet by mouth 3 (Three) Times a Day., Disp: 60 tablet, Rfl: 1  •  Dulaglutide (TRULICITY) 0.75 MG/0.5ML solution pen-injector, Inject 0.75 mg under the skin into the appropriate area  "as directed 1 (One) Time Per Week., Disp: 4 pen, Rfl: 6  •  JANUVIA 50 MG tablet, Take 50 mg by mouth Daily., Disp: , Rfl:     Allergies:   Allergies   Allergen Reactions   • Prednisone Other (See Comments)     ACUTE KIDNEY FAILURE   • Azithromycin Hives   • Erythromycin Hives   • Other Nausea And Vomiting     PT. REPORTS THAT SHE IS \"ALLERGIC TO ALL INSULINS EXCEPT LANTUS\"   • Red Dye Hives   • Sulfa Antibiotics Hives       Physical Exam:  Vital Signs:   Vitals:    04/29/21 0926 04/29/21 0936 04/29/21 0937   BP: 148/84 132/88 112/82   BP Location: Right arm Left arm Left arm   Patient Position: Sitting Sitting Standing   Cuff Size: Adult Adult Adult   Pulse: 75     SpO2: 97%     Weight: 116 kg (256 lb)     Height: 167.6 cm (66\")         Physical Exam  Constitutional:       General: She is not in acute distress.     Appearance: She is well-developed. She is obese. She is not diaphoretic.   HENT:      Head: Normocephalic and atraumatic.   Eyes:      General: No scleral icterus.     Pupils: Pupils are equal, round, and reactive to light.   Neck:      Trachea: No tracheal deviation.   Cardiovascular:      Rate and Rhythm: Normal rate and regular rhythm.      Heart sounds: Normal heart sounds. No murmur heard.   No friction rub. No gallop.       Comments: Normal JVD.  Pulmonary:      Effort: Pulmonary effort is normal. No respiratory distress.      Breath sounds: Normal breath sounds. No stridor. No wheezing or rales.   Chest:      Chest wall: No tenderness.   Abdominal:      General: Bowel sounds are normal. There is no distension.      Palpations: Abdomen is soft.      Tenderness: There is no abdominal tenderness. There is no guarding or rebound.   Musculoskeletal:         General: Normal range of motion.      Cervical back: Neck supple. No tenderness.      Comments: Trace bilateral lower extremity edema with changes of chronic venous stasis   Lymphadenopathy:      Cervical: No cervical adenopathy.   Skin:     " General: Skin is warm and dry.      Findings: No erythema.   Neurological:      General: No focal deficit present.      Mental Status: She is alert and oriented to person, place, and time.   Psychiatric:         Mood and Affect: Mood normal.         Behavior: Behavior normal.         Results Review:   I reviewed the patient's new clinical results.  I personally viewed and interpreted the patient's EKG/Telemetry data      ECG 12 Lead    Date/Time: 4/29/2021 10:50 AM  Performed by: García Selby MD  Authorized by: García Selby MD   Comparison: not compared with previous ECG   Rhythm: sinus rhythm  Rate: normal  QRS axis: left  Other findings comments: Poor R wave progression in the precordial leads  Clinical impression comment: Borderline ECG              Assessment / Plan:     1.  Dizziness/lightheadedness and weakness  --Several month history of recurrent episodes of dizziness, lightheadedness, weakness, and associated headache of unclear etiology  --No history of syncope  --ECG today shows NSR without significant conduction system abnormalities  --Today, the patient reports resolution of her prior symptoms after reinitiation of losartan  --Discussed obtaining baseline echocardiogram as well as outpatient cardiac monitoring, however given the patient symptoms have resolved will defer at this time  --Should symptoms recur, will then proceed with additional cardiac testing  --Follow-up on a as needed basis        Follow Up:   Return if symptoms worsen or fail to improve.      Thank you for allowing me to participate in the care of your patient. Please to not hesitate to contact me with additional questions or concerns.     MARCE Selby MD  Interventional Cardiology   04/29/2021  09:36 EDT

## 2021-05-03 ENCOUNTER — OFFICE VISIT (OUTPATIENT)
Dept: NEUROLOGY | Facility: CLINIC | Age: 61
End: 2021-05-03

## 2021-05-03 DIAGNOSIS — Z86.73 HISTORY OF CVA (CEREBROVASCULAR ACCIDENT): ICD-10-CM

## 2021-05-03 DIAGNOSIS — R40.4 TRANSIENT ALTERATION OF AWARENESS: Primary | ICD-10-CM

## 2021-05-03 PROCEDURE — 99442 PR PHYS/QHP TELEPHONE EVALUATION 11-20 MIN: CPT | Performed by: NURSE PRACTITIONER

## 2021-05-03 NOTE — PROGRESS NOTES
"     Follow Up Office Visit      Patient Name: Sue L Collett  : 1960   MRN: [id number]     Chief Complaint:    Chief Complaint   Patient presents with   • Follow-up     Patient requested telephone visit.       History of Present Illness: Sue L Collett is a 60 y.o. female who presents today via telephone (patient request) for a follow up visit.  She was last seen on 2/3/2021.  She says she took the Depakote for 2 days but stopped taking that and states \"I was scared to take it\".  She is seeing [facility] and she says she has had recent labs but those aren't available for review today.  She continues to take Eliquis 5mg BID.  She is not taking Aspirin.  She saw Dr. Selby, cardiology, on 2021 and is to follow up as needed with him.  She does say her atypical  Episodes (difficulty speaking, weakness, decreased concentration and difficulty walking) have resolved since she started back on her Losartan.  Additional risk factors- BMI 41, history of CVA, COPD, diabetes type 2, history of thyroid cancer, GERD, history of JELLY, Factor V disorder, lupus.     Memory- She some times \"forgets real fast\".  She is concerned about her short term memory.  She feels she has had memory issues for the past 2-3 years.  Her adult son lives with her.  She performs all of her ADLs independently.  She drives but not very much.  She has never gotten lost when driving.  She has episodes where she gets scared \" all of the sudden\" and does not know what causes that.      Following taken from previous visit note: Sue L Collett is a 60 y.o. female who is here today to follow up with atypical spells.  She says she is continuing to have atypical spells where she experiences difficulty speaking, weakness, difficulty walking, and decreased concentration.  She is having these episodes multiple times per week.  She states \"So, you don't think they could be seizures?\".  She continues to have a head tremor.  She says she was evaluated by cardiology " "about 6 years ago and states \"he told me my heart was perfect\".  She is taking Omega 3 fatty acid daily.  She is using [facility] and says she has labs drawn frequently-those have not been reviewed.  Additional risk factors- BMI 41, history of CVA, COPD, diabetes type 2, history of thyroid cancer, GERD, history of JELLY, Factor V disorder, lupus.      Diagnostics: PSG on 1/20/2021 showed an overall AHI 4/hour, much worse in REM sleep AHI 20/hour-insurance company refused to pay for PAP therapy.  EEG on 1/4/2021 was normal.  MRI brain with and without contrast on 1/27/2021 showed atrophy and chronic microvascular white matter changes; unremarkable MRI of the brain, with and without contrast administration.      Subjective      Review of Systems:   Review of Systems   Constitutional: Negative for chills, fatigue and fever.   HENT: Negative for facial swelling, hearing loss, sore throat, tinnitus and trouble swallowing.    Eyes: Negative for blurred vision, double vision, photophobia and visual disturbance.   Respiratory: Negative for cough, chest tightness and shortness of breath.    Cardiovascular: Negative for chest pain, palpitations and leg swelling.   Gastrointestinal: Negative for abdominal pain, nausea and vomiting.   Endocrine: Negative for cold intolerance and heat intolerance.   Musculoskeletal: Negative for gait problem, neck pain and neck stiffness.   Skin: Positive for bruise. Negative for color change and rash.   Allergic/Immunologic: Negative for environmental allergies and food allergies.   Neurological: Positive for memory problem. Negative for dizziness, syncope, speech difficulty, weakness, light-headedness, numbness and headache.   Psychiatric/Behavioral: Negative for behavioral problems, sleep disturbance and depressed mood. The patient is not nervous/anxious.        I have reviewed and the following portions of the patient's history were updated as appropriate: past family history, past medical history, " past social history, past surgical history and problem list.    Medications:     Current Outpatient Medications:   •  acetaminophen (TYLENOL) 325 MG tablet, Take 2 tablets by mouth Every 4 (Four) Hours As Needed for Mild Pain ., Disp: , Rfl:   •  ADVAIR DISKUS 250-50 MCG/DOSE DISKUS, inhale 1 dose by mouth twice a day, PRN, Rinse mouth after use, Disp: , Rfl: 0  •  apixaban (ELIQUIS) 2.5 MG tablet tablet, Take 2.5 mg by mouth 2 (Two) Times a Day., Disp: , Rfl:   •  benzocaine-menthol (CEPACOL) 15-3.6 MG lozenge lozenge, Dissolve 1 lozenge in the mouth Every 2 (Two) Hours As Needed for sore throat, Disp: 36 each, Rfl: 0  •  betamethasone dipropionate (DIPROLENE) 0.05 % cream, Apply  topically to the appropriate area as directed 1 (One) Time Per Week., Disp: , Rfl:   •  Cyanocobalamin (VITAMIN B 12 PO), Take  by mouth., Disp: , Rfl:   •  fluticasone (FLONASE) 50 MCG/ACT nasal spray, instill 2 sprays into each nostril once daily PRN, Disp: , Rfl: 0  •  hydrocortisone 1 % ointment, Apply  topically to the appropriate area as directed 3 (Three) Times a Day. Apply to abdominal rash 3 times a day for itching (Patient taking differently: Apply  topically to the appropriate area as directed 3 (Three) Times a Day As Needed (for itching). Apply to abdominal rash 3 times a day for itching), Disp: 56 g, Rfl: 1  •  Insulin Glargine (BASAGLAR KWIKPEN) 100 UNIT/ML injection pen, Inject 64 Units under the skin into the appropriate area as directed 2 (Two) Times a Day., Disp: , Rfl:   •  levothyroxine (SYNTHROID, LEVOTHROID) 150 MCG tablet, , Disp: , Rfl:   •  losartan (COZAAR) 25 MG tablet, Take 25 mg by mouth Daily., Disp: , Rfl:   •  metroNIDAZOLE (METROCREAM) 0.75 % cream, Apply  topically to the appropriate area as directed Every Night., Disp: , Rfl:   •  polyethylene glycol (MIRALAX) packet, Take 17 g by mouth Daily As Needed., Disp: , Rfl:   •  RESTASIS 0.05 % ophthalmic emulsion, Administer 1 drop to both eyes Every 12  "(Twelve) Hours., Disp: , Rfl:   •  triamcinolone (KENALOG) 0.025 % cream, Apply  topically to the appropriate area as directed 2 (Two) Times a Day., Disp: , Rfl:   •  vitamin D (ERGOCALCIFEROL) 48240 units capsule capsule, Take 50,000 Units by mouth 1 (One) Time Per Week. ON HOLD FOR SURGERY SINCE 11/2018, Disp: , Rfl:   •  divalproex (Depakote) 250 MG DR tablet, Take 1 tablet by mouth 3 (Three) Times a Day., Disp: 60 tablet, Rfl: 1  •  Dulaglutide (TRULICITY) 0.75 MG/0.5ML solution pen-injector, Inject 0.75 mg under the skin into the appropriate area as directed 1 (One) Time Per Week., Disp: 4 pen, Rfl: 6  •  Eliquis 5 MG tablet tablet, 2 (two) times a day., Disp: , Rfl:   •  JANUVIA 50 MG tablet, Take 50 mg by mouth Daily., Disp: , Rfl:     Allergies:   Allergies   Allergen Reactions   • Prednisone Other (See Comments)     ACUTE KIDNEY FAILURE   • Azithromycin Hives   • Erythromycin Hives   • Other Nausea And Vomiting     PT. REPORTS THAT SHE IS \"ALLERGIC TO ALL INSULINS EXCEPT LANTUS\"   • Red Dye Hives   • Sulfa Antibiotics Hives       Objective     Physical Exam:  Vital Signs: There were no vitals filed for this visit.  There is no height or weight on file to calculate BMI.    Physical Exam  Neurological:      Mental Status: She is alert and oriented to person, place, and time.   Psychiatric:         Mood and Affect: Mood normal.         Behavior: Behavior normal.         Thought Content: Thought content normal.         Judgment: Judgment normal.         Neurologic Exam     Mental Status   Oriented to person, place, and time.      *Physical examination limited due to the nature of a telephone visit.     Assessment / Plan      Assessment/Plan:   Diagnoses and all orders for this visit:    1. Transient alteration of awareness (Primary)    2. History of CVA (cerebrovascular accident)  -  LDL goal <70. HgbA1c goal <7.0%.   - Taking Eliquis BID.   - Requested visit noted from [facility] (PCP) but those have not been " "received for review. She says she had an allergic reaction to statin medication in the past.  She takes fish oil \"every now and then\".     3. BMI 40.0-44.9, adult (CMS/HCC)    4. Poor short term memory     Follow Up:   Return if symptoms worsen or fail to improve.     *This visit was a telephone follow up visit that lasted for 17 minutes.     CORINA Joyce, FNP-C  [facility] Neurology and Sleep Medicine       Please note that portions of this note may have been completed with a voice recognition program. Efforts were made to edit the dictations, but occasionally words are mistranscribed.   "

## 2021-05-24 ENCOUNTER — HOSPITAL ENCOUNTER (OUTPATIENT)
Dept: MAMMOGRAPHY | Facility: HOSPITAL | Age: 61
Discharge: HOME | End: 2021-05-24
Payer: MEDICARE

## 2021-05-24 DIAGNOSIS — R92.8 ABNORMAL MAMMOGRAM: ICD-10-CM

## 2021-05-24 PROCEDURE — G0279 TOMOSYNTHESIS, MAMMO: HCPCS

## 2021-05-28 NOTE — PROGRESS NOTES
New Patient Office Visit      Patient Name: Sue L Collett  : 1960   MRN: [id number]     Referring Physician: No ref. provider found    Chief Complaint:    Chief Complaint   Patient presents with   • Follow-up     patient in office to follow up on test results.       History of Present Illness: Sue L Collett is a 60 y.o. female who is here today to establish care with Neurology.  ***    Pertinent Medical History:    Subjective      Review of Systems:   Review of Systems   Constitutional: Negative for chills, fatigue and fever.   HENT: Negative for facial swelling, hearing loss, sore throat, tinnitus and trouble swallowing.    Eyes: Negative for blurred vision, double vision, photophobia and visual disturbance.   Respiratory: Negative for cough, chest tightness and shortness of breath.    Cardiovascular: Negative for chest pain, palpitations and leg swelling.   Gastrointestinal: Negative for abdominal pain, nausea and vomiting.   Endocrine: Negative for cold intolerance and heat intolerance.   Musculoskeletal: Negative for gait problem, neck pain and neck stiffness.   Skin: Negative for color change and rash.   Allergic/Immunologic: Negative for environmental allergies and food allergies.   Neurological: Negative for dizziness, syncope, speech difficulty, weakness, light-headedness, numbness, headache and memory problem.   Psychiatric/Behavioral: Negative for behavioral problems, sleep disturbance and depressed mood. The patient is not nervous/anxious.        Past Medical History:   Past Medical History:   Diagnosis Date   • Allergic    • Anxiety    • Arthritis    • Asthma    • Bruises easily    • Bulging lumbar disc    • Cancer (CMS/HCC)     THYROID CANCER ([facility])    • Chronic kidney disease    • Constipation    • COPD (chronic obstructive pulmonary disease) (CMS/HCC)    • DDD (degenerative disc disease), lumbosacral    • Depression    • Diabetes mellitus (CMS/HCC)    • Diverticulitis    • DVT of  Let him know I sent it into [facility].  Thanks   leg (deep venous thrombosis) (CMS/HCC)     X 4    • Dysphagia    • Factor V deficiency (CMS/HCC)    • GERD (gastroesophageal reflux disease)    • Headache    • History of echocardiogram    • History of exercise stress test     WNL PER PT    • History of pneumonia 2015   • History of transfusion    • Hypertension    • Lupus (CMS/HCC)    • Nausea    • Numbness in right leg     FROM HIP TO JUST ABOVE THE KNEE   • Piercing     EARS ONLY   • PONV (postoperative nausea and vomiting)     SCOPOLAMINE PATCH DIDN'T WORK   • PVD (peripheral vascular disease) (CMS/HCC)    • Sleep apnea     DOES NOT USE CPAP   • SOB (shortness of breath) on exertion    • Stroke (CMS/HCC) 2008    MILD RIGHT SIDED WEAKNESS    • Ulcer, stomach peptic    • Wears glasses     RX   • Wears glasses     READING GLASSES ONLY       Past Surgical History:   Past Surgical History:   Procedure Laterality Date   • BREAST SURGERY Right     LUMPECTOMY    • COLONOSCOPY      WITH POLYP REMOVAL    • ENDOSCOPY     • ERCP     • FINE NEEDLE ASPIRATION  2018    THYROID    • GALLBLADDER SURGERY     • SKIN BIOPSY      HAND    • THYROIDECTOMY Right 12/21/2018    Procedure: Right thyroid lobectomy with isthmusectomy;  Surgeon: Janeth Tan MD;  Location: [facility] OR;  Service: General   • THYROIDECTOMY N/A 2/18/2019    Procedure: THYROIDECTOMY, COMPLETION;  Surgeon: Janeth Tan MD;  Location: [facility] OR;  Service: General   • TUBAL ABDOMINAL LIGATION     • WISDOM TOOTH EXTRACTION         Family History:   Family History   Problem Relation Age of Onset   • Heart disease Father    • Heart attack Father    • Stroke Mother    • Hypertension Mother    • Hyperlipidemia Mother    • Kidney disease Mother        Social History:   Social History     Socioeconomic History   • Marital status:      Spouse name: Not on file   • Number of children: Not on file   • Years of education: Not on file   • Highest education level: Not on file   Occupational History     Employer:  DISABLED   Tobacco Use   • Smoking status: Never Smoker   • Smokeless tobacco: Never Used   Substance and Sexual Activity   • Alcohol use: No   • Drug use: No   • Sexual activity: Defer       Medications:     Current Outpatient Medications:   •  acetaminophen (TYLENOL) 325 MG tablet, Take 2 tablets by mouth Every 4 (Four) Hours As Needed for Mild Pain ., Disp: , Rfl:   •  ADVAIR DISKUS 250-50 MCG/DOSE DISKUS, inhale 1 dose by mouth twice a day, PRN, Rinse mouth after use, Disp: , Rfl: 0  •  apixaban (ELIQUIS) 2.5 MG tablet tablet, Take 2.5 mg by mouth 2 (Two) Times a Day., Disp: , Rfl:   •  benzocaine-menthol (CEPACOL) 15-3.6 MG lozenge lozenge, Dissolve 1 lozenge in the mouth Every 2 (Two) Hours As Needed for sore throat, Disp: 36 each, Rfl: 0  •  betamethasone dipropionate (DIPROLENE) 0.05 % cream, Apply  topically to the appropriate area as directed 1 (One) Time Per Week., Disp: , Rfl:   •  Dulaglutide (TRULICITY) 0.75 MG/0.5ML solution pen-injector, Inject 0.75 mg under the skin into the appropriate area as directed 1 (One) Time Per Week., Disp: 4 pen, Rfl: 6  •  Eliquis 5 MG tablet tablet, 2 (two) times a day., Disp: , Rfl:   •  fluticasone (FLONASE) 50 MCG/ACT nasal spray, instill 2 sprays into each nostril once daily PRN, Disp: , Rfl: 0  •  hydrocortisone 1 % ointment, Apply  topically to the appropriate area as directed 3 (Three) Times a Day. Apply to abdominal rash 3 times a day for itching (Patient taking differently: Apply  topically to the appropriate area as directed 3 (Three) Times a Day As Needed (for itching). Apply to abdominal rash 3 times a day for itching), Disp: 56 g, Rfl: 1  •  Insulin Glargine (BASAGLAR KWIKPEN) 100 UNIT/ML injection pen, Inject 64 Units under the skin into the appropriate area as directed 2 (Two) Times a Day., Disp: , Rfl:   •  JANUVIA 50 MG tablet, Take 50 mg by mouth Daily., Disp: , Rfl:   •  levothyroxine (SYNTHROID, LEVOTHROID) 112 MCG tablet, Take 1 tablet by mouth  "Daily. In addition to 50 to equal 162 daily, Disp: 30 tablet, Rfl: 5  •  levothyroxine (SYNTHROID, LEVOTHROID) 150 MCG tablet, , Disp: , Rfl:   •  levothyroxine (SYNTHROID, LEVOTHROID) 50 MCG tablet, Take 1 tablet by mouth Daily. In addition to 112 to equal 162 daily, Disp: 30 tablet, Rfl: 5  •  metroNIDAZOLE (METROCREAM) 0.75 % cream, Apply  topically to the appropriate area as directed Every Night., Disp: , Rfl:   •  pantoprazole (PROTONIX) 40 MG EC tablet, take 1 tablet by mouth twice a day for 7 days or as directed, Disp: , Rfl: 0  •  polyethylene glycol (MIRALAX) packet, Take 17 g by mouth Daily As Needed., Disp: , Rfl:   •  RESTASIS 0.05 % ophthalmic emulsion, Administer 1 drop to both eyes Every 12 (Twelve) Hours., Disp: , Rfl:   •  triamcinolone (KENALOG) 0.025 % cream, Apply  topically to the appropriate area as directed 2 (Two) Times a Day., Disp: , Rfl:   •  vitamin D (ERGOCALCIFEROL) 64936 units capsule capsule, Take 50,000 Units by mouth 1 (One) Time Per Week. ON HOLD FOR SURGERY SINCE 11/2018, Disp: , Rfl:     Allergies:   Allergies   Allergen Reactions   • Prednisone Other (See Comments)     ACUTE KIDNEY FAILURE   • Azithromycin Hives   • Erythromycin Hives   • Other Nausea And Vomiting     PT. REPORTS THAT SHE IS \"ALLERGIC TO ALL INSULINS EXCEPT LANTUS\"   • Red Dye Hives   • Sulfa Antibiotics Hives       Objective     Physical Exam:  Vital Signs:   Vitals:    02/03/21 1007   BP: 130/80   BP Location: Left arm   Patient Position: Sitting   Cuff Size: Adult   Pulse: 67   Temp: 97.3 °F (36.3 °C)   SpO2: 98%   Weight: 116 kg (254 lb 12.8 oz)   Height: 167.6 cm (66\")   PainSc: 0-No pain     Body mass index is 41.13 kg/m².     Physical Exam    Neurologic Exam    Assessment / Plan      Assessment/Plan:   There are no diagnoses linked to this encounter.     Follow Up:   No follow-ups on file.    CORINA Joyce, FNP-C  [facility]       Please note that " portions of this note may have been completed with a voice recognition program. Efforts were made to edit the dictations, but occasionally words are mistranscribed.

## 2021-09-09 ENCOUNTER — HOSPITAL ENCOUNTER (OUTPATIENT)
Dept: ULTRASOUND IMAGING | Facility: HOSPITAL | Age: 61
Discharge: HOME | End: 2021-09-09
Payer: MEDICARE

## 2021-09-09 DIAGNOSIS — N93.9 UTERUS BLEEDING: ICD-10-CM

## 2021-09-09 DIAGNOSIS — R10.2 PELVIC AND PERINEAL PAIN: ICD-10-CM

## 2021-09-09 PROCEDURE — 76856 US EXAM PELVIC COMPLETE: CPT

## 2021-10-20 ENCOUNTER — OFFICE VISIT (OUTPATIENT)
Dept: OBSTETRICS AND GYNECOLOGY | Facility: CLINIC | Age: 61
End: 2021-10-20

## 2021-10-20 VITALS
BODY MASS INDEX: 40.69 KG/M2 | HEIGHT: 66 IN | WEIGHT: 253.2 LBS | DIASTOLIC BLOOD PRESSURE: 98 MMHG | SYSTOLIC BLOOD PRESSURE: 160 MMHG

## 2021-10-20 DIAGNOSIS — N95.0 POST-MENOPAUSAL BLEEDING: Primary | ICD-10-CM

## 2021-10-20 DIAGNOSIS — R93.5 ABNORMAL ULTRASOUND OF ENDOMETRIUM: ICD-10-CM

## 2021-10-20 DIAGNOSIS — D25.1 INTRAMURAL LEIOMYOMA OF UTERUS: ICD-10-CM

## 2021-10-20 DIAGNOSIS — Z79.01 CURRENT USE OF LONG TERM ANTICOAGULATION: ICD-10-CM

## 2021-10-20 PROCEDURE — 99204 OFFICE O/P NEW MOD 45 MIN: CPT | Performed by: OBSTETRICS & GYNECOLOGY

## 2021-10-20 RX ORDER — FEXOFENADINE HCL 180 MG/1
TABLET ORAL
COMMUNITY
End: 2021-11-09

## 2021-10-20 RX ORDER — PANTOPRAZOLE SODIUM 20 MG/1
20 TABLET, DELAYED RELEASE ORAL DAILY
COMMUNITY
Start: 2021-10-06

## 2021-10-20 RX ORDER — FLUCONAZOLE 150 MG/1
TABLET ORAL
COMMUNITY
End: 2021-11-09

## 2021-10-20 RX ORDER — LOSARTAN POTASSIUM 50 MG/1
50 TABLET ORAL DAILY
COMMUNITY
Start: 2021-07-19 | End: 2022-02-02 | Stop reason: DRUGHIGH

## 2021-10-20 RX ORDER — MOXIFLOXACIN 5 MG/ML
SOLUTION/ DROPS OPHTHALMIC
COMMUNITY
End: 2021-11-09

## 2021-10-20 RX ORDER — DULAGLUTIDE 1.5 MG/.5ML
1.5 INJECTION, SOLUTION SUBCUTANEOUS WEEKLY
COMMUNITY
Start: 2021-08-21

## 2021-10-20 RX ORDER — FLUOXETINE 10 MG/1
10 CAPSULE ORAL
COMMUNITY
Start: 2021-07-16 | End: 2023-02-03 | Stop reason: DRUGHIGH

## 2021-10-20 RX ORDER — CYCLOBENZAPRINE HCL 5 MG
5 TABLET ORAL 3 TIMES DAILY PRN
COMMUNITY
Start: 2021-09-15 | End: 2021-11-09

## 2021-10-20 RX ORDER — PROMETHAZINE HYDROCHLORIDE 25 MG/1
25 TABLET ORAL EVERY 8 HOURS PRN
COMMUNITY
End: 2023-02-03

## 2021-10-21 ENCOUNTER — PREP FOR SURGERY (OUTPATIENT)
Dept: OTHER | Facility: HOSPITAL | Age: 61
End: 2021-10-21

## 2021-10-21 DIAGNOSIS — N95.0 PMB (POSTMENOPAUSAL BLEEDING): Primary | ICD-10-CM

## 2021-10-21 RX ORDER — SODIUM CHLORIDE 0.9 % (FLUSH) 0.9 %
10 SYRINGE (ML) INJECTION AS NEEDED
Status: CANCELLED | OUTPATIENT
Start: 2021-10-21

## 2021-10-21 RX ORDER — SODIUM CHLORIDE 0.9 % (FLUSH) 0.9 %
3 SYRINGE (ML) INJECTION EVERY 12 HOURS SCHEDULED
Status: CANCELLED | OUTPATIENT
Start: 2021-10-21

## 2021-10-21 NOTE — PROGRESS NOTES
Chief Complaint  Consult (TVS. New patient in office for postmeneopausal bleeding and c/o vaginal irritation.)     History of Present Illness:  Patient is 61 y.o.  who presents to [facility] is a new patient for evaluation of postmenopausal bleeding.  Patient reports going through menopause at the age of 32.  Patient has never been on any hormone replacement therapy.  Patient reports having the onset of spotting approximately 6 months ago.  Patient reports that her bleeding has been bright red in nature.  Patient reports having a normal Pap smear a year ago at [facility].  Patient sees Marylou Stevenson nurse practitioner for primary care.  Patient is on long-term anticoagulation.  Patient denies any pelvic pain or discomfort.  Patient has not had any previous imaging.    History  Past Medical History:   Diagnosis Date   • Allergic    • Anxiety    • Arthritis    • Asthma    • Bruises easily    • Bulging lumbar disc    • Cancer ([facility])     THYROID CANCER (MALIGNANCY)    • Chronic kidney disease    • Colon polyp    • Constipation    • COPD (chronic obstructive pulmonary disease) ([facility])    • DDD (degenerative disc disease), lumbosacral    • Depression    • Diabetes mellitus ([facility])    • Diverticulitis    • DVT of leg (deep venous thrombosis) ([facility])     X 4    • Dysphagia    • Factor V deficiency ([facility])    • GERD (gastroesophageal reflux disease)    • Headache    • History of blood clots    • History of echocardiogram    • History of exercise stress test     WNL PER PT    • History of pneumonia    • History of transfusion    • Hypertension    • Lupus ([facility])    • Nausea    • Numbness in right leg     FROM HIP TO JUST ABOVE THE KNEE   • Piercing     EARS ONLY   • PONV (postoperative nausea and vomiting)     SCOPOLAMINE PATCH DIDN'T WORK   • PVD (peripheral vascular disease) ([facility])    • Sleep apnea     DOES NOT USE CPAP   • SOB (shortness of breath) on exertion    • Stroke ([facility])  2008    MILD RIGHT SIDED WEAKNESS    • Ulcer, stomach peptic    • Wears glasses     RX   • Wears glasses     READING GLASSES ONLY     Current Outpatient Medications on File Prior to Visit   Medication Sig Dispense Refill   • acetaminophen (TYLENOL) 325 MG tablet Take 2 tablets by mouth Every 4 (Four) Hours As Needed for Mild Pain .     • ADVAIR DISKUS 250-50 MCG/DOSE DISKUS inhale 1 dose by mouth twice a day, PRN, Rinse mouth after use  0   • apixaban (ELIQUIS) 2.5 MG tablet tablet Take 2.5 mg by mouth 2 (Two) Times a Day.     • Cyanocobalamin (VITAMIN B 12 PO) Take  by mouth.     • cyclobenzaprine (FLEXERIL) 5 MG tablet Take 5 mg by mouth 3 (Three) Times a Day As Needed.     • fexofenadine (ALLEGRA) 180 MG tablet fexofenadine 180 mg tablet   TK 1 T PO QD     • fluconazole (DIFLUCAN) 150 MG tablet fluconazole 150 mg tablet   TK 1 T PO QOD     • FLUoxetine (PROzac) 10 MG capsule Take 10 mg by mouth every night at bedtime.     • fluticasone (FLONASE) 50 MCG/ACT nasal spray instill 2 sprays into each nostril once daily PRN  0   • hydrocortisone 1 % ointment Apply  topically to the appropriate area as directed 3 (Three) Times a Day. Apply to abdominal rash 3 times a day for itching (Patient taking differently: Apply  topically to the appropriate area as directed 3 (Three) Times a Day As Needed (for itching). Apply to abdominal rash 3 times a day for itching) 56 g 1   • Insulin Glargine (BASAGLAR KWIKPEN) 100 UNIT/ML injection pen Inject 64 Units under the skin into the appropriate area as directed 2 (Two) Times a Day.     • levothyroxine (SYNTHROID, LEVOTHROID) 150 MCG tablet      • losartan (COZAAR) 50 MG tablet TAKE 1 TABLET BY MOUTH EVERY DAY. INCREASE DOSE     • metroNIDAZOLE (METROCREAM) 0.75 % cream Apply  topically to the appropriate area as directed Every Night.     • moxifloxacin (VIGAMOX) 0.5 % ophthalmic solution moxifloxacin 0.5 % eye drops     • mupirocin (BACTROBAN) 2 % ointment mupirocin 2 % topical  "ointment   DESHAUN EXT AA BID     • pantoprazole (PROTONIX) 20 MG EC tablet Take 20 mg by mouth Daily.     • polyethylene glycol (MIRALAX) packet Take 17 g by mouth Daily As Needed.     • promethazine (PHENERGAN) 25 MG tablet promethazine 25 mg tablet   TK 1 T PO QID PRN     • RESTASIS 0.05 % ophthalmic emulsion Administer 1 drop to both eyes Every 12 (Twelve) Hours.     • triamcinolone (KENALOG) 0.025 % cream Apply  topically to the appropriate area as directed 2 (Two) Times a Day.     • Trulicity 1.5 MG/0.5ML solution pen-injector USE 1 DOSE SUBCUTANEOUSLY ONCE A WEEK     • vitamin D (ERGOCALCIFEROL) 33480 units capsule capsule Take 50,000 Units by mouth 1 (One) Time Per Week. ON HOLD FOR SURGERY SINCE 11/2018     • benzocaine-menthol (CEPACOL) 15-3.6 MG lozenge lozenge Dissolve 1 lozenge in the mouth Every 2 (Two) Hours As Needed for sore throat 36 each 0   • betamethasone dipropionate (DIPROLENE) 0.05 % cream Apply  topically to the appropriate area as directed 1 (One) Time Per Week.       No current facility-administered medications on file prior to visit.     Allergies   Allergen Reactions   • Prednisone Other (See Comments)     ACUTE KIDNEY FAILURE   • Azithromycin Hives   • Erythromycin Hives   • Other Nausea And Vomiting     PT. REPORTS THAT SHE IS \"ALLERGIC TO ALL INSULINS EXCEPT LANTUS\"   • Red Dye Hives   • Sulfa Antibiotics Hives     Past Surgical History:   Procedure Laterality Date   • BREAST SURGERY Right     LUMPECTOMY    • COLONOSCOPY      WITH POLYP REMOVAL    • ENDOSCOPY     • ERCP     • FINE NEEDLE ASPIRATION  2018    THYROID    • GALLBLADDER SURGERY     • SKIN BIOPSY      HAND    • THYROIDECTOMY Right 12/21/2018    Procedure: Right thyroid lobectomy with isthmusectomy;  Surgeon: Janeth Tan MD;  Location: [facility] OR;  Service: General   • THYROIDECTOMY N/A 2/18/2019    Procedure: THYROIDECTOMY, COMPLETION;  Surgeon: Janeth Tan MD;  Location: [facility] OR;  Service: General   • TUBAL " "ABDOMINAL LIGATION     • WISDOM TOOTH EXTRACTION       Family History   Problem Relation Age of Onset   • Heart disease Father    • Heart attack Father    • Arthritis Father    • Stroke Mother    • Hypertension Mother    • Hyperlipidemia Mother    • Kidney disease Mother    • Arthritis Mother    • Cancer Mother    • Cancer Sister    • Diabetes Other    • Thyroid cancer Other    • Stroke Other      Social History     Socioeconomic History   • Marital status:    Tobacco Use   • Smoking status: Never Smoker   • Smokeless tobacco: Never Used   Substance and Sexual Activity   • Alcohol use: No   • Drug use: No   • Sexual activity: Defer       Physical Examination:  Vital Signs: /98   Ht 167.6 cm (66\")   Wt 115 kg (253 lb 3.2 oz)   BMI 40.87 kg/m²     General Appearance: alert, appears stated age, and cooperative  Breasts: Not performed.  Abdomen: no masses, no hepatomegaly, no splenomegaly, soft non-tender, no guarding and no rebound tenderness  Pelvic: Not performed.    Data Review:  The following data was reviewed by: Karen Melo MD on 10/20/2021:     Labs:    Imaging:  US Non-ob Transvaginal (10/20/2021 11:47)    Medical Records:  None    Assessment and Plan   Problem List Items Addressed This Visit     None      Visit Diagnoses     Post-menopausal bleeding    -  Primary  Patient with postmenopausal bleeding as noted.  Transvaginal ultrasound was obtained.  Patient has been informed regarding those findings.  I discussed with the patient the option of endometrial biopsy or D&C for further evaluation.  Patient desires to schedule D&C with diagnostic hysteroscopy.  Instructions and precautions have been given.  I discussed with the patient the risk, complications, benefits, as well as other alternatives.  Patient will check with her primary care provider regarding stopping her Eliquis 3 days prior to her procedure.  Patient voices understanding.  Patient desires to schedule procedure as discussed.    " Relevant Orders    US Non-ob Transvaginal (Completed)    Abnormal ultrasound of endometrium      Patient with abnormal endometrium on ultrasound.  Patient has been informed regarding those findings.  I recommend further evaluation with endometrial sampling D&C.  Patient desires to proceed with D&C as noted.    Current use of long term anticoagulation      Patient is on long-term anticoagulation as noted.  Patient is to check with her primary care provider regarding discontinuation of her Eliquis prior to her procedure.  I discussed with the patient the risk, complications, benefits, as well as other alternatives.  Patient desires to proceed with D&C as noted.    Intramural leiomyoma of uterus      Patient with small intramural fibroids as noted.  There does not appear to be any distortion of the endometrium.  Patient has been informed regarding those findings.  Patient may repeat imaging in 3 to 4 months to ensure stability.          Follow Up/Instructions:  Follow up as noted.  Patient was given instructions and counseling regarding her condition or for health maintenance advice. Please see specific information pulled into the AVS if appropriate.     Note: Speech recognition transcription software may have been used to dictate portions of this document.  An attempt at proofreading has been made though minor errors in transcription may still be present.    This note was electronically signed.  Karen Melo M.D.

## 2021-10-28 ENCOUNTER — HOSPITAL ENCOUNTER (OUTPATIENT)
Dept: MAMMOGRAPHY | Facility: HOSPITAL | Age: 61
Discharge: HOME | End: 2021-10-28
Payer: MEDICARE

## 2021-10-28 ENCOUNTER — HOSPITAL ENCOUNTER (OUTPATIENT)
Dept: ULTRASOUND IMAGING | Facility: HOSPITAL | Age: 61
Discharge: HOME | End: 2021-10-28
Payer: MEDICARE

## 2021-10-28 DIAGNOSIS — N63.20 LUMP OF LEFT BREAST: ICD-10-CM

## 2021-10-28 DIAGNOSIS — N64.4 MASTODYNIA OF LEFT BREAST: ICD-10-CM

## 2021-10-28 PROCEDURE — 76642 ULTRASOUND BREAST LIMITED: CPT

## 2021-10-28 PROCEDURE — G0279 TOMOSYNTHESIS, MAMMO: HCPCS

## 2021-11-09 ENCOUNTER — PRE-ADMISSION TESTING (OUTPATIENT)
Dept: PREADMISSION TESTING | Facility: HOSPITAL | Age: 61
End: 2021-11-09

## 2021-11-09 ENCOUNTER — HOSPITAL ENCOUNTER (OUTPATIENT)
Dept: GENERAL RADIOLOGY | Facility: HOSPITAL | Age: 61
Discharge: HOME | End: 2021-11-09

## 2021-11-09 VITALS — BODY MASS INDEX: 40.27 KG/M2 | WEIGHT: 250.6 LBS | HEIGHT: 66 IN

## 2021-11-09 DIAGNOSIS — N95.0 PMB (POSTMENOPAUSAL BLEEDING): ICD-10-CM

## 2021-11-09 LAB
ANION GAP SERPL CALCULATED.3IONS-SCNC: 9.4 MMOL/L (ref 5–15)
BASOPHILS # BLD AUTO: 0.06 10*3/MM3 (ref 0–0.2)
BASOPHILS NFR BLD AUTO: 1.2 % (ref 0–1.5)
BILIRUB UR QL STRIP: NEGATIVE
BUN SERPL-MCNC: 11 MG/DL (ref 8–23)
BUN/CREAT SERPL: 10.3 (ref 7–25)
CALCIUM SPEC-SCNC: 9.2 MG/DL (ref 8.6–10.5)
CHLORIDE SERPL-SCNC: 101 MMOL/L (ref 98–107)
CLARITY UR: CLEAR
CO2 SERPL-SCNC: 26.6 MMOL/L (ref 22–29)
COLOR UR: ABNORMAL
CREAT SERPL-MCNC: 1.07 MG/DL (ref 0.57–1)
DEPRECATED RDW RBC AUTO: 38.9 FL (ref 37–54)
EOSINOPHIL # BLD AUTO: 0.15 10*3/MM3 (ref 0–0.4)
EOSINOPHIL NFR BLD AUTO: 3 % (ref 0.3–6.2)
ERYTHROCYTE [DISTWIDTH] IN BLOOD BY AUTOMATED COUNT: 12.3 % (ref 12.3–15.4)
GFR SERPL CREATININE-BSD FRML MDRD: 52 ML/MIN/1.73
GLUCOSE SERPL-MCNC: 195 MG/DL (ref 65–99)
GLUCOSE UR STRIP-MCNC: ABNORMAL MG/DL
HCT VFR BLD AUTO: 46.7 % (ref 34–46.6)
HGB BLD-MCNC: 16 G/DL (ref 12–15.9)
HGB UR QL STRIP.AUTO: NEGATIVE
IMM GRANULOCYTES # BLD AUTO: 0.03 10*3/MM3 (ref 0–0.05)
IMM GRANULOCYTES NFR BLD AUTO: 0.6 % (ref 0–0.5)
KETONES UR QL STRIP: ABNORMAL
LEUKOCYTE ESTERASE UR QL STRIP.AUTO: NEGATIVE
LYMPHOCYTES # BLD AUTO: 1.53 10*3/MM3 (ref 0.7–3.1)
LYMPHOCYTES NFR BLD AUTO: 30.8 % (ref 19.6–45.3)
MCH RBC QN AUTO: 29.7 PG (ref 26.6–33)
MCHC RBC AUTO-ENTMCNC: 34.3 G/DL (ref 31.5–35.7)
MCV RBC AUTO: 86.8 FL (ref 79–97)
MONOCYTES # BLD AUTO: 0.48 10*3/MM3 (ref 0.1–0.9)
MONOCYTES NFR BLD AUTO: 9.7 % (ref 5–12)
NEUTROPHILS NFR BLD AUTO: 2.71 10*3/MM3 (ref 1.7–7)
NEUTROPHILS NFR BLD AUTO: 54.7 % (ref 42.7–76)
NITRITE UR QL STRIP: NEGATIVE
NRBC BLD AUTO-RTO: 0 /100 WBC (ref 0–0.2)
PH UR STRIP.AUTO: 5.5 [PH] (ref 5–8)
PLATELET # BLD AUTO: 250 10*3/MM3 (ref 140–450)
PMV BLD AUTO: 10.4 FL (ref 6–12)
POTASSIUM SERPL-SCNC: 4.3 MMOL/L (ref 3.5–5.2)
PROT UR QL STRIP: NEGATIVE
QT INTERVAL: 398 MS
QTC INTERVAL: 459 MS
RBC # BLD AUTO: 5.38 10*6/MM3 (ref 3.77–5.28)
SODIUM SERPL-SCNC: 137 MMOL/L (ref 136–145)
SP GR UR STRIP: 1.02 (ref 1–1.03)
UROBILINOGEN UR QL STRIP: ABNORMAL
WBC # BLD AUTO: 4.96 10*3/MM3 (ref 3.4–10.8)

## 2021-11-09 PROCEDURE — 85025 COMPLETE CBC W/AUTO DIFF WBC: CPT

## 2021-11-09 PROCEDURE — 71045 X-RAY EXAM CHEST 1 VIEW: CPT

## 2021-11-09 PROCEDURE — 93005 ELECTROCARDIOGRAM TRACING: CPT

## 2021-11-09 PROCEDURE — 81003 URINALYSIS AUTO W/O SCOPE: CPT

## 2021-11-09 PROCEDURE — 80048 BASIC METABOLIC PNL TOTAL CA: CPT

## 2021-11-09 PROCEDURE — 93010 ELECTROCARDIOGRAM REPORT: CPT | Performed by: INTERNAL MEDICINE

## 2021-11-09 PROCEDURE — 36415 COLL VENOUS BLD VENIPUNCTURE: CPT

## 2021-11-09 NOTE — PAT
Patient presented to [facility] from home to prepare for upcoming procedure scheduled with Dr. Melo for 11-16-21. Routine health history interview completed and preliminary EKG report obtained.  Patient reported that she has a Hx of DVT in lower extremities and that her last clot was early 2021.  Patient reported that she is compliant with Eliquis therapy BID and that she will hold pre-operatively per MD instructions.  Patient also reported Hx of CVA in 208 with slight residual weakness of right upper extremity.  Patient denied chest pain or tightness and reported that she is active around her home without issues.      After preliminary EKG and health history were obtained, RN phoned anesthesia services for consultation.  RN spoke with Kishan Harris CRNA who was made aware of the above noted information.  Preliminary EKG report discussed with patient as well.  After discussion with CRNA no additional orders were received and CRNA verbalized that patient may proceed with procedure as scheduled for 11-16-21.

## 2021-11-14 PROCEDURE — S0260 H&P FOR SURGERY: HCPCS | Performed by: OBSTETRICS & GYNECOLOGY

## 2021-11-14 NOTE — H&P
" [place]  Sue L Collett  : 1960  MRN: [id number]  [facility]: [id number]    History and Physical    Subjective   Sue L Collett is a 61 y.o. year old  who present for surgery due to recent episode of postmenopausal bleeding.  Patient was seen in the office and had a transvaginal ultrasound.  The patient was noted to have multiple small intramural fibroids.  Her endometrium was slightly thickened.  The patient is on long-term anticoagulation.  I discussed with the patient the need for endometrial sampling with either endometrial biopsy or D&C.  Patient is here for D&C with diagnostic hysteroscopy for further evaluation.    History  Past Medical History:   Diagnosis Date   • Allergic    • Anxiety    • Arthritis    • Asthma 2021    Reported as history - reported last use of inhaler apx 1 month ago   • Bruises easily    • Bulging lumbar disc    • Cancer (HCC)     THYROID CANCER ([facility])    • Chronic kidney disease    • Colon polyp    • Constipation    • COPD (chronic obstructive pulmonary disease) (HCC)    • DDD (degenerative disc disease), lumbosacral    • Depression    • Diabetes mellitus (HCC)    • Disease of thyroid gland    • Diverticulitis    • DVT of leg (deep venous thrombosis) (HCC)     X 4 - patient reported last DVT was early part of     • Dysphagia 2021    Reported history prior to thyroid nodule were removed - no problem at present time reported   • Elevated cholesterol    • Factor V deficiency (HCC)    • GERD (gastroesophageal reflux disease)    • H/O cardiovascular stress test     Patient reported apx  and wnl's at the time it was done   • Headache    • History of blood clots    • History of echocardiogram    • History of pneumonia    • History of transfusion     No reaction to transfusion    • Hypertension    • Lupus (HCC)     patient reported \"a mild case\"    • Nausea    • Numbness in right leg     FROM HIP TO JUST ABOVE THE KNEE   • Piercing     EARS ONLY   • PONV " (postoperative nausea and vomiting) 11/09/2021    SCOPOLAMINE PATCH DIDN'T WORK   • PVD (peripheral vascular disease) ([facility])    • Sleep apnea     Patient reported had sleep study and was told that she has mild sleep apnea but that a CPAP was not warranted   • SOB (shortness of breath) on exertion    • Stroke ([facility]) 2008    MILD RIGHT SIDED WEAKNESS - reported she has difficulty holding heavy object with RUE   • Ulcer, stomach peptic    • Wears glasses     Rx glasses to read PRN   • Wears glasses     READING GLASSES ONLY     No current facility-administered medications on file prior to encounter.     Current Outpatient Medications on File Prior to Encounter   Medication Sig Dispense Refill   • acetaminophen (TYLENOL) 325 MG tablet Take 2 tablets by mouth Every 4 (Four) Hours As Needed for Mild Pain .     • ADVAIR DISKUS 250-50 MCG/DOSE DISKUS Inhale 1 puff 2 (Two) Times a Day.  0   • apixaban (ELIQUIS) 2.5 MG tablet tablet Take 5 mg by mouth 2 (Two) Times a Day.     • FLUoxetine (PROzac) 10 MG capsule Take 10 mg by mouth every night at bedtime.     • fluticasone (FLONASE) 50 MCG/ACT nasal spray 1 spray into the nostril(s) as directed by provider Daily.  0   • Insulin Glargine (BASAGLAR KWIKPEN) 100 UNIT/ML injection pen Inject 64 Units under the skin into the appropriate area as directed 2 (Two) Times a Day.     • levothyroxine (SYNTHROID, LEVOTHROID) 150 MCG tablet Take 150 mcg by mouth Daily.     • losartan (COZAAR) 50 MG tablet Take 50 mg by mouth Daily.     • pantoprazole (PROTONIX) 20 MG EC tablet Take 20 mg by mouth Daily.     • polyethylene glycol (MIRALAX) packet Take 17 g by mouth Daily As Needed (constipation).     • promethazine (PHENERGAN) 25 MG tablet Take 25 mg by mouth Every 8 (Eight) Hours As Needed for Nausea or Vomiting (Headaches).     • RESTASIS 0.05 % ophthalmic emulsion Administer 1 drop to both eyes Every 12 (Twelve) Hours.     • Trulicity 1.5 MG/0.5ML solution pen-injector Inject 1.5 mg under  "the skin into the appropriate area as directed 1 (One) Time Per Week.     • vitamin D (ERGOCALCIFEROL) 67540 units capsule capsule Take 50,000 Units by mouth 1 (One) Time Per Week.       Allergies   Allergen Reactions   • Prednisone Other (See Comments)     ACUTE KIDNEY FAILURE   • Adhesive Tape Other (See Comments)     Patient reported causes skin to blister and tear.  Patient reported she is able to tolerate paper tape with no adverse reactions.     • Azithromycin Hives   • Erythromycin Hives   • Other Nausea And Vomiting     PT. REPORTS THAT SHE IS \"ALLERGIC TO ALL INSULINS EXCEPT LANTUS\"   • Red Dye Hives   • Sulfa Antibiotics Hives     Past Surgical History:   Procedure Laterality Date   • BREAST SURGERY Right     LUMPECTOMY    • COLONOSCOPY      WITH POLYP REMOVAL    • ENDOSCOPY     • ERCP     • FINE NEEDLE ASPIRATION  2018    THYROID    • GALLBLADDER SURGERY     • MOUTH SURGERY      Reported 2 lower wisdom teeth extracted and other oral extractions   • SKIN BIOPSY      HAND    • THYROIDECTOMY Right 12/21/2018    Procedure: Right thyroid lobectomy with isthmusectomy;  Surgeon: Janeth Tan MD;  Location: [facility] OR;  Service: General   • THYROIDECTOMY N/A 2/18/2019    Procedure: THYROIDECTOMY, COMPLETION;  Surgeon: Janeth Tan MD;  Location: [facility] OR;  Service: General   • TUBAL ABDOMINAL LIGATION       Family History   Problem Relation Age of Onset   • Heart disease Father    • Heart attack Father    • Arthritis Father    • Stroke Mother    • Hypertension Mother    • Hyperlipidemia Mother    • Kidney disease Mother    • Arthritis Mother    • Cancer Mother    • Cancer Sister    • Diabetes Other    • Thyroid cancer Other    • Stroke Other      Social History     Socioeconomic History   • Marital status:    Tobacco Use   • Smoking status: Never Smoker   • Smokeless tobacco: Never Used   • Tobacco comment: Reported exposure to second hand smoke    Vaping Use   • Vaping Use: Never used "   Substance and Sexual Activity   • Alcohol use: No   • Drug use: No   • Sexual activity: Defer     Review of Systems  The following systems were reviewed and negative:  constitution, eyes, ENT, respiratory, cardiovascular, gastrointestinal, genitourinary, integument, breast, hematologic / lymphatic, musculoskeletal, neurological, behavioral/psych, endocrine and allergies / immunologic     Objective  Recent Vitals       4/29/2021 4/29/2021 10/20/2021       BP: 132/88 112/82 160/98     Weight: -- -- 115 kg (253 lb 3.2 oz)     BMI (Calculated): -- -- 40.9         Physical Exam:  General Appearance: alert, appears stated age and cooperative  Head: normocephalic, without obvious abnormality and atraumatic  Eyes: lids and lashes normal, conjunctivae and sclerae normal, no icterus, no pallor and corneas clear  Lungs: clear to auscultation, respirations regular, respirations even and respirations unlabored  Heart: regular rhythm and normal rate, normal S1, S2, no murmur, gallop, or rubs and no click  Abdomen: normal bowel sounds, no masses, no hepatomegaly, no splenomegaly, soft non-tender, no guarding and no rebound tenderness  Extremities: moves extremities well, no edema, no cyanosis and no redness  Skin: no bleeding, bruising or rash and no lesions noted  Psych: normal mood and affect, oriented to person, time and place, thought content organized and appropriate judgment    Labs  Lab Results   Component Value Date     11/09/2021    HGB 16.0 (H) 11/09/2021    HCT 46.7 (H) 11/09/2021    WBC 4.96 11/09/2021     11/09/2021    K 4.3 11/09/2021     11/09/2021    CO2 26.6 11/09/2021    BUN 11 11/09/2021    CREATININE 1.07 (H) 11/09/2021    GLUCOSE 195 (H) 11/09/2021    ALBUMIN 3.70 07/12/2017    CALCIUM 9.2 11/09/2021    AST 25 07/12/2017    ALT 47 07/12/2017    BILITOT 0.6 07/12/2017      Lab Results   Component Value Date    SPECGRAVUR 1.025 11/09/2021    KETONESU Trace (A) 11/09/2021    BLOODU Negative  11/09/2021    LEUKOCYTESUR Negative 11/09/2021    NITRITEU Negative 11/09/2021    RBCUA None seen 05/03/2019    BACTERIA 3+ (A) 05/03/2019        No results found for: URINECX     Assessment   1. Postmenopausal bleeding  2. Abnormal endometrium on ultrasound  3. Intramural fibroids  4. Current use of long-term anticoagulation     Plan   1. D&C with diagnostic hysteroscopy.  2. ABx and DVT prophylaxis as indicated.  3. Risks, complications, benefits, and other alternatives discussed.  4. All questions answered and pt in agreement with plan.    Karen Melo M.D.  11/14/2021  09:48 EST

## 2021-11-16 ENCOUNTER — ANESTHESIA (OUTPATIENT)
Dept: PERIOP | Facility: HOSPITAL | Age: 61
End: 2021-11-16

## 2021-11-16 ENCOUNTER — HOSPITAL ENCOUNTER (OUTPATIENT)
Facility: HOSPITAL | Age: 61
Setting detail: HOSPITAL OUTPATIENT SURGERY
Discharge: HOME | End: 2021-11-16
Attending: OBSTETRICS & GYNECOLOGY | Admitting: OBSTETRICS & GYNECOLOGY

## 2021-11-16 ENCOUNTER — ANESTHESIA EVENT (OUTPATIENT)
Dept: PERIOP | Facility: HOSPITAL | Age: 61
End: 2021-11-16

## 2021-11-16 VITALS
RESPIRATION RATE: 18 BRPM | OXYGEN SATURATION: 96 % | TEMPERATURE: 98 F | HEART RATE: 75 BPM | SYSTOLIC BLOOD PRESSURE: 187 MMHG | DIASTOLIC BLOOD PRESSURE: 93 MMHG

## 2021-11-16 DIAGNOSIS — N95.0 PMB (POSTMENOPAUSAL BLEEDING): ICD-10-CM

## 2021-11-16 LAB — GLUCOSE BLDC GLUCOMTR-MCNC: 102 MG/DL (ref 70–130)

## 2021-11-16 PROCEDURE — 25010000002 ONDANSETRON PER 1 MG: Performed by: NURSE ANESTHETIST, CERTIFIED REGISTERED

## 2021-11-16 PROCEDURE — 58558 HYSTEROSCOPY BIOPSY: CPT | Performed by: OBSTETRICS & GYNECOLOGY

## 2021-11-16 PROCEDURE — 0 LIDOCAINE 1 % SOLUTION: Performed by: OBSTETRICS & GYNECOLOGY

## 2021-11-16 PROCEDURE — 88305 TISSUE EXAM BY PATHOLOGIST: CPT | Performed by: OBSTETRICS & GYNECOLOGY

## 2021-11-16 PROCEDURE — 25010000002 FENTANYL CITRATE (PF) 50 MCG/ML SOLUTION: Performed by: NURSE ANESTHETIST, CERTIFIED REGISTERED

## 2021-11-16 PROCEDURE — 82962 GLUCOSE BLOOD TEST: CPT

## 2021-11-16 PROCEDURE — 25010000002 MIDAZOLAM PER 1MG: Performed by: NURSE ANESTHETIST, CERTIFIED REGISTERED

## 2021-11-16 PROCEDURE — 25010000002 PROPOFOL 10 MG/ML EMULSION: Performed by: NURSE ANESTHETIST, CERTIFIED REGISTERED

## 2021-11-16 RX ORDER — SODIUM CHLORIDE, SODIUM LACTATE, POTASSIUM CHLORIDE, CALCIUM CHLORIDE 600; 310; 30; 20 MG/100ML; MG/100ML; MG/100ML; MG/100ML
INJECTION, SOLUTION INTRAVENOUS CONTINUOUS PRN
Status: DISCONTINUED | OUTPATIENT
Start: 2021-11-16 | End: 2021-11-16 | Stop reason: SURG

## 2021-11-16 RX ORDER — SODIUM CHLORIDE, SODIUM LACTATE, POTASSIUM CHLORIDE, CALCIUM CHLORIDE 600; 310; 30; 20 MG/100ML; MG/100ML; MG/100ML; MG/100ML
1000 INJECTION, SOLUTION INTRAVENOUS CONTINUOUS
Status: DISCONTINUED | OUTPATIENT
Start: 2021-11-16 | End: 2021-11-16 | Stop reason: HOSPADM

## 2021-11-16 RX ORDER — MAGNESIUM HYDROXIDE 1200 MG/15ML
LIQUID ORAL AS NEEDED
Status: DISCONTINUED | OUTPATIENT
Start: 2021-11-16 | End: 2021-11-16 | Stop reason: HOSPADM

## 2021-11-16 RX ORDER — LIDOCAINE HYDROCHLORIDE 10 MG/ML
INJECTION, SOLUTION INFILTRATION; PERINEURAL AS NEEDED
Status: DISCONTINUED | OUTPATIENT
Start: 2021-11-16 | End: 2021-11-16 | Stop reason: HOSPADM

## 2021-11-16 RX ORDER — ONDANSETRON 2 MG/ML
INJECTION INTRAMUSCULAR; INTRAVENOUS AS NEEDED
Status: DISCONTINUED | OUTPATIENT
Start: 2021-11-16 | End: 2021-11-16 | Stop reason: SURG

## 2021-11-16 RX ORDER — MIDAZOLAM HYDROCHLORIDE 2 MG/2ML
INJECTION, SOLUTION INTRAMUSCULAR; INTRAVENOUS AS NEEDED
Status: DISCONTINUED | OUTPATIENT
Start: 2021-11-16 | End: 2021-11-16 | Stop reason: SURG

## 2021-11-16 RX ORDER — FENTANYL CITRATE 50 UG/ML
INJECTION, SOLUTION INTRAMUSCULAR; INTRAVENOUS AS NEEDED
Status: DISCONTINUED | OUTPATIENT
Start: 2021-11-16 | End: 2021-11-16 | Stop reason: SURG

## 2021-11-16 RX ORDER — IBUPROFEN 600 MG/1
600 TABLET ORAL EVERY 6 HOURS PRN
Qty: 60 TABLET | Refills: 0 | Status: SHIPPED | OUTPATIENT
Start: 2021-11-16 | End: 2022-02-02

## 2021-11-16 RX ORDER — KETAMINE HCL IN NACL, ISO-OSM 100MG/10ML
SYRINGE (ML) INJECTION AS NEEDED
Status: DISCONTINUED | OUTPATIENT
Start: 2021-11-16 | End: 2021-11-16 | Stop reason: SURG

## 2021-11-16 RX ORDER — SODIUM CHLORIDE 0.9 % (FLUSH) 0.9 %
10 SYRINGE (ML) INJECTION AS NEEDED
Status: DISCONTINUED | OUTPATIENT
Start: 2021-11-16 | End: 2021-11-16 | Stop reason: HOSPADM

## 2021-11-16 RX ORDER — SODIUM CHLORIDE 0.9 % (FLUSH) 0.9 %
3 SYRINGE (ML) INJECTION EVERY 12 HOURS SCHEDULED
Status: DISCONTINUED | OUTPATIENT
Start: 2021-11-16 | End: 2021-11-16 | Stop reason: HOSPADM

## 2021-11-16 RX ORDER — ACETAMINOPHEN 500 MG
1000 TABLET ORAL EVERY 8 HOURS PRN
Qty: 60 TABLET | Refills: 0 | Status: SHIPPED | OUTPATIENT
Start: 2021-11-16

## 2021-11-16 RX ORDER — ONDANSETRON HYDROCHLORIDE 8 MG/1
8 TABLET, FILM COATED ORAL EVERY 8 HOURS PRN
Qty: 10 TABLET | Refills: 0 | Status: SHIPPED | OUTPATIENT
Start: 2021-11-16 | End: 2022-02-02

## 2021-11-16 RX ADMIN — FENTANYL CITRATE 50 MCG: 50 INJECTION INTRAMUSCULAR; INTRAVENOUS at 10:53

## 2021-11-16 RX ADMIN — ONDANSETRON 4 MG: 2 INJECTION INTRAMUSCULAR; INTRAVENOUS at 11:05

## 2021-11-16 RX ADMIN — Medication 25 MG: at 10:57

## 2021-11-16 RX ADMIN — PROPOFOL 140 MCG/KG/MIN: 10 INJECTION, EMULSION INTRAVENOUS at 10:51

## 2021-11-16 RX ADMIN — SODIUM CHLORIDE, POTASSIUM CHLORIDE, SODIUM LACTATE AND CALCIUM CHLORIDE: 600; 310; 30; 20 INJECTION, SOLUTION INTRAVENOUS at 09:55

## 2021-11-16 RX ADMIN — FENTANYL CITRATE 50 MCG: 50 INJECTION INTRAMUSCULAR; INTRAVENOUS at 11:05

## 2021-11-16 RX ADMIN — SODIUM CHLORIDE, POTASSIUM CHLORIDE, SODIUM LACTATE AND CALCIUM CHLORIDE 1000 ML: 600; 310; 30; 20 INJECTION, SOLUTION INTRAVENOUS at 09:13

## 2021-11-16 RX ADMIN — MIDAZOLAM HYDROCHLORIDE 2 MG: 1 INJECTION, SOLUTION INTRAMUSCULAR; INTRAVENOUS at 10:57

## 2021-11-16 NOTE — ANESTHESIA PREPROCEDURE EVALUATION
Anesthesia Evaluation     Patient summary reviewed and Nursing notes reviewed   history of anesthetic complications: PONV  NPO Solid Status: > 8 hours  NPO Liquid Status: > 8 hours           Airway   Mallampati: I  TM distance: >3 FB  Neck ROM: full  no difficulty expected and Possible difficult intubation  Dental - normal exam     Pulmonary    (+) COPD, asthma,shortness of breath, sleep apnea (Non compliant), decreased breath sounds,   (-) not a smoker  Cardiovascular - normal exam    PT is on anticoagulation therapy  Rhythm: regular  Rate: normal    (+) hypertension, PVD, DVT (Clotting factor deficiency. History of multiple DVTs) current, hyperlipidemia,     ROS comment: Off Coumadin 02/11/2019    Neuro/Psych  (+) CVA (Right side weakness) residual symptoms, headaches, numbness, psychiatric history Anxiety and Depression,     GI/Hepatic/Renal/Endo    (+) obesity, morbid obesity, GERD, PUD,  renal disease CRI, diabetes mellitus (Glucose 228), thyroid problem hypothyroidism    Musculoskeletal     (+) back pain, chronic pain,   Abdominal   (+) obese,     Abdomen: soft.  Bowel sounds: normal.   Substance History      OB/GYN          Other   arthritis,    history of cancer active    ROS/Med Hx Other: Lupus    Right sided neuropathy from knee to hip since last surgery                    Anesthesia Plan    ASA 4     MAC   (Risks and benefits discussed including risk of aspiration, recall and dental damage. All patient questions answered. Will continue with POC.)  intravenous induction     Anesthetic plan, all risks, benefits, and alternatives have been provided, discussed and informed consent has been obtained with: patient.

## 2021-11-16 NOTE — ANESTHESIA POSTPROCEDURE EVALUATION
Patient: Sue L Collett    Procedure Summary     Date: 11/16/21 Room / Location: [facility] OR 2 /  [facility]    Anesthesia Start: 1049 Anesthesia Stop:     Procedure: DILATATION AND CURETTAGE WITH DIAGNOSTIC HYSTEROSCOPY (N/A Uterus) Diagnosis:       PMB (postmenopausal bleeding)      (PMB (postmenopausal bleeding) [N95.0])    Surgeons: Karen Melo MD Provider: Paul Lambert CRNA    Anesthesia Type: MAC ASA Status: 4          Anesthesia Type: MAC    Vitals  No vitals data found for the desired time range.          Post Anesthesia Care and Evaluation    Patient location during evaluation: PHASE II  Patient participation: complete - patient participated  Level of consciousness: awake  Pain score: 0  Pain management: adequate  Airway patency: patent  Anesthetic complications: No anesthetic complications  PONV Status: none  Cardiovascular status: acceptable  Respiratory status: acceptable and face mask  Hydration status: acceptable    Comments: vsss resp spont, reflexes intact, responsive, report given to pacu nurse

## 2021-11-16 NOTE — OP NOTE
[facility] Sue L Collett  : 1960  MRN: [id number]  CSN: [id number]  Date: 2021    Operative Report    Pre-op Diagnosis:  PMB (postmenopausal bleeding) [N95.0]   Post-op Diagnosis:  Post-Op Diagnosis Codes:     * PMB (postmenopausal bleeding) [N95.0]   Procedure: Procedure(s):  DILATATION AND CURETTAGE WITH DIAGNOSTIC HYSTEROSCOPY   Surgeon: Karen Melo M.D.   Assist: Staff was responsible for performing the following activities: Retraction and their skilled assistance was necessary for the success of this case.   Anesthesia: IV sedation with 1% lidocaine paracervical block   Estimated Blood Loss: 5 mls   ABx: none   Specimens:  endometrial   Findings: Atrophic appearing endometrium with bilateral ostia identified.   Complications: none   Indications: See H&P         Description of Procedure:  After the appropriate time out and adequate dosing of her anesthesia, the patient had been prepped and draped in the usual sterile fashion.  She was placed in the dorsal lithotomy position using Brian stirrups.  The bladder had been drained with a red rubber catheter per nursing.  A weighted speculum was placed in the vagina.  The anterior lip of the cervix was grasped with a single-tooth tenaculum.  The cervix was injected at the 3 o'clock and 9 o'clock position with 1% lidocaine plain without any complications.  The cervix was then progressively dilated using Ladd dilators.  Rigid hysteroscopy was then performed with the above findings noted.  Sharp curettings were then obtained with a good cry throughout with tissue retrieved and sent for pathologic specimen.  The cervical tenaculum was removed and the cervix was noted to be hemostatic.  All instrument and sponge counts were correct at the end of the procedure.  The patient tolerated the procedure well.  There were no complications.  She was taken to the postoperative recovery room in stable condition.    Karen Melo M.D.  2021  11:16 EST

## 2021-11-19 LAB
LAB AP CASE REPORT: NORMAL
PATH REPORT.FINAL DX SPEC: NORMAL

## 2021-11-23 ENCOUNTER — OFFICE VISIT (OUTPATIENT)
Dept: OBSTETRICS AND GYNECOLOGY | Facility: CLINIC | Age: 61
End: 2021-11-23

## 2021-11-23 VITALS
HEIGHT: 66 IN | SYSTOLIC BLOOD PRESSURE: 144 MMHG | BODY MASS INDEX: 40.95 KG/M2 | DIASTOLIC BLOOD PRESSURE: 82 MMHG | WEIGHT: 254.8 LBS

## 2021-11-23 DIAGNOSIS — Z09 POSTOPERATIVE FOLLOW-UP: Primary | ICD-10-CM

## 2021-11-23 PROCEDURE — 99024 POSTOP FOLLOW-UP VISIT: CPT | Performed by: PHYSICIAN ASSISTANT

## 2021-11-23 NOTE — PROGRESS NOTES
"Subjective   Chief Complaint   Patient presents with   • Post-op     One week post-op D&C Hysteroscopy, doing well       Sue L Collett is a 61 y.o. year old  presenting to be seen for postop visit.  She is doing well 1 week postop D&C hysteroscopy for postmenopausal bleeding  Her pathology is benign and does not show any hyperplasia or malignancy  He is having normal bowel and bladder function  She has not had any vaginal bleeding since 3 to 4 days postop.    Past Medical History:   Diagnosis Date   • Allergic    • Anxiety    • Arthritis    • Asthma 2021    Reported as history - reported last use of inhaler apx 1 month ago   • Bruises easily    • Bulging lumbar disc    • Cancer ([facility])     THYROID CANCER ([facility])    • Chronic kidney disease    • Colon polyp    • Constipation    • COPD (chronic obstructive pulmonary disease) ([facility])    • DDD (degenerative disc disease), lumbosacral    • Depression    • Diabetes mellitus ([facility])    • Disease of thyroid gland    • Diverticulitis    • DVT of leg (deep venous thrombosis) ([facility])     X 4 - patient reported last DVT was early part of     • Dysphagia 2021    Reported history prior to thyroid nodule were removed - no problem at present time reported   • Elevated cholesterol    • Factor V deficiency ([facility])    • GERD (gastroesophageal reflux disease)    • H/O cardiovascular stress test     Patient reported apx  and wnl's at the time it was done   • Headache    • History of blood clots    • History of echocardiogram    • History of pneumonia    • History of transfusion     No reaction to transfusion    • Hypertension    • Lupus ([facility])     patient reported \"a mild case\"    • Nausea    • Numbness in right leg     FROM HIP TO JUST ABOVE THE KNEE   • Piercing     EARS ONLY   • PONV (postoperative nausea and vomiting) 2021    SCOPOLAMINE PATCH DIDN'T WORK   • PVD (peripheral vascular disease) ([facility])    • Sleep apnea     Patient reported had sleep study " and was told that she has mild sleep apnea but that a CPAP was not warranted   • SOB (shortness of breath) on exertion    • Stroke (HCC) 2008    MILD RIGHT SIDED WEAKNESS - reported she has difficulty holding heavy object with RUE   • Ulcer, stomach peptic    • Wears glasses     Rx glasses to read PRN   • Wears glasses     READING GLASSES ONLY        Current Outpatient Medications:   •  acetaminophen (TYLENOL) 325 MG tablet, Take 2 tablets by mouth Every 4 (Four) Hours As Needed for Mild Pain ., Disp: , Rfl:   •  acetaminophen (TYLENOL) 500 MG tablet, Take 2 tablets by mouth Every 8 (Eight) Hours As Needed for Mild Pain or Moderate Pain ., Disp: 60 tablet, Rfl: 0  •  ADVAIR DISKUS 250-50 MCG/DOSE DISKUS, Inhale 1 puff 2 (Two) Times a Day., Disp: , Rfl: 0  •  apixaban (ELIQUIS) 2.5 MG tablet tablet, Take 5 mg by mouth 2 (Two) Times a Day., Disp: , Rfl:   •  FLUoxetine (PROzac) 10 MG capsule, Take 10 mg by mouth every night at bedtime., Disp: , Rfl:   •  fluticasone (FLONASE) 50 MCG/ACT nasal spray, 1 spray into the nostril(s) as directed by provider Daily., Disp: , Rfl: 0  •  ibuprofen (ADVIL,MOTRIN) 600 MG tablet, Take 1 tablet by mouth Every 6 (Six) Hours As Needed for Mild Pain ., Disp: 60 tablet, Rfl: 0  •  Insulin Glargine (BASAGLAR KWIKPEN) 100 UNIT/ML injection pen, Inject 64 Units under the skin into the appropriate area as directed 2 (Two) Times a Day., Disp: , Rfl:   •  levothyroxine (SYNTHROID, LEVOTHROID) 150 MCG tablet, Take 150 mcg by mouth Daily., Disp: , Rfl:   •  losartan (COZAAR) 50 MG tablet, Take 50 mg by mouth Daily., Disp: , Rfl:   •  pantoprazole (PROTONIX) 20 MG EC tablet, Take 20 mg by mouth Daily., Disp: , Rfl:   •  polyethylene glycol (MIRALAX) packet, Take 17 g by mouth Daily As Needed (constipation)., Disp: , Rfl:   •  promethazine (PHENERGAN) 25 MG tablet, Take 25 mg by mouth Every 8 (Eight) Hours As Needed for Nausea or Vomiting (Headaches)., Disp: , Rfl:   •  RESTASIS 0.05 % ophthalmic  "emulsion, Administer 1 drop to both eyes Every 12 (Twelve) Hours., Disp: , Rfl:   •  Trulicity 1.5 MG/0.5ML solution pen-injector, Inject 1.5 mg under the skin into the appropriate area as directed 1 (One) Time Per Week., Disp: , Rfl:   •  vitamin D (ERGOCALCIFEROL) 98648 units capsule capsule, Take 50,000 Units by mouth 1 (One) Time Per Week., Disp: , Rfl:   •  ondansetron (ZOFRAN) 8 MG tablet, Take 1 tablet by mouth Every 8 (Eight) Hours As Needed for Nausea or Vomiting., Disp: 10 tablet, Rfl: 0   Allergies   Allergen Reactions   • Prednisone Other (See Comments)     ACUTE KIDNEY FAILURE   • Adhesive Tape Other (See Comments)     Patient reported causes skin to blister and tear.  Patient reported she is able to tolerate paper tape with no adverse reactions.     • Azithromycin Hives   • Erythromycin Hives   • Other Nausea And Vomiting     PT. REPORTS THAT SHE IS \"ALLERGIC TO ALL INSULINS EXCEPT LANTUS\"   • Red Dye Hives   • Sulfa Antibiotics Hives      Past Surgical History:   Procedure Laterality Date   • BREAST SURGERY Right     LUMPECTOMY    • COLONOSCOPY      WITH POLYP REMOVAL    • D & C HYSTEROSCOPY N/A 11/16/2021    Procedure: DILATATION AND CURETTAGE WITH DIAGNOSTIC HYSTEROSCOPY;  Surgeon: Karen Melo MD;  Location: [facility];  Service: Obstetrics/Gynecology;  Laterality: N/A;   • DILATATION AND CURETTAGE     • ENDOSCOPY     • ERCP     • FINE NEEDLE ASPIRATION  2018    THYROID    • GALLBLADDER SURGERY     • HYSTEROSCOPY     • MOUTH SURGERY      Reported 2 lower wisdom teeth extracted and other oral extractions   • SKIN BIOPSY      HAND    • THYROIDECTOMY Right 12/21/2018    Procedure: Right thyroid lobectomy with isthmusectomy;  Surgeon: Janeth Tan MD;  Location: [facility];  Service: General   • THYROIDECTOMY N/A 2/18/2019    Procedure: THYROIDECTOMY, COMPLETION;  Surgeon: Janeth Tan MD;  Location: [facility];  Service: General   • TUBAL ABDOMINAL LIGATION        Social History " "    Socioeconomic History   • Marital status:    Tobacco Use   • Smoking status: Never Smoker   • Smokeless tobacco: Never Used   • Tobacco comment: Reported exposure to second hand smoke    Vaping Use   • Vaping Use: Never used   Substance and Sexual Activity   • Alcohol use: No   • Drug use: No   • Sexual activity: Defer      Family History   Problem Relation Age of Onset   • Heart disease Father    • Heart attack Father    • Arthritis Father    • Stroke Mother    • Hypertension Mother    • Hyperlipidemia Mother    • Kidney disease Mother    • Arthritis Mother    • Cancer Mother    • Cancer Sister    • Diabetes Other    • Thyroid cancer Other    • Stroke Other        Review of Systems   Constitutional: Negative for chills, diaphoresis and fever.   Gastrointestinal: Negative.    Genitourinary: Negative for difficulty urinating, dysuria, pelvic pain, vaginal bleeding and vaginal discharge.           Objective   /82   Ht 167.6 cm (66\")   Wt 116 kg (254 lb 12.8 oz)   LMP  (LMP Unknown)   Breastfeeding No   BMI 41.13 kg/m²     Physical Exam  Constitutional:       Appearance: Normal appearance. She is well-developed and well-groomed.   Eyes:      General: Lids are normal.      Extraocular Movements: Extraocular movements intact.      Conjunctiva/sclera: Conjunctivae normal.   Skin:     General: Skin is warm and dry.      Findings: No bruising or lesion.   Neurological:      Mental Status: She is alert.   Psychiatric:         Attention and Perception: Attention normal.         Mood and Affect: Mood normal.         Speech: Speech normal.         Behavior: Behavior is cooperative.            Result Review :                   Assessment and Plan  Diagnoses and all orders for this visit:    1. Postoperative follow-up (Primary)      Patient Instructions   Follow up prn             This note was electronically signed.    Ankita Gurrola PA-C   November 23, 2021  "

## 2022-02-02 ENCOUNTER — OFFICE VISIT (OUTPATIENT)
Dept: OBSTETRICS AND GYNECOLOGY | Facility: CLINIC | Age: 62
End: 2022-02-02

## 2022-02-02 VITALS
HEIGHT: 66 IN | SYSTOLIC BLOOD PRESSURE: 140 MMHG | BODY MASS INDEX: 41.08 KG/M2 | DIASTOLIC BLOOD PRESSURE: 86 MMHG | WEIGHT: 255.6 LBS

## 2022-02-02 DIAGNOSIS — Z01.419 ENCOUNTER FOR GYNECOLOGICAL EXAMINATION WITHOUT ABNORMAL FINDING: Primary | ICD-10-CM

## 2022-02-02 DIAGNOSIS — Z12.4 SCREENING FOR CERVICAL CANCER: ICD-10-CM

## 2022-02-02 PROCEDURE — 3015F CERV CANCER SCREEN DOCD: CPT | Performed by: PHYSICIAN ASSISTANT

## 2022-02-02 PROCEDURE — G0101 CA SCREEN;PELVIC/BREAST EXAM: HCPCS | Performed by: PHYSICIAN ASSISTANT

## 2022-02-02 RX ORDER — LOSARTAN POTASSIUM 100 MG/1
100 TABLET ORAL DAILY
COMMUNITY
Start: 2022-01-19

## 2022-02-02 RX ORDER — ATORVASTATIN CALCIUM 10 MG/1
10 TABLET, FILM COATED ORAL
COMMUNITY
Start: 2022-01-19

## 2022-02-02 NOTE — PATIENT INSTRUCTIONS
Self breast exam monthly  Annual mammogram  Regular excercise  Sign for copy of mammograms from [facility]

## 2022-02-02 NOTE — PROGRESS NOTES
"Subjective   Chief Complaint   Patient presents with   • Gynecologic Exam     [facility]  MMG done -repeat in 6 months, No complaints       Sue L Collett is a 61 y.o. year old  presenting to be seen for her annual gynecological exam.   She has no complaints or concerns.  Reports she had a mammogram in November at [facility] and was told to have a follow-up mammogram in 6 months.  Mammograms have been ordered by her PCP  She is taking vitamin D 50,000 units daily by her PCP  Past Medical History:   Diagnosis Date   • Allergic    • Anxiety    • Arthritis    • Asthma 2021    Reported as history - reported last use of inhaler apx 1 month ago   • Bruises easily    • Bulging lumbar disc    • Cancer (HCC)     THYROID CANCER ([facility])    • Chronic kidney disease    • Colon polyp    • Constipation    • COPD (chronic obstructive pulmonary disease) (HCC)    • DDD (degenerative disc disease), lumbosacral    • Depression    • Diabetes mellitus (HCC)    • Disease of thyroid gland    • Diverticulitis    • DVT of leg (deep venous thrombosis) ([facility])     X 4 - patient reported last DVT was early part of     • Dysphagia 2021    Reported history prior to thyroid nodule were removed - no problem at present time reported   • Elevated cholesterol    • Factor V deficiency ([facility])    • GERD (gastroesophageal reflux disease)    • H/O cardiovascular stress test     Patient reported apx  and wnl's at the time it was done   • Headache    • History of blood clots    • History of echocardiogram    • History of pneumonia    • History of transfusion     No reaction to transfusion    • Hypertension    • Lupus (HCC)     patient reported \"a mild case\"    • Nausea    • Numbness in right leg     FROM HIP TO JUST ABOVE THE KNEE   • Piercing     EARS ONLY   • PONV (postoperative nausea and vomiting) 2021    SCOPOLAMINE PATCH DIDN'T WORK   • PVD (peripheral vascular " disease) ([facility])    • Sleep apnea     Patient reported had sleep study and was told that she has mild sleep apnea but that a CPAP was not warranted   • SOB (shortness of breath) on exertion    • Stroke ([facility]) 2008    MILD RIGHT SIDED WEAKNESS - reported she has difficulty holding heavy object with RUE   • Ulcer, stomach peptic    • Wears glasses     Rx glasses to read PRN   • Wears glasses     READING GLASSES ONLY        Current Outpatient Medications:   •  acetaminophen (TYLENOL) 325 MG tablet, Take 2 tablets by mouth Every 4 (Four) Hours As Needed for Mild Pain ., Disp: , Rfl:   •  acetaminophen (TYLENOL) 500 MG tablet, Take 2 tablets by mouth Every 8 (Eight) Hours As Needed for Mild Pain or Moderate Pain ., Disp: 60 tablet, Rfl: 0  •  ADVAIR DISKUS 250-50 MCG/DOSE DISKUS, Inhale 1 puff 2 (Two) Times a Day., Disp: , Rfl: 0  •  apixaban (ELIQUIS) 2.5 MG tablet tablet, Take 5 mg by mouth 2 (Two) Times a Day., Disp: , Rfl:   •  atorvastatin (LIPITOR) 10 MG tablet, Take 10 mg by mouth every night at bedtime., Disp: , Rfl:   •  FLUoxetine (PROzac) 10 MG capsule, Take 10 mg by mouth every night at bedtime., Disp: , Rfl:   •  fluticasone (FLONASE) 50 MCG/ACT nasal spray, 1 spray into the nostril(s) as directed by provider Daily., Disp: , Rfl: 0  •  Insulin Glargine (BASAGLAR KWIKPEN) 100 UNIT/ML injection pen, Inject 64 Units under the skin into the appropriate area as directed 2 (Two) Times a Day., Disp: , Rfl:   •  levothyroxine (SYNTHROID, LEVOTHROID) 150 MCG tablet, Take 150 mcg by mouth Daily., Disp: , Rfl:   •  losartan (COZAAR) 100 MG tablet, Take 100 mg by mouth Daily., Disp: , Rfl:   •  pantoprazole (PROTONIX) 20 MG EC tablet, Take 20 mg by mouth Daily., Disp: , Rfl:   •  polyethylene glycol (MIRALAX) packet, Take 17 g by mouth Daily As Needed (constipation)., Disp: , Rfl:   •  promethazine (PHENERGAN) 25 MG tablet, Take 25 mg by mouth Every 8 (Eight) Hours As Needed for Nausea or Vomiting (Headaches)., Disp: ,  "Rfl:   •  RESTASIS 0.05 % ophthalmic emulsion, Administer 1 drop to both eyes Every 12 (Twelve) Hours., Disp: , Rfl:   •  Trulicity 1.5 MG/0.5ML solution pen-injector, Inject 1.5 mg under the skin into the appropriate area as directed 1 (One) Time Per Week., Disp: , Rfl:   •  vitamin D (ERGOCALCIFEROL) 46122 units capsule capsule, Take 50,000 Units by mouth 1 (One) Time Per Week., Disp: , Rfl:    Allergies   Allergen Reactions   • Prednisone Other (See Comments)     ACUTE KIDNEY FAILURE   • Adhesive Tape Other (See Comments)     Patient reported causes skin to blister and tear.  Patient reported she is able to tolerate paper tape with no adverse reactions.     • Azithromycin Hives   • Erythromycin Hives   • Other Nausea And Vomiting     PT. REPORTS THAT SHE IS \"ALLERGIC TO ALL INSULINS EXCEPT LANTUS\"   • Red Dye Hives   • Sulfa Antibiotics Hives      Past Surgical History:   Procedure Laterality Date   • BREAST SURGERY Right     LUMPECTOMY    • COLONOSCOPY      WITH POLYP REMOVAL    • D & C HYSTEROSCOPY N/A 11/16/2021    Procedure: DILATATION AND CURETTAGE WITH DIAGNOSTIC HYSTEROSCOPY;  Surgeon: Karen Melo MD;  Location: [facility];  Service: Obstetrics/Gynecology;  Laterality: N/A;   • DILATATION AND CURETTAGE     • ENDOSCOPY     • ERCP     • FINE NEEDLE ASPIRATION  2018    THYROID    • GALLBLADDER SURGERY     • HYSTEROSCOPY     • MOUTH SURGERY      Reported 2 lower wisdom teeth extracted and other oral extractions   • SKIN BIOPSY      HAND    • THYROIDECTOMY Right 12/21/2018    Procedure: Right thyroid lobectomy with isthmusectomy;  Surgeon: Janeth Tan MD;  Location: [facility] OR;  Service: General   • THYROIDECTOMY N/A 2/18/2019    Procedure: THYROIDECTOMY, COMPLETION;  Surgeon: Janeth Tan MD;  Location: [facility];  Service: General   • TUBAL ABDOMINAL LIGATION        Social History     Socioeconomic History   • Marital status:    Tobacco Use   • Smoking status: Never Smoker   • Smokeless " "tobacco: Never Used   • Tobacco comment: Reported exposure to second hand smoke    Vaping Use   • Vaping Use: Never used   Substance and Sexual Activity   • Alcohol use: No   • Drug use: No   • Sexual activity: Not Currently     Birth control/protection: Post-menopausal      Family History   Problem Relation Age of Onset   • Heart disease Father    • Heart attack Father    • Arthritis Father    • Stroke Mother    • Hypertension Mother    • Hyperlipidemia Mother    • Kidney disease Mother    • Arthritis Mother    • Cancer Mother    • Cancer Sister    • Diabetes Other    • Thyroid cancer Other    • Stroke Other        Review of Systems   Constitutional: Negative for chills, diaphoresis and fever.   Gastrointestinal: Negative.    Genitourinary: Negative for difficulty urinating, dysuria, pelvic pain and vaginal bleeding.           Objective   /86   Ht 167.6 cm (66\")   Wt 116 kg (255 lb 9.6 oz)   LMP  (LMP Unknown)   Breastfeeding No   BMI 41.25 kg/m²     Physical Exam  Constitutional:       Appearance: Normal appearance. She is well-developed and well-groomed.   Eyes:      General: Lids are normal.      Extraocular Movements: Extraocular movements intact.      Conjunctiva/sclera: Conjunctivae normal.   Neck:      Thyroid: No thyroid mass or thyromegaly.   Chest:   Breasts: Breasts are symmetrical.      Right: No inverted nipple, mass, nipple discharge, skin change, tenderness or axillary adenopathy.      Left: No inverted nipple, mass, nipple discharge, skin change, tenderness or axillary adenopathy.       Abdominal:      General: There is no distension.      Palpations: Abdomen is soft. There is no hepatomegaly or splenomegaly.      Tenderness: There is no abdominal tenderness.   Genitourinary:     Exam position: Lithotomy position.      Labia:         Right: No rash, tenderness or lesion.         Left: No rash, tenderness or lesion.       Urethra: No prolapse, urethral pain, urethral swelling or urethral " lesion.      Vagina: No vaginal discharge, tenderness or lesions.      Cervix: No cervical motion tenderness, discharge, friability or lesion.      Uterus: Not enlarged and not tender.       Adnexa:         Right: No mass or tenderness.          Left: No mass or tenderness.     Musculoskeletal:      Cervical back: Neck supple.   Lymphadenopathy:      Upper Body:      Right upper body: No axillary adenopathy.      Left upper body: No axillary adenopathy.   Skin:     General: Skin is warm and dry.      Findings: No lesion.   Neurological:      General: No focal deficit present.      Mental Status: She is alert and oriented to person, place, and time.   Psychiatric:         Attention and Perception: Attention normal.         Mood and Affect: Mood normal.         Speech: Speech normal.         Behavior: Behavior normal.         Thought Content: Thought content normal.            Result Review :                   Assessment and Plan  Diagnoses and all orders for this visit:    1. Encounter for gynecological examination without abnormal finding (Primary)    2. Screening for cervical cancer  -     Pap IG, Rfx HPV ASCU; Future      Patient Instructions   Self breast exam monthly  Annual mammogram  Regular excercise  Sign for copy of mammograms from [facility]             This note was electronically signed.    Ankita Gurrola PA-C   February 2, 2022

## 2022-02-15 DIAGNOSIS — Z12.4 SCREENING FOR CERVICAL CANCER: ICD-10-CM

## 2022-02-15 NOTE — PROGRESS NOTES
Please inform patient her Pap smear has returned showing endometrial cells present.  I need her to return for endometrial biopsy and pelvic ultrasound for further evaluation.

## 2022-03-02 ENCOUNTER — OFFICE VISIT (OUTPATIENT)
Dept: OBSTETRICS AND GYNECOLOGY | Facility: CLINIC | Age: 62
End: 2022-03-02

## 2022-03-02 VITALS
SYSTOLIC BLOOD PRESSURE: 140 MMHG | DIASTOLIC BLOOD PRESSURE: 80 MMHG | WEIGHT: 253 LBS | BODY MASS INDEX: 40.66 KG/M2 | HEIGHT: 66 IN

## 2022-03-02 DIAGNOSIS — R87.618 UNEXPLAINED ENDOMETRIAL CELLS ON CERVICAL PAP SMEAR: Primary | ICD-10-CM

## 2022-03-02 DIAGNOSIS — R92.8 OTHER ABNORMAL AND INCONCLUSIVE FINDINGS ON DIAGNOSTIC IMAGING OF BREAST: ICD-10-CM

## 2022-03-02 DIAGNOSIS — Z87.898 H/O ABNORMAL MAMMOGRAM: ICD-10-CM

## 2022-03-02 PROCEDURE — 58100 BIOPSY OF UTERUS LINING: CPT | Performed by: PHYSICIAN ASSISTANT

## 2022-03-02 PROCEDURE — 99213 OFFICE O/P EST LOW 20 MIN: CPT | Performed by: PHYSICIAN ASSISTANT

## 2022-03-02 NOTE — PROGRESS NOTES
Endometrial Biopsy    Date of procedure:  3/2/2022    Indication:                                     Endometrial cells on pap    Procedure documentation:    After informed consent obtained and all questions answered, the patient was placed in lithotomy position.  The cervix was grasped anterior at the 12 o'clock position.  The cavity sounded to 8 centimeters.  An endometrial biopsy specimen was obtained with multiple passes.  The tissue was sent for permanent histopathologic evaluation.  Tenaculum was removed from the cervix with scant bleeding. Patient tolerated the procedure well. EBL minimal.    Post procedure instructions: She was instructed to call us in 1 week's time if she has not heard from us otherwise.  If there is any significant pelvic pain,  fever,  or excessive bleeding she is to call immediately.    Follow up  prn    This note was electronically signed.    Ankita Gurrola PA-C  March 2, 2022

## 2022-03-02 NOTE — PROGRESS NOTES
Subjective   Chief Complaint   Patient presents with   • Follow-up     Patient is here today for TVS and endometrial biopsy       Sue L Collett is a 61 y.o. year old  presenting to be seen for pelvic ultrasound and endometrial biopsy.  Patient had been seen recently for annual exam and her Pap smear returned showing endometrial cells present.  Patient has not had any vaginal bleeding.  Pelvic ultrasound is done today and reviewed with patient.  Uterus is normal size with multiple small fibroids with the largest fibroid being 12 mm.  Her endometrium is 6.3 mm.  Both of her ovaries appear normal and there is no free fluid or adnexal masses.  Endometrial biopsy is done per separate note.  Also copies of patient's previous diagnostic mammogram and breast ultrasound have been received and reviewed.  She is in need of follow-up diagnostic mammogram in April.  Previous imaging was done at [facility] in [place]. Would like order for follow up diagnostic mammogram  Past Medical History:   Diagnosis Date   • Allergic    • Anxiety    • Arthritis    • Asthma 2021    Reported as history - reported last use of inhaler apx 1 month ago   • Bruises easily    • Bulging lumbar disc    • Cancer (HCC)     THYROID CANCER ([facility])    • Chronic kidney disease    • Colon polyp    • Constipation    • COPD (chronic obstructive pulmonary disease) (HCC)    • DDD (degenerative disc disease), lumbosacral    • Depression    • Diabetes mellitus (HCC)    • Disease of thyroid gland    • Diverticulitis    • DVT of leg (deep venous thrombosis) (HCC)     X 4 - patient reported last DVT was early part of     • Dysphagia 2021    Reported history prior to thyroid nodule were removed - no problem at present time reported   • Elevated cholesterol    • Factor V deficiency (HCC)    • GERD (gastroesophageal reflux disease)    • H/O cardiovascular stress test     Patient reported apx  and wnl's at the time it was done   •  "Headache    • History of blood clots    • History of echocardiogram    • History of pneumonia 2015   • History of transfusion     No reaction to transfusion    • Hypertension    • Lupus (HCC)     patient reported \"a mild case\"    • Nausea    • Numbness in right leg     FROM HIP TO JUST ABOVE THE KNEE   • Piercing     EARS ONLY   • PONV (postoperative nausea and vomiting) 11/09/2021    SCOPOLAMINE PATCH DIDN'T WORK   • PVD (peripheral vascular disease) ([facility])    • Sleep apnea     Patient reported had sleep study and was told that she has mild sleep apnea but that a CPAP was not warranted   • SOB (shortness of breath) on exertion    • Stroke ([facility]) 2008    MILD RIGHT SIDED WEAKNESS - reported she has difficulty holding heavy object with RUE   • Ulcer, stomach peptic    • Wears glasses     Rx glasses to read PRN   • Wears glasses     READING GLASSES ONLY        Current Outpatient Medications:   •  acetaminophen (TYLENOL) 500 MG tablet, Take 2 tablets by mouth Every 8 (Eight) Hours As Needed for Mild Pain or Moderate Pain ., Disp: 60 tablet, Rfl: 0  •  ADVAIR DISKUS 250-50 MCG/DOSE DISKUS, Inhale 1 puff 2 (Two) Times a Day., Disp: , Rfl: 0  •  apixaban (ELIQUIS) 2.5 MG tablet tablet, Take 5 mg by mouth 2 (Two) Times a Day., Disp: , Rfl:   •  atorvastatin (LIPITOR) 10 MG tablet, Take 10 mg by mouth every night at bedtime., Disp: , Rfl:   •  FLUoxetine (PROzac) 10 MG capsule, Take 10 mg by mouth every night at bedtime., Disp: , Rfl:   •  fluticasone (FLONASE) 50 MCG/ACT nasal spray, 1 spray into the nostril(s) as directed by provider Daily., Disp: , Rfl: 0  •  Insulin Glargine (BASAGLAR KWIKPEN) 100 UNIT/ML injection pen, Inject 64 Units under the skin into the appropriate area as directed 2 (Two) Times a Day., Disp: , Rfl:   •  levothyroxine (SYNTHROID, LEVOTHROID) 150 MCG tablet, Take 150 mcg by mouth Daily., Disp: , Rfl:   •  losartan (COZAAR) 100 MG tablet, Take 100 mg by mouth Daily., Disp: , Rfl:   •  pantoprazole " "(PROTONIX) 20 MG EC tablet, Take 20 mg by mouth Daily., Disp: , Rfl:   •  polyethylene glycol (MIRALAX) packet, Take 17 g by mouth Daily As Needed (constipation)., Disp: , Rfl:   •  promethazine (PHENERGAN) 25 MG tablet, Take 25 mg by mouth Every 8 (Eight) Hours As Needed for Nausea or Vomiting (Headaches)., Disp: , Rfl:   •  RESTASIS 0.05 % ophthalmic emulsion, Administer 1 drop to both eyes Every 12 (Twelve) Hours., Disp: , Rfl:   •  Trulicity 1.5 MG/0.5ML solution pen-injector, Inject 1.5 mg under the skin into the appropriate area as directed 1 (One) Time Per Week., Disp: , Rfl:   •  vitamin D (ERGOCALCIFEROL) 71714 units capsule capsule, Take 50,000 Units by mouth 1 (One) Time Per Week., Disp: , Rfl:    Allergies   Allergen Reactions   • Prednisone Other (See Comments)     ACUTE KIDNEY FAILURE   • Adhesive Tape Other (See Comments)     Patient reported causes skin to blister and tear.  Patient reported she is able to tolerate paper tape with no adverse reactions.     • Azithromycin Hives   • Erythromycin Hives   • Other Nausea And Vomiting     PT. REPORTS THAT SHE IS \"ALLERGIC TO ALL INSULINS EXCEPT LANTUS\"   • Red Dye Hives   • Sulfa Antibiotics Hives      Past Surgical History:   Procedure Laterality Date   • BREAST SURGERY Right     LUMPECTOMY    • COLONOSCOPY      WITH POLYP REMOVAL    • D & C HYSTEROSCOPY N/A 11/16/2021    Procedure: DILATATION AND CURETTAGE WITH DIAGNOSTIC HYSTEROSCOPY;  Surgeon: Karen Melo MD;  Location: [facility];  Service: Obstetrics/Gynecology;  Laterality: N/A;   • DILATATION AND CURETTAGE     • ENDOSCOPY     • ERCP     • FINE NEEDLE ASPIRATION  2018    THYROID    • GALLBLADDER SURGERY     • HYSTEROSCOPY     • MOUTH SURGERY      Reported 2 lower wisdom teeth extracted and other oral extractions   • SKIN BIOPSY      HAND    • THYROIDECTOMY Right 12/21/2018    Procedure: Right thyroid lobectomy with isthmusectomy;  Surgeon: Janeth Tan MD;  Location: [facility];  Service: " "General   • THYROIDECTOMY N/A 2/18/2019    Procedure: THYROIDECTOMY, COMPLETION;  Surgeon: Janeth Tan MD;  Location: [facility];  Service: General   • TUBAL ABDOMINAL LIGATION        Social History     Socioeconomic History   • Marital status:    Tobacco Use   • Smoking status: Never Smoker   • Smokeless tobacco: Never Used   • Tobacco comment: Reported exposure to second hand smoke    Vaping Use   • Vaping Use: Never used   Substance and Sexual Activity   • Alcohol use: No   • Drug use: No   • Sexual activity: Not Currently     Birth control/protection: Post-menopausal      Family History   Problem Relation Age of Onset   • Heart disease Father    • Heart attack Father    • Arthritis Father    • Stroke Mother    • Hypertension Mother    • Hyperlipidemia Mother    • Kidney disease Mother    • Arthritis Mother    • Cancer Mother    • Cancer Sister    • Diabetes Other    • Thyroid cancer Other    • Stroke Other        Review of Systems   Constitutional: Negative for chills, diaphoresis and fever.   Gastrointestinal: Negative.    Genitourinary: Negative for difficulty urinating, dysuria and vaginal bleeding.           Objective   /80   Ht 167.6 cm (66\")   Wt 115 kg (253 lb)   LMP  (LMP Unknown)   Breastfeeding No   BMI 40.84 kg/m²     Physical Exam       Result Review :                   Assessment and Plan  Diagnoses and all orders for this visit:    1. Unexplained endometrial cells on cervical Pap smear (Primary)    2. H/O abnormal mammogram  -     Mammo Diagnostic Digital Tomosynthesis Bilateral With CAD; Future    3. Other abnormal and inconclusive findings on diagnostic imaging of breast   -     Mammo Diagnostic Digital Tomosynthesis Bilateral With CAD; Future      Patient Instructions   Patient will be contacted with results of endometrial biopsy in about 1 week.  If biopsy is negative follow-up in 1 year for annual exam.             This note was electronically signed.    Ankita STEWARD" MAMADOU Gurrola   March 2, 2022

## 2022-03-02 NOTE — PATIENT INSTRUCTIONS
Patient will be contacted with results of endometrial biopsy in about 1 week.  If biopsy is negative follow-up in 1 year for annual exam.

## 2022-03-04 ENCOUNTER — TELEPHONE (OUTPATIENT)
Dept: OBSTETRICS AND GYNECOLOGY | Facility: CLINIC | Age: 62
End: 2022-03-04

## 2022-03-04 NOTE — TELEPHONE ENCOUNTER
Being 2 days out from the procedure she should not be having any sharp severe pain however she could be having some cramping from the biopsy plus the ultrasound she did.  Nausea should not be coming from the procedures however.  She may need to contact her PCP if this does not improve in the next 1 to 2 days.

## 2022-03-04 NOTE — TELEPHONE ENCOUNTER
Patient called in saying she has been having some nausea and sharp pain her lower right side all the way up to her ribs since her appointment on Wednesday she had a scan and endometrial biopsy done that day.  Was wanting to know if that was normal?

## 2022-03-09 DIAGNOSIS — R87.618 UNEXPLAINED ENDOMETRIAL CELLS ON CERVICAL PAP SMEAR: ICD-10-CM

## 2022-03-09 NOTE — PROGRESS NOTES
Please inform patient endometrial biopsy is OK. Follow up in one year unless she has any bleeding or other problems.

## 2022-12-09 ENCOUNTER — HOSPITAL ENCOUNTER (OUTPATIENT)
Facility: HOSPITAL | Age: 62
Discharge: HOME | End: 2022-12-09
Payer: MEDICARE

## 2022-12-09 LAB
ALBUMIN SERPL-MCNC: 3.7 G/DL (ref 3.4–4.8)
ANION GAP SERPL CALCULATED.3IONS-SCNC: 8 MMOL/L (ref 3–16)
BUN BLDV-MCNC: 12 MG/DL (ref 6–20)
CALCIUM SERPL-MCNC: 9.2 MG/DL (ref 8.5–10.5)
CHLORIDE BLD-SCNC: 102 MMOL/L (ref 98–107)
CO2: 28 MMOL/L (ref 20–30)
CREAT SERPL-MCNC: 1.2 MG/DL (ref 0.4–1.2)
GFR SERPL CREATININE-BSD FRML MDRD: 50 ML/MIN/{1.73_M2}
GLUCOSE BLD-MCNC: 212 MG/DL (ref 74–106)
PHOSPHORUS: 3.3 MG/DL (ref 2.5–4.5)
POTASSIUM SERPL-SCNC: 4.5 MMOL/L (ref 3.4–5.1)
RHEUMATOID FACTOR: 28.2 IU/ML
SEDIMENTATION RATE, ERYTHROCYTE: 2 MM/HR (ref 0–30)
SODIUM BLD-SCNC: 138 MMOL/L (ref 136–145)

## 2022-12-09 PROCEDURE — 86431 RHEUMATOID FACTOR QUANT: CPT

## 2022-12-09 PROCEDURE — 80069 RENAL FUNCTION PANEL: CPT

## 2022-12-09 PROCEDURE — 36415 COLL VENOUS BLD VENIPUNCTURE: CPT

## 2022-12-09 PROCEDURE — 86038 ANTINUCLEAR ANTIBODIES: CPT

## 2022-12-09 PROCEDURE — 86039 ANTINUCLEAR ANTIBODIES (ANA): CPT

## 2022-12-09 PROCEDURE — 85652 RBC SED RATE AUTOMATED: CPT

## 2022-12-10 LAB — ANTI-NUCLEAR ANTIBODY (ANA): POSITIVE

## 2023-02-03 ENCOUNTER — OFFICE VISIT (OUTPATIENT)
Dept: OBSTETRICS AND GYNECOLOGY | Facility: CLINIC | Age: 63
End: 2023-02-03
Payer: MEDICARE

## 2023-02-03 VITALS
HEIGHT: 66 IN | SYSTOLIC BLOOD PRESSURE: 126 MMHG | WEIGHT: 254.4 LBS | DIASTOLIC BLOOD PRESSURE: 70 MMHG | BODY MASS INDEX: 40.88 KG/M2

## 2023-02-03 DIAGNOSIS — Z12.31 ENCOUNTER FOR SCREENING MAMMOGRAM FOR MALIGNANT NEOPLASM OF BREAST: ICD-10-CM

## 2023-02-03 DIAGNOSIS — Z01.419 WELL WOMAN EXAM WITH ROUTINE GYNECOLOGICAL EXAM: Primary | ICD-10-CM

## 2023-02-03 DIAGNOSIS — N95.0 POST-MENOPAUSAL BLEEDING: ICD-10-CM

## 2023-02-03 PROCEDURE — 99396 PREV VISIT EST AGE 40-64: CPT | Performed by: PHYSICIAN ASSISTANT

## 2023-02-03 PROCEDURE — 99213 OFFICE O/P EST LOW 20 MIN: CPT | Performed by: PHYSICIAN ASSISTANT

## 2023-02-03 RX ORDER — FLUOXETINE HYDROCHLORIDE 40 MG/1
40 CAPSULE ORAL
COMMUNITY
Start: 2023-01-19

## 2023-02-03 RX ORDER — APIXABAN 5 MG/1
1 TABLET, FILM COATED ORAL EVERY 12 HOURS SCHEDULED
COMMUNITY
Start: 2023-01-12

## 2023-02-03 RX ORDER — HYDROCHLOROTHIAZIDE 12.5 MG/1
TABLET ORAL
COMMUNITY
Start: 2023-02-02

## 2023-02-03 NOTE — PATIENT INSTRUCTIONS
Self breast exam monthly  Annual mammogram  vit D 2000 mg daily  Regular exercise  RTO 2 weeks for pelvic ultrasound to assess endometrium. If endometrium thickened must consider repeat endometrial sampling

## 2023-02-03 NOTE — PROGRESS NOTES
"Subjective   Chief Complaint   Patient presents with   • Gynecologic Exam     No pap, Last pap done 22, MMG is due, Patient states she had some spotting in October       Sue L Collett is a 62 y.o. year old  presenting to be seen for her annual gynecological exam.   Patient reports that she had some very light spotting for 1 day in 2022.  She had had a D&C for postmenopausal bleeding 2021 with benign findings.  Had an endometrial biopsy for another episode of postmenopausal bleeding in 2022 benign findings.  Last Pap 2022 showed normal cervical cytology but endometrial cells were present.  On Eloquis    Past Medical History:   Diagnosis Date   • Allergic    • Anxiety    • Arthritis    • Asthma 2021    Reported as history - reported last use of inhaler apx 1 month ago   • Bruises easily    • Bulging lumbar disc    • Cancer (HCC)     THYROID CANCER (MALIGNANCY)    • Chronic kidney disease    • Colon polyp    • Constipation    • COPD (chronic obstructive pulmonary disease) (HCC)    • DDD (degenerative disc disease), lumbosacral    • Depression    • Diabetes mellitus (HCC)    • Disease of thyroid gland    • Diverticulitis    • DVT of leg (deep venous thrombosis) (HCC)     X 4 - patient reported last DVT was early part of     • Dysphagia 2021    Reported history prior to thyroid nodule were removed - no problem at present time reported   • Elevated cholesterol    • Factor V deficiency (HCC)    • GERD (gastroesophageal reflux disease)    • H/O cardiovascular stress test     Patient reported apx 2016 and wnl's at the time it was done   • Headache    • History of blood clots    • History of echocardiogram    • History of pneumonia    • History of transfusion     No reaction to transfusion    • Hypertension    • Lupus (HCC)     patient reported \"a mild case\"    • Nausea    • Numbness in right leg     FROM HIP TO JUST ABOVE THE KNEE   • Piercing     EARS ONLY   • " PONV (postoperative nausea and vomiting) 11/09/2021    SCOPOLAMINE PATCH DIDN'T WORK   • PVD (peripheral vascular disease) ([facility])    • Sleep apnea     Patient reported had sleep study and was told that she has mild sleep apnea but that a CPAP was not warranted   • SOB (shortness of breath) on exertion    • Stroke ([facility]) 2008    MILD RIGHT SIDED WEAKNESS - reported she has difficulty holding heavy object with RUE   • Ulcer, stomach peptic    • Wears glasses     Rx glasses to read PRN   • Wears glasses     READING GLASSES ONLY        Current Outpatient Medications:   •  acetaminophen (TYLENOL) 500 MG tablet, Take 2 tablets by mouth Every 8 (Eight) Hours As Needed for Mild Pain or Moderate Pain ., Disp: 60 tablet, Rfl: 0  •  ADVAIR DISKUS 250-50 MCG/DOSE DISKUS, Inhale 1 puff 2 (Two) Times a Day., Disp: , Rfl: 0  •  apixaban (ELIQUIS) 2.5 MG tablet tablet, Take 5 mg by mouth 2 (Two) Times a Day., Disp: , Rfl:   •  atorvastatin (LIPITOR) 10 MG tablet, Take 10 mg by mouth every night at bedtime., Disp: , Rfl:   •  Eliquis 5 MG tablet tablet, Take 1 tablet by mouth Every 12 (Twelve) Hours., Disp: , Rfl:   •  FLUoxetine (PROzac) 40 MG capsule, Take 40 mg by mouth every night at bedtime., Disp: , Rfl:   •  fluticasone (FLONASE) 50 MCG/ACT nasal spray, 1 spray into the nostril(s) as directed by provider Daily., Disp: , Rfl: 0  •  hydroCHLOROthiazide (HYDRODIURIL) 12.5 MG tablet, , Disp: , Rfl:   •  Insulin Glargine (BASAGLAR KWIKPEN) 100 UNIT/ML injection pen, Inject 64 Units under the skin into the appropriate area as directed 2 (Two) Times a Day., Disp: , Rfl:   •  levothyroxine (SYNTHROID, LEVOTHROID) 150 MCG tablet, Take 150 mcg by mouth Daily., Disp: , Rfl:   •  losartan (COZAAR) 100 MG tablet, Take 100 mg by mouth Daily., Disp: , Rfl:   •  pantoprazole (PROTONIX) 20 MG EC tablet, Take 20 mg by mouth Daily., Disp: , Rfl:   •  polyethylene glycol (MIRALAX) packet, Take 17 g by mouth Daily As Needed (constipation)., Disp: ,  "Rfl:   •  RESTASIS 0.05 % ophthalmic emulsion, Administer 1 drop to both eyes Every 12 (Twelve) Hours., Disp: , Rfl:   •  Trulicity 1.5 MG/0.5ML solution pen-injector, Inject 1.5 mg under the skin into the appropriate area as directed 1 (One) Time Per Week., Disp: , Rfl:   •  vitamin D (ERGOCALCIFEROL) 48694 units capsule capsule, Take 50,000 Units by mouth 1 (One) Time Per Week., Disp: , Rfl:    Allergies   Allergen Reactions   • Prednisone Other (See Comments)     ACUTE KIDNEY FAILURE   • Adhesive Tape Other (See Comments)     Patient reported causes skin to blister and tear.  Patient reported she is able to tolerate paper tape with no adverse reactions.     • Azithromycin Hives   • Erythromycin Hives   • Other Nausea And Vomiting     PT. REPORTS THAT SHE IS \"ALLERGIC TO ALL INSULINS EXCEPT LANTUS\"   • Red Dye Hives   • Sulfa Antibiotics Hives      Past Surgical History:   Procedure Laterality Date   • BREAST SURGERY Right     LUMPECTOMY    • COLONOSCOPY      WITH POLYP REMOVAL    • D & C HYSTEROSCOPY N/A 11/16/2021    Procedure: DILATATION AND CURETTAGE WITH DIAGNOSTIC HYSTEROSCOPY;  Surgeon: Karen Melo MD;  Location: [facility];  Service: Obstetrics/Gynecology;  Laterality: N/A;   • DILATATION AND CURETTAGE     • ENDOSCOPY     • ERCP     • FINE NEEDLE ASPIRATION  2018    THYROID    • GALLBLADDER SURGERY     • HYSTEROSCOPY     • MOUTH SURGERY      Reported 2 lower wisdom teeth extracted and other oral extractions   • SKIN BIOPSY      HAND    • THYROIDECTOMY Right 12/21/2018    Procedure: Right thyroid lobectomy with isthmusectomy;  Surgeon: Janeth Tan MD;  Location: [facility] OR;  Service: General   • THYROIDECTOMY N/A 2/18/2019    Procedure: THYROIDECTOMY, COMPLETION;  Surgeon: Janeth Tan MD;  Location: [facility];  Service: General   • TUBAL ABDOMINAL LIGATION        Social History     Socioeconomic History   • Marital status:    Tobacco Use   • Smoking status: Never   • Smokeless tobacco: " "Never   • Tobacco comments:     Reported exposure to second hand smoke    Vaping Use   • Vaping Use: Never used   Substance and Sexual Activity   • Alcohol use: No   • Drug use: No   • Sexual activity: Not Currently     Birth control/protection: Post-menopausal      Family History   Problem Relation Age of Onset   • Heart disease Father    • Heart attack Father    • Arthritis Father    • Stroke Mother    • Hypertension Mother    • Hyperlipidemia Mother    • Kidney disease Mother    • Arthritis Mother    • Cancer Mother    • Cancer Sister    • Diabetes Other    • Thyroid cancer Other    • Stroke Other        Review of Systems   Constitutional: Negative for chills, diaphoresis and fever.   Gastrointestinal: Negative.    Genitourinary: Negative for difficulty urinating, dysuria and pelvic pain.           Objective   /70   Ht 167.6 cm (66\")   Wt 115 kg (254 lb 6.4 oz)   LMP  (LMP Unknown)   BMI 41.06 kg/m²     Physical Exam  Exam conducted with a chaperone present.   Constitutional:       Appearance: Normal appearance. She is well-developed and well-groomed.   Eyes:      General: Lids are normal.      Extraocular Movements: Extraocular movements intact.      Conjunctiva/sclera: Conjunctivae normal.   Chest:   Breasts:     Breasts are symmetrical.      Right: No inverted nipple, mass, nipple discharge, skin change or tenderness.      Left: No inverted nipple, mass, nipple discharge, skin change or tenderness.   Abdominal:      Palpations: Abdomen is soft.      Tenderness: There is no abdominal tenderness.   Genitourinary:     Labia:         Right: No rash, tenderness or lesion.         Left: No rash, tenderness or lesion.       Urethra: No prolapse, urethral pain, urethral swelling or urethral lesion.      Vagina: No vaginal discharge, tenderness or lesions.      Cervix: No cervical motion tenderness, discharge, friability or lesion.      Uterus: Not enlarged and not tender.       Adnexa:         Right: No mass " or tenderness.          Left: No mass or tenderness.        Rectum: No external hemorrhoid.      Comments: Urethral meatus without lesions or discharge   Lymphadenopathy:      Upper Body:      Right upper body: No axillary adenopathy.      Left upper body: No axillary adenopathy.   Skin:     General: Skin is warm and dry.      Findings: No bruising or lesion.   Neurological:      General: No focal deficit present.      Mental Status: She is alert and oriented to person, place, and time.   Psychiatric:         Attention and Perception: Attention normal.         Mood and Affect: Mood normal.         Speech: Speech normal.         Behavior: Behavior is cooperative.            Result Review :           Tissue Pathology Exam (11/16/2021 11:13)  Tissue Pathology Exam (03/02/2022)  Pap IG, Rfx HPV ASCU (02/02/2022)          Assessment and Plan  Diagnoses and all orders for this visit:    1. Well woman exam with routine gynecological exam (Primary)    2. Encounter for screening mammogram for malignant neoplasm of breast  -     Mammo Screening Bilateral With CAD; Future    3. Post-menopausal bleeding  -     US Non-ob Transvaginal      Patient Instructions   Self breast exam monthly  Annual mammogram  vit D 2000 mg daily  Regular exercise  RTO 2 weeks for pelvic ultrasound to assess endometrium. If endometrium thickened must consider repeat endometrial sampling                 This note was electronically signed.    Ankita Gurrola PA-C   February 3, 2023

## 2023-02-23 ENCOUNTER — DOCUMENTATION (OUTPATIENT)
Dept: OBSTETRICS AND GYNECOLOGY | Facility: CLINIC | Age: 63
End: 2023-02-23
Payer: MEDICARE

## 2023-02-23 NOTE — PROGRESS NOTES
Patient is informed of results of pelvic ultrasound from yesterday.  Endometrium 6 mm.  Bilateral ovaries are normal with vascular flow.  No free fluid noted.  Patient reports that she has had no further vaginal spotting since October.  She had D&C a year and a half ago with benign findings and endometrial biopsy 11 months ago with benign findings.  With thin endometrium and no further spotting we will observe for any future bleeding or spotting.  Patient is advised to call or return to the office if she sees any further vaginal bleeding.  Patient voices understanding and states she is comfortable with this approach.

## 2023-02-27 ENCOUNTER — HOSPITAL ENCOUNTER (OUTPATIENT)
Dept: MAMMOGRAPHY | Facility: HOSPITAL | Age: 63
Discharge: HOME | End: 2023-02-27
Payer: MEDICARE

## 2023-02-27 VITALS — WEIGHT: 254 LBS | BODY MASS INDEX: 40.82 KG/M2 | HEIGHT: 66 IN

## 2023-02-27 DIAGNOSIS — N63.10 MASS OF RIGHT BREAST, UNSPECIFIED QUADRANT: ICD-10-CM

## 2023-02-27 DIAGNOSIS — R92.8 ABNORMAL MAMMOGRAM: ICD-10-CM

## 2023-02-27 PROCEDURE — G0279 TOMOSYNTHESIS, MAMMO: HCPCS

## 2023-04-26 ENCOUNTER — OFFICE VISIT (OUTPATIENT)
Dept: PRIMARY CARE CLINIC | Age: 63
End: 2023-04-26
Payer: MEDICARE

## 2023-04-26 VITALS
DIASTOLIC BLOOD PRESSURE: 82 MMHG | WEIGHT: 248.6 LBS | HEART RATE: 71 BPM | RESPIRATION RATE: 16 BRPM | HEIGHT: 66 IN | SYSTOLIC BLOOD PRESSURE: 147 MMHG | BODY MASS INDEX: 39.95 KG/M2 | OXYGEN SATURATION: 95 % | TEMPERATURE: 97.4 F

## 2023-04-26 DIAGNOSIS — Z79.4 TYPE 2 DIABETES MELLITUS WITH OTHER CIRCULATORY COMPLICATION, WITH LONG-TERM CURRENT USE OF INSULIN (HCC): Primary | ICD-10-CM

## 2023-04-26 DIAGNOSIS — E78.2 COMBINED HYPERLIPIDEMIA: ICD-10-CM

## 2023-04-26 DIAGNOSIS — E11.59 TYPE 2 DIABETES MELLITUS WITH OTHER CIRCULATORY COMPLICATION, WITH LONG-TERM CURRENT USE OF INSULIN (HCC): Primary | ICD-10-CM

## 2023-04-26 DIAGNOSIS — M54.42 CHRONIC LEFT-SIDED LOW BACK PAIN WITH LEFT-SIDED SCIATICA: ICD-10-CM

## 2023-04-26 DIAGNOSIS — I01.1 RHEUMATOID AORTITIS: ICD-10-CM

## 2023-04-26 DIAGNOSIS — E55.9 VITAMIN D DEFICIENCY: ICD-10-CM

## 2023-04-26 DIAGNOSIS — E89.0 POSTOPERATIVE HYPOTHYROIDISM: ICD-10-CM

## 2023-04-26 DIAGNOSIS — G89.29 CHRONIC LEFT-SIDED LOW BACK PAIN WITH LEFT-SIDED SCIATICA: ICD-10-CM

## 2023-04-26 PROCEDURE — 3052F HG A1C>EQUAL 8.0%<EQUAL 9.0%: CPT | Performed by: NURSE PRACTITIONER

## 2023-04-26 PROCEDURE — 99204 OFFICE O/P NEW MOD 45 MIN: CPT | Performed by: NURSE PRACTITIONER

## 2023-04-26 RX ORDER — CYCLOSPORINE 0.5 MG/ML
EMULSION OPHTHALMIC
COMMUNITY
Start: 2023-04-06

## 2023-04-26 RX ORDER — APIXABAN 5 MG/1
5 TABLET, FILM COATED ORAL 2 TIMES DAILY
COMMUNITY
Start: 2023-04-06

## 2023-04-26 RX ORDER — IBUPROFEN 800 MG/1
TABLET ORAL
COMMUNITY
Start: 2023-04-24

## 2023-04-26 RX ORDER — FLUTICASONE PROPIONATE 50 MCG
SPRAY, SUSPENSION (ML) NASAL
COMMUNITY
Start: 2023-04-06

## 2023-04-26 RX ORDER — LEVOTHYROXINE SODIUM 0.12 MG/1
125 TABLET ORAL DAILY
COMMUNITY
Start: 2023-04-06

## 2023-04-26 RX ORDER — FLUOXETINE HYDROCHLORIDE 40 MG/1
40 CAPSULE ORAL NIGHTLY
COMMUNITY
Start: 2023-04-06

## 2023-04-26 RX ORDER — LEFLUNOMIDE 20 MG/1
TABLET ORAL
COMMUNITY
Start: 2023-03-14

## 2023-04-26 RX ORDER — LOSARTAN POTASSIUM 100 MG/1
100 TABLET ORAL DAILY
COMMUNITY
Start: 2023-04-06 | End: 2023-05-03 | Stop reason: ALTCHOICE

## 2023-04-26 RX ORDER — ERGOCALCIFEROL 1.25 MG/1
CAPSULE ORAL
COMMUNITY

## 2023-04-26 RX ORDER — TRAZODONE HYDROCHLORIDE 50 MG/1
50 TABLET ORAL NIGHTLY
COMMUNITY

## 2023-04-26 RX ORDER — INSULIN GLARGINE 100 [IU]/ML
INJECTION, SOLUTION SUBCUTANEOUS
Qty: 5 ADJUSTABLE DOSE PRE-FILLED PEN SYRINGE | Refills: 3
Start: 2023-04-26

## 2023-04-26 RX ORDER — PEN NEEDLE, DIABETIC 31 GX3/16"
NEEDLE, DISPOSABLE MISCELLANEOUS
COMMUNITY
Start: 2023-04-06

## 2023-04-26 RX ORDER — HYDROCHLOROTHIAZIDE 12.5 MG/1
TABLET ORAL
COMMUNITY
Start: 2023-04-06

## 2023-04-26 RX ORDER — PANTOPRAZOLE SODIUM 40 MG/1
40 TABLET, DELAYED RELEASE ORAL DAILY
COMMUNITY
Start: 2023-04-06

## 2023-04-26 RX ORDER — ATORVASTATIN CALCIUM 10 MG/1
10 TABLET, FILM COATED ORAL NIGHTLY
COMMUNITY
Start: 2023-04-06

## 2023-04-26 RX ORDER — PILOCARPINE HYDROCHLORIDE 5 MG/1
5 TABLET, FILM COATED ORAL 4 TIMES DAILY
COMMUNITY
Start: 2023-04-12

## 2023-04-26 ASSESSMENT — ENCOUNTER SYMPTOMS
RESPIRATORY NEGATIVE: 1
BACK PAIN: 1
EYES NEGATIVE: 1
ALLERGIC/IMMUNOLOGIC NEGATIVE: 1
GASTROINTESTINAL NEGATIVE: 1

## 2023-04-26 ASSESSMENT — PATIENT HEALTH QUESTIONNAIRE - PHQ9
2. FEELING DOWN, DEPRESSED OR HOPELESS: 1
1. LITTLE INTEREST OR PLEASURE IN DOING THINGS: 1
SUM OF ALL RESPONSES TO PHQ QUESTIONS 1-9: 2
SUM OF ALL RESPONSES TO PHQ QUESTIONS 1-9: 2
SUM OF ALL RESPONSES TO PHQ9 QUESTIONS 1 & 2: 2
SUM OF ALL RESPONSES TO PHQ QUESTIONS 1-9: 2
SUM OF ALL RESPONSES TO PHQ QUESTIONS 1-9: 2

## 2023-04-27 ENCOUNTER — HOSPITAL ENCOUNTER (OUTPATIENT)
Facility: HOSPITAL | Age: 63
Discharge: HOME | End: 2023-04-27
Payer: MEDICARE

## 2023-04-27 ENCOUNTER — HOSPITAL ENCOUNTER (OUTPATIENT)
Dept: GENERAL RADIOLOGY | Facility: HOSPITAL | Age: 63
Discharge: HOME | End: 2023-04-27
Payer: MEDICARE

## 2023-04-27 DIAGNOSIS — M47.896 OTHER OSTEOARTHRITIS OF SPINE, LUMBAR REGION: ICD-10-CM

## 2023-04-27 DIAGNOSIS — E55.9 VITAMIN D DEFICIENCY: ICD-10-CM

## 2023-04-27 DIAGNOSIS — E78.2 COMBINED HYPERLIPIDEMIA: ICD-10-CM

## 2023-04-27 DIAGNOSIS — E89.0 POSTOPERATIVE HYPOTHYROIDISM: ICD-10-CM

## 2023-04-27 DIAGNOSIS — Z79.4 TYPE 2 DIABETES MELLITUS WITH OTHER CIRCULATORY COMPLICATION, WITH LONG-TERM CURRENT USE OF INSULIN (HCC): ICD-10-CM

## 2023-04-27 DIAGNOSIS — I01.1 RHEUMATOID AORTITIS: ICD-10-CM

## 2023-04-27 DIAGNOSIS — E11.59 TYPE 2 DIABETES MELLITUS WITH OTHER CIRCULATORY COMPLICATION, WITH LONG-TERM CURRENT USE OF INSULIN (HCC): ICD-10-CM

## 2023-04-27 LAB
25(OH)D3 SERPL-MCNC: 66.8 NG/ML (ref 32–100)
ALBUMIN SERPL-MCNC: 3.7 G/DL (ref 3.4–4.8)
ALBUMIN/GLOB SERPL: 1.8 {RATIO} (ref 0.8–2)
ALP SERPL-CCNC: 101 U/L (ref 25–100)
ALT SERPL-CCNC: 19 U/L (ref 4–36)
ANION GAP SERPL CALCULATED.3IONS-SCNC: 8 MMOL/L (ref 3–16)
AST SERPL-CCNC: 15 U/L (ref 8–33)
BASOPHILS # BLD: 0.1 K/UL (ref 0–0.1)
BASOPHILS NFR BLD: 1.6 %
BILIRUB SERPL-MCNC: 0.6 MG/DL (ref 0.3–1.2)
BUN SERPL-MCNC: 13 MG/DL (ref 6–20)
CALCIUM SERPL-MCNC: 8.9 MG/DL (ref 8.5–10.5)
CHLORIDE SERPL-SCNC: 104 MMOL/L (ref 98–107)
CHOLEST SERPL-MCNC: 142 MG/DL (ref 0–200)
CO2 SERPL-SCNC: 29 MMOL/L (ref 20–30)
CREAT SERPL-MCNC: 0.9 MG/DL (ref 0.4–1.2)
CRP SERPL-MCNC: <3 MG/L (ref 0–5.1)
EOSINOPHIL # BLD: 0.2 K/UL (ref 0–0.4)
EOSINOPHIL NFR BLD: 4.7 %
ERYTHROCYTE [DISTWIDTH] IN BLOOD BY AUTOMATED COUNT: 12.8 % (ref 11–16)
ERYTHROCYTE [SEDIMENTATION RATE] IN BLOOD BY WESTERGREN METHOD: <1 MM/HR (ref 0–30)
FOLATE SERPL-MCNC: 6.75 NG/ML
GFR SERPLBLD CREATININE-BSD FMLA CKD-EPI: >60 ML/MIN/{1.73_M2}
GLOBULIN SER CALC-MCNC: 2.1 G/DL
GLUCOSE SERPL-MCNC: 178 MG/DL (ref 74–106)
HBA1C MFR BLD: 8.2 %
HCT VFR BLD AUTO: 45.3 % (ref 37–47)
HDLC SERPL-MCNC: 36 MG/DL (ref 40–60)
HGB BLD-MCNC: 15.3 G/DL (ref 11.5–16.5)
IMM GRANULOCYTES # BLD: 0 K/UL
IMM GRANULOCYTES NFR BLD: 0.2 % (ref 0–5)
LDLC SERPL CALC-MCNC: 78 MG/DL
LYMPHOCYTES # BLD: 1.4 K/UL (ref 1.5–4)
LYMPHOCYTES NFR BLD: 31.6 %
MCH RBC QN AUTO: 29.9 PG (ref 27–32)
MCHC RBC AUTO-ENTMCNC: 33.8 G/DL (ref 31–35)
MCV RBC AUTO: 88.5 FL (ref 80–100)
MONOCYTES # BLD: 0.4 K/UL (ref 0.2–0.8)
MONOCYTES NFR BLD: 8.9 %
NEUTROPHILS # BLD: 2.4 K/UL (ref 2–7.5)
NEUTS SEG NFR BLD: 53 %
PLATELET # BLD AUTO: 180 K/UL (ref 150–400)
PMV BLD AUTO: 12 FL (ref 6–10)
POTASSIUM SERPL-SCNC: 4.5 MMOL/L (ref 3.4–5.1)
PROT SERPL-MCNC: 5.8 G/DL (ref 6.4–8.3)
RBC # BLD AUTO: 5.12 M/UL (ref 3.8–5.8)
SODIUM SERPL-SCNC: 141 MMOL/L (ref 136–145)
TRIGL SERPL-MCNC: 142 MG/DL (ref 0–249)
TSH SERPL DL<=0.005 MIU/L-ACNC: 6.45 UIU/ML (ref 0.27–4.2)
VIT B12 SERPL-MCNC: 292 PG/ML (ref 211–911)
VLDLC SERPL CALC-MCNC: 28 MG/DL
WBC # BLD AUTO: 4.5 K/UL (ref 4–11)

## 2023-04-27 PROCEDURE — 84443 ASSAY THYROID STIM HORMONE: CPT

## 2023-04-27 PROCEDURE — 80061 LIPID PANEL: CPT

## 2023-04-27 PROCEDURE — 72100 X-RAY EXAM L-S SPINE 2/3 VWS: CPT

## 2023-04-27 PROCEDURE — 82306 VITAMIN D 25 HYDROXY: CPT

## 2023-04-27 PROCEDURE — 85025 COMPLETE CBC W/AUTO DIFF WBC: CPT

## 2023-04-27 PROCEDURE — 82746 ASSAY OF FOLIC ACID SERUM: CPT

## 2023-04-27 PROCEDURE — 85652 RBC SED RATE AUTOMATED: CPT

## 2023-04-27 PROCEDURE — 82607 VITAMIN B-12: CPT

## 2023-04-27 PROCEDURE — 80053 COMPREHEN METABOLIC PANEL: CPT

## 2023-04-27 PROCEDURE — 83036 HEMOGLOBIN GLYCOSYLATED A1C: CPT

## 2023-04-27 PROCEDURE — 86140 C-REACTIVE PROTEIN: CPT

## 2023-04-30 ENCOUNTER — HOSPITAL ENCOUNTER (EMERGENCY)
Facility: HOSPITAL | Age: 63
Discharge: HOME | End: 2023-04-30
Attending: HOSPITALIST
Payer: MEDICARE

## 2023-04-30 VITALS
TEMPERATURE: 98 F | HEART RATE: 84 BPM | RESPIRATION RATE: 16 BRPM | OXYGEN SATURATION: 98 % | SYSTOLIC BLOOD PRESSURE: 160 MMHG | DIASTOLIC BLOOD PRESSURE: 99 MMHG

## 2023-04-30 DIAGNOSIS — M54.32 SCIATICA OF LEFT SIDE: Primary | ICD-10-CM

## 2023-04-30 PROCEDURE — 6370000000 HC RX 637 (ALT 250 FOR IP): Performed by: HOSPITALIST

## 2023-04-30 PROCEDURE — 99284 EMERGENCY DEPT VISIT MOD MDM: CPT

## 2023-04-30 PROCEDURE — 6360000002 HC RX W HCPCS: Performed by: HOSPITALIST

## 2023-04-30 PROCEDURE — 96372 THER/PROPH/DIAG INJ SC/IM: CPT

## 2023-04-30 RX ORDER — TRAMADOL HYDROCHLORIDE 50 MG/1
100 TABLET ORAL ONCE
Status: COMPLETED | OUTPATIENT
Start: 2023-04-30 | End: 2023-04-30

## 2023-04-30 RX ORDER — TRAMADOL HYDROCHLORIDE 50 MG/1
50 TABLET ORAL EVERY 6 HOURS PRN
Qty: 12 TABLET | Refills: 0 | Status: SHIPPED | OUTPATIENT
Start: 2023-04-30 | End: 2023-05-03

## 2023-04-30 RX ORDER — DEXAMETHASONE SODIUM PHOSPHATE 10 MG/ML
10 INJECTION INTRAMUSCULAR; INTRAVENOUS ONCE
Status: COMPLETED | OUTPATIENT
Start: 2023-04-30 | End: 2023-04-30

## 2023-04-30 RX ADMIN — TRAMADOL HYDROCHLORIDE 100 MG: 50 TABLET, COATED ORAL at 21:59

## 2023-04-30 RX ADMIN — DEXAMETHASONE SODIUM PHOSPHATE 10 MG: 10 INJECTION INTRAMUSCULAR; INTRAVENOUS at 22:01

## 2023-04-30 ASSESSMENT — PAIN - FUNCTIONAL ASSESSMENT: PAIN_FUNCTIONAL_ASSESSMENT: 0-10

## 2023-04-30 ASSESSMENT — LIFESTYLE VARIABLES
HOW OFTEN DO YOU HAVE A DRINK CONTAINING ALCOHOL: NEVER
HOW MANY STANDARD DRINKS CONTAINING ALCOHOL DO YOU HAVE ON A TYPICAL DAY: PATIENT DOES NOT DRINK

## 2023-04-30 ASSESSMENT — PAIN SCALES - GENERAL
PAINLEVEL_OUTOF10: 10
PAINLEVEL_OUTOF10: 10

## 2023-05-01 NOTE — ED PROVIDER NOTES
[address] ENCOUNTER        Pt Name: Cayetano Sierra  MRN: [id number]  [place] 1960  Date of evaluation: 4/30/2023  Provider: Santos Maciel DO  PCP: NICKOLAS Dejesus  Note Started: 9:27 PM EDT 4/30/23    CHIEF COMPLAINT       Chief Complaint   Patient presents with    Hip Pain     C/o left hip pain,has RA,states the pain is going down her leg to the foot. Denies recent injury. HISTORY OF PRESENT ILLNESS: 1 or more Elements     History from : Patient    Limitations to history : None    Cayetano Sierra is a 57 y.o. female who presents emergency department for left hip pain that radiates all the way down to the foot. Patient states that she believes that sciatica. She states that she has had sciatica of the right leg before in the past but never in the left. Patient states the symptoms been ongoing for the past 2 to 3 weeks. She states that she did tell her primary care provider about this and she had x-ray performed here 3 days ago but was no acute fracture but just showed some chronic degenerative changes this was reviewed by me. Patient denies any loss of bowel or bladder control but she states that if she does not get up and go soon she has a sensation she sometimes will urinate on herself because she cannot get to the bathroom in time because of the discomfort to her hip. Patient states that she is really only been laying around her bed over the last several days. She states she has had some intermittent tingling down the leg but the pain that she has shoots all the way down the leg itself the tingling is intermittent and is not constant.   Patient advised that she cannot take prednisone but after speaking with her about the use of this the only side effect that she had was it made her blood sugar go up but she is on insulin advised that when she takes steroids she would need to go up on her insulin dose also and watch what she eats

## 2023-05-03 ENCOUNTER — OFFICE VISIT (OUTPATIENT)
Dept: PRIMARY CARE CLINIC | Age: 63
End: 2023-05-03
Payer: MEDICARE

## 2023-05-03 VITALS
OXYGEN SATURATION: 95 % | HEIGHT: 66 IN | BODY MASS INDEX: 37.77 KG/M2 | HEART RATE: 62 BPM | TEMPERATURE: 97 F | WEIGHT: 235 LBS | RESPIRATION RATE: 16 BRPM | DIASTOLIC BLOOD PRESSURE: 88 MMHG | SYSTOLIC BLOOD PRESSURE: 173 MMHG

## 2023-05-03 DIAGNOSIS — Z79.4 TYPE 2 DIABETES MELLITUS WITH OTHER CIRCULATORY COMPLICATION, WITH LONG-TERM CURRENT USE OF INSULIN (HCC): ICD-10-CM

## 2023-05-03 DIAGNOSIS — E53.8 B12 DEFICIENCY: ICD-10-CM

## 2023-05-03 DIAGNOSIS — M25.552 LEFT HIP PAIN: ICD-10-CM

## 2023-05-03 DIAGNOSIS — M54.16 LUMBAR BACK PAIN WITH RADICULOPATHY AFFECTING LEFT LOWER EXTREMITY: Primary | ICD-10-CM

## 2023-05-03 DIAGNOSIS — E11.59 TYPE 2 DIABETES MELLITUS WITH OTHER CIRCULATORY COMPLICATION, WITH LONG-TERM CURRENT USE OF INSULIN (HCC): ICD-10-CM

## 2023-05-03 DIAGNOSIS — I10 PRIMARY HYPERTENSION: ICD-10-CM

## 2023-05-03 PROCEDURE — 96372 THER/PROPH/DIAG INJ SC/IM: CPT | Performed by: NURSE PRACTITIONER

## 2023-05-03 PROCEDURE — 3078F DIAST BP <80 MM HG: CPT | Performed by: NURSE PRACTITIONER

## 2023-05-03 PROCEDURE — 3052F HG A1C>EQUAL 8.0%<EQUAL 9.0%: CPT | Performed by: NURSE PRACTITIONER

## 2023-05-03 PROCEDURE — 3074F SYST BP LT 130 MM HG: CPT | Performed by: NURSE PRACTITIONER

## 2023-05-03 PROCEDURE — 99214 OFFICE O/P EST MOD 30 MIN: CPT | Performed by: NURSE PRACTITIONER

## 2023-05-03 RX ORDER — CYCLOBENZAPRINE HCL 10 MG
10 TABLET ORAL 3 TIMES DAILY PRN
Qty: 30 TABLET | Refills: 0 | Status: SHIPPED | OUTPATIENT
Start: 2023-05-03 | End: 2023-05-13

## 2023-05-03 RX ORDER — PREGABALIN 75 MG/1
CAPSULE ORAL
COMMUNITY
Start: 2023-04-28

## 2023-05-03 RX ORDER — CYANOCOBALAMIN 1000 UG/ML
1000 INJECTION, SOLUTION INTRAMUSCULAR; SUBCUTANEOUS ONCE
Status: COMPLETED | OUTPATIENT
Start: 2023-05-03 | End: 2023-05-03

## 2023-05-03 RX ORDER — IRBESARTAN 300 MG/1
300 TABLET ORAL DAILY
Qty: 30 TABLET | Refills: 3 | Status: SHIPPED | OUTPATIENT
Start: 2023-05-03

## 2023-05-03 RX ORDER — HYDROCODONE BITARTRATE AND ACETAMINOPHEN 5; 325 MG/1; MG/1
1 TABLET ORAL EVERY 4 HOURS PRN
Qty: 18 TABLET | Refills: 0 | Status: SHIPPED | OUTPATIENT
Start: 2023-05-03 | End: 2023-05-06

## 2023-05-03 RX ADMIN — CYANOCOBALAMIN 1000 MCG: 1000 INJECTION, SOLUTION INTRAMUSCULAR; SUBCUTANEOUS at 11:48

## 2023-05-03 ASSESSMENT — ENCOUNTER SYMPTOMS
GASTROINTESTINAL NEGATIVE: 1
BACK PAIN: 1
RESPIRATORY NEGATIVE: 1
EYES NEGATIVE: 1
ALLERGIC/IMMUNOLOGIC NEGATIVE: 1

## 2023-05-16 ENCOUNTER — HOSPITAL ENCOUNTER (OUTPATIENT)
Dept: MRI IMAGING | Facility: HOSPITAL | Age: 63
Discharge: HOME | End: 2023-05-16
Payer: MEDICARE

## 2023-05-16 DIAGNOSIS — M54.16 LUMBAR BACK PAIN WITH RADICULOPATHY AFFECTING LEFT LOWER EXTREMITY: ICD-10-CM

## 2023-05-16 PROCEDURE — 72148 MRI LUMBAR SPINE W/O DYE: CPT

## 2023-05-17 ENCOUNTER — OFFICE VISIT (OUTPATIENT)
Dept: PRIMARY CARE CLINIC | Age: 63
End: 2023-05-17
Payer: MEDICARE

## 2023-05-17 VITALS
OXYGEN SATURATION: 96 % | TEMPERATURE: 97.1 F | DIASTOLIC BLOOD PRESSURE: 82 MMHG | SYSTOLIC BLOOD PRESSURE: 132 MMHG | HEART RATE: 79 BPM

## 2023-05-17 DIAGNOSIS — L08.9 PUSTULAR LESION: ICD-10-CM

## 2023-05-17 DIAGNOSIS — E11.59 TYPE 2 DIABETES MELLITUS WITH OTHER CIRCULATORY COMPLICATION, WITH LONG-TERM CURRENT USE OF INSULIN (HCC): ICD-10-CM

## 2023-05-17 DIAGNOSIS — I10 PRIMARY HYPERTENSION: ICD-10-CM

## 2023-05-17 DIAGNOSIS — B37.31 VAGINAL YEAST INFECTION: Primary | ICD-10-CM

## 2023-05-17 DIAGNOSIS — M54.16 LUMBAR BACK PAIN WITH RADICULOPATHY AFFECTING LEFT LOWER EXTREMITY: ICD-10-CM

## 2023-05-17 DIAGNOSIS — M54.41 ACUTE BILATERAL LOW BACK PAIN WITH BILATERAL SCIATICA: Primary | ICD-10-CM

## 2023-05-17 DIAGNOSIS — Z85.850 HISTORY OF THYROID CANCER: ICD-10-CM

## 2023-05-17 DIAGNOSIS — M54.42 ACUTE BILATERAL LOW BACK PAIN WITH BILATERAL SCIATICA: Primary | ICD-10-CM

## 2023-05-17 DIAGNOSIS — Z79.4 TYPE 2 DIABETES MELLITUS WITH OTHER CIRCULATORY COMPLICATION, WITH LONG-TERM CURRENT USE OF INSULIN (HCC): ICD-10-CM

## 2023-05-17 PROCEDURE — 3078F DIAST BP <80 MM HG: CPT | Performed by: NURSE PRACTITIONER

## 2023-05-17 PROCEDURE — 3052F HG A1C>EQUAL 8.0%<EQUAL 9.0%: CPT | Performed by: NURSE PRACTITIONER

## 2023-05-17 PROCEDURE — 3074F SYST BP LT 130 MM HG: CPT | Performed by: NURSE PRACTITIONER

## 2023-05-17 PROCEDURE — 99214 OFFICE O/P EST MOD 30 MIN: CPT | Performed by: NURSE PRACTITIONER

## 2023-05-17 RX ORDER — CYCLOBENZAPRINE HCL 10 MG
10 TABLET ORAL 3 TIMES DAILY PRN
Qty: 30 TABLET | Refills: 1 | Status: SHIPPED | OUTPATIENT
Start: 2023-05-17 | End: 2023-05-27

## 2023-05-17 RX ORDER — LEVOTHYROXINE SODIUM 0.15 MG/1
TABLET ORAL
COMMUNITY
Start: 2023-05-11

## 2023-05-17 RX ORDER — FLUCONAZOLE 100 MG/1
100 TABLET ORAL DAILY
Qty: 3 TABLET | Refills: 0 | Status: SHIPPED | OUTPATIENT
Start: 2023-05-17 | End: 2023-05-20

## 2023-05-17 SDOH — ECONOMIC STABILITY: FOOD INSECURITY: WITHIN THE PAST 12 MONTHS, THE FOOD YOU BOUGHT JUST DIDN'T LAST AND YOU DIDN'T HAVE MONEY TO GET MORE.: NEVER TRUE

## 2023-05-17 SDOH — ECONOMIC STABILITY: INCOME INSECURITY: HOW HARD IS IT FOR YOU TO PAY FOR THE VERY BASICS LIKE FOOD, HOUSING, MEDICAL CARE, AND HEATING?: NOT HARD AT ALL

## 2023-05-17 SDOH — ECONOMIC STABILITY: FOOD INSECURITY: WITHIN THE PAST 12 MONTHS, YOU WORRIED THAT YOUR FOOD WOULD RUN OUT BEFORE YOU GOT MONEY TO BUY MORE.: NEVER TRUE

## 2023-05-17 SDOH — ECONOMIC STABILITY: HOUSING INSECURITY
IN THE LAST 12 MONTHS, WAS THERE A TIME WHEN YOU DID NOT HAVE A STEADY PLACE TO SLEEP OR SLEPT IN A SHELTER (INCLUDING NOW)?: NO

## 2023-05-18 ENCOUNTER — TELEPHONE (OUTPATIENT)
Dept: PRIMARY CARE CLINIC | Age: 63
End: 2023-05-18

## 2023-05-18 DIAGNOSIS — M54.16 LUMBAR BACK PAIN WITH RADICULOPATHY AFFECTING LEFT LOWER EXTREMITY: Primary | ICD-10-CM

## 2023-05-18 NOTE — TELEPHONE ENCOUNTER
----- Message from NICKOLAS Santos sent at 5/18/2023 12:36 PM EDT -----  She needs referral to ortho spine or neurosurgeon. Find out where she wants to go.

## 2023-05-18 NOTE — TELEPHONE ENCOUNTER
Called and informed the patient of the results. Patient verbalized understanding. Patient would like to go somewhere in [place] if possible.

## 2023-05-19 RX ORDER — TRAMADOL HYDROCHLORIDE 50 MG/1
50 TABLET ORAL EVERY 6 HOURS PRN
Qty: 12 TABLET | Refills: 0 | Status: SHIPPED | OUTPATIENT
Start: 2023-05-19 | End: 2023-05-22

## 2023-05-28 ASSESSMENT — ENCOUNTER SYMPTOMS: BACK PAIN: 1

## 2023-05-30 ENCOUNTER — TELEMEDICINE (OUTPATIENT)
Dept: PRIMARY CARE CLINIC | Age: 63
End: 2023-05-30
Payer: MEDICARE

## 2023-05-30 DIAGNOSIS — M54.16 LUMBAR BACK PAIN WITH RADICULOPATHY AFFECTING LEFT LOWER EXTREMITY: Primary | ICD-10-CM

## 2023-05-30 PROCEDURE — 99214 OFFICE O/P EST MOD 30 MIN: CPT | Performed by: NURSE PRACTITIONER

## 2023-05-30 RX ORDER — LEFLUNOMIDE 20 MG/1
20 TABLET ORAL DAILY
Qty: 30 TABLET | Refills: 3 | Status: SHIPPED | OUTPATIENT
Start: 2023-05-30

## 2023-05-30 RX ORDER — PREGABALIN 75 MG/1
CAPSULE ORAL
Qty: 60 CAPSULE | Refills: 2 | Status: SHIPPED | OUTPATIENT
Start: 2023-05-30 | End: 2023-05-30

## 2023-05-30 RX ORDER — HYDROCODONE BITARTRATE AND ACETAMINOPHEN 10; 325 MG/1; MG/1
1 TABLET ORAL EVERY 6 HOURS PRN
Qty: 40 TABLET | Refills: 0 | Status: SHIPPED | OUTPATIENT
Start: 2023-05-30 | End: 2023-06-13

## 2023-05-30 ASSESSMENT — ENCOUNTER SYMPTOMS
GASTROINTESTINAL NEGATIVE: 1
RESPIRATORY NEGATIVE: 1
BACK PAIN: 1

## 2023-05-30 NOTE — PROGRESS NOTES
April Ellison (:  1960) is a Established patient, presenting virtually for evaluation of the following: back and leg pain. She is in so much pain she could not come into the office. She says the Reba Logan is not helping. She did get the MRI done    She has an appt with ortho spine on Thursday    Assessment & Plan   Below is the assessment and plan developed based on review of pertinent history, physical exam, labs, studies, and medications. 1. Lumbar back pain with radiculopathy affecting left lower extremity  -     HYDROcodone-acetaminophen (NORCO)  MG per tablet; Take 1 tablet by mouth every 6 hours as needed for Pain for up to 14 days. Intended supply: 30 days Max Daily Amount: 4 tablets, Disp-40 tablet, R-0Normal  -     pregabalin (LYRICA) 75 MG capsule; TAKE 1 CAPSULE BY MOUTH IN THE MORNING AND 1 CAPSULE BEFORE BEDTIME, Disp-60 capsule, R-2Normal  Will follow after specialist appt. No follow-ups on file. Subjective       Review of Systems   Constitutional: Negative. HENT: Negative. Respiratory: Negative. Cardiovascular: Negative. Gastrointestinal: Negative. Genitourinary: Negative. Musculoskeletal:  Positive for arthralgias, back pain and gait problem. Skin: Negative. Psychiatric/Behavioral:  Positive for decreased concentration and sleep disturbance. Objective   Patient-Reported Vitals  No data recorded     Physical Exam  Vitals and nursing note reviewed. Constitutional:       Appearance: She is well-developed. HENT:      Head: Normocephalic and atraumatic. Eyes:      Conjunctiva/sclera: Conjunctivae normal.      Pupils: Pupils are equal, round, and reactive to light. Neck:      Thyroid: No thyromegaly. Vascular: No JVD. Cardiovascular:      Rate and Rhythm: Normal rate and regular rhythm. Heart sounds: No murmur heard. No friction rub. No gallop. Pulmonary:      Effort: Pulmonary effort is normal. No respiratory distress.

## 2023-06-08 ENCOUNTER — OFFICE VISIT (OUTPATIENT)
Dept: PRIMARY CARE CLINIC | Age: 63
End: 2023-06-08
Payer: MEDICARE

## 2023-06-08 VITALS
BODY MASS INDEX: 37.7 KG/M2 | HEART RATE: 98 BPM | SYSTOLIC BLOOD PRESSURE: 137 MMHG | DIASTOLIC BLOOD PRESSURE: 88 MMHG | TEMPERATURE: 97.6 F | OXYGEN SATURATION: 99 % | WEIGHT: 233.6 LBS

## 2023-06-08 DIAGNOSIS — E03.9 HYPOTHYROIDISM, UNSPECIFIED TYPE: ICD-10-CM

## 2023-06-08 DIAGNOSIS — Z85.850 HISTORY OF THYROID CANCER: ICD-10-CM

## 2023-06-08 DIAGNOSIS — E11.59 TYPE 2 DIABETES MELLITUS WITH OTHER CIRCULATORY COMPLICATION, WITH LONG-TERM CURRENT USE OF INSULIN (HCC): ICD-10-CM

## 2023-06-08 DIAGNOSIS — Z79.4 TYPE 2 DIABETES MELLITUS WITH OTHER CIRCULATORY COMPLICATION, WITH LONG-TERM CURRENT USE OF INSULIN (HCC): ICD-10-CM

## 2023-06-08 DIAGNOSIS — Z86.718 HISTORY OF DVT (DEEP VEIN THROMBOSIS): ICD-10-CM

## 2023-06-08 DIAGNOSIS — I10 PRIMARY HYPERTENSION: ICD-10-CM

## 2023-06-08 DIAGNOSIS — F41.8 MIXED ANXIETY DEPRESSIVE DISORDER: Primary | ICD-10-CM

## 2023-06-08 PROCEDURE — 3052F HG A1C>EQUAL 8.0%<EQUAL 9.0%: CPT | Performed by: NURSE PRACTITIONER

## 2023-06-08 PROCEDURE — 3078F DIAST BP <80 MM HG: CPT | Performed by: NURSE PRACTITIONER

## 2023-06-08 PROCEDURE — 3074F SYST BP LT 130 MM HG: CPT | Performed by: NURSE PRACTITIONER

## 2023-06-08 PROCEDURE — 99213 OFFICE O/P EST LOW 20 MIN: CPT | Performed by: NURSE PRACTITIONER

## 2023-06-08 RX ORDER — FLUOXETINE HYDROCHLORIDE 40 MG/1
40 CAPSULE ORAL NIGHTLY
Qty: 30 CAPSULE | Refills: 2 | Status: SHIPPED | OUTPATIENT
Start: 2023-06-08

## 2023-06-08 RX ORDER — ERGOCALCIFEROL 1.25 MG/1
CAPSULE ORAL
Qty: 4 CAPSULE | Refills: 1 | Status: SHIPPED | OUTPATIENT
Start: 2023-06-08

## 2023-06-08 RX ORDER — LEVOTHYROXINE SODIUM 0.15 MG/1
150 TABLET ORAL DAILY
Qty: 30 TABLET | Refills: 2 | Status: SHIPPED | OUTPATIENT
Start: 2023-06-08

## 2023-06-08 RX ORDER — TRAZODONE HYDROCHLORIDE 50 MG/1
50 TABLET ORAL NIGHTLY
Qty: 30 TABLET | Refills: 1 | Status: SHIPPED | OUTPATIENT
Start: 2023-06-08

## 2023-06-08 RX ORDER — PILOCARPINE HYDROCHLORIDE 5 MG/1
5 TABLET, FILM COATED ORAL 4 TIMES DAILY
Qty: 120 TABLET | Refills: 1 | Status: SHIPPED | OUTPATIENT
Start: 2023-06-08

## 2023-06-08 RX ORDER — PANTOPRAZOLE SODIUM 40 MG/1
40 TABLET, DELAYED RELEASE ORAL DAILY
Qty: 30 TABLET | Refills: 2 | Status: SHIPPED | OUTPATIENT
Start: 2023-06-08

## 2023-06-08 RX ORDER — APIXABAN 5 MG/1
5 TABLET, FILM COATED ORAL 2 TIMES DAILY
Qty: 60 TABLET | Refills: 2 | Status: SHIPPED | OUTPATIENT
Start: 2023-06-08

## 2023-06-08 RX ORDER — ATORVASTATIN CALCIUM 10 MG/1
10 TABLET, FILM COATED ORAL NIGHTLY
Qty: 30 TABLET | Refills: 2 | Status: SHIPPED | OUTPATIENT
Start: 2023-06-08

## 2023-06-08 ASSESSMENT — ENCOUNTER SYMPTOMS
SHORTNESS OF BREATH: 0
COUGH: 0

## 2023-06-08 NOTE — PROGRESS NOTES
Hyperglycemia with glucose in 200s per patient. Continue home regimens and follow diet closely. Has PCP appointment tomorrow and can adjust regimen then as needed. 4. History of thyroid cancer  5. Hypothyroidism, unspecified type  Chronic. Last TSH 6.45  4/27. Patient has not taken Synthroid in 1 to 2 weeks. Refill sent and education provided on medication compliance. Take medication as directed. Follow-up with PCP as scheduled. 6. History of DVT (deep vein thrombosis)  On Eliquis. Refill sent. Follow-up as scheduled. Orders Placed This Encounter   Medications    atorvastatin (LIPITOR) 10 MG tablet     Sig: Take 1 tablet by mouth nightly at bedtime.      Dispense:  30 tablet     Refill:  2    ELIQUIS 5 MG TABS tablet     Sig: Take 1 tablet by mouth 2 times daily     Dispense:  60 tablet     Refill:  2    FLUoxetine (PROZAC) 40 MG capsule     Sig: Take 1 capsule by mouth nightly     Dispense:  30 capsule     Refill:  2    pantoprazole (PROTONIX) 40 MG tablet     Sig: Take 1 tablet by mouth daily     Dispense:  30 tablet     Refill:  2    pilocarpine (SALAGEN) 5 MG tablet     Sig: Take 1 tablet by mouth 4 times daily     Dispense:  120 tablet     Refill:  1    traZODone (DESYREL) 50 MG tablet     Sig: Take 1 tablet by mouth nightly     Dispense:  30 tablet     Refill:  1    ergocalciferol (ERGOCALCIFEROL) 1.25 MG (33247 UT) capsule     Sig: ergocalciferol (vitamin D2) 1,250 mcg (50,000 unit) capsule   TK 1 C PO Q WK     Dispense:  4 capsule     Refill:  1    levothyroxine (SYNTHROID) 150 MCG tablet     Sig: Take 1 tablet by mouth Daily     Dispense:  30 tablet     Refill:  2

## 2023-06-09 ENCOUNTER — OFFICE VISIT (OUTPATIENT)
Dept: PRIMARY CARE CLINIC | Age: 63
End: 2023-06-09
Payer: MEDICARE

## 2023-06-09 ENCOUNTER — HOSPITAL ENCOUNTER (OUTPATIENT)
Facility: HOSPITAL | Age: 63
Discharge: HOME | End: 2023-06-09
Payer: MEDICARE

## 2023-06-09 VITALS
BODY MASS INDEX: 37.22 KG/M2 | OXYGEN SATURATION: 97 % | TEMPERATURE: 97.9 F | HEART RATE: 84 BPM | SYSTOLIC BLOOD PRESSURE: 131 MMHG | DIASTOLIC BLOOD PRESSURE: 87 MMHG | WEIGHT: 230.6 LBS

## 2023-06-09 DIAGNOSIS — F41.8 MIXED ANXIETY DEPRESSIVE DISORDER: ICD-10-CM

## 2023-06-09 DIAGNOSIS — I10 PRIMARY HYPERTENSION: ICD-10-CM

## 2023-06-09 DIAGNOSIS — E03.9 HYPOTHYROIDISM, UNSPECIFIED TYPE: ICD-10-CM

## 2023-06-09 DIAGNOSIS — E11.59 TYPE 2 DIABETES MELLITUS WITH OTHER CIRCULATORY COMPLICATION, WITH LONG-TERM CURRENT USE OF INSULIN (HCC): Primary | ICD-10-CM

## 2023-06-09 DIAGNOSIS — Z79.4 TYPE 2 DIABETES MELLITUS WITH OTHER CIRCULATORY COMPLICATION, WITH LONG-TERM CURRENT USE OF INSULIN (HCC): Primary | ICD-10-CM

## 2023-06-09 DIAGNOSIS — M54.16 LUMBAR BACK PAIN WITH RADICULOPATHY AFFECTING LEFT LOWER EXTREMITY: ICD-10-CM

## 2023-06-09 DIAGNOSIS — Z79.4 TYPE 2 DIABETES MELLITUS WITH OTHER CIRCULATORY COMPLICATION, WITH LONG-TERM CURRENT USE OF INSULIN (HCC): ICD-10-CM

## 2023-06-09 DIAGNOSIS — E11.59 TYPE 2 DIABETES MELLITUS WITH OTHER CIRCULATORY COMPLICATION, WITH LONG-TERM CURRENT USE OF INSULIN (HCC): ICD-10-CM

## 2023-06-09 DIAGNOSIS — R35.0 URINARY FREQUENCY: ICD-10-CM

## 2023-06-09 LAB
BILIRUBIN, POC: NORMAL
BLOOD URINE, POC: NORMAL
CLARITY, POC: CLEAR
COLOR, POC: YELLOW
CREAT UR-MCNC: 102.4 MG/DL (ref 1.5–300)
GLUCOSE URINE, POC: NORMAL
KETONES, POC: NORMAL
LEUKOCYTE EST, POC: NORMAL
MICROALBUMIN UR DL<=1MG/L-MCNC: <1.2 MG/DL (ref 0–22)
MICROALBUMIN/CREAT UR: NORMAL MG/G (ref 0–30)
NITRITE, POC: NORMAL
PH, POC: 5.5
PROTEIN, POC: NORMAL
SPECIFIC GRAVITY, POC: 1.02
UROBILINOGEN, POC: 0.2

## 2023-06-09 PROCEDURE — 82570 ASSAY OF URINE CREATININE: CPT

## 2023-06-09 PROCEDURE — 3078F DIAST BP <80 MM HG: CPT | Performed by: NURSE PRACTITIONER

## 2023-06-09 PROCEDURE — 81002 URINALYSIS NONAUTO W/O SCOPE: CPT | Performed by: NURSE PRACTITIONER

## 2023-06-09 PROCEDURE — 3074F SYST BP LT 130 MM HG: CPT | Performed by: NURSE PRACTITIONER

## 2023-06-09 PROCEDURE — 99214 OFFICE O/P EST MOD 30 MIN: CPT | Performed by: NURSE PRACTITIONER

## 2023-06-09 PROCEDURE — 82043 UR ALBUMIN QUANTITATIVE: CPT

## 2023-06-09 PROCEDURE — 3052F HG A1C>EQUAL 8.0%<EQUAL 9.0%: CPT | Performed by: NURSE PRACTITIONER

## 2023-06-09 ASSESSMENT — ENCOUNTER SYMPTOMS
SORE THROAT: 0
SINUS PRESSURE: 0
ABDOMINAL PAIN: 0
EYE DISCHARGE: 0
COUGH: 0
WHEEZING: 0
NAUSEA: 0
BACK PAIN: 1
SHORTNESS OF BREATH: 0
VOMITING: 0

## 2023-06-19 DIAGNOSIS — I10 PRIMARY HYPERTENSION: ICD-10-CM

## 2023-06-19 RX ORDER — IRBESARTAN 300 MG/1
TABLET ORAL
Qty: 30 TABLET | Refills: 3 | OUTPATIENT
Start: 2023-06-19

## 2023-06-20 ENCOUNTER — PATIENT MESSAGE (OUTPATIENT)
Dept: PRIMARY CARE CLINIC | Age: 63
End: 2023-06-20

## 2023-06-20 ENCOUNTER — HOSPITAL ENCOUNTER (EMERGENCY)
Facility: HOSPITAL | Age: 63
Discharge: HOME | End: 2023-06-20
Attending: FAMILY MEDICINE
Payer: MEDICARE

## 2023-06-20 VITALS
BODY MASS INDEX: 36.96 KG/M2 | WEIGHT: 230 LBS | TEMPERATURE: 97.6 F | SYSTOLIC BLOOD PRESSURE: 136 MMHG | RESPIRATION RATE: 16 BRPM | HEART RATE: 90 BPM | HEIGHT: 66 IN | DIASTOLIC BLOOD PRESSURE: 84 MMHG | OXYGEN SATURATION: 96 %

## 2023-06-20 DIAGNOSIS — M54.32 SCIATICA OF LEFT SIDE: ICD-10-CM

## 2023-06-20 DIAGNOSIS — G89.29 ACUTE EXACERBATION OF CHRONIC LOW BACK PAIN: Primary | ICD-10-CM

## 2023-06-20 DIAGNOSIS — M54.50 ACUTE EXACERBATION OF CHRONIC LOW BACK PAIN: Primary | ICD-10-CM

## 2023-06-20 PROCEDURE — 99283 EMERGENCY DEPT VISIT LOW MDM: CPT

## 2023-06-20 PROCEDURE — 6370000000 HC RX 637 (ALT 250 FOR IP): Performed by: FAMILY MEDICINE

## 2023-06-20 RX ORDER — OXYCODONE AND ACETAMINOPHEN 10; 325 MG/1; MG/1
1 TABLET ORAL ONCE
Status: COMPLETED | OUTPATIENT
Start: 2023-06-20 | End: 2023-06-20

## 2023-06-20 RX ORDER — OXYCODONE AND ACETAMINOPHEN 10; 325 MG/1; MG/1
1 TABLET ORAL EVERY 6 HOURS PRN
Qty: 12 TABLET | Refills: 0 | Status: SHIPPED | OUTPATIENT
Start: 2023-06-20 | End: 2023-06-23

## 2023-06-20 RX ADMIN — OXYCODONE HYDROCHLORIDE AND ACETAMINOPHEN 1 TABLET: 10; 325 TABLET ORAL at 20:44

## 2023-06-20 ASSESSMENT — PAIN DESCRIPTION - ORIENTATION: ORIENTATION: LEFT

## 2023-06-20 ASSESSMENT — PAIN SCALES - GENERAL
PAINLEVEL_OUTOF10: 10
PAINLEVEL_OUTOF10: 10

## 2023-06-20 ASSESSMENT — PAIN DESCRIPTION - LOCATION: LOCATION: BACK;LEG

## 2023-06-20 ASSESSMENT — PAIN DESCRIPTION - DESCRIPTORS: DESCRIPTORS: THROBBING

## 2023-06-20 ASSESSMENT — ENCOUNTER SYMPTOMS: BACK PAIN: 1

## 2023-06-20 NOTE — ED PROVIDER NOTES
[address] ENCOUNTER        Pt Name: Anastacio Lux  MRN: [id number]  [place] 1960  Date of evaluation: 6/20/2023  Provider: Sarika Waters DO  PCP: NICKOLAS Bishop  Note Started: 7:57 PM EDT 6/20/23    CHIEF COMPLAINT       Chief Complaint   Patient presents with    Back Pain    Leg Pain       HISTORY OF PRESENT ILLNESS: 1 or more Elements     History from : Patient    Limitations to history : None    Anastacio Lux is a 57 y.o. female who presents ED for having back and leg pain. Pt having pain across the low back down to the buttocks. Pain is radiating to her left knee causing her \"left leg to draw\". Pain is also radiating down to the top of the left foot. She hasn't slept in 3 days. She said that she is physically and mentally wore out. \"I am in misery\". She sees a Spine and Disc Specialist - Dr. Corazon Ortiz (Spine/orthopedics) in [place]. She has already tried physical therapy and shots in her back/buttock. It didn't help. She is getting set up to have an epidural and if that doesn't work, surgery. She sees Kerri Grijalva but it isn't till July 3rd. Pain 2 out of 10, normally a 7-8 out of 10. Pain is chronic in nature been going on for quite some time. She has had no trauma to the low back or any recent fall. Has had no change to the severity intensity or location of her pain. No groin or perineal dysesthesia, no incontinence bowel or urine. No weakness to the lower extremities. Nursing Notes were all reviewed and agreed with or any disagreements were addressed in the HPI. REVIEW OF SYSTEMS :      Review of Systems   Musculoskeletal:  Positive for arthralgias and back pain. Low back pain, left radiating pain   All other systems reviewed and are negative.         SURGICAL HISTORY     Past Surgical History:   Procedure Laterality Date    BREAST BIOPSY Right     CHOLECYSTECTOMY      COLONOSCOPY      THYROIDECTOMY      TUBAL

## 2023-06-21 NOTE — ED NOTES
Reviewed discharge plan with William Langston. Encouraged her to f/u with NICKOLAS Guzman and she understood. NAD noted on discharge, gait steady. Reviewed discharge prescription for:     Current Discharge Medication List        START taking these medications    Details   oxyCODONE-acetaminophen (PERCOCET)  MG per tablet Take 1 tablet by mouth every 6 hours as needed for Pain for up to 3 days. Intended supply: 30 days Max Daily Amount: 4 tablets  Qty: 12 tablet, Refills: 0    Associated Diagnoses: Acute exacerbation of chronic low back pain             Sue Stephens states understanding of how and when to take medications.       Electronically signed by Giuliana Ayala RN on 6/20/2023 at 9:11 PM       Giuliana Ayala RN  06/20/23 1163

## 2023-06-21 NOTE — ED NOTES
Pt sitting in chair in room. She rates her pain 10/10 in her back and left leg at this time.   Pt medicated per doctor order and given apple juice and crackers per pt request.     Mayra Cortes RN  06/20/23 2049

## 2023-07-01 ENCOUNTER — HOSPITAL ENCOUNTER (EMERGENCY)
Facility: HOSPITAL | Age: 63
Discharge: HOME | End: 2023-07-01
Attending: HOSPITALIST
Payer: MEDICARE

## 2023-07-01 VITALS
HEIGHT: 66 IN | RESPIRATION RATE: 17 BRPM | DIASTOLIC BLOOD PRESSURE: 65 MMHG | TEMPERATURE: 97.9 F | WEIGHT: 230 LBS | HEART RATE: 87 BPM | OXYGEN SATURATION: 100 % | SYSTOLIC BLOOD PRESSURE: 125 MMHG | BODY MASS INDEX: 36.96 KG/M2

## 2023-07-01 DIAGNOSIS — R42 ORTHOSTATIC DIZZINESS: Primary | ICD-10-CM

## 2023-07-01 DIAGNOSIS — E86.0 DEHYDRATION: ICD-10-CM

## 2023-07-01 LAB
ALBUMIN SERPL-MCNC: 4 G/DL (ref 3.4–4.8)
ALBUMIN/GLOB SERPL: 1.4 {RATIO} (ref 0.8–2)
ALP SERPL-CCNC: 124 U/L (ref 25–100)
ALT SERPL-CCNC: 34 U/L (ref 4–36)
ANION GAP SERPL CALCULATED.3IONS-SCNC: 12 MMOL/L (ref 3–16)
AST SERPL-CCNC: 17 U/L (ref 8–33)
BACTERIA URNS QL MICRO: ABNORMAL /HPF
BASOPHILS # BLD: 0.1 K/UL (ref 0–0.1)
BASOPHILS NFR BLD: 0.9 %
BILIRUB SERPL-MCNC: 0.7 MG/DL (ref 0.3–1.2)
BILIRUB UR QL STRIP.AUTO: ABNORMAL
BUN SERPL-MCNC: 32 MG/DL (ref 6–20)
CALCIUM SERPL-MCNC: 9.7 MG/DL (ref 8.5–10.5)
CHLORIDE SERPL-SCNC: 98 MMOL/L (ref 98–107)
CLARITY UR: ABNORMAL
CO2 SERPL-SCNC: 25 MMOL/L (ref 20–30)
COLOR UR: YELLOW
CREAT SERPL-MCNC: 1.4 MG/DL (ref 0.4–1.2)
EOSINOPHIL # BLD: 0.2 K/UL (ref 0–0.4)
EOSINOPHIL NFR BLD: 2.1 %
EPI CELLS #/AREA URNS HPF: ABNORMAL /HPF (ref 0–5)
ERYTHROCYTE [DISTWIDTH] IN BLOOD BY AUTOMATED COUNT: 13 % (ref 11–16)
GFR SERPLBLD CREATININE-BSD FMLA CKD-EPI: 42 ML/MIN/{1.73_M2}
GLOBULIN SER CALC-MCNC: 2.8 G/DL
GLUCOSE SERPL-MCNC: 210 MG/DL (ref 74–106)
GLUCOSE UR STRIP.AUTO-MCNC: NEGATIVE MG/DL
HCT VFR BLD AUTO: 50.4 % (ref 37–47)
HGB BLD-MCNC: 16.8 G/DL (ref 11.5–16.5)
HGB UR QL STRIP.AUTO: ABNORMAL
IMM GRANULOCYTES # BLD: 0 K/UL
IMM GRANULOCYTES NFR BLD: 0.3 % (ref 0–5)
KETONES UR STRIP.AUTO-MCNC: NEGATIVE MG/DL
LEUKOCYTE ESTERASE UR QL STRIP.AUTO: ABNORMAL
LYMPHOCYTES # BLD: 1.8 K/UL (ref 1.5–4)
LYMPHOCYTES NFR BLD: 23.2 %
MCH RBC QN AUTO: 29.4 PG (ref 27–32)
MCHC RBC AUTO-ENTMCNC: 33.3 G/DL (ref 31–35)
MCV RBC AUTO: 88.1 FL (ref 80–100)
MONOCYTES # BLD: 0.6 K/UL (ref 0.2–0.8)
MONOCYTES NFR BLD: 7.9 %
NEUTROPHILS # BLD: 5.2 K/UL (ref 2–7.5)
NEUTS SEG NFR BLD: 65.6 %
NITRITE UR QL STRIP.AUTO: NEGATIVE
PH UR STRIP.AUTO: 5 [PH] (ref 5–8)
PLATELET # BLD AUTO: 202 K/UL (ref 150–400)
PMV BLD AUTO: 11.5 FL (ref 6–10)
POTASSIUM SERPL-SCNC: 4.2 MMOL/L (ref 3.4–5.1)
PROT SERPL-MCNC: 6.8 G/DL (ref 6.4–8.3)
PROT UR STRIP.AUTO-MCNC: 30 MG/DL
RBC # BLD AUTO: 5.72 M/UL (ref 3.8–5.8)
RBC #/AREA URNS HPF: ABNORMAL /HPF (ref 0–4)
SODIUM SERPL-SCNC: 135 MMOL/L (ref 136–145)
SP GR UR STRIP.AUTO: >=1.03 (ref 1–1.03)
TROPONIN, HIGH SENSITIVITY: 19 NG/L (ref 0–14)
TROPONIN, HIGH SENSITIVITY: 25 NG/L (ref 0–14)
UA COMPLETE W REFLEX CULTURE PNL UR: ABNORMAL
UA DIPSTICK W REFLEX MICRO PNL UR: YES
URN SPEC COLLECT METH UR: ABNORMAL
UROBILINOGEN UR STRIP-ACNC: 0.2 E.U./DL
WBC # BLD AUTO: 7.9 K/UL (ref 4–11)
WBC #/AREA URNS HPF: ABNORMAL /HPF (ref 0–5)

## 2023-07-01 PROCEDURE — 36415 COLL VENOUS BLD VENIPUNCTURE: CPT

## 2023-07-01 PROCEDURE — 99284 EMERGENCY DEPT VISIT MOD MDM: CPT

## 2023-07-01 PROCEDURE — 80053 COMPREHEN METABOLIC PANEL: CPT

## 2023-07-01 PROCEDURE — 84484 ASSAY OF TROPONIN QUANT: CPT

## 2023-07-01 PROCEDURE — 2580000003 HC RX 258: Performed by: HOSPITALIST

## 2023-07-01 PROCEDURE — 81001 URINALYSIS AUTO W/SCOPE: CPT

## 2023-07-01 PROCEDURE — 85025 COMPLETE CBC W/AUTO DIFF WBC: CPT

## 2023-07-01 RX ORDER — 0.9 % SODIUM CHLORIDE 0.9 %
1000 INTRAVENOUS SOLUTION INTRAVENOUS ONCE
Status: COMPLETED | OUTPATIENT
Start: 2023-07-01 | End: 2023-07-01

## 2023-07-01 RX ADMIN — SODIUM CHLORIDE 1000 ML: 9 INJECTION, SOLUTION INTRAVENOUS at 13:26

## 2023-07-01 RX ADMIN — SODIUM CHLORIDE 1000 ML: 9 INJECTION, SOLUTION INTRAVENOUS at 14:17

## 2023-07-01 ASSESSMENT — PAIN DESCRIPTION - FREQUENCY: FREQUENCY: CONTINUOUS

## 2023-07-01 ASSESSMENT — PAIN DESCRIPTION - PAIN TYPE: TYPE: ACUTE PAIN

## 2023-07-01 ASSESSMENT — PAIN SCALES - GENERAL: PAINLEVEL_OUTOF10: 4

## 2023-07-01 ASSESSMENT — PAIN DESCRIPTION - LOCATION: LOCATION: KNEE

## 2023-07-01 ASSESSMENT — PAIN DESCRIPTION - ORIENTATION: ORIENTATION: LEFT

## 2023-07-01 ASSESSMENT — PAIN - FUNCTIONAL ASSESSMENT: PAIN_FUNCTIONAL_ASSESSMENT: WONG-BAKER FACES

## 2023-07-11 ENCOUNTER — OFFICE VISIT (OUTPATIENT)
Dept: PRIMARY CARE CLINIC | Age: 63
End: 2023-07-11
Payer: MEDICARE

## 2023-07-11 VITALS
HEIGHT: 66 IN | HEART RATE: 95 BPM | DIASTOLIC BLOOD PRESSURE: 70 MMHG | RESPIRATION RATE: 16 BRPM | WEIGHT: 224.4 LBS | OXYGEN SATURATION: 98 % | TEMPERATURE: 97.5 F | SYSTOLIC BLOOD PRESSURE: 103 MMHG | BODY MASS INDEX: 36.07 KG/M2

## 2023-07-11 DIAGNOSIS — R29.898 LEFT LEG WEAKNESS: ICD-10-CM

## 2023-07-11 DIAGNOSIS — Z79.4 TYPE 2 DIABETES MELLITUS WITH OTHER CIRCULATORY COMPLICATION, WITH LONG-TERM CURRENT USE OF INSULIN (HCC): Primary | ICD-10-CM

## 2023-07-11 DIAGNOSIS — E11.59 TYPE 2 DIABETES MELLITUS WITH OTHER CIRCULATORY COMPLICATION, WITH LONG-TERM CURRENT USE OF INSULIN (HCC): Primary | ICD-10-CM

## 2023-07-11 DIAGNOSIS — R21 RASH: ICD-10-CM

## 2023-07-11 DIAGNOSIS — M54.16 LUMBAR BACK PAIN WITH RADICULOPATHY AFFECTING LEFT LOWER EXTREMITY: ICD-10-CM

## 2023-07-11 DIAGNOSIS — I10 PRIMARY HYPERTENSION: ICD-10-CM

## 2023-07-11 DIAGNOSIS — R29.6 FREQUENT FALLS: ICD-10-CM

## 2023-07-11 PROCEDURE — 3078F DIAST BP <80 MM HG: CPT | Performed by: NURSE PRACTITIONER

## 2023-07-11 PROCEDURE — 3052F HG A1C>EQUAL 8.0%<EQUAL 9.0%: CPT | Performed by: NURSE PRACTITIONER

## 2023-07-11 PROCEDURE — 3074F SYST BP LT 130 MM HG: CPT | Performed by: NURSE PRACTITIONER

## 2023-07-11 PROCEDURE — 99214 OFFICE O/P EST MOD 30 MIN: CPT | Performed by: NURSE PRACTITIONER

## 2023-07-11 RX ORDER — CYCLOBENZAPRINE HCL 10 MG
TABLET ORAL
COMMUNITY
Start: 2023-06-19

## 2023-07-11 RX ORDER — DULAGLUTIDE 1.5 MG/.5ML
INJECTION, SOLUTION SUBCUTANEOUS
COMMUNITY

## 2023-07-11 RX ORDER — PREGABALIN 100 MG/1
100 CAPSULE ORAL 2 TIMES DAILY
COMMUNITY
Start: 2023-07-05

## 2023-07-11 ASSESSMENT — ENCOUNTER SYMPTOMS
ALLERGIC/IMMUNOLOGIC NEGATIVE: 1
EYES NEGATIVE: 1
GASTROINTESTINAL NEGATIVE: 1
RESPIRATORY NEGATIVE: 1
BACK PAIN: 1

## 2023-07-11 NOTE — PROGRESS NOTES
Chief Complaint   Patient presents with    ED Follow-up     [facility]    Dizziness       Have you seen any other physician or provider since your last visit yes - [facility]    Have you had any other diagnostic tests since your last visit? yes - labs    Have you changed or stopped any medications since your last visit? Yes stopped Lasix and started taking Irbesartan to every other day. I have recommended that this patient have a immunization for pneumonia and sigmoidoscopy but she due to refusal reason: not comfortable with test   I have discussed the risks and benefits of this examination with her. The patient verbalizes understanding. Provider will be informed of refusal.      Diabetic retinal exam completed this year? Yes                       * If yes please have patient sign a records release to obtain record to update Health Maintenance                       * If no, please order referral for patient to be scheduled   SUBJECTIVE:    Patient ID:Flory Noonan is a 57 y.o. female. Chief Complaint   Patient presents with    ED Follow-up     [facility]ER    Dizziness     HPI:  Patient is here to follow up from ER visit for dizziness. She was diagnosed with dehydration. She changed the way she takes her Irbesartan to every other day and she stopped the Lasix. Patient has had several falls since she was here last. Her left leg is shaky and weak. She cannot lift her leg without help. She states the artery in her leg hurts. She is asking for a Rolator walker and a bath chair. She has abrasions on bilateral knees. She had a procedure in her back last week. She is going to Pain clinic in [address] [place] [address]. Her blood sugars have been higher since the steroid injections. She is walking with a cane at this time. She has history of RA and goes to dr. Francesco Hayward. Her left leg is weak and won't hold her weifht. She can not lift her leg without passive movement. She has had erythema multiform.   [place]   Patient's

## 2023-07-28 ENCOUNTER — HOSPITAL ENCOUNTER (OUTPATIENT)
Facility: HOSPITAL | Age: 63
Discharge: HOME | End: 2023-07-28
Payer: MEDICARE

## 2023-07-28 DIAGNOSIS — E11.59 TYPE 2 DIABETES MELLITUS WITH OTHER CIRCULATORY COMPLICATION, WITH LONG-TERM CURRENT USE OF INSULIN (HCC): ICD-10-CM

## 2023-07-28 DIAGNOSIS — Z79.4 TYPE 2 DIABETES MELLITUS WITH OTHER CIRCULATORY COMPLICATION, WITH LONG-TERM CURRENT USE OF INSULIN (HCC): ICD-10-CM

## 2023-07-28 LAB
ALBUMIN SERPL-MCNC: 3.8 G/DL (ref 3.4–4.8)
ALBUMIN/GLOB SERPL: 1.9 {RATIO} (ref 0.8–2)
ALP SERPL-CCNC: 94 U/L (ref 25–100)
ALT SERPL-CCNC: 23 U/L (ref 4–36)
ANION GAP SERPL CALCULATED.3IONS-SCNC: 10 MMOL/L (ref 3–16)
AST SERPL-CCNC: 16 U/L (ref 8–33)
BILIRUB SERPL-MCNC: 0.7 MG/DL (ref 0.3–1.2)
BUN SERPL-MCNC: 21 MG/DL (ref 6–20)
CALCIUM SERPL-MCNC: 9.4 MG/DL (ref 8.5–10.5)
CHLORIDE SERPL-SCNC: 104 MMOL/L (ref 98–107)
CO2 SERPL-SCNC: 27 MMOL/L (ref 20–30)
CREAT SERPL-MCNC: 1.2 MG/DL (ref 0.4–1.2)
CRP SERPL-MCNC: <3 MG/L (ref 0–5.1)
ERYTHROCYTE [DISTWIDTH] IN BLOOD BY AUTOMATED COUNT: 13.4 % (ref 11–16)
ERYTHROCYTE [SEDIMENTATION RATE] IN BLOOD BY WESTERGREN METHOD: 2 MM/HR (ref 0–30)
GFR SERPLBLD CREATININE-BSD FMLA CKD-EPI: 50 ML/MIN/{1.73_M2}
GLOBULIN SER CALC-MCNC: 2 G/DL
GLUCOSE SERPL-MCNC: 143 MG/DL (ref 74–106)
HBA1C MFR BLD: 8.2 %
HCT VFR BLD AUTO: 46.6 % (ref 37–47)
HGB BLD-MCNC: 15.4 G/DL (ref 11.5–16.5)
MCH RBC QN AUTO: 29.8 PG (ref 27–32)
MCHC RBC AUTO-ENTMCNC: 33 G/DL (ref 31–35)
MCV RBC AUTO: 90.3 FL (ref 80–100)
PLATELET # BLD AUTO: 197 K/UL (ref 150–400)
PMV BLD AUTO: 12 FL (ref 6–10)
POTASSIUM SERPL-SCNC: 4.7 MMOL/L (ref 3.4–5.1)
PROT SERPL-MCNC: 5.8 G/DL (ref 6.4–8.3)
RBC # BLD AUTO: 5.16 M/UL (ref 3.8–5.8)
SODIUM SERPL-SCNC: 141 MMOL/L (ref 136–145)
WBC # BLD AUTO: 5.6 K/UL (ref 4–11)

## 2023-07-28 PROCEDURE — 85027 COMPLETE CBC AUTOMATED: CPT

## 2023-07-28 PROCEDURE — 80053 COMPREHEN METABOLIC PANEL: CPT

## 2023-07-28 PROCEDURE — 86140 C-REACTIVE PROTEIN: CPT

## 2023-07-28 PROCEDURE — 85652 RBC SED RATE AUTOMATED: CPT

## 2023-07-28 PROCEDURE — 83036 HEMOGLOBIN GLYCOSYLATED A1C: CPT

## 2023-08-02 RX ORDER — TRAZODONE HYDROCHLORIDE 50 MG/1
TABLET ORAL
Qty: 30 TABLET | Refills: 1 | Status: SHIPPED | OUTPATIENT
Start: 2023-08-02

## 2023-08-04 ENCOUNTER — TELEPHONE (OUTPATIENT)
Dept: NEUROSURGERY | Facility: OTHER | Age: 63
End: 2023-08-04

## 2023-08-04 NOTE — TELEPHONE ENCOUNTER
ERIC FROM RADIOLOGY AT [facility] IN [place] CALLED TO LET OFFICE KNOW THAT THE MRI LUMBAR SPINE IMAGES HAVE BEEN POWER SHARED. PLEASE ADVISE- THANK YOU.

## 2023-08-07 RX ORDER — LEFLUNOMIDE 20 MG/1
20 TABLET ORAL DAILY
Qty: 90 TABLET | Refills: 1 | Status: SHIPPED | OUTPATIENT
Start: 2023-08-07

## 2023-08-07 RX ORDER — INSULIN GLARGINE 100 [IU]/ML
INJECTION, SOLUTION SUBCUTANEOUS
Qty: 15 ADJUSTABLE DOSE PRE-FILLED PEN SYRINGE | Refills: 1 | Status: SHIPPED | OUTPATIENT
Start: 2023-08-07

## 2023-08-07 RX ORDER — LEFLUNOMIDE 20 MG/1
TABLET ORAL
Qty: 30 TABLET | Refills: 3 | Status: SHIPPED | OUTPATIENT
Start: 2023-08-07 | End: 2023-08-07 | Stop reason: SDUPTHER

## 2023-08-07 RX ORDER — LEVOTHYROXINE SODIUM 0.15 MG/1
TABLET ORAL
Qty: 90 TABLET | Refills: 1 | Status: SHIPPED | OUTPATIENT
Start: 2023-08-07

## 2023-08-07 RX ORDER — ATORVASTATIN CALCIUM 10 MG/1
TABLET, FILM COATED ORAL
Qty: 90 TABLET | Refills: 1 | Status: SHIPPED | OUTPATIENT
Start: 2023-08-07

## 2023-08-07 RX ORDER — FLUOXETINE HYDROCHLORIDE 40 MG/1
CAPSULE ORAL
Qty: 90 CAPSULE | Refills: 1 | Status: SHIPPED | OUTPATIENT
Start: 2023-08-07

## 2023-08-07 RX ORDER — PANTOPRAZOLE SODIUM 40 MG/1
TABLET, DELAYED RELEASE ORAL
Qty: 90 TABLET | Refills: 1 | Status: SHIPPED | OUTPATIENT
Start: 2023-08-07

## 2023-08-08 ENCOUNTER — OFFICE VISIT (OUTPATIENT)
Dept: NEUROSURGERY | Facility: CLINIC | Age: 63
End: 2023-08-08
Payer: MEDICARE

## 2023-08-08 VITALS — WEIGHT: 205 LBS | TEMPERATURE: 97.5 F | BODY MASS INDEX: 32.95 KG/M2 | HEIGHT: 66 IN

## 2023-08-08 DIAGNOSIS — M79.605 LEFT LEG PAIN: ICD-10-CM

## 2023-08-08 DIAGNOSIS — R20.0 LEFT LEG NUMBNESS: Primary | ICD-10-CM

## 2023-08-08 DIAGNOSIS — M54.9 MECHANICAL BACK PAIN: ICD-10-CM

## 2023-08-08 PROCEDURE — 1160F RVW MEDS BY RX/DR IN RCRD: CPT | Performed by: NEUROLOGICAL SURGERY

## 2023-08-08 PROCEDURE — 99203 OFFICE O/P NEW LOW 30 MIN: CPT | Performed by: NEUROLOGICAL SURGERY

## 2023-08-08 PROCEDURE — 1159F MED LIST DOCD IN RCRD: CPT | Performed by: NEUROLOGICAL SURGERY

## 2023-08-08 RX ORDER — IRBESARTAN 300 MG/1
300 TABLET ORAL DAILY
COMMUNITY
Start: 2023-05-03

## 2023-08-08 RX ORDER — PREGABALIN 100 MG/1
100 CAPSULE ORAL 3 TIMES DAILY
Qty: 90 CAPSULE | Refills: 11 | COMMUNITY
Start: 2023-07-05 | End: 2024-07-04

## 2023-08-08 NOTE — PROGRESS NOTES
Patient: Sue L Collett  : 1960    Primary Care Provider: Stephany De Los Santos APRN    Requesting Provider: As above        History    Chief Complaint: Chronic low back pain with numbness and weakness in the left leg.    History of Present Illness: Ms.Collett is a 62-year-old woman who I last saw in 2017 with complaints of back pain and numbness in her thighs.  She did not follow-up as recommended.  She continues to have chronic low back pain.  About a month ago she apparently rather suddenly developed pain in her left hip and leg.  She has weakness and numbness globally in her left leg and foot.  She has had epidural injections performed.  She has done chiropractic in the past.  Some of her symptoms are worse when she stands or walks too long.  She has been followed and treated at an orthopedic practice.    Review of Systems   Constitutional:  Positive for appetite change, chills, fatigue and unexpected weight change. Negative for activity change, diaphoresis and fever.   HENT:  Positive for postnasal drip and sneezing. Negative for congestion, dental problem, drooling, ear discharge, ear pain, facial swelling, hearing loss, mouth sores, nosebleeds, rhinorrhea, sinus pressure, sinus pain, sore throat, tinnitus, trouble swallowing and voice change.    Eyes:  Negative for photophobia, pain, discharge, redness, itching and visual disturbance.   Respiratory:  Positive for cough, shortness of breath and wheezing. Negative for apnea, choking, chest tightness and stridor.    Cardiovascular:  Negative for chest pain, palpitations and leg swelling.   Gastrointestinal:  Positive for constipation, diarrhea and nausea. Negative for abdominal distention, abdominal pain, anal bleeding, blood in stool, rectal pain and vomiting.   Endocrine: Negative for cold intolerance, heat intolerance, polydipsia, polyphagia and polyuria.   Genitourinary:  Negative for decreased urine volume, difficulty urinating, dyspareunia, dysuria,  "enuresis, flank pain, frequency, genital sores, hematuria, menstrual problem, pelvic pain, urgency, vaginal bleeding, vaginal discharge and vaginal pain.   Musculoskeletal:  Positive for arthralgias, back pain and joint swelling. Negative for gait problem, myalgias, neck pain and neck stiffness.   Skin:  Positive for pallor. Negative for color change, rash and wound.   Allergic/Immunologic: Positive for environmental allergies. Negative for food allergies and immunocompromised state.   Neurological:  Positive for dizziness, tremors, weakness, light-headedness, numbness and headaches. Negative for seizures, syncope, facial asymmetry and speech difficulty.   Hematological:  Negative for adenopathy. Bruises/bleeds easily.   Psychiatric/Behavioral:  Positive for agitation, behavioral problems, confusion, decreased concentration and sleep disturbance. Negative for dysphoric mood, hallucinations, self-injury and suicidal ideas. The patient is nervous/anxious. The patient is not hyperactive.      The patient's past medical history, past surgical history, family history, and social history have been reviewed at length in the electronic medical record.      Physical Exam:   Temp 97.5 øF (36.4 øC)   Ht 167.6 cm (66\")   Wt 93 kg (205 lb)   LMP  (LMP Unknown)   BMI 33.09 kg/mý   The patient ambulates very slowly and independently.  There is some diffuse weakness throughout her left leg.  She may have some weakness in dorsiflexion of her left foot.  Straight leg raising is negative.  Sensation is altered globally in her left leg from the knee down.  She is tender to touch in her left lower leg.    Medical Decision Making    Data Review:   (All imaging studies were personally reviewed unless stated otherwise)  Lumbar MRI study dated 5/16/2023 demonstrates some mild to moderate canal narrowing at L3-4 and more generous canal narrowing at L4-5.    Electrodiagnostic studies were performed by physical therapist and this suggested " severe ongoing axonal pathology affecting the left L4 and S1 nerve roots.  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    Diagnosis:   The patient has global unilateral symptoms involving her left leg and foot.  These complaints and findings would not be consistent with a focal radiculopathy or neuropathy.  No reduced be consistent with symptomatic spinal stenosis.  Given that she has some left leg pain and not just numbness or weakness would mei against a brain lesion.    Treatment Options:   I have referred the patient for physical therapy to work on some strengthening of her left leg.  I do not see anything that would require surgical intervention.  She will follow-up with her primary care provider.       Diagnosis Plan   1. Left leg numbness        2. Left leg pain        3. Mechanical back pain            Scribed for Fantasma Huitron MD by Soraida Allen [facility] 8/8/2023 13:53 EDT]      I, Dr. Huitron, personally performed the services described in the documentation, as scribed in my presence, and it is both accurate and complete.

## 2023-08-09 NOTE — TELEPHONE ENCOUNTER
Called an spoke with patient about her COVID test. Notified patient she could request a throat swab to be covid tested.        
Statement Selected

## 2023-08-15 ENCOUNTER — HOSPITAL ENCOUNTER (OUTPATIENT)
Dept: PHYSICAL THERAPY | Facility: HOSPITAL | Age: 63
Setting detail: THERAPIES SERIES
Discharge: HOME | End: 2023-08-15
Payer: MEDICARE

## 2023-08-15 PROCEDURE — 97162 PT EVAL MOD COMPLEX 30 MIN: CPT

## 2023-08-15 PROCEDURE — 97110 THERAPEUTIC EXERCISES: CPT

## 2023-08-15 ASSESSMENT — PAIN DESCRIPTION - DESCRIPTORS: DESCRIPTORS: BURNING

## 2023-08-15 ASSESSMENT — PAIN DESCRIPTION - ORIENTATION: ORIENTATION: LEFT

## 2023-08-15 ASSESSMENT — PAIN DESCRIPTION - PAIN TYPE: TYPE: CHRONIC PAIN

## 2023-08-15 ASSESSMENT — PAIN SCALES - GENERAL: PAINLEVEL_OUTOF10: 9

## 2023-08-15 ASSESSMENT — PAIN DESCRIPTION - LOCATION: LOCATION: LEG;BACK

## 2023-08-15 NOTE — PROGRESS NOTES
Standard  Bathroom Accessibility: Walker accessible  Home Equipment: Rollator, Cane, quad    Occupation/Interests:  Occupation: On disability (Factor 5 disorder)  Leisure & Hobbies: Pt walks with rollator inside to get exercise.     Prior Level of Function:    Independent        Current Level of Function:  Mod I with FWW      Receives Help From:  (Son helps with vaccuming, cooking, dishes and cleans around the house)  ADL Assistance: Needs assistance  Bath: Modified independent  Dressing: Modified independent  Grooming: Modified independent   Feeding: Modified independent   Toileting: Independent  Homemaking Assistance: Independent  Homemaking Responsibilities: Yes  Ambulation Assistance: Needs assistance (with rollator)  Transfer Assistance: Independent  Active : Yes (Pt can drive, but has cut back on driving ~5 months ago)  Mode of Transportation: Car    OBJECTIVE EXAMINATION     Review of Systems:  Follows Commands: Within Functional Limits    Observations:  General Observations  Description: Discolored BLE feet, pt presents with Rollator, Obese, FHP, Kyphosis    Palpation:   Lumbar Spine Palpation: Tender around sacrum and B piriformis    Neuro Screen:  Sensation      Sensation  Overall Sensation Status: Impaired (Hx of diabetes)       Left Reflexes  Right Reflexes             Left Quadriceps (L3-4):  Diminished  Left Gastrocnemius (S1):  Diminished Right Quadriceps (L3-4):  Average/Normal  Right Gastrocnemius (S1):  Diminished     Left AROM  Right AROM         AROM LLE (degrees)  L SLR: 0-0  L Hip External Rotation (0-45): 0-12  L Hip Internal Rotation (0-45): 0-20    AROM RLE (degrees)  R SLR: 0-10  R Hip External Rotation (0-45): 0-22  R Hip Internal Rotation (0-45): 0-25     Left PROM  Right PROM       WNL     WNL        Left Strength  Right Strength         Strength LLE  L Hip Flexion: 1+/5  L Hip Extension: 1+/5  L Hip ABduction: 2+/5  L Hip ADduction: 2+/5  L Hip Internal Rotation: 2-/5  L Hip
[facility]/CONTACT- COVID-19+/aspiration precautions/fall precautions/isolation precautions

## 2023-08-16 DIAGNOSIS — R29.6 FREQUENT FALLS: ICD-10-CM

## 2023-08-16 DIAGNOSIS — R29.898 LEFT LEG WEAKNESS: Primary | ICD-10-CM

## 2023-08-17 ENCOUNTER — HOSPITAL ENCOUNTER (OUTPATIENT)
Dept: OCCUPATIONAL THERAPY | Facility: HOSPITAL | Age: 63
Setting detail: THERAPIES SERIES
Discharge: HOME | End: 2023-08-17
Payer: MEDICARE

## 2023-08-17 ENCOUNTER — HOSPITAL ENCOUNTER (OUTPATIENT)
Dept: PHYSICAL THERAPY | Facility: HOSPITAL | Age: 63
Setting detail: THERAPIES SERIES
Discharge: HOME | End: 2023-08-17
Payer: MEDICARE

## 2023-08-17 PROCEDURE — 97166 OT EVAL MOD COMPLEX 45 MIN: CPT

## 2023-08-17 PROCEDURE — 97110 THERAPEUTIC EXERCISES: CPT

## 2023-08-17 PROCEDURE — 97535 SELF CARE MNGMENT TRAINING: CPT

## 2023-08-17 PROCEDURE — 97140 MANUAL THERAPY 1/> REGIONS: CPT

## 2023-08-17 PROCEDURE — 97530 THERAPEUTIC ACTIVITIES: CPT

## 2023-08-17 NOTE — FLOWSHEET NOTE
Physical Therapy Daily Treatment Note   Date:  2023    TIme In:  1030                    Time Out: 1133    Patient Name:  Rajni Ferreira    :  1960  MRN: [id number]    Restrictions/Precautions:    Pertinent Medical History:  Medical/Treatment Diagnosis Information:  Anesthesia of skin [R20.0]  Pain in left leg [H72.453]     Insurance/Certification information:  Payor: Allison Sewell / Plan: YVONNE Wren / Product Type: *No Product type* /   Physician Information:  Teddy Kelly MD  Plan of care signed (Y/N):    Visit# / total visits:     2 /    G-Code (if applicable):      Date / Visit # G-Code Applied:         Progress Note: []  Yes  [x]  No  Next due by: Visit #10       Pain level:  9/10    Subjective: Pt states she is really sore today. Objective:  Observation:   Test measurements:    Palpation:    Exercises:  Exercise Resistance/Repetitions Date performed   Nustep lvl 4x7' Seat lvl 9   17   Sciatic nerve glides  BLE x30 each 17   AAROM LAQ LLE  2x10 17   Supine bridges with belt  2x10 2\" 17   Supine Piriformis  2x45\" B 16   Hooklying Clamshells with belt 2x10 2\" 17   Hooklying hip add with belt support  2x10 with ball 2\" 17   Supine LLE hip abd  2x10 with belt assist and Sliding board 17                                   Other Therapeutic Activities: There-ex observed by Akilah Ayon Day    Manual Treatments:  hip stretch Ripon Renae, hip flexor stretch in sidelying, gentle L IT band STM x15'    Modalities:  MH in SL L hip x12'      Timed Code Treatment Minutes:  50      Total Treatment Minutes:  63    Treatment/Activity Tolerance:  [x] Patient tolerated treatment well [] Patient limited by fatigue  [] Patient limited by pain  [] Patient limited by other medical complications  [x] Other: Pt in a lot of pain upon entering clinic, pt able to progress through exercises with min cues for proper form. Pt required extensive assist from belt d/t poor LLE control.  Pt responded well

## 2023-08-17 NOTE — PROGRESS NOTES
Occupational Therapy  Initial Assessment  Date:  2023    Patient Name: Gwendloyn Galeazzi  MRN: [id number]     :  1960     RESTRICTIONS: General Precautions  OTHER MEDICAL HX: Chronic low back pain, L hip/leg pain w numbness & tingling, Seropositive RA of multiple sites, Sjogren syndrome, Asthma, Type 2 diabetes mellitus, Hypertension, Thyroid cancer, Breast cancer, Chronic kidney disease, DVTs, Lupus, OA, Mixed anxiety/depressive disorder, CVA, Gallbladder surgery, COPD, MARTIR    SUBJECTIVE  Chart Reviewed: Yes  Patient assessed for rehabilitation services?: Yes  Self reported health status: Bad  History obtained from: Chart review, interview, observations, assessments  Fall History: Frequent falls, dizziness & orthostatic hypotension  Family/caregiver present: No  Insurance: [employer]  PCP: NICKOLAS Monsalve  Referring provider: NICKOLAS Monsalve  Required treatment: Evaluation & treatment  Orders received on: 23  Pain: 10/10- 'I'd give 100/10 if I could'; denies needing to go to ER  Diagnosis: L leg weakness R29.898; Frequent falls R29.6    Hx: Pt has hx of prior pain medication use, running out of medications for weeks before contacting physician, & a wide range of medical dx. Subjective: Pt reports she is having trouble w ADLs & home management from LLE pain & numbness & back pain. SOCIAL HX  Lives with: Son (Pt lives w son due to fall risk)  Type of home & layout: Apt w 14 stairs- difficulty accessing, 'scoots up on hiney'  Bathroom: Tub shower unit, no shower chair, no hand rails  Home equipment: Rolling walker    Receives help from: Son  ADL assistance: Needs assistance  Homemaking assistance: Needs assistance  Mobility assistance: Needs assistance    Occupation: Disabled  Leisure: Limited pursuits  Routines/Habits: Stays at home mostly  Sleep/Rest: Poor- can't fall asleep    Active : Yes    OBJECTIVE  Observation: Pt ambulated into therapy gym w rollator walker;  Morbidly

## 2023-08-22 ENCOUNTER — APPOINTMENT (OUTPATIENT)
Dept: OCCUPATIONAL THERAPY | Facility: HOSPITAL | Age: 63
End: 2023-08-22
Payer: MEDICARE

## 2023-08-22 ENCOUNTER — APPOINTMENT (OUTPATIENT)
Dept: PHYSICAL THERAPY | Facility: HOSPITAL | Age: 63
End: 2023-08-22
Payer: MEDICARE

## 2023-08-22 ENCOUNTER — OFFICE VISIT (OUTPATIENT)
Dept: PRIMARY CARE CLINIC | Age: 63
End: 2023-08-22
Payer: MEDICARE

## 2023-08-22 VITALS
RESPIRATION RATE: 16 BRPM | HEIGHT: 66 IN | SYSTOLIC BLOOD PRESSURE: 129 MMHG | BODY MASS INDEX: 35.52 KG/M2 | WEIGHT: 221 LBS | DIASTOLIC BLOOD PRESSURE: 80 MMHG | HEART RATE: 101 BPM | OXYGEN SATURATION: 95 % | TEMPERATURE: 97.4 F

## 2023-08-22 DIAGNOSIS — M54.16 LUMBAR BACK PAIN WITH RADICULOPATHY AFFECTING LEFT LOWER EXTREMITY: ICD-10-CM

## 2023-08-22 DIAGNOSIS — Z01.818 PRE-OP EXAM: ICD-10-CM

## 2023-08-22 DIAGNOSIS — Z78.0 POST-MENOPAUSAL: Primary | ICD-10-CM

## 2023-08-22 DIAGNOSIS — D68.51 FACTOR 5 LEIDEN MUTATION, HETEROZYGOUS (HCC): ICD-10-CM

## 2023-08-22 DIAGNOSIS — Z91.81 STATUS POST FALL: ICD-10-CM

## 2023-08-22 PROCEDURE — 1123F ACP DISCUSS/DSCN MKR DOCD: CPT | Performed by: NURSE PRACTITIONER

## 2023-08-22 PROCEDURE — G0439 PPPS, SUBSEQ VISIT: HCPCS | Performed by: NURSE PRACTITIONER

## 2023-08-22 RX ORDER — HYDROCODONE BITARTRATE AND ACETAMINOPHEN 5; 325 MG/1; MG/1
1 TABLET ORAL EVERY 8 HOURS PRN
Qty: 42 TABLET | Refills: 0 | Status: SHIPPED | OUTPATIENT
Start: 2023-08-22 | End: 2023-09-21

## 2023-08-22 RX ORDER — ALBUTEROL SULFATE 90 UG/1
2 AEROSOL, METERED RESPIRATORY (INHALATION) EVERY 4 HOURS PRN
Qty: 1 EACH | Refills: 1 | Status: SHIPPED | OUTPATIENT
Start: 2023-08-22 | End: 2023-09-21

## 2023-08-22 RX ORDER — CYCLOBENZAPRINE HCL 10 MG
10 TABLET ORAL 3 TIMES DAILY PRN
Qty: 30 TABLET | Refills: 0 | Status: SHIPPED | OUTPATIENT
Start: 2023-08-22 | End: 2023-09-01

## 2023-08-22 ASSESSMENT — PATIENT HEALTH QUESTIONNAIRE - PHQ9
9. THOUGHTS THAT YOU WOULD BE BETTER OFF DEAD, OR OF HURTING YOURSELF: 0
5. POOR APPETITE OR OVEREATING: 1
10. IF YOU CHECKED OFF ANY PROBLEMS, HOW DIFFICULT HAVE THESE PROBLEMS MADE IT FOR YOU TO DO YOUR WORK, TAKE CARE OF THINGS AT HOME, OR GET ALONG WITH OTHER PEOPLE: 1
SUM OF ALL RESPONSES TO PHQ QUESTIONS 1-9: 20
8. MOVING OR SPEAKING SO SLOWLY THAT OTHER PEOPLE COULD HAVE NOTICED. OR THE OPPOSITE, BEING SO FIGETY OR RESTLESS THAT YOU HAVE BEEN MOVING AROUND A LOT MORE THAN USUAL: 1
SUM OF ALL RESPONSES TO PHQ QUESTIONS 1-9: 20
2. FEELING DOWN, DEPRESSED OR HOPELESS: 3
3. TROUBLE FALLING OR STAYING ASLEEP: 3
SUM OF ALL RESPONSES TO PHQ9 QUESTIONS 1 & 2: 6
SUM OF ALL RESPONSES TO PHQ QUESTIONS 1-9: 20
6. FEELING BAD ABOUT YOURSELF - OR THAT YOU ARE A FAILURE OR HAVE LET YOURSELF OR YOUR FAMILY DOWN: 3
4. FEELING TIRED OR HAVING LITTLE ENERGY: 3
7. TROUBLE CONCENTRATING ON THINGS, SUCH AS READING THE NEWSPAPER OR WATCHING TELEVISION: 3
1. LITTLE INTEREST OR PLEASURE IN DOING THINGS: 3
SUM OF ALL RESPONSES TO PHQ QUESTIONS 1-9: 20

## 2023-08-22 ASSESSMENT — COLUMBIA-SUICIDE SEVERITY RATING SCALE - C-SSRS
2. HAVE YOU ACTUALLY HAD ANY THOUGHTS OF KILLING YOURSELF?: NO
6. HAVE YOU EVER DONE ANYTHING, STARTED TO DO ANYTHING, OR PREPARED TO DO ANYTHING TO END YOUR LIFE?: NO
1. WITHIN THE PAST MONTH, HAVE YOU WISHED YOU WERE DEAD OR WISHED YOU COULD GO TO SLEEP AND NOT WAKE UP?: NO

## 2023-08-22 ASSESSMENT — LIFESTYLE VARIABLES: HOW OFTEN DO YOU HAVE A DRINK CONTAINING ALCOHOL: NEVER

## 2023-08-24 ENCOUNTER — APPOINTMENT (OUTPATIENT)
Dept: PHYSICAL THERAPY | Facility: HOSPITAL | Age: 63
End: 2023-08-24
Payer: MEDICARE

## 2023-08-24 ENCOUNTER — APPOINTMENT (OUTPATIENT)
Dept: OCCUPATIONAL THERAPY | Facility: HOSPITAL | Age: 63
End: 2023-08-24
Payer: MEDICARE

## 2023-08-29 ENCOUNTER — TELEPHONE (OUTPATIENT)
Dept: PRIMARY CARE CLINIC | Age: 63
End: 2023-08-29

## 2023-09-08 ENCOUNTER — TELEPHONE (OUTPATIENT)
Dept: PRIMARY CARE CLINIC | Age: 63
End: 2023-09-08

## 2023-09-08 DIAGNOSIS — D68.51 FACTOR 5 LEIDEN MUTATION, HETEROZYGOUS (HCC): Primary | ICD-10-CM

## 2023-09-08 NOTE — TELEPHONE ENCOUNTER
Per Psychiatric Hem/Onc they need lab results showing pt has Factor 5 Leiden mutation prior to scheduling appt, I could not find these results in chart.

## 2023-09-10 ENCOUNTER — HOSPITAL ENCOUNTER (OUTPATIENT)
Facility: HOSPITAL | Age: 63
Discharge: HOME | End: 2023-09-10
Payer: MEDICARE

## 2023-09-10 ENCOUNTER — HOSPITAL ENCOUNTER (OUTPATIENT)
Dept: GENERAL RADIOLOGY | Facility: HOSPITAL | Age: 63
Discharge: HOME | End: 2023-09-10
Payer: MEDICARE

## 2023-09-10 DIAGNOSIS — Z91.81 STATUS POST FALL: ICD-10-CM

## 2023-09-10 DIAGNOSIS — D68.51 FACTOR 5 LEIDEN MUTATION, HETEROZYGOUS (HCC): ICD-10-CM

## 2023-09-10 PROCEDURE — 81241 F5 GENE: CPT

## 2023-09-10 PROCEDURE — 73562 X-RAY EXAM OF KNEE 3: CPT

## 2023-09-12 ENCOUNTER — APPOINTMENT (OUTPATIENT)
Dept: CT IMAGING | Facility: HOSPITAL | Age: 63
End: 2023-09-12
Attending: EMERGENCY MEDICINE
Payer: MEDICARE

## 2023-09-12 ENCOUNTER — OFFICE VISIT (OUTPATIENT)
Dept: PRIMARY CARE CLINIC | Age: 63
End: 2023-09-12
Payer: MEDICARE

## 2023-09-12 ENCOUNTER — HOSPITAL ENCOUNTER (EMERGENCY)
Facility: HOSPITAL | Age: 63
Discharge: HOME | End: 2023-09-12
Attending: EMERGENCY MEDICINE
Payer: MEDICARE

## 2023-09-12 VITALS
SYSTOLIC BLOOD PRESSURE: 150 MMHG | DIASTOLIC BLOOD PRESSURE: 92 MMHG | TEMPERATURE: 97.8 F | RESPIRATION RATE: 14 BRPM | OXYGEN SATURATION: 100 % | BODY MASS INDEX: 33.75 KG/M2 | WEIGHT: 210 LBS | HEIGHT: 66 IN | HEART RATE: 75 BPM

## 2023-09-12 VITALS
OXYGEN SATURATION: 100 % | SYSTOLIC BLOOD PRESSURE: 95 MMHG | HEART RATE: 98 BPM | TEMPERATURE: 97.5 F | DIASTOLIC BLOOD PRESSURE: 62 MMHG

## 2023-09-12 DIAGNOSIS — E11.59 TYPE 2 DIABETES MELLITUS WITH OTHER CIRCULATORY COMPLICATION, WITH LONG-TERM CURRENT USE OF INSULIN (HCC): ICD-10-CM

## 2023-09-12 DIAGNOSIS — R42 ORTHOSTATIC DIZZINESS: Primary | ICD-10-CM

## 2023-09-12 DIAGNOSIS — R42 DIZZINESS: Primary | ICD-10-CM

## 2023-09-12 DIAGNOSIS — R55 NEAR SYNCOPE: ICD-10-CM

## 2023-09-12 DIAGNOSIS — Z79.4 TYPE 2 DIABETES MELLITUS WITH OTHER CIRCULATORY COMPLICATION, WITH LONG-TERM CURRENT USE OF INSULIN (HCC): ICD-10-CM

## 2023-09-12 LAB
ALBUMIN SERPL-MCNC: 3.6 G/DL (ref 3.4–4.8)
ALBUMIN/GLOB SERPL: 1.7 {RATIO} (ref 0.8–2)
ALP SERPL-CCNC: 98 U/L (ref 25–100)
ALT SERPL-CCNC: 20 U/L (ref 4–36)
ANION GAP SERPL CALCULATED.3IONS-SCNC: 9 MMOL/L (ref 3–16)
AST SERPL-CCNC: 14 U/L (ref 8–33)
BASOPHILS # BLD: 0.1 K/UL (ref 0–0.1)
BASOPHILS NFR BLD: 1.2 %
BILIRUB SERPL-MCNC: 0.4 MG/DL (ref 0.3–1.2)
BILIRUB UR QL STRIP.AUTO: NEGATIVE
BUN SERPL-MCNC: 14 MG/DL (ref 6–20)
CALCIUM SERPL-MCNC: 9 MG/DL (ref 8.5–10.5)
CHLORIDE SERPL-SCNC: 104 MMOL/L (ref 98–107)
CHP ED QC CHECK: NORMAL
CLARITY UR: CLEAR
CO2 SERPL-SCNC: 28 MMOL/L (ref 20–30)
COLOR UR: YELLOW
CREAT SERPL-MCNC: 1 MG/DL (ref 0.4–1.2)
EOSINOPHIL # BLD: 0.3 K/UL (ref 0–0.4)
EOSINOPHIL NFR BLD: 3.5 %
ERYTHROCYTE [DISTWIDTH] IN BLOOD BY AUTOMATED COUNT: 12.9 % (ref 11–16)
GFR SERPLBLD CREATININE-BSD FMLA CKD-EPI: >60 ML/MIN/{1.73_M2}
GLOBULIN SER CALC-MCNC: 2.1 G/DL
GLUCOSE BLD-MCNC: 222 MG/DL
GLUCOSE SERPL-MCNC: 191 MG/DL (ref 74–106)
GLUCOSE UR STRIP.AUTO-MCNC: >=1000 MG/DL
HCT VFR BLD AUTO: 43.9 % (ref 37–47)
HGB BLD-MCNC: 15.1 G/DL (ref 11.5–16.5)
HGB UR QL STRIP.AUTO: NEGATIVE
IMM GRANULOCYTES # BLD: 0 K/UL
IMM GRANULOCYTES NFR BLD: 0.4 % (ref 0–5)
KETONES UR STRIP.AUTO-MCNC: NEGATIVE MG/DL
LACTATE BLDV-SCNC: 1.8 MMOL/L (ref 0.4–2)
LEUKOCYTE ESTERASE UR QL STRIP.AUTO: NEGATIVE
LIPASE SERPL-CCNC: 40 U/L (ref 5.6–51.3)
LYMPHOCYTES # BLD: 1.8 K/UL (ref 1.5–4)
LYMPHOCYTES NFR BLD: 24.8 %
MAGNESIUM SERPL-MCNC: 2.1 MG/DL (ref 1.7–2.4)
MCH RBC QN AUTO: 30.6 PG (ref 27–32)
MCHC RBC AUTO-ENTMCNC: 34.4 G/DL (ref 31–35)
MCV RBC AUTO: 88.9 FL (ref 80–100)
MONOCYTES # BLD: 0.7 K/UL (ref 0.2–0.8)
MONOCYTES NFR BLD: 8.9 %
NEUTROPHILS # BLD: 4.5 K/UL (ref 2–7.5)
NEUTS SEG NFR BLD: 61.2 %
NITRITE UR QL STRIP.AUTO: NEGATIVE
PH UR STRIP.AUTO: 6 [PH] (ref 5–8)
PLATELET # BLD AUTO: 183 K/UL (ref 150–400)
PMV BLD AUTO: 11.5 FL (ref 6–10)
POTASSIUM SERPL-SCNC: 3.7 MMOL/L (ref 3.4–5.1)
PROT SERPL-MCNC: 5.7 G/DL (ref 6.4–8.3)
PROT UR STRIP.AUTO-MCNC: NEGATIVE MG/DL
RBC # BLD AUTO: 4.94 M/UL (ref 3.8–5.8)
SODIUM SERPL-SCNC: 141 MMOL/L (ref 136–145)
SP GR UR STRIP.AUTO: <=1.005 (ref 1–1.03)
TROPONIN, HIGH SENSITIVITY: 13 NG/L (ref 0–14)
UA COMPLETE W REFLEX CULTURE PNL UR: ABNORMAL
UA DIPSTICK W REFLEX MICRO PNL UR: ABNORMAL
URN SPEC COLLECT METH UR: ABNORMAL
UROBILINOGEN UR STRIP-ACNC: 0.2 E.U./DL
WBC # BLD AUTO: 7.4 K/UL (ref 4–11)

## 2023-09-12 PROCEDURE — 36415 COLL VENOUS BLD VENIPUNCTURE: CPT

## 2023-09-12 PROCEDURE — 83735 ASSAY OF MAGNESIUM: CPT

## 2023-09-12 PROCEDURE — 83605 ASSAY OF LACTIC ACID: CPT

## 2023-09-12 PROCEDURE — 85025 COMPLETE CBC W/AUTO DIFF WBC: CPT

## 2023-09-12 PROCEDURE — 80053 COMPREHEN METABOLIC PANEL: CPT

## 2023-09-12 PROCEDURE — 3052F HG A1C>EQUAL 8.0%<EQUAL 9.0%: CPT | Performed by: PHYSICIAN ASSISTANT

## 2023-09-12 PROCEDURE — 99284 EMERGENCY DEPT VISIT MOD MDM: CPT

## 2023-09-12 PROCEDURE — 81003 URINALYSIS AUTO W/O SCOPE: CPT

## 2023-09-12 PROCEDURE — 99214 OFFICE O/P EST MOD 30 MIN: CPT | Performed by: PHYSICIAN ASSISTANT

## 2023-09-12 PROCEDURE — 1123F ACP DISCUSS/DSCN MKR DOCD: CPT | Performed by: PHYSICIAN ASSISTANT

## 2023-09-12 PROCEDURE — 70450 CT HEAD/BRAIN W/O DYE: CPT

## 2023-09-12 PROCEDURE — 83690 ASSAY OF LIPASE: CPT

## 2023-09-12 PROCEDURE — 84484 ASSAY OF TROPONIN QUANT: CPT

## 2023-09-12 RX ORDER — DULAGLUTIDE 1.5 MG/.5ML
INJECTION, SOLUTION SUBCUTANEOUS
Qty: 2 ML | Refills: 3 | Status: SHIPPED | OUTPATIENT
Start: 2023-09-12

## 2023-09-12 RX ORDER — DULAGLUTIDE 1.5 MG/.5ML
INJECTION, SOLUTION SUBCUTANEOUS
Status: CANCELLED | OUTPATIENT
Start: 2023-09-12

## 2023-09-12 ASSESSMENT — ENCOUNTER SYMPTOMS
ABDOMINAL PAIN: 0
DIARRHEA: 0
EYE PAIN: 0
SORE THROAT: 0
COUGH: 0
SHORTNESS OF BREATH: 0
CONSTIPATION: 0

## 2023-09-12 NOTE — PROGRESS NOTES
Chief Complaint   Patient presents with    Dizziness     Pt says last night she started getting dizzy when she stood up, says everything goes black. Have you seen any other physician or provider since your last visit no    Have you had any other diagnostic tests since your last visit? no    Have you changed or stopped any medications since your last visit? no     Spencer Hogan (:  1960) is a 70 y.o. female,Established patient, here for evaluation of the following chief complaint(s):  Dizziness (Pt says last night she started getting dizzy when she stood up, says everything goes black. )           Subjective   SUBJECTIVE/OBJECTIVE:  HPI  Ms. Katalina Conway is a pleasant 70year old female with past medical history of T2DM, RA, Asthma, Factor 5 Leiden, Chronic back pain and lumbar radiculopathy and  frequent falls who presents with dizziness ongoing since last night. She does not drink well. She reports near syncope last night. She denies any headache or vision changes. She denies any chest pain, SOA, palpations. She denies any trouble speaking or swallowing. She reports chronic weakness in lower legs from her back. She has a history of lumbar radiculopathy and falls frequently. She reports she needs surgery on her back and is awaiting clearance from Hematology due to her Factor 5 Leiden. She reports dizziness worsens with position change such as standing. She reports her blood glucose averages . She has not taken her diabetes medication this morning. She stopped Lasix and had recent ED visit for orthostatic dizziness in July. She had to get IV fluids. She states she drove herself this morning and was unable to make it to ED. She does have a history of stroke 20 years ago. Review of Systems   Constitutional:  Negative for chills, fatigue and fever. HENT:  Negative for congestion, ear pain, nosebleeds and sore throat. Eyes:  Negative for pain and visual disturbance.

## 2023-09-12 NOTE — ED PROVIDER NOTES
of Breath With spacer (and mask if indicated). Thanks. , Disp-1 each, R-1Normal      HYDROcodone-acetaminophen (NORCO) 5-325 MG per tablet Take 1 tablet by mouth every 8 hours as needed for Pain for up to 30 days. Max Daily Amount: 3 tablets, Disp-42 tablet, R-0Normal      levothyroxine (SYNTHROID) 150 MCG tablet TAKE ONE TABLET BY MOUTH ONCE A DAY, Disp-90 tablet, R-1Normal      FLUoxetine (PROZAC) 40 MG capsule TAKE ONE CAPSULE BY MOUTH AT BEDTIME, Disp-90 capsule, R-1Normal      pantoprazole (PROTONIX) 40 MG tablet TAKE ONE TABLET BY MOUTH ONCE A DAY, Disp-90 tablet, R-1Normal      atorvastatin (LIPITOR) 10 MG tablet TAKE ONE TABLET BY MOUTH AT BEDTIME, Disp-90 tablet, R-1Normal      leflunomide (ARAVA) 20 MG tablet Take 1 tablet by mouth daily, Disp-90 tablet, R-1Normal      insulin glargine (LANTUS SOLOSTAR) 100 UNIT/ML injection pen 70 units bid, Disp-15 Adjustable Dose Pre-filled Pen Syringe, R-1Normal      traZODone (DESYREL) 50 MG tablet TAKE ONE TABLET BY MOUTH AT BEDTIME, Disp-30 tablet, R-1Normal      pregabalin (LYRICA) 100 MG capsule Take 1 capsule by mouth 2 times daily. Historical Med      ELIQUIS 5 MG TABS tablet Take 1 tablet by mouth 2 times daily, Disp-60 tablet, R-2, DAWNormal      ergocalciferol (ERGOCALCIFEROL) 1.25 MG (60088 UT) capsule ergocalciferol (vitamin D2) 1,250 mcg (50,000 unit) capsule   TK 1 C PO Q WK, Disp-4 capsule, R-1Normal      !! Continuous Blood Gluc Sensor (FREESTYLE SOFI 2 SENSOR) MISC 1 each by Does not apply route every 14 days, Disp-2 each, R-5Normal      irbesartan (AVAPRO) 300 MG tablet Take 1 tablet by mouth daily, Disp-30 tablet, R-3Normal      dapagliflozin (FARXIGA) 5 MG tablet Take 1 tablet by mouth every morning, Disp-30 tablet, R-5Normal      !!  Continuous Blood Gluc Sensor (FREESTYLE SOFI 2 SENSOR) MISC Q 14 days, Disp-2 each, R-3Normal      RESTASIS 0.05 % ophthalmic emulsion INSTILL 1 DROP IN AFFECTED EYE(S) TWICE DAILY, DAWHistorical Med      diclofenac

## 2023-09-12 NOTE — ED NOTES
Discharge instructions reviewed with verbalized understanding from patient. Patient had no further questions or concerns.         Robbi Mae RN  09/12/23 [id number]

## 2023-09-13 DIAGNOSIS — M54.16 LUMBAR BACK PAIN WITH RADICULOPATHY AFFECTING LEFT LOWER EXTREMITY: ICD-10-CM

## 2023-09-13 RX ORDER — CYCLOBENZAPRINE HCL 10 MG
TABLET ORAL
Qty: 30 TABLET | Refills: 0 | Status: SHIPPED | OUTPATIENT
Start: 2023-09-13

## 2023-09-14 LAB
F5 P.R506Q BLD/T QL: ABNORMAL
SPECIMEN SOURCE: ABNORMAL

## 2023-09-15 NOTE — CASE COMMUNICATION
Spoke with pt, she states that she is feeling much better with no futher dizzy spells. She says that after returning home she has made sure to drink plenty of fluids and that it seems to have worked. She has a PCP appointment next week.

## 2023-09-18 ENCOUNTER — OFFICE VISIT (OUTPATIENT)
Dept: PRIMARY CARE CLINIC | Age: 63
End: 2023-09-18
Payer: MEDICARE

## 2023-09-18 VITALS
TEMPERATURE: 97.1 F | HEART RATE: 98 BPM | DIASTOLIC BLOOD PRESSURE: 76 MMHG | OXYGEN SATURATION: 99 % | SYSTOLIC BLOOD PRESSURE: 122 MMHG

## 2023-09-18 DIAGNOSIS — L02.31 ABSCESS OF LEFT BUTTOCK: Primary | ICD-10-CM

## 2023-09-18 PROCEDURE — 99213 OFFICE O/P EST LOW 20 MIN: CPT | Performed by: NURSE PRACTITIONER

## 2023-09-18 PROCEDURE — 1123F ACP DISCUSS/DSCN MKR DOCD: CPT | Performed by: NURSE PRACTITIONER

## 2023-09-18 RX ORDER — ONDANSETRON 4 MG/1
4 TABLET, ORALLY DISINTEGRATING ORAL 3 TIMES DAILY PRN
Qty: 21 TABLET | Refills: 0 | Status: SHIPPED | OUTPATIENT
Start: 2023-09-18

## 2023-09-18 RX ORDER — DOXYCYCLINE HYCLATE 100 MG
100 TABLET ORAL 2 TIMES DAILY
Qty: 20 TABLET | Refills: 0 | Status: SHIPPED | OUTPATIENT
Start: 2023-09-18 | End: 2023-09-28

## 2023-09-18 NOTE — PROGRESS NOTES
Chief Complaint   Patient presents with    Cyst     Patient has a knot on her bottom and she is concerned about it. It hurts when she sits and has gotten bigger in the last week. The place has been on her for a month.

## 2023-09-18 NOTE — PROGRESS NOTES
SUBJECTIVE:    Patient ID: Iker Johnson is a 70 y. o.female. Chief Complaint   Patient presents with    Cyst     Patient has a knot on her bottom and she is concerned about it. It hurts when she sits and has gotten bigger in the last week. The place has been on her for a month. HPI:    Patient presents with complaints of possible boil on buttock that she noticed a month ago but it has gotten bigger and sore in the last week. She is unable to see the area very well. Left buttock. Firm, red and sore. Sitting up makes it worse. No relieving factors. No drainage or fevers. No rash. No treatments. Patient's medications, allergies, past medical, surgical, social and family histories were reviewed and updated as appropriate in electronic medical record. No outpatient medications have been marked as taking for the 9/18/23 encounter (Office Visit) with NICKOLAS Lackey CNP. Review of Systems   Constitutional:  Negative for fever. Skin:  Positive for color change and wound. All other systems reviewed and are negative.       Past Medical History:   Diagnosis Date    Cancer [facility])     thyroid    Cerebral artery occlusion with cerebral infarction ([address])     decreased right arm strength    Chronic kidney disease     Deep vein thrombosis (HCC)     right leg    Diabetes mellitus (HCC)     Factor VIII inhibitor disorder (HCC)     Hypertension     Lupus (HCC)     Osteoarthritis     RA (rheumatoid arthritis) ([address])     Thyroid disease      Past Surgical History:   Procedure Laterality Date    BREAST BIOPSY Right     CHOLECYSTECTOMY      COLONOSCOPY      THYROIDECTOMY      TUBAL LIGATION       Family History   Problem Relation Age of Onset    Stroke Mother     Cancer Mother         colon and strokes    Heart Failure Father       Social History     Tobacco Use   Smoking Status Never   Smokeless Tobacco Never       OBJECTIVE:   Wt Readings from Last 3 Encounters:   09/12/23 210 lb (95.3

## 2023-09-22 ENCOUNTER — HOSPITAL ENCOUNTER (OUTPATIENT)
Dept: GENERAL RADIOLOGY | Facility: HOSPITAL | Age: 63
Discharge: HOME | End: 2023-09-22
Payer: MEDICARE

## 2023-09-22 DIAGNOSIS — Z78.0 POST-MENOPAUSAL: ICD-10-CM

## 2023-09-22 PROCEDURE — 77080 DXA BONE DENSITY AXIAL: CPT

## 2023-09-25 ASSESSMENT — ENCOUNTER SYMPTOMS: COLOR CHANGE: 1

## 2023-10-03 ENCOUNTER — OFFICE VISIT (OUTPATIENT)
Dept: PRIMARY CARE CLINIC | Age: 63
End: 2023-10-03
Payer: MEDICARE

## 2023-10-03 VITALS
OXYGEN SATURATION: 96 % | BODY MASS INDEX: 34.41 KG/M2 | WEIGHT: 213.2 LBS | HEART RATE: 93 BPM | SYSTOLIC BLOOD PRESSURE: 122 MMHG | DIASTOLIC BLOOD PRESSURE: 85 MMHG

## 2023-10-03 DIAGNOSIS — I10 PRIMARY HYPERTENSION: ICD-10-CM

## 2023-10-03 DIAGNOSIS — M54.16 LUMBAR BACK PAIN WITH RADICULOPATHY AFFECTING LEFT LOWER EXTREMITY: Primary | ICD-10-CM

## 2023-10-03 DIAGNOSIS — R29.898 RIGHT ARM WEAKNESS: ICD-10-CM

## 2023-10-03 DIAGNOSIS — E55.9 VITAMIN D DEFICIENCY: ICD-10-CM

## 2023-10-03 DIAGNOSIS — Z79.4 TYPE 2 DIABETES MELLITUS WITH OTHER CIRCULATORY COMPLICATION, WITH LONG-TERM CURRENT USE OF INSULIN (HCC): ICD-10-CM

## 2023-10-03 DIAGNOSIS — Z13.29 THYROID DISORDER SCREEN: ICD-10-CM

## 2023-10-03 DIAGNOSIS — D68.51 FACTOR 5 LEIDEN MUTATION, HETEROZYGOUS (HCC): ICD-10-CM

## 2023-10-03 DIAGNOSIS — E11.59 TYPE 2 DIABETES MELLITUS WITH OTHER CIRCULATORY COMPLICATION, WITH LONG-TERM CURRENT USE OF INSULIN (HCC): ICD-10-CM

## 2023-10-03 PROCEDURE — 99214 OFFICE O/P EST MOD 30 MIN: CPT | Performed by: NURSE PRACTITIONER

## 2023-10-03 PROCEDURE — 3052F HG A1C>EQUAL 8.0%<EQUAL 9.0%: CPT | Performed by: NURSE PRACTITIONER

## 2023-10-03 PROCEDURE — 3074F SYST BP LT 130 MM HG: CPT | Performed by: NURSE PRACTITIONER

## 2023-10-03 PROCEDURE — 1123F ACP DISCUSS/DSCN MKR DOCD: CPT | Performed by: NURSE PRACTITIONER

## 2023-10-03 PROCEDURE — 3078F DIAST BP <80 MM HG: CPT | Performed by: NURSE PRACTITIONER

## 2023-10-03 RX ORDER — LEFLUNOMIDE 20 MG/1
20 TABLET ORAL DAILY
Qty: 90 TABLET | Refills: 1 | Status: SHIPPED | OUTPATIENT
Start: 2023-10-03

## 2023-10-03 ASSESSMENT — ENCOUNTER SYMPTOMS
SINUS PRESSURE: 0
EYE DISCHARGE: 0
NAUSEA: 0
BACK PAIN: 1
COUGH: 0
VOMITING: 0
SORE THROAT: 0
SHORTNESS OF BREATH: 0
WHEEZING: 0
ABDOMINAL PAIN: 0

## 2023-10-03 NOTE — PROGRESS NOTES
Chief Complaint   Patient presents with    Follow-up    Diabetes    Results     Labs & Bone Density       Have you seen any other physician or provider since your last visit yes - Gretel Weir and Amadeo Victoria    Have you had any other diagnostic tests since your last visit? yes - Labs & Bone Density    Have you changed or stopped any medications since your last visit? no     Diabetic retinal exam completed this year? Yes                       * If yes please have patient sign a records release to obtain record to update Health Maintenance                       * If no, please order referral for patient to be scheduled       SUBJECTIVE:    Patient ID: Brook Ramos is a 70 y. o.female. Chief Complaint   Patient presents with    Follow-up    Diabetes    Results     Labs & Bone Density         HPI:  Patient is here for follow-up. She has a history of diabetes, rheumatoid arthritis and Factor 5 Leiden Mutation. She is walking with a cane today and has brace on her left ankle. She has a slow unsteady gait and states that she has increased weakness due to  RA. She has had labs and bone density completed and would like to discuss the results. She has been usign 68 units of Lantus. Patient was seen by NICKOLAS Bowers recently for boil on left buttock. She states this area has not completely gone and may still need to be drained. She is going to surgery on October 17 for her lumbar surgery. Patient's medications, allergies, past medical, surgical, social and family histories were reviewed and updated as appropriate in electronic medical record. Outpatient Medications Marked as Taking for the 10/3/23 encounter (Office Visit) with NICKOLAS Lewis   Medication Sig Dispense Refill    leflunomide (ARAVA) 20 MG tablet Take 1 tablet by mouth daily 90 tablet 1        Review of Systems   Constitutional:  Negative for chills and fever. HENT:  Negative for congestion, sinus pressure and sore throat.

## 2023-10-05 DIAGNOSIS — I10 PRIMARY HYPERTENSION: ICD-10-CM

## 2023-10-05 DIAGNOSIS — M54.16 LUMBAR BACK PAIN WITH RADICULOPATHY AFFECTING LEFT LOWER EXTREMITY: ICD-10-CM

## 2023-10-05 RX ORDER — TRAZODONE HYDROCHLORIDE 50 MG/1
50 TABLET ORAL
Qty: 30 TABLET | Refills: 1 | Status: SHIPPED | OUTPATIENT
Start: 2023-10-05

## 2023-10-05 RX ORDER — IRBESARTAN 300 MG/1
300 TABLET ORAL DAILY
Qty: 30 TABLET | Refills: 3 | Status: SHIPPED | OUTPATIENT
Start: 2023-10-05

## 2023-10-05 RX ORDER — CYCLOBENZAPRINE HCL 10 MG
10 TABLET ORAL 3 TIMES DAILY PRN
Qty: 30 TABLET | Refills: 0 | Status: SHIPPED | OUTPATIENT
Start: 2023-10-05

## 2023-10-05 RX ORDER — APIXABAN 5 MG/1
5 TABLET, FILM COATED ORAL 2 TIMES DAILY
Qty: 60 TABLET | Refills: 2 | Status: SHIPPED | OUTPATIENT
Start: 2023-10-05

## 2023-10-06 RX ORDER — INSULIN GLARGINE 100 [IU]/ML
INJECTION, SOLUTION SUBCUTANEOUS
Qty: 15 ADJUSTABLE DOSE PRE-FILLED PEN SYRINGE | Refills: 1 | Status: SHIPPED | OUTPATIENT
Start: 2023-10-06

## 2023-10-25 ENCOUNTER — TELEPHONE (OUTPATIENT)
Dept: PRIMARY CARE CLINIC | Age: 63
End: 2023-10-25

## 2023-10-25 NOTE — TELEPHONE ENCOUNTER
Sent her home with 2 doses of Lovenox which she took Monday and Tuesday (sx on 17th) Asking if she can restart Eliquis \"Dr Alvarado office will not return call\"

## 2023-11-05 ENCOUNTER — HOSPITAL ENCOUNTER (EMERGENCY)
Facility: HOSPITAL | Age: 63
Discharge: HOME | End: 2023-11-05
Attending: EMERGENCY MEDICINE | Admitting: EMERGENCY MEDICINE
Payer: MEDICARE

## 2023-11-05 ENCOUNTER — APPOINTMENT (OUTPATIENT)
Dept: GENERAL RADIOLOGY | Facility: HOSPITAL | Age: 63
End: 2023-11-05
Payer: MEDICARE

## 2023-11-05 VITALS
HEART RATE: 88 BPM | RESPIRATION RATE: 20 BRPM | DIASTOLIC BLOOD PRESSURE: 66 MMHG | BODY MASS INDEX: 33.91 KG/M2 | WEIGHT: 211 LBS | OXYGEN SATURATION: 98 % | TEMPERATURE: 98 F | SYSTOLIC BLOOD PRESSURE: 128 MMHG | HEIGHT: 66 IN

## 2023-11-05 DIAGNOSIS — R82.71 BACTERIURIA: ICD-10-CM

## 2023-11-05 DIAGNOSIS — I95.9 TRANSIENT HYPOTENSION: ICD-10-CM

## 2023-11-05 DIAGNOSIS — L76.34 POSTOPERATIVE SEROMA OF SUBCUTANEOUS TISSUE AFTER NON-DERMATOLOGIC PROCEDURE: Primary | ICD-10-CM

## 2023-11-05 LAB
ALBUMIN SERPL-MCNC: 3.5 G/DL (ref 3.5–5.2)
ALBUMIN/GLOB SERPL: 1.3 G/DL
ALP SERPL-CCNC: 142 U/L (ref 39–117)
ALT SERPL W P-5'-P-CCNC: 18 U/L (ref 1–33)
ANION GAP SERPL CALCULATED.3IONS-SCNC: 10.7 MMOL/L (ref 5–15)
AST SERPL-CCNC: 15 U/L (ref 1–32)
BACTERIA UR QL AUTO: ABNORMAL /HPF
BASOPHILS # BLD AUTO: 0.06 10*3/MM3 (ref 0–0.2)
BASOPHILS NFR BLD AUTO: 0.6 % (ref 0–1.5)
BILIRUB SERPL-MCNC: 0.8 MG/DL (ref 0–1.2)
BILIRUB UR QL STRIP: NEGATIVE
BUN SERPL-MCNC: 10 MG/DL (ref 8–23)
BUN/CREAT SERPL: 8.2 (ref 7–25)
CALCIUM SPEC-SCNC: 9.1 MG/DL (ref 8.6–10.5)
CHLORIDE SERPL-SCNC: 98 MMOL/L (ref 98–107)
CLARITY UR: CLEAR
CO2 SERPL-SCNC: 26.3 MMOL/L (ref 22–29)
COLOR UR: YELLOW
CREAT SERPL-MCNC: 1.22 MG/DL (ref 0.57–1)
D-LACTATE SERPL-SCNC: 2 MMOL/L (ref 0.5–2)
D-LACTATE SERPL-SCNC: 2.2 MMOL/L (ref 0.5–2)
DEPRECATED RDW RBC AUTO: 39.2 FL (ref 37–54)
EGFRCR SERPLBLD CKD-EPI 2021: 49.3 ML/MIN/1.73
EOSINOPHIL # BLD AUTO: 0.08 10*3/MM3 (ref 0–0.4)
EOSINOPHIL NFR BLD AUTO: 0.8 % (ref 0.3–6.2)
ERYTHROCYTE [DISTWIDTH] IN BLOOD BY AUTOMATED COUNT: 12.1 % (ref 12.3–15.4)
GLOBULIN UR ELPH-MCNC: 2.7 GM/DL
GLUCOSE SERPL-MCNC: 266 MG/DL (ref 65–99)
GLUCOSE UR STRIP-MCNC: ABNORMAL MG/DL
HCT VFR BLD AUTO: 45.1 % (ref 34–46.6)
HGB BLD-MCNC: 15.3 G/DL (ref 12–15.9)
HGB UR QL STRIP.AUTO: NEGATIVE
HOLD SPECIMEN: NORMAL
HOLD SPECIMEN: NORMAL
HYALINE CASTS UR QL AUTO: ABNORMAL /LPF
IMM GRANULOCYTES # BLD AUTO: 0.05 10*3/MM3 (ref 0–0.05)
IMM GRANULOCYTES NFR BLD AUTO: 0.5 % (ref 0–0.5)
KETONES UR QL STRIP: NEGATIVE
LEUKOCYTE ESTERASE UR QL STRIP.AUTO: ABNORMAL
LYMPHOCYTES # BLD AUTO: 1.23 10*3/MM3 (ref 0.7–3.1)
LYMPHOCYTES NFR BLD AUTO: 12.5 % (ref 19.6–45.3)
MCH RBC QN AUTO: 30.2 PG (ref 26.6–33)
MCHC RBC AUTO-ENTMCNC: 33.9 G/DL (ref 31.5–35.7)
MCV RBC AUTO: 89 FL (ref 79–97)
MONOCYTES # BLD AUTO: 0.58 10*3/MM3 (ref 0.1–0.9)
MONOCYTES NFR BLD AUTO: 5.9 % (ref 5–12)
NEUTROPHILS NFR BLD AUTO: 7.83 10*3/MM3 (ref 1.7–7)
NEUTROPHILS NFR BLD AUTO: 79.7 % (ref 42.7–76)
NITRITE UR QL STRIP: NEGATIVE
NRBC BLD AUTO-RTO: 0 /100 WBC (ref 0–0.2)
PH UR STRIP.AUTO: 6.5 [PH] (ref 5–8)
PLATELET # BLD AUTO: 248 10*3/MM3 (ref 140–450)
PMV BLD AUTO: 10.6 FL (ref 6–12)
POTASSIUM SERPL-SCNC: 3.9 MMOL/L (ref 3.5–5.2)
PROCALCITONIN SERPL-MCNC: 0.23 NG/ML (ref 0–0.25)
PROT SERPL-MCNC: 6.2 G/DL (ref 6–8.5)
PROT UR QL STRIP: NEGATIVE
RBC # BLD AUTO: 5.07 10*6/MM3 (ref 3.77–5.28)
RBC # UR STRIP: ABNORMAL /HPF
REF LAB TEST METHOD: ABNORMAL
SODIUM SERPL-SCNC: 135 MMOL/L (ref 136–145)
SP GR UR STRIP: 1.01 (ref 1–1.03)
SQUAMOUS #/AREA URNS HPF: ABNORMAL /HPF
UROBILINOGEN UR QL STRIP: ABNORMAL
WBC # UR STRIP: ABNORMAL /HPF
WBC NRBC COR # BLD: 9.83 10*3/MM3 (ref 3.4–10.8)
WHOLE BLOOD HOLD COAG: NORMAL
WHOLE BLOOD HOLD SPECIMEN: NORMAL

## 2023-11-05 PROCEDURE — 81001 URINALYSIS AUTO W/SCOPE: CPT | Performed by: EMERGENCY MEDICINE

## 2023-11-05 PROCEDURE — 80053 COMPREHEN METABOLIC PANEL: CPT | Performed by: EMERGENCY MEDICINE

## 2023-11-05 PROCEDURE — 99284 EMERGENCY DEPT VISIT MOD MDM: CPT

## 2023-11-05 PROCEDURE — 25010000002 CEFTRIAXONE SODIUM-DEXTROSE 1-3.74 GM-%(50ML) RECONSTITUTED SOLUTION: Performed by: EMERGENCY MEDICINE

## 2023-11-05 PROCEDURE — 87147 CULTURE TYPE IMMUNOLOGIC: CPT | Performed by: EMERGENCY MEDICINE

## 2023-11-05 PROCEDURE — 87086 URINE CULTURE/COLONY COUNT: CPT | Performed by: EMERGENCY MEDICINE

## 2023-11-05 PROCEDURE — 85025 COMPLETE CBC W/AUTO DIFF WBC: CPT | Performed by: EMERGENCY MEDICINE

## 2023-11-05 PROCEDURE — 87205 SMEAR GRAM STAIN: CPT | Performed by: EMERGENCY MEDICINE

## 2023-11-05 PROCEDURE — 83605 ASSAY OF LACTIC ACID: CPT | Performed by: EMERGENCY MEDICINE

## 2023-11-05 PROCEDURE — 84145 PROCALCITONIN (PCT): CPT | Performed by: EMERGENCY MEDICINE

## 2023-11-05 PROCEDURE — 25810000003 SODIUM CHLORIDE 0.9 % SOLUTION: Performed by: EMERGENCY MEDICINE

## 2023-11-05 PROCEDURE — 71045 X-RAY EXAM CHEST 1 VIEW: CPT

## 2023-11-05 PROCEDURE — 36415 COLL VENOUS BLD VENIPUNCTURE: CPT

## 2023-11-05 PROCEDURE — 87070 CULTURE OTHR SPECIMN AEROBIC: CPT | Performed by: EMERGENCY MEDICINE

## 2023-11-05 PROCEDURE — 96365 THER/PROPH/DIAG IV INF INIT: CPT

## 2023-11-05 PROCEDURE — 87040 BLOOD CULTURE FOR BACTERIA: CPT | Performed by: EMERGENCY MEDICINE

## 2023-11-05 PROCEDURE — 87186 SC STD MICRODIL/AGAR DIL: CPT | Performed by: EMERGENCY MEDICINE

## 2023-11-05 RX ORDER — CEFTRIAXONE 1 G/50ML
1000 INJECTION, SOLUTION INTRAVENOUS ONCE
Status: COMPLETED | OUTPATIENT
Start: 2023-11-05 | End: 2023-11-05

## 2023-11-05 RX ORDER — CEPHALEXIN 500 MG/1
500 CAPSULE ORAL 3 TIMES DAILY
Qty: 21 CAPSULE | Refills: 0 | Status: SHIPPED | OUTPATIENT
Start: 2023-11-05

## 2023-11-05 RX ORDER — SODIUM CHLORIDE 0.9 % (FLUSH) 0.9 %
10 SYRINGE (ML) INJECTION AS NEEDED
Status: DISCONTINUED | OUTPATIENT
Start: 2023-11-05 | End: 2023-11-05 | Stop reason: HOSPADM

## 2023-11-05 RX ADMIN — CEFTRIAXONE 1000 MG: 1 INJECTION, SOLUTION INTRAVENOUS at 13:05

## 2023-11-05 RX ADMIN — SODIUM CHLORIDE 2000 ML: 9 INJECTION, SOLUTION INTRAVENOUS at 10:45

## 2023-11-05 NOTE — ED PROVIDER NOTES
"Subjective  History of Present Illness:    Chief Complaint: Drain from surgical site    History of Present Illness: 63-year-old female presents with and drainage from her surgical site, 2 to 3 weeks out from lumbar surgery Dr. Mcneill, woke up this morning and she had soaked her she with drainage.  She denies any fever, no vomiting no chills no shortness of breath, was found to be hypotensive.  I taken all her blood pressure medicines this morning.    Nurses Notes reviewed and agree, including vitals, allergies, social history and prior medical history.     REVIEW OF SYSTEMS: All systems reviewed and not pertinent unless noted.  Review of Systems      Positive for: Drainage from surgical site    Negative for: Fever cough hemoptysis syncope chest pain palpitations edema diarrhea urinary symptoms    Past Medical History:   Diagnosis Date    Allergic     Anxiety     Arthritis     Asthma 11/09/2021    Reported as history - reported last use of inhaler apx 1 month ago    Bruises easily     Bulging lumbar disc     Cancer     THYROID CANCER (MALIGNANCY)     Chronic kidney disease     Colon polyp     Constipation     COPD (chronic obstructive pulmonary disease)     DDD (degenerative disc disease), lumbosacral     Depression     Diabetes mellitus     Disease of thyroid gland     Diverticulitis     DVT of leg (deep venous thrombosis)     X 4 - patient reported last DVT was early part of 2021     Dysphagia 11/09/2021    Reported history prior to thyroid nodule were removed - no problem at present time reported    Elevated cholesterol     Factor V deficiency     GERD (gastroesophageal reflux disease)     H/O cardiovascular stress test     Patient reported apx 2016 and wnl's at the time it was done    Headache     History of blood clots     History of echocardiogram     History of pneumonia 2015    History of transfusion     No reaction to transfusion     Hypertension     Lupus     patient reported \"a mild case\"     Nausea     " Numbness in right leg     FROM HIP TO JUST ABOVE THE KNEE    Piercing     EARS ONLY    PONV (postoperative nausea and vomiting) 11/09/2021    SCOPOLAMINE PATCH DIDN'T WORK    PVD (peripheral vascular disease)     Sleep apnea     Patient reported had sleep study and was told that she has mild sleep apnea but that a CPAP was not warranted    SOB (shortness of breath) on exertion     Stroke 2008    MILD RIGHT SIDED WEAKNESS - reported she has difficulty holding heavy object with RUE    Ulcer, stomach peptic     Wears glasses     Rx glasses to read PRN    Wears glasses     READING GLASSES ONLY       Allergies:    Prednisone, Adhesive tape, Azithromycin, Erythromycin, Other, Red dye, and Sulfa antibiotics      Past Surgical History:   Procedure Laterality Date    BREAST SURGERY Right     LUMPECTOMY     COLONOSCOPY      WITH POLYP REMOVAL     D & C HYSTEROSCOPY N/A 11/16/2021    Procedure: DILATATION AND CURETTAGE WITH DIAGNOSTIC HYSTEROSCOPY;  Surgeon: Karen Melo MD;  Location: [facility];  Service: Obstetrics/Gynecology;  Laterality: N/A;    DILATATION AND CURETTAGE      ENDOSCOPY      ERCP      FINE NEEDLE ASPIRATION  2018    THYROID     GALLBLADDER SURGERY      HYSTEROSCOPY      MOUTH SURGERY      Reported 2 lower wisdom teeth extracted and other oral extractions    SKIN BIOPSY      HAND     THYROIDECTOMY Right 12/21/2018    Procedure: Right thyroid lobectomy with isthmusectomy;  Surgeon: Janeth Tan MD;  Location: [facility];  Service: General    THYROIDECTOMY N/A 2/18/2019    Procedure: THYROIDECTOMY, COMPLETION;  Surgeon: Janeth Tan MD;  Location: [facility];  Service: General    TUBAL ABDOMINAL LIGATION           Social History     Socioeconomic History    Marital status:    Tobacco Use    Smoking status: Never    Smokeless tobacco: Never    Tobacco comments:     Reported exposure to second hand smoke    Vaping Use    Vaping Use: Never used   Substance and Sexual Activity    Alcohol use: No     "Drug use: No    Sexual activity: Not Currently     Birth control/protection: Post-menopausal         Family History   Problem Relation Age of Onset    Heart disease Father     Heart attack Father     Arthritis Father     Stroke Mother     Hypertension Mother     Hyperlipidemia Mother     Kidney disease Mother     Arthritis Mother     Cancer Mother     Cancer Sister     Diabetes Other     Thyroid cancer Other     Stroke Other        Objective  Physical Exam:  /66   Pulse 88   Temp 98 °F (36.7 °C) (Oral)   Resp 20   Ht 167.6 cm (66\")   Wt 95.7 kg (211 lb)   LMP  (LMP Unknown)   SpO2 98%   BMI 34.06 kg/m²      Physical Exam    CONSTITUTIONAL: Well developed, nontoxic 63-year-old female,  in no acute distress.  VITAL SIGNS: per nursing, reviewed and noted  SKIN: exposed skin with scant cellulitis to the mid low back surgical incision.  Clear serous drainage from inferior aspect of the wound without fluctuance or abscess, no dehiscence   EYES: Grossly EOMI, no icterus  ENT: Normal voice.  Moist mucous membranes   RESPIRATORY:  No increased work of breathing. No retractions.  Chest clear  CARDIOVASCULAR:   Extremities pink and warm.  Good cap refill to extremities.  Regular rate and rhythm  GI: Abdomen without distention soft nontender  MUSCULOSKELETAL: Age-appropriate bulk and tone, moves all 4 extremities  NEUROLOGIC: Alert, oriented x 3. No gross deficits. GCS 15.   PSYCH: appropriate affect.  : no bladder tenderness or distention, no CVA tenderness    Procedures    ED Course:    Lab Results (last 24 hours)       Procedure Component Value Units Date/Time    CBC & Differential [[id number]]  (Abnormal) Collected: 11/05/23 1018    Specimen: Blood Updated: 11/05/23 1034    Narrative:      The following orders were created for panel order CBC & Differential.  Procedure                               Abnormality         Status                     ---------                               -----------         " ------                     CBC Auto Differential[[id number]]        Abnormal            Final result                 Please view results for these tests on the individual orders.    Comprehensive Metabolic Panel [[id number]]  (Abnormal) Collected: 11/05/23 1018    Specimen: Blood Updated: 11/05/23 1053     Glucose 266 mg/dL      Comment: Glucose >180, Hemoglobin A1C recommended.        BUN 10 mg/dL      Creatinine 1.22 mg/dL      Sodium 135 mmol/L      Potassium 3.9 mmol/L      Chloride 98 mmol/L      CO2 26.3 mmol/L      Calcium 9.1 mg/dL      Total Protein 6.2 g/dL      Albumin 3.5 g/dL      ALT (SGPT) 18 U/L      AST (SGOT) 15 U/L      Alkaline Phosphatase 142 U/L      Total Bilirubin 0.8 mg/dL      Globulin 2.7 gm/dL      A/G Ratio 1.3 g/dL      BUN/Creatinine Ratio 8.2     Anion Gap 10.7 mmol/L      eGFR 49.3 mL/min/1.73     Narrative:      GFR Normal >60  Chronic Kidney Disease <60  Kidney Failure <15      Lactic Acid, Plasma [[id number]]  (Abnormal) Collected: 11/05/23 1018    Specimen: Blood Updated: 11/05/23 1106     Lactate 2.2 mmol/L     CBC Auto Differential [[id number]]  (Abnormal) Collected: 11/05/23 1018    Specimen: Blood Updated: 11/05/23 1034     WBC 9.83 10*3/mm3      RBC 5.07 10*6/mm3      Hemoglobin 15.3 g/dL      Hematocrit 45.1 %      MCV 89.0 fL      MCH 30.2 pg      MCHC 33.9 g/dL      RDW 12.1 %      RDW-SD 39.2 fl      MPV 10.6 fL      Platelets 248 10*3/mm3      Neutrophil % 79.7 %      Lymphocyte % 12.5 %      Monocyte % 5.9 %      Eosinophil % 0.8 %      Basophil % 0.6 %      Immature Grans % 0.5 %      Neutrophils, Absolute 7.83 10*3/mm3      Lymphocytes, Absolute 1.23 10*3/mm3      Monocytes, Absolute 0.58 10*3/mm3      Eosinophils, Absolute 0.08 10*3/mm3      Basophils, Absolute 0.06 10*3/mm3      Immature Grans, Absolute 0.05 10*3/mm3      nRBC 0.0 /100 WBC     Procalcitonin [[id number]]  (Normal) Collected: 11/05/23 1018    Specimen: Blood Updated: 11/05/23 1114     Procalcitonin 0.23  "ng/mL     Narrative:      As a Marker for Sepsis (Non-Neonates):    1. <0.5 ng/mL represents a low risk of severe sepsis and/or septic shock.  2. >2 ng/mL represents a high risk of severe sepsis and/or septic shock.    As a Marker for Lower Respiratory Tract Infections that require antibiotic therapy:    PCT on Admission    Antibiotic Therapy       6-12 Hrs later    >0.5                Strongly Recommended  >0.25 - <0.5        Recommended   0.1 - 0.25          Discouraged              Remeasure/reassess PCT  <0.1                Strongly Discouraged     Remeasure/reassess PCT    As 28 day mortality risk marker: \"Change in Procalcitonin Result\" (>80% or <=80%) if Day 0 (or Day 1) and Day 4 values are available. Refer to http://[email or web address]-pct-calculator.com    Change in PCT <=80%  A decrease of PCT levels below or equal to 80% defines a positive change in PCT test result representing a higher risk for 28-day all-cause mortality of patients diagnosed with severe sepsis for septic shock.    Change in PCT >80%  A decrease of PCT levels of more than 80% defines a negative change in PCT result representing a lower risk for 28-day all-cause mortality of patients diagnosed with severe sepsis or septic shock.       Wound Culture - Swab, Back [[id number]] Collected: 11/05/23 1019    Specimen: Swab from Back Updated: 11/05/23 1234     Gram Stain No WBCs seen      Rare (1+) Gram positive cocci in clusters    Blood Culture - Blood, Arm, Right [[id number]] Collected: 11/05/23 1025    Specimen: Blood from Arm, Right Updated: 11/05/23 1041    Blood Culture - Blood, Arm, Left [[id number]] Collected: 11/05/23 1040    Specimen: Blood from Arm, Left Updated: 11/05/23 1041    Urinalysis With Microscopic - Urine, Clean Catch [[id number]]  (Abnormal) Collected: 11/05/23 1208    Specimen: Urine, Clean Catch Updated: 11/05/23 1226    Narrative:      The following orders were created for panel order Urinalysis With Microscopic - Urine, Clean " Catch.  Procedure                               Abnormality         Status                     ---------                               -----------         ------                     Urinalysis without micro...[[id number]]  Abnormal            Final result               Urinalysis, Microscopic ...[[id number]]  Abnormal            Final result                 Please view results for these tests on the individual orders.    Urinalysis without microscopic (no culture) - Urine, Clean Catch [[id number]]  (Abnormal) Collected: 11/05/23 1208    Specimen: Urine, Clean Catch Updated: 11/05/23 1217     Color, UA Yellow     Appearance, UA Clear     pH, UA 6.5     Specific Gravity, UA 1.007     Glucose,  mg/dL (Trace)     Ketones, UA Negative     Bilirubin, UA Negative     Blood, UA Negative     Protein, UA Negative     Leuk Esterase, UA Trace     Nitrite, UA Negative     Urobilinogen, UA 0.2 E.U./dL    Urinalysis, Microscopic Only - Urine, Clean Catch [[id number]]  (Abnormal) Collected: 11/05/23 1208    Specimen: Urine, Clean Catch Updated: 11/05/23 1226     RBC, UA None Seen /HPF      WBC, UA 3-5 /HPF      Bacteria, UA 1+ /HPF      Squamous Epithelial Cells, UA 3-6 /HPF      Hyaline Casts, UA None Seen /LPF      Methodology Manual Light Microscopy    STAT Lactic Acid, Reflex [[id number]]  (Normal) Collected: 11/05/23 1308    Specimen: Blood Updated: 11/05/23 1335     Lactate 2.0 mmol/L              XR Chest 1 View    Result Date: 11/5/2023  PORTABLE CHEST  11/5/2023 11:21 AM  HISTORY: Chest pain protocol  COMPARISON:   None  FINDINGS: The cardiac silhouette is normal in size. The mediastinal and hilar contours are unremarkable.  The lungs are clear. There is no pneumothorax. The visualized osseous structures demonstrate no acute abnormalities.      Impression: No acute cardiopulmonary process.        This report was signed and finalized on 11/5/2023 11:19 AM by Harpreet Kulkarni MD.        MDM     Amount and/or Complexity of  Data Reviewed  Clinical lab tests: reviewed  Tests in the radiology section of CPT®: reviewed        ED Course as of 11/05/23 1345   Sun Nov 05, 2023   1040 EKG interpreted by me reveals sinus tachycardia rate of 103 no ectopy no ischemic changes [PF]   1040 Discussed with Dr. Mcneill, patient orthopedist, reviewed images, from surgical standpoint, advised could be treated with oral abx. And outpatient follow up tomorrow. Recommended iv fluids and reassess   [PF]   1109 Blood pressure 111/69 before IV fluid initiation. [PF]   1110 EKG on arrival interpreted by me reveals sinus tachycardia rate of 103 no ectopy no ischemic changes [PF]   1326 Blood pressures 140 systolic.  Appears well nontoxic. [PF]      ED Course User Index  [PF] Anam Romero, DO       Medical Decision Making:    Initial impression of presenting illness: 63-year-old female presents with and drainage from her surgical site, 2 to 3 weeks out from lumbar surgery Dr. Mcneill, woke up this morning and she had soaked her she with drainage.  She denies any fever, no vomiting no chills no shortness of breath, was found to be hypotensive.  I taken all her blood pressure medicines this morning.    DDX: includes but is not limited to: Sepsis, seroma, abscess, among others         Patient arrives hypotensive afebrile no tachycardia sats reassuring 98% on room air with vitals interpreted by myself.     Pertinent features from physical exam: Clear serous drainage from inferior aspect of surgical incision without dehiscence.  Chest clear abdomen soft, nontoxic in appearance.    Initial diagnostic plan: CBC CMP blood cultures, lactate procalcitonin, UA, urine culture chest x-ray    Results from initial plan were reviewed and interpreted by me revealing CBC reassuring nonactionable CMP UA with 3-5 white cells and 1+ bacteria, procalcitonin negative, lactate initially 2.2 improved to 2, chest x-ray without acute findings per radiologist    Diagnostic information from  other sources: Old record review    Interventions / Re-evaluation: IV fluids Rocephin,    Results/clinical rationale were discussed with patient    Consultations/Discussion of results with other physicians: Dr. Mcneill, patient's orthopedic surgeon    Disposition plan: Patient is hemodynamically stable and she is  -----      Final diagnoses:   Postoperative seroma of subcutaneous tissue after non-dermatologic procedure   Transient hypotension   Bacteriuria            Anam Romero, DO  11/05/23 7956

## 2023-11-06 ENCOUNTER — TELEPHONE (OUTPATIENT)
Dept: EMERGENCY DEPT | Facility: HOSPITAL | Age: 63
End: 2023-11-06
Payer: MEDICARE

## 2023-11-06 LAB — BACTERIA SPEC AEROBE CULT: NO GROWTH

## 2023-11-06 RX ORDER — CLINDAMYCIN HYDROCHLORIDE 300 MG/1
300 CAPSULE ORAL 3 TIMES DAILY
Qty: 21 CAPSULE | Refills: 0 | Status: SHIPPED | OUTPATIENT
Start: 2023-11-06 | End: 2023-11-13

## 2023-11-07 LAB
BACTERIA SPEC AEROBE CULT: ABNORMAL
GRAM STN SPEC: ABNORMAL
GRAM STN SPEC: ABNORMAL

## 2023-11-07 NOTE — PROGRESS NOTES
Patient treated for a postoperative seroma.  Positive wound culture, treated with cephalexin and clindamycin.  Clindamycin will treat the MRSA from the swab, cephalexin was used to treat her UTI.

## 2023-11-08 DIAGNOSIS — M54.16 LUMBAR BACK PAIN WITH RADICULOPATHY AFFECTING LEFT LOWER EXTREMITY: ICD-10-CM

## 2023-11-08 RX ORDER — CYCLOBENZAPRINE HCL 10 MG
10 TABLET ORAL 3 TIMES DAILY PRN
Qty: 30 TABLET | Refills: 0 | Status: SHIPPED | OUTPATIENT
Start: 2023-11-08

## 2023-11-10 LAB
BACTERIA SPEC AEROBE CULT: NORMAL
BACTERIA SPEC AEROBE CULT: NORMAL

## 2023-11-17 ENCOUNTER — CARE COORDINATION (OUTPATIENT)
Dept: PRIMARY CARE CLINIC | Age: 63
End: 2023-11-17

## 2023-11-17 ENCOUNTER — OFFICE VISIT (OUTPATIENT)
Dept: PRIMARY CARE CLINIC | Age: 63
End: 2023-11-17
Payer: MEDICARE

## 2023-11-17 VITALS
BODY MASS INDEX: 34.7 KG/M2 | OXYGEN SATURATION: 97 % | DIASTOLIC BLOOD PRESSURE: 83 MMHG | SYSTOLIC BLOOD PRESSURE: 136 MMHG | HEART RATE: 88 BPM | TEMPERATURE: 97.9 F | WEIGHT: 215 LBS

## 2023-11-17 DIAGNOSIS — M54.16 LUMBAR BACK PAIN WITH RADICULOPATHY AFFECTING LEFT LOWER EXTREMITY: ICD-10-CM

## 2023-11-17 DIAGNOSIS — R29.898 LEFT LEG WEAKNESS: ICD-10-CM

## 2023-11-17 DIAGNOSIS — Z22.322 MRSA (METHICILLIN RESISTANT STAPH AUREUS) CULTURE POSITIVE: Primary | ICD-10-CM

## 2023-11-17 DIAGNOSIS — R53.81 DEBILITY: ICD-10-CM

## 2023-11-17 DIAGNOSIS — I10 PRIMARY HYPERTENSION: ICD-10-CM

## 2023-11-17 DIAGNOSIS — D68.51 FACTOR 5 LEIDEN MUTATION, HETEROZYGOUS (HCC): ICD-10-CM

## 2023-11-17 PROCEDURE — 3078F DIAST BP <80 MM HG: CPT | Performed by: NURSE PRACTITIONER

## 2023-11-17 PROCEDURE — 3074F SYST BP LT 130 MM HG: CPT | Performed by: NURSE PRACTITIONER

## 2023-11-17 PROCEDURE — 1123F ACP DISCUSS/DSCN MKR DOCD: CPT | Performed by: NURSE PRACTITIONER

## 2023-11-17 PROCEDURE — 99214 OFFICE O/P EST MOD 30 MIN: CPT | Performed by: NURSE PRACTITIONER

## 2023-11-17 RX ORDER — OXYCODONE AND ACETAMINOPHEN 7.5; 325 MG/1; MG/1
TABLET ORAL
Status: ON HOLD | COMMUNITY
Start: 2023-11-12

## 2023-11-17 RX ORDER — PILOCARPINE HYDROCHLORIDE 5 MG/1
5 TABLET, FILM COATED ORAL 4 TIMES DAILY
Status: ON HOLD | COMMUNITY
Start: 2023-10-05

## 2023-11-17 ASSESSMENT — ENCOUNTER SYMPTOMS
SINUS PRESSURE: 0
SHORTNESS OF BREATH: 0
ABDOMINAL PAIN: 0
COUGH: 0
NAUSEA: 0
BACK PAIN: 1
VOMITING: 0
SORE THROAT: 0
WHEEZING: 0
EYE DISCHARGE: 0

## 2023-11-17 NOTE — CARE COORDINATION
Royer Carter, RN  Manager Care Coordination  [phone]     Notified Kit Barnett that we will have documentation ready and speak to Dr Maggie Luis on Monday . We will call and tell her how to proceed.

## 2023-11-17 NOTE — PROGRESS NOTES
Chief Complaint   Patient presents with    Follow-up    Post-Op Check    Wound Infection       Have you seen any other physician or provider since your last visit yes - Arthea Regino, Infectious Disease    Have you had any other diagnostic tests since your last visit? yes - 2 Back Surgeries and labs    Have you changed or stopped any medications since your last visit? yes - Stopped RA medications        SUBJECTIVE:    Patient ID: Catrina Lechuga is a 70 y. o.female. Chief Complaint   Patient presents with    Follow-up    Post-Op Check    Wound Infection         HPI:  Patient presents to the office today to follow-up from recent back surgery. Patient states she had planned surgery with  at [facility]. Following her procedure she developed an infection and was taken in for emergency surgery. She currently has a Picc Line in place and is on Daptomycin 500mg for wound infection. Patient reports that home health is coming to her home two days a week. She is having pain/discomfort in her back and tailbone area today. She is taking Percocet PRN to help with this. She is not able to get up and down the stairs. She says her left leg is still not functioning. She has home health for once a week. She says she doesn't have physical therapy. She can' afford it and she is getting weaker. Patient's medications, allergies, past medical, surgical, social and family histories were reviewed and updated as appropriate in electronic medical record.         Outpatient Medications Marked as Taking for the 11/17/23 encounter (Office Visit) with NICKOLAS Escobar   Medication Sig Dispense Refill    DAPTOmycin (CUBICIN) 500 MG injection       oxyCODONE-acetaminophen (PERCOCET) 7.5-325 MG per tablet TAKE 1 TABLET BY MOUTH EVERY 4 TO 6 HOURS AS NEEDED FOR PAIN      pilocarpine (SALAGEN) 5 MG tablet       cyclobenzaprine (FLEXERIL) 10 MG tablet Take 1 tablet by mouth 3 times daily as needed for Muscle spasms 30 tablet 0

## 2023-11-17 NOTE — CARE COORDINATION
Pelon Arita, RN  Manager Care Coordination  [phone]     Call placed to Herlinda Williamson to explain how we can proceed towards getting her into rehab / swing bed at [facility].  Message left with contact information.

## 2023-11-20 ENCOUNTER — HOSPITAL ENCOUNTER (OUTPATIENT)
Facility: HOSPITAL | Age: 63
Setting detail: OBSERVATION
Discharge: NURSING, REHAB OR LONG-TERM CARE | End: 2023-11-22
Attending: INTERNAL MEDICINE | Admitting: INTERNAL MEDICINE
Payer: MEDICARE

## 2023-11-20 ENCOUNTER — CARE COORDINATION (OUTPATIENT)
Dept: PRIMARY CARE CLINIC | Age: 63
End: 2023-11-20

## 2023-11-20 PROBLEM — R53.1 GENERALIZED WEAKNESS: Status: ACTIVE | Noted: 2023-11-20

## 2023-11-20 LAB
ALBUMIN SERPL-MCNC: 3.6 G/DL (ref 3.4–4.8)
ALBUMIN/GLOB SERPL: 1.6 {RATIO} (ref 0.8–2)
ALP SERPL-CCNC: 187 U/L (ref 25–100)
ALT SERPL-CCNC: 63 U/L (ref 4–36)
AMPHET UR QL SCN: ABNORMAL
ANION GAP SERPL CALCULATED.3IONS-SCNC: 10 MMOL/L (ref 3–16)
AST SERPL-CCNC: 38 U/L (ref 8–33)
BARBITURATES UR QL SCN: ABNORMAL
BASOPHILS # BLD: 0.1 K/UL (ref 0–0.1)
BASOPHILS NFR BLD: 1.6 %
BENZODIAZ UR QL SCN: ABNORMAL
BILIRUB SERPL-MCNC: 0.4 MG/DL (ref 0.3–1.2)
BILIRUB UR QL STRIP.AUTO: NEGATIVE
BUN SERPL-MCNC: 15 MG/DL (ref 6–20)
BUPRENORPHINE QUAL, URINE: ABNORMAL
CALCIUM SERPL-MCNC: 9 MG/DL (ref 8.5–10.5)
CANNABINOIDS UR QL SCN: ABNORMAL
CHLORIDE SERPL-SCNC: 103 MMOL/L (ref 98–107)
CK SERPL-CCNC: 50 U/L (ref 26–174)
CLARITY UR: CLEAR
CO2 SERPL-SCNC: 27 MMOL/L (ref 20–30)
COCAINE UR QL SCN: ABNORMAL
COLOR UR: YELLOW
CREAT SERPL-MCNC: 0.8 MG/DL (ref 0.4–1.2)
DRUG SCREEN COMMENT UR-IMP: ABNORMAL
EOSINOPHIL # BLD: 0.3 K/UL (ref 0–0.4)
EOSINOPHIL NFR BLD: 3.3 %
ERYTHROCYTE [DISTWIDTH] IN BLOOD BY AUTOMATED COUNT: 12.5 % (ref 11–16)
GFR SERPLBLD CREATININE-BSD FMLA CKD-EPI: >60 ML/MIN/{1.73_M2}
GLOBULIN SER CALC-MCNC: 2.3 G/DL
GLUCOSE BLD-MCNC: 169 MG/DL (ref 74–106)
GLUCOSE BLD-MCNC: 187 MG/DL (ref 74–106)
GLUCOSE BLD-MCNC: 261 MG/DL (ref 74–106)
GLUCOSE BLD-MCNC: 271 MG/DL (ref 74–106)
GLUCOSE SERPL-MCNC: 169 MG/DL (ref 74–106)
GLUCOSE UR STRIP.AUTO-MCNC: 500 MG/DL
HCT VFR BLD AUTO: 44.1 % (ref 37–47)
HGB BLD-MCNC: 14.6 G/DL (ref 11.5–16.5)
HGB UR QL STRIP.AUTO: NEGATIVE
IMM GRANULOCYTES # BLD: 0 K/UL
IMM GRANULOCYTES NFR BLD: 0.3 % (ref 0–5)
KETONES UR STRIP.AUTO-MCNC: NEGATIVE MG/DL
LEUKOCYTE ESTERASE UR QL STRIP.AUTO: NEGATIVE
LYMPHOCYTES # BLD: 2 K/UL (ref 1.5–4)
LYMPHOCYTES NFR BLD: 25.2 %
MAGNESIUM SERPL-MCNC: 2.2 MG/DL (ref 1.7–2.4)
MCH RBC QN AUTO: 29.6 PG (ref 27–32)
MCHC RBC AUTO-ENTMCNC: 33.1 G/DL (ref 31–35)
MCV RBC AUTO: 89.3 FL (ref 80–100)
METHADONE UR QL SCN: ABNORMAL
METHAMPHET UR QL SCN: ABNORMAL
MONOCYTES # BLD: 0.7 K/UL (ref 0.2–0.8)
MONOCYTES NFR BLD: 8.8 %
NEUTROPHILS # BLD: 4.9 K/UL (ref 2–7.5)
NEUTS SEG NFR BLD: 60.8 %
NITRITE UR QL STRIP.AUTO: NEGATIVE
OPIATES UR QL SCN: ABNORMAL
OXYCODONE UR QL SCN: ABNORMAL
PCP UR QL SCN: ABNORMAL
PERFORMED ON: ABNORMAL
PH UR STRIP.AUTO: 6.5 [PH] (ref 5–8)
PLATELET # BLD AUTO: 246 K/UL (ref 150–400)
PMV BLD AUTO: 10.4 FL (ref 6–10)
POTASSIUM SERPL-SCNC: 3.7 MMOL/L (ref 3.4–5.1)
PROPOXYPH UR QL SCN: ABNORMAL
PROT SERPL-MCNC: 5.9 G/DL (ref 6.4–8.3)
PROT UR STRIP.AUTO-MCNC: NEGATIVE MG/DL
RBC # BLD AUTO: 4.94 M/UL (ref 3.8–5.8)
SARS-COV-2 RDRP RESP QL NAA+PROBE: NOT DETECTED
SODIUM SERPL-SCNC: 140 MMOL/L (ref 136–145)
SP GR UR STRIP.AUTO: 1.01 (ref 1–1.03)
TRICYCLICS UR QL SCN: POSITIVE
UA COMPLETE W REFLEX CULTURE PNL UR: ABNORMAL
UA DIPSTICK W REFLEX MICRO PNL UR: ABNORMAL
URN SPEC COLLECT METH UR: ABNORMAL
UROBILINOGEN UR STRIP-ACNC: 0.2 E.U./DL
WBC # BLD AUTO: 8 K/UL (ref 4–11)

## 2023-11-20 PROCEDURE — 97535 SELF CARE MNGMENT TRAINING: CPT

## 2023-11-20 PROCEDURE — 97166 OT EVAL MOD COMPLEX 45 MIN: CPT

## 2023-11-20 PROCEDURE — 85025 COMPLETE CBC W/AUTO DIFF WBC: CPT

## 2023-11-20 PROCEDURE — 80053 COMPREHEN METABOLIC PANEL: CPT

## 2023-11-20 PROCEDURE — 97530 THERAPEUTIC ACTIVITIES: CPT

## 2023-11-20 PROCEDURE — 6370000000 HC RX 637 (ALT 250 FOR IP): Performed by: PHYSICIAN ASSISTANT

## 2023-11-20 PROCEDURE — G0378 HOSPITAL OBSERVATION PER HR: HCPCS

## 2023-11-20 PROCEDURE — 80307 DRUG TEST PRSMV CHEM ANLYZR: CPT

## 2023-11-20 PROCEDURE — 97116 GAIT TRAINING THERAPY: CPT

## 2023-11-20 PROCEDURE — 97161 PT EVAL LOW COMPLEX 20 MIN: CPT

## 2023-11-20 PROCEDURE — 82550 ASSAY OF CK (CPK): CPT

## 2023-11-20 PROCEDURE — 81003 URINALYSIS AUTO W/O SCOPE: CPT

## 2023-11-20 PROCEDURE — 83735 ASSAY OF MAGNESIUM: CPT

## 2023-11-20 PROCEDURE — 92610 EVALUATE SWALLOWING FUNCTION: CPT

## 2023-11-20 PROCEDURE — 36415 COLL VENOUS BLD VENIPUNCTURE: CPT

## 2023-11-20 PROCEDURE — G0379 DIRECT REFER HOSPITAL OBSERV: HCPCS

## 2023-11-20 PROCEDURE — 87635 SARS-COV-2 COVID-19 AMP PRB: CPT

## 2023-11-20 RX ORDER — ACETAMINOPHEN 325 MG/1
650 TABLET ORAL EVERY 6 HOURS PRN
Status: DISCONTINUED | OUTPATIENT
Start: 2023-11-20 | End: 2023-11-22 | Stop reason: HOSPADM

## 2023-11-20 RX ORDER — INSULIN GLARGINE 100 [IU]/ML
50 INJECTION, SOLUTION SUBCUTANEOUS 2 TIMES DAILY
Status: DISCONTINUED | OUTPATIENT
Start: 2023-11-20 | End: 2023-11-22 | Stop reason: HOSPADM

## 2023-11-20 RX ORDER — LEVOTHYROXINE SODIUM 0.07 MG/1
150 TABLET ORAL DAILY
Status: DISCONTINUED | OUTPATIENT
Start: 2023-11-21 | End: 2023-11-22 | Stop reason: HOSPADM

## 2023-11-20 RX ORDER — INSULIN LISPRO 100 [IU]/ML
0-4 INJECTION, SOLUTION INTRAVENOUS; SUBCUTANEOUS
Status: DISCONTINUED | OUTPATIENT
Start: 2023-11-20 | End: 2023-11-22 | Stop reason: HOSPADM

## 2023-11-20 RX ORDER — FUROSEMIDE 20 MG/1
10 TABLET ORAL DAILY
Status: DISCONTINUED | OUTPATIENT
Start: 2023-11-20 | End: 2023-11-20

## 2023-11-20 RX ORDER — LOSARTAN POTASSIUM 50 MG/1
100 TABLET ORAL DAILY
Status: DISCONTINUED | OUTPATIENT
Start: 2023-11-21 | End: 2023-11-21

## 2023-11-20 RX ORDER — INSULIN LISPRO 100 [IU]/ML
0-4 INJECTION, SOLUTION INTRAVENOUS; SUBCUTANEOUS NIGHTLY
Status: DISCONTINUED | OUTPATIENT
Start: 2023-11-20 | End: 2023-11-22 | Stop reason: HOSPADM

## 2023-11-20 RX ORDER — ONDANSETRON 4 MG/1
4 TABLET, ORALLY DISINTEGRATING ORAL EVERY 8 HOURS PRN
Status: DISCONTINUED | OUTPATIENT
Start: 2023-11-20 | End: 2023-11-22 | Stop reason: HOSPADM

## 2023-11-20 RX ORDER — FLUTICASONE PROPIONATE 50 MCG
1 SPRAY, SUSPENSION (ML) NASAL DAILY
Status: DISCONTINUED | OUTPATIENT
Start: 2023-11-20 | End: 2023-11-20

## 2023-11-20 RX ORDER — PREGABALIN 50 MG/1
100 CAPSULE ORAL 3 TIMES DAILY
Status: DISCONTINUED | OUTPATIENT
Start: 2023-11-20 | End: 2023-11-22 | Stop reason: HOSPADM

## 2023-11-20 RX ORDER — ATORVASTATIN CALCIUM 10 MG/1
10 TABLET, FILM COATED ORAL NIGHTLY
Status: DISCONTINUED | OUTPATIENT
Start: 2023-11-20 | End: 2023-11-22 | Stop reason: HOSPADM

## 2023-11-20 RX ORDER — FLUTICASONE PROPIONATE 50 MCG
1 SPRAY, SUSPENSION (ML) NASAL DAILY PRN
Status: DISCONTINUED | OUTPATIENT
Start: 2023-11-20 | End: 2023-11-22 | Stop reason: HOSPADM

## 2023-11-20 RX ORDER — PANTOPRAZOLE SODIUM 40 MG/1
40 TABLET, DELAYED RELEASE ORAL DAILY PRN
Status: DISCONTINUED | OUTPATIENT
Start: 2023-11-20 | End: 2023-11-22 | Stop reason: HOSPADM

## 2023-11-20 RX ORDER — ACETAMINOPHEN 650 MG/1
650 SUPPOSITORY RECTAL EVERY 6 HOURS PRN
Status: DISCONTINUED | OUTPATIENT
Start: 2023-11-20 | End: 2023-11-22 | Stop reason: HOSPADM

## 2023-11-20 RX ORDER — TRAZODONE HYDROCHLORIDE 50 MG/1
50 TABLET ORAL NIGHTLY
Status: DISCONTINUED | OUTPATIENT
Start: 2023-11-20 | End: 2023-11-22 | Stop reason: HOSPADM

## 2023-11-20 RX ORDER — FLUOXETINE HYDROCHLORIDE 20 MG/1
40 CAPSULE ORAL NIGHTLY
Status: DISCONTINUED | OUTPATIENT
Start: 2023-11-20 | End: 2023-11-22 | Stop reason: HOSPADM

## 2023-11-20 RX ORDER — IBUPROFEN 600 MG/1
1 TABLET ORAL PRN
Status: DISCONTINUED | OUTPATIENT
Start: 2023-11-20 | End: 2023-11-22 | Stop reason: HOSPADM

## 2023-11-20 RX ORDER — POLYETHYLENE GLYCOL 3350 17 G/17G
17 POWDER, FOR SOLUTION ORAL DAILY PRN
Status: DISCONTINUED | OUTPATIENT
Start: 2023-11-20 | End: 2023-11-22 | Stop reason: HOSPADM

## 2023-11-20 RX ORDER — ONDANSETRON 2 MG/ML
4 INJECTION INTRAMUSCULAR; INTRAVENOUS EVERY 6 HOURS PRN
Status: DISCONTINUED | OUTPATIENT
Start: 2023-11-20 | End: 2023-11-22 | Stop reason: HOSPADM

## 2023-11-20 RX ORDER — DEXTROSE MONOHYDRATE 100 MG/ML
INJECTION, SOLUTION INTRAVENOUS CONTINUOUS PRN
Status: DISCONTINUED | OUTPATIENT
Start: 2023-11-20 | End: 2023-11-22 | Stop reason: HOSPADM

## 2023-11-20 RX ORDER — OXYCODONE AND ACETAMINOPHEN 7.5; 325 MG/1; MG/1
1 TABLET ORAL EVERY 4 HOURS PRN
Status: DISCONTINUED | OUTPATIENT
Start: 2023-11-20 | End: 2023-11-22 | Stop reason: HOSPADM

## 2023-11-20 RX ORDER — PILOCARPINE HYDROCHLORIDE 5 MG/1
5 TABLET, FILM COATED ORAL 4 TIMES DAILY
Status: DISCONTINUED | OUTPATIENT
Start: 2023-11-20 | End: 2023-11-22 | Stop reason: HOSPADM

## 2023-11-20 RX ORDER — FUROSEMIDE 20 MG/1
10 TABLET ORAL DAILY PRN
Status: DISCONTINUED | OUTPATIENT
Start: 2023-11-20 | End: 2023-11-22 | Stop reason: HOSPADM

## 2023-11-20 RX ORDER — CYCLOBENZAPRINE HCL 10 MG
10 TABLET ORAL 3 TIMES DAILY PRN
Status: DISCONTINUED | OUTPATIENT
Start: 2023-11-20 | End: 2023-11-22 | Stop reason: HOSPADM

## 2023-11-20 RX ORDER — PANTOPRAZOLE SODIUM 40 MG/1
40 TABLET, DELAYED RELEASE ORAL DAILY
Status: DISCONTINUED | OUTPATIENT
Start: 2023-11-20 | End: 2023-11-20

## 2023-11-20 RX ADMIN — PILOCARPINE HYDROCHLORIDE 5 MG: 5 TABLET, FILM COATED ORAL at 20:55

## 2023-11-20 RX ADMIN — PILOCARPINE HYDROCHLORIDE 5 MG: 5 TABLET, FILM COATED ORAL at 17:21

## 2023-11-20 RX ADMIN — OXYCODONE HYDROCHLORIDE AND ACETAMINOPHEN 1 TABLET: 7.5; 325 TABLET ORAL at 14:47

## 2023-11-20 RX ADMIN — APIXABAN 5 MG: 5 TABLET, FILM COATED ORAL at 20:56

## 2023-11-20 RX ADMIN — PREGABALIN 100 MG: 50 CAPSULE ORAL at 20:55

## 2023-11-20 RX ADMIN — FLUOXETINE 40 MG: 20 CAPSULE ORAL at 20:55

## 2023-11-20 RX ADMIN — TRAZODONE HYDROCHLORIDE 50 MG: 50 TABLET ORAL at 20:55

## 2023-11-20 RX ADMIN — PREGABALIN 100 MG: 50 CAPSULE ORAL at 14:39

## 2023-11-20 RX ADMIN — INSULIN GLARGINE 50 UNITS: 100 INJECTION, SOLUTION SUBCUTANEOUS at 14:44

## 2023-11-20 RX ADMIN — INSULIN GLARGINE 50 UNITS: 100 INJECTION, SOLUTION SUBCUTANEOUS at 20:59

## 2023-11-20 RX ADMIN — INSULIN LISPRO 2 UNITS: 100 INJECTION, SOLUTION INTRAVENOUS; SUBCUTANEOUS at 18:35

## 2023-11-20 RX ADMIN — CYCLOBENZAPRINE 10 MG: 10 TABLET, FILM COATED ORAL at 20:56

## 2023-11-20 RX ADMIN — OXYCODONE HYDROCHLORIDE AND ACETAMINOPHEN 1 TABLET: 7.5; 325 TABLET ORAL at 20:55

## 2023-11-20 SDOH — ECONOMIC STABILITY: FOOD INSECURITY: WITHIN THE PAST 12 MONTHS, YOU WORRIED THAT YOUR FOOD WOULD RUN OUT BEFORE YOU GOT MONEY TO BUY MORE.: NEVER TRUE

## 2023-11-20 SDOH — ECONOMIC STABILITY: INCOME INSECURITY: IN THE PAST 12 MONTHS, HAS THE ELECTRIC, GAS, OIL, OR WATER COMPANY THREATENED TO SHUT OFF SERVICE IN YOUR HOME?: NO

## 2023-11-20 SDOH — ECONOMIC STABILITY: INCOME INSECURITY: HOW HARD IS IT FOR YOU TO PAY FOR THE VERY BASICS LIKE FOOD, HOUSING, MEDICAL CARE, AND HEATING?: NOT HARD AT ALL

## 2023-11-20 ASSESSMENT — PAIN DESCRIPTION - LOCATION
LOCATION: LEG;BACK
LOCATION: BACK
LOCATION: BACK

## 2023-11-20 ASSESSMENT — PATIENT HEALTH QUESTIONNAIRE - PHQ9
SUM OF ALL RESPONSES TO PHQ QUESTIONS 1-9: 6
2. FEELING DOWN, DEPRESSED OR HOPELESS: 3
SUM OF ALL RESPONSES TO PHQ QUESTIONS 1-9: 6
SUM OF ALL RESPONSES TO PHQ9 QUESTIONS 1 & 2: 6
1. LITTLE INTEREST OR PLEASURE IN DOING THINGS: 3
SUM OF ALL RESPONSES TO PHQ QUESTIONS 1-9: 6
SUM OF ALL RESPONSES TO PHQ QUESTIONS 1-9: 6

## 2023-11-20 ASSESSMENT — PAIN SCALES - GENERAL
PAINLEVEL_OUTOF10: 7
PAINLEVEL_OUTOF10: 10

## 2023-11-20 ASSESSMENT — PAIN DESCRIPTION - DESCRIPTORS: DESCRIPTORS: ACHING

## 2023-11-20 NOTE — CARE COORDINATION
Irasema Paul know that she can go to the hospital admissions desk and will be admitted for observation and PT evaluation. Verbalized understanding.

## 2023-11-20 NOTE — PLAN OF CARE
Problem: Discharge Planning  Goal: Discharge to home or other facility with appropriate resources  Outcome: Progressing  Flowsheets (Taken 11/20/2023 1301)  Discharge to home or other facility with appropriate resources:   Identify barriers to discharge with patient and caregiver   Identify discharge learning needs (meds, wound care, etc)     Problem: Safety - Adult  Goal: Free from fall injury  Outcome: Progressing     Problem: ABCDS Injury Assessment  Goal: Absence of physical injury  Outcome: Progressing     Problem: Skin/Tissue Integrity  Goal: Absence of new skin breakdown  Description: 1. Monitor for areas of redness and/or skin breakdown  2. Assess vascular access sites hourly  3. Every 4-6 hours minimum:  Change oxygen saturation probe site  4. Every 4-6 hours:  If on nasal continuous positive airway pressure, respiratory therapy assess nares and determine need for appliance change or resting period.   Outcome: Progressing     Problem: Pain  Goal: Verbalizes/displays adequate comfort level or baseline comfort level  Outcome: Progressing     Problem: Chronic Conditions and Co-morbidities  Goal: Patient's chronic conditions and co-morbidity symptoms are monitored and maintained or improved  Outcome: Progressing

## 2023-11-20 NOTE — CARE COORDINATION
Fransisca Gordon, RN  Manager Care Coordination  [phone]     I placed a call to Karyle Blocker this morning for an update on her weekend. She tells me that she fell yesterday while using her walker. Her feet \"got tangled up\" . She hasn't seen home health to change her PICC dressing. I let her know that we would work this morning on getting her into hospital for observation and evaluation by PT.    She states that her son took off work today to help her get to the hospital.

## 2023-11-21 ENCOUNTER — APPOINTMENT (OUTPATIENT)
Dept: MRI IMAGING | Facility: HOSPITAL | Age: 63
End: 2023-11-21
Attending: INTERNAL MEDICINE
Payer: MEDICARE

## 2023-11-21 PROBLEM — R29.898 RIGHT HAND WEAKNESS: Status: ACTIVE | Noted: 2023-11-21

## 2023-11-21 PROBLEM — D68.51 FACTOR V LEIDEN (HCC): Status: ACTIVE | Noted: 2023-11-21

## 2023-11-21 PROBLEM — M21.372 LEFT FOOT DROP: Status: ACTIVE | Noted: 2023-11-21

## 2023-11-21 PROBLEM — R20.2 RIGHT LEG PARESTHESIAS: Status: ACTIVE | Noted: 2023-11-21

## 2023-11-21 PROBLEM — B95.62 INFECTION OF SKIN DUE TO METHICILLIN RESISTANT STAPHYLOCOCCUS AUREUS (MRSA): Status: ACTIVE | Noted: 2023-11-21

## 2023-11-21 PROBLEM — E11.9 TYPE 2 DIABETES MELLITUS (HCC): Status: ACTIVE | Noted: 2023-11-21

## 2023-11-21 PROBLEM — L08.9 INFECTION OF SKIN DUE TO METHICILLIN RESISTANT STAPHYLOCOCCUS AUREUS (MRSA): Status: ACTIVE | Noted: 2023-11-21

## 2023-11-21 PROBLEM — Z87.39 HISTORY OF RHEUMATOID ARTHRITIS: Status: ACTIVE | Noted: 2023-11-21

## 2023-11-21 PROBLEM — Z98.890 HISTORY OF LUMBAR LAMINECTOMY: Status: ACTIVE | Noted: 2023-11-21

## 2023-11-21 PROBLEM — Z86.718 HISTORY OF DEEP VEIN THROMBOSIS: Status: ACTIVE | Noted: 2023-11-21

## 2023-11-21 LAB
ALBUMIN SERPL-MCNC: 3.1 G/DL (ref 3.4–4.8)
ALBUMIN/GLOB SERPL: 1.5 {RATIO} (ref 0.8–2)
ALP SERPL-CCNC: 163 U/L (ref 25–100)
ALT SERPL-CCNC: 59 U/L (ref 4–36)
ANION GAP SERPL CALCULATED.3IONS-SCNC: 8 MMOL/L (ref 3–16)
AST SERPL-CCNC: 39 U/L (ref 8–33)
BASOPHILS # BLD: 0.1 K/UL (ref 0–0.1)
BASOPHILS NFR BLD: 1.7 %
BILIRUB SERPL-MCNC: 0.4 MG/DL (ref 0.3–1.2)
BUN SERPL-MCNC: 14 MG/DL (ref 6–20)
CALCIUM SERPL-MCNC: 8.7 MG/DL (ref 8.5–10.5)
CHLORIDE SERPL-SCNC: 103 MMOL/L (ref 98–107)
CO2 SERPL-SCNC: 28 MMOL/L (ref 20–30)
CREAT SERPL-MCNC: 0.8 MG/DL (ref 0.4–1.2)
EOSINOPHIL # BLD: 0.4 K/UL (ref 0–0.4)
EOSINOPHIL NFR BLD: 5.7 %
ERYTHROCYTE [DISTWIDTH] IN BLOOD BY AUTOMATED COUNT: 12.5 % (ref 11–16)
FOLATE SERPL-MCNC: 5.26 NG/ML
GFR SERPLBLD CREATININE-BSD FMLA CKD-EPI: >60 ML/MIN/{1.73_M2}
GLOBULIN SER CALC-MCNC: 2.1 G/DL
GLUCOSE BLD-MCNC: 126 MG/DL (ref 74–106)
GLUCOSE BLD-MCNC: 168 MG/DL (ref 74–106)
GLUCOSE BLD-MCNC: 200 MG/DL (ref 74–106)
GLUCOSE BLD-MCNC: 98 MG/DL (ref 74–106)
GLUCOSE SERPL-MCNC: 106 MG/DL (ref 74–106)
HBA1C MFR BLD: 8.5 %
HCT VFR BLD AUTO: 43.2 % (ref 37–47)
HGB BLD-MCNC: 14.1 G/DL (ref 11.5–16.5)
IMM GRANULOCYTES # BLD: 0 K/UL
IMM GRANULOCYTES NFR BLD: 0.1 % (ref 0–5)
LYMPHOCYTES # BLD: 2.4 K/UL (ref 1.5–4)
LYMPHOCYTES NFR BLD: 35 %
MAGNESIUM SERPL-MCNC: 2.3 MG/DL (ref 1.7–2.4)
MCH RBC QN AUTO: 29.6 PG (ref 27–32)
MCHC RBC AUTO-ENTMCNC: 32.6 G/DL (ref 31–35)
MCV RBC AUTO: 90.6 FL (ref 80–100)
MONOCYTES # BLD: 0.7 K/UL (ref 0.2–0.8)
MONOCYTES NFR BLD: 9.3 %
NEUTROPHILS # BLD: 3.4 K/UL (ref 2–7.5)
NEUTS SEG NFR BLD: 48.2 %
PERFORMED ON: ABNORMAL
PERFORMED ON: NORMAL
PLATELET # BLD AUTO: 214 K/UL (ref 150–400)
PMV BLD AUTO: 10.8 FL (ref 6–10)
POTASSIUM SERPL-SCNC: 3.9 MMOL/L (ref 3.4–5.1)
PROT SERPL-MCNC: 5.2 G/DL (ref 6.4–8.3)
RBC # BLD AUTO: 4.77 M/UL (ref 3.8–5.8)
SODIUM SERPL-SCNC: 139 MMOL/L (ref 136–145)
TSH SERPL-MCNC: 0.29 UIU/ML (ref 0.27–4.2)
VIT B12 SERPL-MCNC: 478 PG/ML (ref 211–911)
WBC # BLD AUTO: 7 K/UL (ref 4–11)

## 2023-11-21 PROCEDURE — 36415 COLL VENOUS BLD VENIPUNCTURE: CPT

## 2023-11-21 PROCEDURE — 6370000000 HC RX 637 (ALT 250 FOR IP): Performed by: PHYSICIAN ASSISTANT

## 2023-11-21 PROCEDURE — 97530 THERAPEUTIC ACTIVITIES: CPT

## 2023-11-21 PROCEDURE — 2500000003 HC RX 250 WO HCPCS: Performed by: PHYSICIAN ASSISTANT

## 2023-11-21 PROCEDURE — 83036 HEMOGLOBIN GLYCOSYLATED A1C: CPT

## 2023-11-21 PROCEDURE — 80053 COMPREHEN METABOLIC PANEL: CPT

## 2023-11-21 PROCEDURE — 6360000002 HC RX W HCPCS: Performed by: PHYSICIAN ASSISTANT

## 2023-11-21 PROCEDURE — 96365 THER/PROPH/DIAG IV INF INIT: CPT

## 2023-11-21 PROCEDURE — 83735 ASSAY OF MAGNESIUM: CPT

## 2023-11-21 PROCEDURE — 97116 GAIT TRAINING THERAPY: CPT

## 2023-11-21 PROCEDURE — 72148 MRI LUMBAR SPINE W/O DYE: CPT

## 2023-11-21 PROCEDURE — 99222 1ST HOSP IP/OBS MODERATE 55: CPT | Performed by: INTERNAL MEDICINE

## 2023-11-21 PROCEDURE — 97535 SELF CARE MNGMENT TRAINING: CPT

## 2023-11-21 PROCEDURE — G0378 HOSPITAL OBSERVATION PER HR: HCPCS

## 2023-11-21 PROCEDURE — 97802 MEDICAL NUTRITION INDIV IN: CPT

## 2023-11-21 PROCEDURE — 82607 VITAMIN B-12: CPT

## 2023-11-21 PROCEDURE — 70551 MRI BRAIN STEM W/O DYE: CPT

## 2023-11-21 PROCEDURE — 85025 COMPLETE CBC W/AUTO DIFF WBC: CPT

## 2023-11-21 PROCEDURE — 82746 ASSAY OF FOLIC ACID SERUM: CPT

## 2023-11-21 PROCEDURE — 72141 MRI NECK SPINE W/O DYE: CPT

## 2023-11-21 PROCEDURE — 84443 ASSAY THYROID STIM HORMONE: CPT

## 2023-11-21 PROCEDURE — 2580000003 HC RX 258: Performed by: PHYSICIAN ASSISTANT

## 2023-11-21 RX ORDER — LOSARTAN POTASSIUM 25 MG/1
25 TABLET ORAL DAILY
Status: CANCELLED | OUTPATIENT
Start: 2023-11-22

## 2023-11-21 RX ORDER — TRAZODONE HYDROCHLORIDE 50 MG/1
50 TABLET ORAL NIGHTLY
Status: CANCELLED | OUTPATIENT
Start: 2023-11-21

## 2023-11-21 RX ORDER — OXYCODONE AND ACETAMINOPHEN 7.5; 325 MG/1; MG/1
1 TABLET ORAL EVERY 4 HOURS PRN
Status: CANCELLED | OUTPATIENT
Start: 2023-11-21

## 2023-11-21 RX ORDER — DIAPER,BRIEF,INFANT-TODD,DISP
EACH MISCELLANEOUS 2 TIMES DAILY
Status: CANCELLED | OUTPATIENT
Start: 2023-11-21

## 2023-11-21 RX ORDER — DIAPER,BRIEF,INFANT-TODD,DISP
EACH MISCELLANEOUS 2 TIMES DAILY
Status: DISCONTINUED | OUTPATIENT
Start: 2023-11-21 | End: 2023-11-22 | Stop reason: HOSPADM

## 2023-11-21 RX ORDER — ACETAMINOPHEN 325 MG/1
650 TABLET ORAL EVERY 6 HOURS PRN
Status: CANCELLED | OUTPATIENT
Start: 2023-11-21

## 2023-11-21 RX ORDER — LOSARTAN POTASSIUM 50 MG/1
50 TABLET ORAL DAILY
Status: DISCONTINUED | OUTPATIENT
Start: 2023-11-22 | End: 2023-11-21

## 2023-11-21 RX ORDER — DEXTROSE MONOHYDRATE 100 MG/ML
INJECTION, SOLUTION INTRAVENOUS CONTINUOUS PRN
Status: CANCELLED | OUTPATIENT
Start: 2023-11-21

## 2023-11-21 RX ORDER — INSULIN LISPRO 100 [IU]/ML
0-4 INJECTION, SOLUTION INTRAVENOUS; SUBCUTANEOUS NIGHTLY
Status: CANCELLED | OUTPATIENT
Start: 2023-11-21

## 2023-11-21 RX ORDER — INSULIN LISPRO 100 [IU]/ML
0-4 INJECTION, SOLUTION INTRAVENOUS; SUBCUTANEOUS
Status: CANCELLED | OUTPATIENT
Start: 2023-11-21

## 2023-11-21 RX ORDER — LEVOTHYROXINE SODIUM 0.07 MG/1
150 TABLET ORAL DAILY
Status: CANCELLED | OUTPATIENT
Start: 2023-11-22

## 2023-11-21 RX ORDER — ONDANSETRON 2 MG/ML
4 INJECTION INTRAMUSCULAR; INTRAVENOUS EVERY 6 HOURS PRN
Status: CANCELLED | OUTPATIENT
Start: 2023-11-21

## 2023-11-21 RX ORDER — ONDANSETRON 4 MG/1
4 TABLET, ORALLY DISINTEGRATING ORAL EVERY 8 HOURS PRN
Status: CANCELLED | OUTPATIENT
Start: 2023-11-21

## 2023-11-21 RX ORDER — FUROSEMIDE 20 MG/1
10 TABLET ORAL DAILY PRN
Status: CANCELLED | OUTPATIENT
Start: 2023-11-21

## 2023-11-21 RX ORDER — FOLIC ACID 1 MG/1
1 TABLET ORAL DAILY
Status: CANCELLED | OUTPATIENT
Start: 2023-11-22

## 2023-11-21 RX ORDER — PREGABALIN 50 MG/1
100 CAPSULE ORAL 3 TIMES DAILY
Status: CANCELLED | OUTPATIENT
Start: 2023-11-21

## 2023-11-21 RX ORDER — CYCLOBENZAPRINE HCL 10 MG
10 TABLET ORAL 3 TIMES DAILY PRN
Status: CANCELLED | OUTPATIENT
Start: 2023-11-21

## 2023-11-21 RX ORDER — LOSARTAN POTASSIUM 25 MG/1
25 TABLET ORAL DAILY
Status: DISCONTINUED | OUTPATIENT
Start: 2023-11-22 | End: 2023-11-22 | Stop reason: HOSPADM

## 2023-11-21 RX ORDER — INSULIN GLARGINE 100 [IU]/ML
50 INJECTION, SOLUTION SUBCUTANEOUS 2 TIMES DAILY
Status: CANCELLED | OUTPATIENT
Start: 2023-11-21

## 2023-11-21 RX ORDER — FLUOXETINE HYDROCHLORIDE 20 MG/1
40 CAPSULE ORAL NIGHTLY
Status: CANCELLED | OUTPATIENT
Start: 2023-11-21

## 2023-11-21 RX ORDER — POLYETHYLENE GLYCOL 3350 17 G/17G
17 POWDER, FOR SOLUTION ORAL DAILY PRN
Status: CANCELLED | OUTPATIENT
Start: 2023-11-21

## 2023-11-21 RX ORDER — FLUTICASONE PROPIONATE 50 MCG
1 SPRAY, SUSPENSION (ML) NASAL DAILY PRN
Status: CANCELLED | OUTPATIENT
Start: 2023-11-21

## 2023-11-21 RX ORDER — PILOCARPINE HYDROCHLORIDE 5 MG/1
5 TABLET, FILM COATED ORAL 4 TIMES DAILY
Status: CANCELLED | OUTPATIENT
Start: 2023-11-21

## 2023-11-21 RX ORDER — PANTOPRAZOLE SODIUM 40 MG/1
40 TABLET, DELAYED RELEASE ORAL DAILY PRN
Status: CANCELLED | OUTPATIENT
Start: 2023-11-21

## 2023-11-21 RX ORDER — ACETAMINOPHEN 650 MG/1
650 SUPPOSITORY RECTAL EVERY 6 HOURS PRN
Status: CANCELLED | OUTPATIENT
Start: 2023-11-21

## 2023-11-21 RX ORDER — FOLIC ACID 1 MG/1
1 TABLET ORAL DAILY
Status: DISCONTINUED | OUTPATIENT
Start: 2023-11-21 | End: 2023-11-22 | Stop reason: HOSPADM

## 2023-11-21 RX ORDER — IBUPROFEN 600 MG/1
1 TABLET ORAL PRN
Status: CANCELLED | OUTPATIENT
Start: 2023-11-21

## 2023-11-21 RX ADMIN — CYCLOBENZAPRINE 10 MG: 10 TABLET, FILM COATED ORAL at 08:06

## 2023-11-21 RX ADMIN — MICONAZOLE NITRATE: 2 POWDER TOPICAL at 13:01

## 2023-11-21 RX ADMIN — PILOCARPINE HYDROCHLORIDE 5 MG: 5 TABLET, FILM COATED ORAL at 13:00

## 2023-11-21 RX ADMIN — OXYCODONE HYDROCHLORIDE AND ACETAMINOPHEN 1 TABLET: 7.5; 325 TABLET ORAL at 13:00

## 2023-11-21 RX ADMIN — LOSARTAN POTASSIUM 100 MG: 50 TABLET, FILM COATED ORAL at 08:07

## 2023-11-21 RX ADMIN — TRAZODONE HYDROCHLORIDE 50 MG: 50 TABLET ORAL at 21:11

## 2023-11-21 RX ADMIN — PILOCARPINE HYDROCHLORIDE 5 MG: 5 TABLET, FILM COATED ORAL at 18:22

## 2023-11-21 RX ADMIN — FLUOXETINE 40 MG: 20 CAPSULE ORAL at 21:14

## 2023-11-21 RX ADMIN — PILOCARPINE HYDROCHLORIDE 5 MG: 5 TABLET, FILM COATED ORAL at 21:14

## 2023-11-21 RX ADMIN — OXYCODONE HYDROCHLORIDE AND ACETAMINOPHEN 1 TABLET: 7.5; 325 TABLET ORAL at 05:11

## 2023-11-21 RX ADMIN — PREGABALIN 100 MG: 50 CAPSULE ORAL at 21:10

## 2023-11-21 RX ADMIN — INSULIN GLARGINE 50 UNITS: 100 INJECTION, SOLUTION SUBCUTANEOUS at 21:17

## 2023-11-21 RX ADMIN — FOLIC ACID 1 MG: 1 TABLET ORAL at 11:03

## 2023-11-21 RX ADMIN — PILOCARPINE HYDROCHLORIDE 5 MG: 5 TABLET, FILM COATED ORAL at 08:07

## 2023-11-21 RX ADMIN — PREGABALIN 100 MG: 50 CAPSULE ORAL at 08:11

## 2023-11-21 RX ADMIN — CYCLOBENZAPRINE 10 MG: 10 TABLET, FILM COATED ORAL at 21:11

## 2023-11-21 RX ADMIN — PREGABALIN 100 MG: 50 CAPSULE ORAL at 13:00

## 2023-11-21 RX ADMIN — APIXABAN 5 MG: 5 TABLET, FILM COATED ORAL at 21:11

## 2023-11-21 RX ADMIN — LEVOTHYROXINE SODIUM 150 MCG: 0.07 TABLET ORAL at 05:12

## 2023-11-21 RX ADMIN — OXYCODONE HYDROCHLORIDE AND ACETAMINOPHEN 1 TABLET: 7.5; 325 TABLET ORAL at 21:13

## 2023-11-21 RX ADMIN — HYDROCORTISONE: 1 CREAM TOPICAL at 13:01

## 2023-11-21 RX ADMIN — HYDROCORTISONE: 1 CREAM TOPICAL at 21:17

## 2023-11-21 RX ADMIN — DAPTOMYCIN 600 MG: 350 INJECTION, POWDER, LYOPHILIZED, FOR SOLUTION INTRAVENOUS at 11:05

## 2023-11-21 RX ADMIN — APIXABAN 5 MG: 5 TABLET, FILM COATED ORAL at 08:06

## 2023-11-21 RX ADMIN — EMPAGLIFLOZIN 10 MG: 10 TABLET, FILM COATED ORAL at 08:07

## 2023-11-21 RX ADMIN — INSULIN GLARGINE 50 UNITS: 100 INJECTION, SOLUTION SUBCUTANEOUS at 08:07

## 2023-11-21 ASSESSMENT — PAIN DESCRIPTION - ORIENTATION
ORIENTATION: LEFT
ORIENTATION: RIGHT

## 2023-11-21 ASSESSMENT — PAIN SCALES - GENERAL
PAINLEVEL_OUTOF10: 10
PAINLEVEL_OUTOF10: 9
PAINLEVEL_OUTOF10: 7
PAINLEVEL_OUTOF10: 10

## 2023-11-21 ASSESSMENT — PAIN DESCRIPTION - LOCATION
LOCATION: HIP
LOCATION: HIP;BACK
LOCATION: LEG
LOCATION: HIP

## 2023-11-21 ASSESSMENT — PAIN DESCRIPTION - DESCRIPTORS: DESCRIPTORS: ACHING

## 2023-11-21 NOTE — CARE COORDINATION
Pt is approved for B. Agreeable to stay for [facility] for medical management and therapy.   Plan to change to [facility] after scans if continues to be stable and does not need transfer

## 2023-11-21 NOTE — CONSULTS
Comprehensive Nutrition Assessment    Type and Reason for Visit:  Initial, Positive Nutrition Screen, Consult    Nutrition Recommendations/Plan:   3 CHO added to existing diet order. Continue to encourage PO intakes as appropriate. Avg of % x 3 meals. Shawn ordered BID. Consider MVI. RD to follow pt and available PRN. Malnutrition Assessment:  Malnutrition Status: At risk for malnutrition (Comment) (r/t report of unintentional weight loss and decreased intakes.) (11/21/23 2465)    Context:  Acute Illness     Findings of the 6 clinical characteristics of malnutrition:  Energy Intake:  No significant decrease in energy intake  Weight Loss:   (4% in ~ 6 months.)     Body Fat Loss:  No significant body fat loss     Muscle Mass Loss:  No significant muscle mass loss    Fluid Accumulation:  No significant fluid accumulation     Strength:  Not Performed    Nutrition Assessment:    Pt admitted with generalized weakness. Pt is at moderate risk for ongoing nutritional compromise r/t wounds and good intakes. Nutrition Related Findings:    Weight loss of 4% in ~ 6 months. PMH thyroid cancer, CKD, DM, HTN, lupus. Medications: folic acid, insulin. Labs: folate 5.26, glu , alb 3.1, alk phos 163, ALT 59, AST 39, A1c 8.5. No edema at this time. Wound Type: Multiple, Skin Tears (Incision back, scattered redness/ecchymosis, skin tears to left and right knees.)       Current Nutrition Intake & Therapies:    Average Meal Intake: % (x 3)  Average Supplements Intake: None Ordered  ADULT ORAL NUTRITION SUPPLEMENT; Breakfast, Lunch, Dinner; Diabetic Oral Supplement  ADULT DIET; Dysphagia - Soft and Bite Sized; 3 carb choices (45 gm/meal)    Anthropometric Measures:  Height: 167.6 cm (5' 6\")  Ideal Body Weight (IBW): 130 lbs (59 kg)       Current Body Weight: 97.5 kg (215 lb), 165.4 % IBW.  Weight Source: Standing Scale  Current BMI (kg/m2): 34.7        Weight Adjustment For: No Adjustment

## 2023-11-21 NOTE — ACP (ADVANCE CARE PLANNING)
Advance Care Planning     General Advance Care Planning (ACP) Conversation    Date of Conversation: 11/21/2023  Conducted with: Patient with Decision Making Capacity    Healthcare Decision Maker:    Primary Decision Maker: Andreia Nunn - Minister[employer] - [phone]  Click here to complete Healthcare Decision Makers including selection of the Healthcare Decision Maker Relationship (ie \"Primary\"). Today we documented Decision Maker(s) consistent with Legal Next of Kin hierarchy.     Content/Action Overview:  Has NO ACP documents/care preferences - information provided, considering goals and options  Reviewed DNR/DNI and patient elects Full Code (Attempt Resuscitation)  artificial nutrition, ventilation preferences, and resuscitation preferences      Length of Voluntary ACP Conversation in minutes:  <16 minutes (Non-Billable)    Reji Garcia

## 2023-11-21 NOTE — PLAN OF CARE
Problem: Discharge Planning  Goal: Discharge to home or other facility with appropriate resources  11/20/2023 1328 by Reina Narvaez RN  Outcome: Progressing  Flowsheets  Taken 11/20/2023 1304  Discharge to home or other facility with appropriate resources:   Identify barriers to discharge with patient and caregiver   Identify discharge learning needs (meds, wound care, etc)  Taken 11/20/2023 1301  Discharge to home or other facility with appropriate resources:   Identify barriers to discharge with patient and caregiver   Identify discharge learning needs (meds, wound care, etc)     Problem: Safety - Adult  Goal: Free from fall injury  11/21/2023 0206 by Andre Walton RN  Outcome: Progressing  11/20/2023 1328 by Reina Narvaez RN  Outcome: Progressing     Problem: ABCDS Injury Assessment  Goal: Absence of physical injury  11/20/2023 1328 by Reina Narvaez RN  Outcome: Progressing     Problem: Skin/Tissue Integrity  Goal: Absence of new skin breakdown  Description: 1. Monitor for areas of redness and/or skin breakdown  2. Assess vascular access sites hourly  3. Every 4-6 hours minimum:  Change oxygen saturation probe site  4. Every 4-6 hours:  If on nasal continuous positive airway pressure, respiratory therapy assess nares and determine need for appliance change or resting period.   11/21/2023 0206 by Andre Walton RN  Outcome: Progressing  11/20/2023 1328 by Reina Narvaez RN  Outcome: Progressing     Problem: Pain  Goal: Verbalizes/displays adequate comfort level or baseline comfort level  11/20/2023 1328 by Reina Narvaez RN  Outcome: Progressing     Problem: Chronic Conditions and Co-morbidities  Goal: Patient's chronic conditions and co-morbidity symptoms are monitored and maintained or improved  11/20/2023 1328 by Reina Narvaez RN  Outcome: Progressing

## 2023-11-21 NOTE — H&P
Short Stay Summary      Patient ID: Josafat Britt       Patient's PCP: NICKOLAS Howard    Admit Date: 11/20/2023     Discharge Date:   11/21/2023     Admitting Physician: Diane So MD    Discharge Physician: CONSTANTINE Yo     Reason for this admission:   Declining functional status     Discharge Diagnoses: Active Hospital Problems    Diagnosis Date Noted    History of rheumatoid arthritis [Z87.39] 11/21/2023    Right hand weakness [R29.898] 11/21/2023    Infection of skin due to methicillin resistant Staphylococcus aureus (MRSA) [L08.9, B95.62] 11/21/2023    History of lumbar laminectomy [Z98.890] 11/21/2023    Right leg paresthesias [R20.2] 11/21/2023    Factor V Leiden ([address]) [D68.51] 11/21/2023    Type 2 diabetes mellitus ([address]) [E11.9] 11/21/2023    History of deep vein thrombosis [Z86.718] 11/21/2023    Left foot drop [M21.372] 11/21/2023    Generalized weakness [R53.1] 11/20/2023       Procedures:  MRI BRAIN WO CONTRAST    (Results Pending)   MRI CERVICAL SPINE WO CONTRAST    (Results Pending)   MRI LUMBAR SPINE WO CONTRAST    (Results Pending)         Consults:   IP CONSULT TO CASE MANAGEMENT  IP CONSULT TO DIETITIAN  IP CONSULT TO CASE MANAGEMENT  IP CONSULT TO CASE MANAGEMENT  PT/OT    HISTORY OF PRESENT ILLNESS:   The patient is a 70 y.o. female with PMH of RA, Sjogren's syndrome, stroke, factor V Leiden on Eliquis, DVT, hypertension, hypothyroidism, diabetes who was directly admitted from PCP office on 11/20 due to declining functional status and gait difficulty making her a fall risk at home. Patient reports she was having significant issues with left lower extremity pain and weakness for quite some time. Also had left foot drop. Underwent left-sided L3-4 and L4-5 laminectomy on 10/17/2023 with Dr. Anthony Juarez. Left lower extremity pain improved but foot drop remains. Patient states she was having drainage from her wound.  She was admitted at [facility] DR FITZ HIGGINS from 11/9 to 11/12 for MRSA

## 2023-11-21 NOTE — DISCHARGE SUMMARY
Short Stay Summary      Patient ID: Jatin Zafar       Patient's PCP: NICKOLAS Bynum    Admit Date: 11/20/2023     Discharge Date:   11/21/2023     Admitting Physician: Mandie Florez MD    Discharge Physician: CONSTANTINE Velez     Reason for this admission:   Declining functional status     Discharge Diagnoses: Active Hospital Problems    Diagnosis Date Noted    History of rheumatoid arthritis [Z87.39] 11/21/2023    Right hand weakness [R29.898] 11/21/2023    Infection of skin due to methicillin resistant Staphylococcus aureus (MRSA) [L08.9, B95.62] 11/21/2023    History of lumbar laminectomy [Z98.890] 11/21/2023    Right leg paresthesias [R20.2] 11/21/2023    Factor V Leiden ([address]) [D68.51] 11/21/2023    Type 2 diabetes mellitus ([address]) [E11.9] 11/21/2023    History of deep vein thrombosis [Z86.718] 11/21/2023    Left foot drop [M21.372] 11/21/2023    Generalized weakness [R53.1] 11/20/2023       Procedures:  MRI BRAIN WO CONTRAST    (Results Pending)   MRI CERVICAL SPINE WO CONTRAST    (Results Pending)   MRI LUMBAR SPINE WO CONTRAST    (Results Pending)         Consults:   IP CONSULT TO CASE MANAGEMENT  IP CONSULT TO DIETITIAN  IP CONSULT TO CASE MANAGEMENT  IP CONSULT TO CASE MANAGEMENT  PT/OT    HISTORY OF PRESENT ILLNESS:   The patient is a 70 y.o. female with PMH of RA, Sjogren's syndrome, stroke, factor V Leiden on Eliquis, DVT, hypertension, hypothyroidism, diabetes who was directly admitted from PCP office on 11/20 due to declining functional status and gait difficulty making her a fall risk at home. Patient reports she was having significant issues with left lower extremity pain and weakness for quite some time. Also had left foot drop. Underwent left-sided L3-4 and L4-5 laminectomy on 10/17/2023 with Dr. Seema Rivera. Left lower extremity pain improved but foot drop remains. Patient states she was having drainage from her wound.  She was admitted at [facility] DR FITZ HIGGINS from 11/9 to 11/12 for MRSA

## 2023-11-21 NOTE — PLAN OF CARE
Problem: Discharge Planning  Goal: Discharge to home or other facility with appropriate resources  Outcome: Progressing  Flowsheets (Taken 11/21/2023 0806)  Discharge to home or other facility with appropriate resources:   Identify barriers to discharge with patient and caregiver   Identify discharge learning needs (meds, wound care, etc)     Problem: Safety - Adult  Goal: Free from fall injury  11/21/2023 0833 by Lizette Nino RN  Outcome: Progressing  11/21/2023 0206 by Chelle Fenton RN  Outcome: Progressing     Problem: ABCDS Injury Assessment  Goal: Absence of physical injury  Outcome: Progressing     Problem: Skin/Tissue Integrity  Goal: Absence of new skin breakdown  Description: 1. Monitor for areas of redness and/or skin breakdown  2. Assess vascular access sites hourly  3. Every 4-6 hours minimum:  Change oxygen saturation probe site  4. Every 4-6 hours:  If on nasal continuous positive airway pressure, respiratory therapy assess nares and determine need for appliance change or resting period.   11/21/2023 9838 by Lizette Nino RN  Outcome: Progressing  11/21/2023 0206 by Chelle Fenton RN  Outcome: Progressing     Problem: Pain  Goal: Verbalizes/displays adequate comfort level or baseline comfort level  Outcome: Progressing     Problem: Chronic Conditions and Co-morbidities  Goal: Patient's chronic conditions and co-morbidity symptoms are monitored and maintained or improved  Outcome: Progressing

## 2023-11-22 ENCOUNTER — HOSPITAL ENCOUNTER (INPATIENT)
Facility: HOSPITAL | Age: 63
LOS: 8 days | Discharge: HOME | DRG: 948 | End: 2023-11-30
Attending: INTERNAL MEDICINE | Admitting: INTERNAL MEDICINE
Payer: MEDICARE

## 2023-11-22 ENCOUNTER — APPOINTMENT (OUTPATIENT)
Dept: ULTRASOUND IMAGING | Facility: HOSPITAL | Age: 63
DRG: 948 | End: 2023-11-22
Attending: INTERNAL MEDICINE
Payer: MEDICARE

## 2023-11-22 VITALS
HEART RATE: 71 BPM | DIASTOLIC BLOOD PRESSURE: 63 MMHG | RESPIRATION RATE: 18 BRPM | WEIGHT: 215 LBS | BODY MASS INDEX: 34.55 KG/M2 | HEIGHT: 66 IN | SYSTOLIC BLOOD PRESSURE: 120 MMHG | TEMPERATURE: 98.8 F | OXYGEN SATURATION: 97 %

## 2023-11-22 PROBLEM — R53.81 DECLINING FUNCTIONAL STATUS: Status: ACTIVE | Noted: 2023-11-22

## 2023-11-22 LAB
ALBUMIN SERPL-MCNC: 3 G/DL (ref 3.4–4.8)
ALBUMIN/GLOB SERPL: 1.5 {RATIO} (ref 0.8–2)
ALP SERPL-CCNC: 163 U/L (ref 25–100)
ALT SERPL-CCNC: 53 U/L (ref 4–36)
ANION GAP SERPL CALCULATED.3IONS-SCNC: 6 MMOL/L (ref 3–16)
AST SERPL-CCNC: 34 U/L (ref 8–33)
BASOPHILS # BLD: 0.1 K/UL (ref 0–0.1)
BASOPHILS NFR BLD: 1.4 %
BILIRUB SERPL-MCNC: <0.2 MG/DL (ref 0.3–1.2)
BUN SERPL-MCNC: 18 MG/DL (ref 6–20)
CALCIUM SERPL-MCNC: 8.8 MG/DL (ref 8.5–10.5)
CHLORIDE SERPL-SCNC: 104 MMOL/L (ref 98–107)
CO2 SERPL-SCNC: 29 MMOL/L (ref 20–30)
CREAT SERPL-MCNC: 1 MG/DL (ref 0.4–1.2)
EOSINOPHIL # BLD: 0.5 K/UL (ref 0–0.4)
EOSINOPHIL NFR BLD: 6.8 %
ERYTHROCYTE [DISTWIDTH] IN BLOOD BY AUTOMATED COUNT: 12.6 % (ref 11–16)
GFR SERPLBLD CREATININE-BSD FMLA CKD-EPI: >60 ML/MIN/{1.73_M2}
GLOBULIN SER CALC-MCNC: 2 G/DL
GLUCOSE BLD-MCNC: 107 MG/DL (ref 74–106)
GLUCOSE BLD-MCNC: 139 MG/DL (ref 74–106)
GLUCOSE BLD-MCNC: 222 MG/DL (ref 74–106)
GLUCOSE BLD-MCNC: 89 MG/DL (ref 74–106)
GLUCOSE SERPL-MCNC: 108 MG/DL (ref 74–106)
HCT VFR BLD AUTO: 40.6 % (ref 37–47)
HGB BLD-MCNC: 13 G/DL (ref 11.5–16.5)
IMM GRANULOCYTES # BLD: 0 K/UL
IMM GRANULOCYTES NFR BLD: 0.1 % (ref 0–5)
LYMPHOCYTES # BLD: 2.3 K/UL (ref 1.5–4)
LYMPHOCYTES NFR BLD: 30.9 %
MAGNESIUM SERPL-MCNC: 2.2 MG/DL (ref 1.7–2.4)
MCH RBC QN AUTO: 29.2 PG (ref 27–32)
MCHC RBC AUTO-ENTMCNC: 32 G/DL (ref 31–35)
MCV RBC AUTO: 91.2 FL (ref 80–100)
MONOCYTES # BLD: 0.6 K/UL (ref 0.2–0.8)
MONOCYTES NFR BLD: 8.7 %
NEUTROPHILS # BLD: 3.8 K/UL (ref 2–7.5)
NEUTS SEG NFR BLD: 52.1 %
PERFORMED ON: ABNORMAL
PERFORMED ON: NORMAL
PLATELET # BLD AUTO: 233 K/UL (ref 150–400)
PMV BLD AUTO: 10.9 FL (ref 6–10)
POTASSIUM SERPL-SCNC: 4.3 MMOL/L (ref 3.4–5.1)
PROT SERPL-MCNC: 5 G/DL (ref 6.4–8.3)
RBC # BLD AUTO: 4.45 M/UL (ref 3.8–5.8)
SODIUM SERPL-SCNC: 139 MMOL/L (ref 136–145)
WBC # BLD AUTO: 7.3 K/UL (ref 4–11)

## 2023-11-22 PROCEDURE — 96366 THER/PROPH/DIAG IV INF ADDON: CPT

## 2023-11-22 PROCEDURE — 2580000003 HC RX 258: Performed by: PHYSICIAN ASSISTANT

## 2023-11-22 PROCEDURE — 6370000000 HC RX 637 (ALT 250 FOR IP): Performed by: PHYSICIAN ASSISTANT

## 2023-11-22 PROCEDURE — 97116 GAIT TRAINING THERAPY: CPT

## 2023-11-22 PROCEDURE — 97110 THERAPEUTIC EXERCISES: CPT

## 2023-11-22 PROCEDURE — 80053 COMPREHEN METABOLIC PANEL: CPT

## 2023-11-22 PROCEDURE — 97535 SELF CARE MNGMENT TRAINING: CPT

## 2023-11-22 PROCEDURE — 93970 EXTREMITY STUDY: CPT

## 2023-11-22 PROCEDURE — 2500000003 HC RX 250 WO HCPCS: Performed by: PHYSICIAN ASSISTANT

## 2023-11-22 PROCEDURE — 36415 COLL VENOUS BLD VENIPUNCTURE: CPT

## 2023-11-22 PROCEDURE — 6360000002 HC RX W HCPCS: Performed by: PHYSICIAN ASSISTANT

## 2023-11-22 PROCEDURE — G0378 HOSPITAL OBSERVATION PER HR: HCPCS

## 2023-11-22 PROCEDURE — 83735 ASSAY OF MAGNESIUM: CPT

## 2023-11-22 PROCEDURE — 99305 1ST NF CARE MODERATE MDM 35: CPT | Performed by: INTERNAL MEDICINE

## 2023-11-22 PROCEDURE — 92526 ORAL FUNCTION THERAPY: CPT

## 2023-11-22 PROCEDURE — 97802 MEDICAL NUTRITION INDIV IN: CPT

## 2023-11-22 PROCEDURE — 97530 THERAPEUTIC ACTIVITIES: CPT

## 2023-11-22 PROCEDURE — 85025 COMPLETE CBC W/AUTO DIFF WBC: CPT

## 2023-11-22 PROCEDURE — 1200000002 HC SEMI PRIVATE SWING BED

## 2023-11-22 RX ORDER — PANTOPRAZOLE SODIUM 40 MG/1
40 TABLET, DELAYED RELEASE ORAL DAILY PRN
Status: DISCONTINUED | OUTPATIENT
Start: 2023-11-22 | End: 2023-11-30 | Stop reason: HOSPADM

## 2023-11-22 RX ORDER — DEXTROSE MONOHYDRATE 100 MG/ML
INJECTION, SOLUTION INTRAVENOUS CONTINUOUS PRN
Status: DISCONTINUED | OUTPATIENT
Start: 2023-11-22 | End: 2023-11-30 | Stop reason: HOSPADM

## 2023-11-22 RX ORDER — CYCLOBENZAPRINE HCL 10 MG
10 TABLET ORAL 3 TIMES DAILY PRN
Status: DISCONTINUED | OUTPATIENT
Start: 2023-11-22 | End: 2023-11-30 | Stop reason: HOSPADM

## 2023-11-22 RX ORDER — FUROSEMIDE 20 MG/1
10 TABLET ORAL DAILY PRN
Status: DISCONTINUED | OUTPATIENT
Start: 2023-11-22 | End: 2023-11-30 | Stop reason: HOSPADM

## 2023-11-22 RX ORDER — PILOCARPINE HYDROCHLORIDE 5 MG/1
5 TABLET, FILM COATED ORAL 4 TIMES DAILY
Status: DISCONTINUED | OUTPATIENT
Start: 2023-11-22 | End: 2023-11-30 | Stop reason: HOSPADM

## 2023-11-22 RX ORDER — INSULIN LISPRO 100 [IU]/ML
0-4 INJECTION, SOLUTION INTRAVENOUS; SUBCUTANEOUS
Status: DISCONTINUED | OUTPATIENT
Start: 2023-11-22 | End: 2023-11-30 | Stop reason: HOSPADM

## 2023-11-22 RX ORDER — FLUOXETINE HYDROCHLORIDE 20 MG/1
40 CAPSULE ORAL NIGHTLY
Status: DISCONTINUED | OUTPATIENT
Start: 2023-11-22 | End: 2023-11-30 | Stop reason: HOSPADM

## 2023-11-22 RX ORDER — ONDANSETRON 4 MG/1
4 TABLET, ORALLY DISINTEGRATING ORAL EVERY 8 HOURS PRN
Status: DISCONTINUED | OUTPATIENT
Start: 2023-11-22 | End: 2023-11-30 | Stop reason: HOSPADM

## 2023-11-22 RX ORDER — OXYCODONE AND ACETAMINOPHEN 7.5; 325 MG/1; MG/1
1 TABLET ORAL EVERY 4 HOURS PRN
Status: DISCONTINUED | OUTPATIENT
Start: 2023-11-22 | End: 2023-11-23

## 2023-11-22 RX ORDER — ONDANSETRON 2 MG/ML
4 INJECTION INTRAMUSCULAR; INTRAVENOUS EVERY 6 HOURS PRN
Status: DISCONTINUED | OUTPATIENT
Start: 2023-11-22 | End: 2023-11-30 | Stop reason: HOSPADM

## 2023-11-22 RX ORDER — ACETAMINOPHEN 650 MG/1
650 SUPPOSITORY RECTAL EVERY 6 HOURS PRN
Status: DISCONTINUED | OUTPATIENT
Start: 2023-11-22 | End: 2023-11-30 | Stop reason: HOSPADM

## 2023-11-22 RX ORDER — TRAZODONE HYDROCHLORIDE 50 MG/1
50 TABLET ORAL NIGHTLY
Status: DISCONTINUED | OUTPATIENT
Start: 2023-11-22 | End: 2023-11-30 | Stop reason: HOSPADM

## 2023-11-22 RX ORDER — POLYETHYLENE GLYCOL 3350 17 G/17G
17 POWDER, FOR SOLUTION ORAL DAILY PRN
Status: DISCONTINUED | OUTPATIENT
Start: 2023-11-22 | End: 2023-11-30 | Stop reason: HOSPADM

## 2023-11-22 RX ORDER — INSULIN LISPRO 100 [IU]/ML
0-4 INJECTION, SOLUTION INTRAVENOUS; SUBCUTANEOUS NIGHTLY
Status: DISCONTINUED | OUTPATIENT
Start: 2023-11-22 | End: 2023-11-30 | Stop reason: HOSPADM

## 2023-11-22 RX ORDER — POLYETHYLENE GLYCOL 3350 17 G/17G
17 POWDER, FOR SOLUTION ORAL DAILY PRN
Status: DISCONTINUED | OUTPATIENT
Start: 2023-11-22 | End: 2023-11-22 | Stop reason: SDUPTHER

## 2023-11-22 RX ORDER — ACETAMINOPHEN 325 MG/1
650 TABLET ORAL EVERY 6 HOURS PRN
Status: DISCONTINUED | OUTPATIENT
Start: 2023-11-22 | End: 2023-11-30 | Stop reason: HOSPADM

## 2023-11-22 RX ORDER — IBUPROFEN 600 MG/1
1 TABLET ORAL PRN
Status: DISCONTINUED | OUTPATIENT
Start: 2023-11-22 | End: 2023-11-30 | Stop reason: HOSPADM

## 2023-11-22 RX ORDER — PREGABALIN 50 MG/1
100 CAPSULE ORAL 3 TIMES DAILY
Status: DISCONTINUED | OUTPATIENT
Start: 2023-11-22 | End: 2023-11-30 | Stop reason: HOSPADM

## 2023-11-22 RX ORDER — FOLIC ACID 1 MG/1
1 TABLET ORAL DAILY
Status: DISCONTINUED | OUTPATIENT
Start: 2023-11-23 | End: 2023-11-30 | Stop reason: HOSPADM

## 2023-11-22 RX ORDER — DIAPER,BRIEF,INFANT-TODD,DISP
EACH MISCELLANEOUS 2 TIMES DAILY
Status: DISCONTINUED | OUTPATIENT
Start: 2023-11-22 | End: 2023-11-30 | Stop reason: HOSPADM

## 2023-11-22 RX ORDER — FLUTICASONE PROPIONATE 50 MCG
1 SPRAY, SUSPENSION (ML) NASAL DAILY PRN
Status: DISCONTINUED | OUTPATIENT
Start: 2023-11-22 | End: 2023-11-30 | Stop reason: HOSPADM

## 2023-11-22 RX ORDER — INSULIN GLARGINE 100 [IU]/ML
50 INJECTION, SOLUTION SUBCUTANEOUS 2 TIMES DAILY
Status: DISCONTINUED | OUTPATIENT
Start: 2023-11-22 | End: 2023-11-25

## 2023-11-22 RX ORDER — LEVOTHYROXINE SODIUM 0.07 MG/1
150 TABLET ORAL DAILY
Status: DISCONTINUED | OUTPATIENT
Start: 2023-11-23 | End: 2023-11-30 | Stop reason: HOSPADM

## 2023-11-22 RX ORDER — LOSARTAN POTASSIUM 25 MG/1
25 TABLET ORAL DAILY
Status: DISCONTINUED | OUTPATIENT
Start: 2023-11-23 | End: 2023-11-30 | Stop reason: HOSPADM

## 2023-11-22 RX ADMIN — HYDROCORTISONE: 1 CREAM TOPICAL at 08:34

## 2023-11-22 RX ADMIN — PREGABALIN 100 MG: 50 CAPSULE ORAL at 08:33

## 2023-11-22 RX ADMIN — OXYCODONE HYDROCHLORIDE AND ACETAMINOPHEN 1 TABLET: 7.5; 325 TABLET ORAL at 15:18

## 2023-11-22 RX ADMIN — TRAZODONE HYDROCHLORIDE 50 MG: 50 TABLET ORAL at 20:51

## 2023-11-22 RX ADMIN — OXYCODONE HYDROCHLORIDE AND ACETAMINOPHEN 1 TABLET: 7.5; 325 TABLET ORAL at 05:11

## 2023-11-22 RX ADMIN — PILOCARPINE HYDROCHLORIDE 5 MG: 5 TABLET, FILM COATED ORAL at 20:51

## 2023-11-22 RX ADMIN — DAPTOMYCIN 600 MG: 350 INJECTION, POWDER, LYOPHILIZED, FOR SOLUTION INTRAVENOUS at 11:58

## 2023-11-22 RX ADMIN — LOSARTAN POTASSIUM 25 MG: 25 TABLET, FILM COATED ORAL at 08:33

## 2023-11-22 RX ADMIN — PILOCARPINE HYDROCHLORIDE 5 MG: 5 TABLET, FILM COATED ORAL at 08:33

## 2023-11-22 RX ADMIN — PREGABALIN 100 MG: 50 CAPSULE ORAL at 20:51

## 2023-11-22 RX ADMIN — INSULIN GLARGINE 50 UNITS: 100 INJECTION, SOLUTION SUBCUTANEOUS at 08:36

## 2023-11-22 RX ADMIN — INSULIN GLARGINE 50 UNITS: 100 INJECTION, SOLUTION SUBCUTANEOUS at 20:55

## 2023-11-22 RX ADMIN — METHYLPREDNISOLONE SODIUM SUCCINATE 60 MG: 125 INJECTION INTRAMUSCULAR; INTRAVENOUS at 15:53

## 2023-11-22 RX ADMIN — MICONAZOLE NITRATE: 2 POWDER TOPICAL at 08:34

## 2023-11-22 RX ADMIN — HYDROCORTISONE: 1 CREAM TOPICAL at 20:57

## 2023-11-22 RX ADMIN — PREGABALIN 100 MG: 50 CAPSULE ORAL at 15:18

## 2023-11-22 RX ADMIN — APIXABAN 5 MG: 5 TABLET, FILM COATED ORAL at 08:33

## 2023-11-22 RX ADMIN — APIXABAN 5 MG: 5 TABLET, FILM COATED ORAL at 20:51

## 2023-11-22 RX ADMIN — LEVOTHYROXINE SODIUM 150 MCG: 0.07 TABLET ORAL at 05:12

## 2023-11-22 RX ADMIN — EMPAGLIFLOZIN 10 MG: 10 TABLET, FILM COATED ORAL at 08:33

## 2023-11-22 RX ADMIN — FLUOXETINE 40 MG: 20 CAPSULE ORAL at 20:51

## 2023-11-22 RX ADMIN — CYCLOBENZAPRINE 10 MG: 10 TABLET, FILM COATED ORAL at 20:52

## 2023-11-22 RX ADMIN — OXYCODONE HYDROCHLORIDE AND ACETAMINOPHEN 1 TABLET: 7.5; 325 TABLET ORAL at 20:52

## 2023-11-22 RX ADMIN — CYCLOBENZAPRINE 10 MG: 10 TABLET, FILM COATED ORAL at 05:12

## 2023-11-22 RX ADMIN — FOLIC ACID 1 MG: 1 TABLET ORAL at 08:33

## 2023-11-22 SDOH — ECONOMIC STABILITY: INCOME INSECURITY: IN THE PAST 12 MONTHS, HAS THE ELECTRIC, GAS, OIL, OR WATER COMPANY THREATENED TO SHUT OFF SERVICE IN YOUR HOME?: NO

## 2023-11-22 SDOH — ECONOMIC STABILITY: FOOD INSECURITY: WITHIN THE PAST 12 MONTHS, YOU WORRIED THAT YOUR FOOD WOULD RUN OUT BEFORE YOU GOT MONEY TO BUY MORE.: NEVER TRUE

## 2023-11-22 SDOH — ECONOMIC STABILITY: INCOME INSECURITY: HOW HARD IS IT FOR YOU TO PAY FOR THE VERY BASICS LIKE FOOD, HOUSING, MEDICAL CARE, AND HEATING?: NOT HARD AT ALL

## 2023-11-22 ASSESSMENT — PAIN SCALES - GENERAL
PAINLEVEL_OUTOF10: 9
PAINLEVEL_OUTOF10: 10
PAINLEVEL_OUTOF10: 10
PAINLEVEL_OUTOF10: 9

## 2023-11-22 ASSESSMENT — PAIN DESCRIPTION - LOCATION
LOCATION: LEG
LOCATION: LEG;HIP
LOCATION: BACK;HIP;SACRUM
LOCATION: BACK;HIP;LEG

## 2023-11-22 ASSESSMENT — PAIN DESCRIPTION - DESCRIPTORS
DESCRIPTORS: THROBBING
DESCRIPTORS: THROBBING

## 2023-11-22 ASSESSMENT — PAIN DESCRIPTION - ORIENTATION: ORIENTATION: RIGHT

## 2023-11-22 ASSESSMENT — PATIENT HEALTH QUESTIONNAIRE - PHQ9
2. FEELING DOWN, DEPRESSED OR HOPELESS: 1
SUM OF ALL RESPONSES TO PHQ QUESTIONS 1-9: 2
SUM OF ALL RESPONSES TO PHQ9 QUESTIONS 1 & 2: 2
SUM OF ALL RESPONSES TO PHQ QUESTIONS 1-9: 2
1. LITTLE INTEREST OR PLEASURE IN DOING THINGS: 1

## 2023-11-22 NOTE — CARE COORDINATION
43/50/4752    I certify that the skilled nursing and/or rehabilitation services are required to be given on a daily basis, which as a practical matter can only be provided in a skilled nursing unit on an inpatient basis.

## 2023-11-22 NOTE — PROGRESS NOTES
4 Eyes Skin Assessment     NAME:  Dinesh Nelson  YOB: 1960  MEDICAL RECORD NUMBER:  [id number]    The patient is being assessed for  Admission    I agree that at least one RN has performed a thorough Head to Toe Skin Assessment on the patient. ALL assessment sites listed below have been assessed. Areas assessed by both nurses:    Head, Face, Ears, Shoulders, Back, Chest, Arms, Elbows, Hands, Sacrum. Buttock, Coccyx, Ischium, and Legs. Feet and Heels        Does the Patient have a Wound? Yes wound(s) were present on assessment.  LDA wound assessment was Initiated and completed by RN       Goyo Prevention initiated by RN: Yes  Wound Care Orders initiated by RN: No    Pressure Injury (Stage 3,4, Unstageable, DTI, NWPT, and Complex wounds) if present, place Wound referral order by RN under : No    New Ostomies, if present place, Ostomy referral order under : No     Nurse 1 eSignature: Electronically signed by Rene Arceo RN on 11/20/23 at 5:47 PM EST    **SHARE this note so that the co-signing nurse can place an eSignature**    Nurse 2 eSignature: Electronically signed by Raad Shaffer RN on 11/20/23 at 6:59 PM EST
[id number] [facility]  SPEECH/LANGUAGE PATHOLOGY   INPATIENT DAILY NOTE      [x] Daily           [] Discharge    Patient:Flory Prince      :1960  BWE:[id number]  Rehab Dx/Hx: Generalized weakness [R53.1]   Allergies   Allergen Reactions    Red Dye Hives    Adhesive Tape Other (See Comments)     Patient reported causes skin to blister and tear. Patient reported she is able to tolerate paper tape with no adverse reactions. Patient reported causes skin to blister and tear. Patient reported she is able to tolerate paper tape with no adverse reactions. Other Nausea And Vomiting     PT. REPORTS THAT SHE IS \"ALLERGIC TO ALL INSULINS EXCEPT LANTUS\"  PT. REPORTS THAT SHE IS \"ALLERGIC TO ALL INSULINS EXCEPT LANTUS\"    Pt unsure of the exact insulin that caused rash      Erythromycin Other (See Comments)     Boils all over body    Guaifenesin      Chest pain    Insulins      Some insulins    Lisinopril     Prednisone Other (See Comments)     Renal failure    Sulfa Antibiotics Other (See Comments)     Kidney failure    Zithromax [Azithromycin] Other (See Comments)     Heart races     Precautions: Sit up for all meals and thereafter for 30 minutes, Eat with small bites (1/2 tsp; 1 tsp), Alternate solids with liquids, and Take small sips of water at the end of snacks and meals;  Restrictions/Precautions: Fall Risk, General Precautions    Home Situation/IADL:   Social/Functional History  Lives With: Son  Type of Home: Apartment  Home Layout: One level  Home Access: Stairs to enter with rails  Entrance Stairs - Number of Steps: 14 DARRELL  Bathroom Shower/Tub: Tub/Shower unit  Bathroom Toilet: Standard  Bathroom Equipment: Shower chair, 3-in-1 commode  Bathroom Accessibility: Walker accessible  Home Equipment: Rollator, Lift chair  Has the patient had two or more falls in the past year or any fall with injury in the past year?: Yes  Receives Help From: Family  ADL Assistance: Needs assistance (requires
Last office note requested from Dr. Camila Palomares (ortho). Office faxing over.
Medication Reconciliation completed with fill history from [address] [place] [address] in [place], [address] [place] [address] in [place], and 11/12/23 dc med list from [facility]. See notes below:    -Patient stated she normally takes leflunomide 20mg po daily, however she reported that she is supposed to hold the medication while on Daptomycin. Notified provider Yajaira FITCH.  -Pt has not recently filled Flonase, Diclofenac Gel, and Furosemide, however per pt she still uses them at home as needed. Updated furosemide on home med list to show that pt takes this med as needed.  -Patient has not filled her Vitamin D 50,000 units weekly recently, however per pt she still takes vitamin D once weekly.   -Patient has not filled Restasis recently, however pt stated she is still using her Restasis at home.     Harish Ellis, Deaocn Hou, RadhamesD
Occupational Therapy  Facility/Department: [facility]  Occupational Therapy Initial Assessment    Name: Angelica Simon  : 1960  MRN: [id number]  Date of Service: 2023    Discharge Recommendations:  Continue to assess pending progress       Patient Diagnosis(es): There were no encounter diagnoses. 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 Past Surgical History:  has a past surgical history that includes Thyroidectomy; Cholecystectomy; Tubal ligation; Colonoscopy; and Breast biopsy (Right). Assessment   Performance deficits / Impairments: Decreased functional mobility ; Decreased ADL status; Decreased strength;Decreased safe awareness;Decreased endurance;Decreased sensation;Decreased fine motor control;Decreased high-level IADLs;Decreased balance;Decreased coordination  Assessment: This 70year old female was referred to OT services upon admission following onset of generalized weakness. Pt reports hx of recent spinal surgeries in Oct and 2023. Pt with precautions of no BLT. Pt able to verbalize these precautions. Pt does state she does not wear a back brace. Pt states she has an AFO however, it is ill fitting and has increased pressure at LLE ankle. This is a custom AFO and pt has not been able to go to appt for adjustments. Pt reports living in top level apt with 14 steps to enter, reporting significant difficulty with task with several falls despite assistance with steps. Pt completed bed mobility using bed railing with CGA. Pt sat at EOB with SBA. Pt presents with atrophy in RUE hand especially in thenar emenince.  Pt having difficulty with fine motor coordination with reports of decreased sensation
Occupational Therapy  Facility/Department: [facility]  Daily Treatment Note  NAME: Josafat Britt  : 1960  MRN: [id number]    Date of Service: 2023    Discharge Recommendations:  Continue to assess pending progress     Patient Diagnosis(es): There were no encounter diagnoses. Assessment    Assessment: Pt received in bed on this date, co tx with PTA. Pt agreeable to therapy services. Pt transferred to sitting at EOB with SUP. Pt sat at EOB unsupported. Pt come to stand at EOB with CGA<>MIN A x2 with min verbal cuing for hand placement. pt ambulated to bathroom with RW CGA x2 and completed toileting transfer with CGA to toilet. Pt completed toileting tasks with CGA. Pt come to stand and ambulated 200 feet with RW CGA<>MIN A with narrow COLEEN and difficulty clearing RLE. Pt sock sliding off during ambulation. Pt returned to room and transferred to chair to rest. PTA exited treatment. OT provided pt with sock aid and  shoe horn. Pt doffed sock seated in chair with CGA using shoe horn. OT demonstrated use of sock aid and pt able to return demonstration with CGA. Pt tolerated well. Pt completed  strength assessment RUE presents with significant deficits 8.8 average compared to 56.8 on LUE. Pt has a difference of 48LBS in comparison. Pt completed 9 hole peg test assessment RUE scored 79 seconds average, LUE scored 32.2 with a difference of 46.8 seconds in coordination. Pt presents with significant deficits in RUE and will continue to benefit from strengthening and fine motor coordination training. Pt agreeable to plan. Pt left seated upright in chair upon exit call light in reach . Pt educated to use call light for assistance for transfers. Activity Tolerance: Patient tolerated treatment well     Subjective   Subjective  Subjective: My legs are just not doing good today.  They are restless or something  Pain: R hip and LBP           Objective    Vitals     Bed Mobility Training  Bed Mobility
Offered spiritual care to patient. Told pt to reach out if they needed anything further.
Physical Therapy  Facility/Department: [facility]  Daily Treatment Note  NAME: Cheryle Albarran  : 1960  MRN: [id number]    Date of Service: 2023    Discharge Recommendations:  Continue to assess pending progress      Patient Diagnosis(es): There were no encounter diagnoses. Assessment   Assessment: Cotreated with OT. Patient transitioned supine to sit with supervision and reports vertigo-like symptoms which resolved after sitting EOB for a couple of minutes. Patient stood with Min/CGA and ambulated with RW to the bathroom and toileted with CGA. Patient ambulated in the hallway ~200 feet with RW, CGA, and w/c follow. Patient has L foot drop but does not currently have a properly fitting AFO. Activity Tolerance: Patient tolerated treatment well     Plan    Physical Therapy Plan  General Plan: 3-5 times per week  Days Per Week: 5 Days  Current Treatment Recommendations: Strengthening;Balance training;Functional mobility training;Transfer training;Gait training; Endurance training;Pain management;Home exercise program;Patient/Caregiver education & training; Safety education & training;Equipment evaluation, education, & procurement; Therapeutic activities     Restrictions  Restrictions/Precautions  Restrictions/Precautions: Fall Risk, General Precautions  Required Braces or Orthoses?: No     Subjective    Subjective  Subjective: Patient presents asleep but easily awakened and was agreeable to therapy  Pain: R hip and LBP     Objective   Bed Mobility Training  Bed Mobility Training: Yes  Overall Level of Assistance: Supervision  Rolling: Supervision  Supine to Sit: Supervision  Sit to Supine: Supervision  Balance  Sitting: Intact  Standing: High guard  Transfer Training  Transfer Training: Yes  Overall Level of Assistance: Contact-guard assistance;Minimum assistance  Interventions: Verbal cues  Sit to Stand: Contact-guard assistance;Minimum assistance  Stand to Sit: Contact-guard assistance  Stand Pivot
Physical Therapy  Facility/Department: [facility]  Daily Treatment Note  NAME: Jatin Zafar  : 1960  MRN: [id number]    Date of Service: 2023    Discharge Recommendations:  Continue to assess pending progress     Patient Diagnosis(es): There were no encounter diagnoses. Assessment   Assessment: Cotreated with OT. Removed SCDs and patient completed bed mobility tasks with extra time and Mod I. Stood from bedside with CGA/Min A and ambulated to the bathroom with RW and toileted with CGA. Ambulated 210 feet in the hallway with RW, CGA, and w/c follow. Engaged patient in B LE therex in sitting to increase strength and endurance needed for improved functional mobility. Patient better able to  her L leg today during ambulation and exercise. Patient left up in the bedside chair and was provided reading material.  Activity Tolerance: Patient tolerated treatment well     Plan    Physical Therapy Plan  General Plan: 3-5 times per week  Days Per Week: 5 Days  Current Treatment Recommendations: Strengthening;Balance training;Functional mobility training;Transfer training;Gait training; Endurance training;Pain management;Home exercise program;Patient/Caregiver education & training; Safety education & training;Equipment evaluation, education, & procurement; Therapeutic activities     Restrictions  Restrictions/Precautions  Restrictions/Precautions: Fall Risk, General Precautions  Required Braces or Orthoses?: No     Subjective    Subjective  Subjective: Patient presents awake in bed with SCDs (sequential compression devices) on her lower legs  Pain: R hip and LBP     Objective   Bed Mobility Training  Bed Mobility Training: No  Overall Level of Assistance: Modified independent  Rolling: Modified independent  Supine to Sit: Modified independent  Scooting: Modified independent  Balance  Sitting: Intact  Standing: High guard  Transfer Training  Transfer Training: Yes  Overall Level of Assistance:
SLP ALL NOTES  Facility/Department: [facility] SURG   CLINICAL BEDSIDE SWALLOW EVALUATION    NAME: Cassidy Carver  : 1960  MRN: [id number]    ADMISSION DATE: 2023  ADMITTING DIAGNOSIS: has Generalized weakness on their problem list.  ONSET DATE: 2023    Recent Chest Xray/CT of Chest: (2023)  FINDINGS: The cardiac silhouette is normal in size. The mediastinal and   hilar contours are unremarkable. The lungs are clear. There is no   pneumothorax. The visualized osseous structures demonstrate no acute   abnormalities. Date of Eval: 2023  Evaluating Therapist: Claude Bihari, SLP    Current Diet level:  Current Diet : Regular  Current Liquid Diet : Thin    Primary Complaint  Patient complains of weakness and trouble cutting foods    Pain:  Pain Assessment  Pain Assessment: 0-10  Pain Level: 10  Pain Location: Leg, Back    Reason for Referral  Cassidy Carver was referred for a bedside swallow evaluation to assess the efficiency of her swallow function, identify signs and symptoms of aspiration and make recommendations regarding safe dietary consistencies, effective compensatory strategies, and safe eating environment. Impression  Dysphagia Diagnosis: Mild oral stage dysphagia  Dysphagia Impression : Patient presents with mild oropharyngeal dysphagia. Dysphagia Outcome Severity Scale: Level 5: Mild dysphagia- Distant supervision. May need one diet consistency restricted     Treatment Plan  Requires SLP Intervention: Yes  Duration of Treatment: 2 weeks  D/C Recommendations: To be determined       Recommended Diet and Intervention  Diet Solids Recommendation: Soft & Bite Sized  Liquid Consistency Recommendation: Thin  Recommended Form of Meds: PO  Recommendations: Dysphagia treatment  Therapeutic Interventions: Bolus control exercises;Oral motor exercises;Diet tolerance monitoring;Patient/Family education;Pharyngeal exercises; Laryngeal exercises; Therapeutic PO trials with
Goal 2: Patient will perform sit to stand and transfers with RW and no more than SBA. Short Term Goal 3: Patient will ambulate 100'x2 with RW and no more than CGA. Short Term Goal 4: Patient will perform B LE ther ex x 15 reps. Education  Patient Education  Education Given To: Patient  Education Provided: Role of Therapy;Plan of Care;Transfer Training; Fall Prevention Strategies;Precautions  Education Method: Demonstration;Verbal  Barriers to Learning: None  Education Outcome: Continued education needed      Therapy Time   Individual Concurrent Group Co-treatment   Time In 1410         Time Out 5851         Minutes 44             This note will serve as DC summary in the event of pt discharge.      Harvinder Dumont, PT

## 2023-11-22 NOTE — CONSULTS
Comprehensive Nutrition Assessment    Type and Reason for Visit:  Reassess, Consult (readmitted as swing bed)    Nutrition Recommendations/Plan:   Continue current diet as medically appropriate and tolerated. Continue to encourage PO intakes as appropriate. Avg of 71-95% x 5 meals. Glucerna ordered once daily and Shawn ordered BID. Consider MVI. RD to follow pt and available PRN. Malnutrition Assessment:  Malnutrition Status: At risk for malnutrition (Comment) (r/t report of unintentional weight loss and decreased intakes.) (11/22/23 9630)    Context:  Acute Illness     Findings of the 6 clinical characteristics of malnutrition:  Energy Intake:  No significant decrease in energy intake  Weight Loss:   (4% in ~ 6 months.)     Body Fat Loss:  No significant body fat loss     Muscle Mass Loss:  No significant muscle mass loss    Fluid Accumulation:  No significant fluid accumulation     Strength:  Not Performed    Nutrition Assessment:    Pt readmitted as swing bed. Pt is at moderate risk for ongoing nutritional compromise r/t wounds and good PO intakes. Nutrition Related Findings:    Weight loss 4% in the last ~ 6 months. PMH thyroid cancer, CKD, DM, HTN, lupus. Medications: folic acid, insulin, solumedrol. Labs: glu , alb 3.0, alk phos 163, ALT 53, AST 34. No edema at this time. Wound Type: Multiple, Skin Tears (Incision back, scattered redness/ecchymosis, skin tears to left and right knees)       Current Nutrition Intake & Therapies:    Average Meal Intake: 51-75%, % (71-95% x 5 meals)     ADULT DIET; Dysphagia - Soft and Bite Sized; 3 carb choices (45 gm/meal)  ADULT ORAL NUTRITION SUPPLEMENT; Lunch; Diabetic Oral Supplement  ADULT ORAL NUTRITION SUPPLEMENT; Breakfast, Dinner; Wound Healing Oral Supplement    Anthropometric Measures:  Height: 167.6 cm (5' 6\")  Ideal Body Weight (IBW): 130 lbs (59 kg)       Current Body Weight: 97.5 kg (215 lb), 165.4 % IBW.  Weight Source: Standing

## 2023-11-22 NOTE — H&P
History and Physical    Patient:  Abdulaziz Bhatia    CHIEF COMPLAINT:    Declining functional status and generalized weakness    HISTORY OF PRESENT ILLNESS:    The patient is a 70 y.o. female with PMH of RA, Sjogren's syndrome, stroke, factor V Leiden on Eliquis, DVT, hypertension, hypothyroidism, diabetes who was directly admitted from PCP office on 11/20 due to declining functional status and gait difficulty making her a fall risk at home. Patient reports she was having significant issues with left lower extremity pain and weakness for quite some time. Also had left foot drop. Underwent left-sided L3-4 and L4-5 laminectomy on 10/17/2023 with Dr. Chantale Hopkins. Left lower extremity pain improved but foot drop remains. Patient states she was having drainage from her wound. She was admitted at [facility] DR FITZ HIGGINS from 11/9 to 11/12 for MRSA surgical site infection. ID consulted and started her on vancomycin but then changed to daptomycin with stop date 12/22. She was discharged home. Per patient she has significant difficulty walking at home and she complains of new right upper and lower extremity pain, paresthesias, and weakness after this most recent procedure (I&D around 11/9). Directly admitted by pcp to observation and she was approved for swing bed on 11/21 so transitioned to swing for ongoing subacutre rehab. Today MRI C and L spine reports finalized and I reviewed L spine report with ortho spine Dr. Chantale Hopkins who feels these are expected post surgical changes. MRI brain without acute finding. Gautam dup rle pending with rpeort of worsening rle pain, history dvt, factor v leiden. She is on eliquis. She will also receive IV solumedrol today.      Past Medical History:      Diagnosis Date    Cancer [facility])     thyroid    Cerebral artery occlusion with cerebral infarction ([address])     decreased right arm strength    Chronic kidney disease     Deep vein thrombosis (HCC)     right leg    Diabetes mellitus ([address])     Factor VIII reports that she has never smoked. She has never used smokeless tobacco.  ETOH:   reports that she does not currently use alcohol. OCCUPATION:  None      Family History:       Problem Relation Age of Onset    Stroke Mother     Cancer Mother         colon and strokes    Heart Failure Father        Review of system   Constitutional:  Denies fever or chills. Positive for declining functional status, gait difficulty. Eyes:  Denies change in visual acuity or discharge. HENT:  Denies nasal congestion or sore throat. Respiratory:  Denies cough or shortness of breath. Cardiovascular:  Denies chest pain, palpitation or swelling in LEs. GI:  Denies abdominal pain, nausea, vomiting, bloody stools or diarrhea. Positive for constipation. :  Denies dysuria or frequency. Musculoskeletal:  Denies back pain or joint pain. Integument:  Denies rash or itching. Neurologic:  Denies headache. Positive for neuropathy and paresthesias, gait difficutly, left foot drop, right hand weakness, rle pain  Psychiatric:  Denies depression or anxiety. Vital Signs        Respirations: 16                vital signs reviewed in electronic chart. Physical exam   Constitutional:  Well developed, well nourished, no acute distress. Eyes:  PERRL, conjunctiva normal, EOMI. HENT:  Atraumatic, external ears normal, external nose/nares normal, oropharynx moist, no pharyngeal exudates. Poor dentition. Neck:  Supple. No JVD or thyromegaly. Respiratory:  No respiratory distress, normal breath sounds, no rales, no wheezing. Cardiovascular:  Normal rate, normal rhythm, no murmurs, no gallops, no rubs. GI:  Soft, nondistended, normal bowel sounds, nontender, no organomegaly, no mass. :  No costovertebral angle tenderness. Musculoskeletal:  No edema, no tenderness. Patient is moving all extremities. Left foot drop. Right lower extremity decreased strength. Right  strength decreased approximately 50% left  strength.  Right hand

## 2023-11-22 NOTE — PROGRESS NOTES
Occupational Therapy  [facility] MED SURG  Daily Treatment Note  NAME: Getachew Hernandez  : 1960  MRN: [id number]    Date of Service: 2023    Discharge Recommendations: Continue to assess pending progress    Patient Diagnosis(es): There were no encounter diagnoses. Assessment  Co tx w PTA. Pt able to perform toileting tasks w CGA. Pt required CGA using RW w w/c follow for functional mobility; difficulty w descending w moving stand<>sit w poor control. Pt has progressed w less foot drop this date. Pt declined further participation. Pt left up in bedside chair, call bell in reach, all needs met. Subjective   'I wore those compression things last night'    Objective  Posture: Fair  Sitting Balance: Good  Standing Balance: Fair    Bed Mobility: Mod I  Sit to Stand: CGA<>Min A  Step Stand Transfers: CGA  Functional Mobility: CGA 210ft w RW ; R foot drop observed    Toileting: CGA  LB Clothing Management: CGA    Goals  Short Term Goals  Time Frame for Short Term Goals: 1 week  Short Term Goal 1: Pt to complete dressing UB/LB with MOD I using AE as needed. Short Term Goal 2: Pt to complete 9 Hole peg test to determine severity of coordination deficits. Short Term Goal 3: Pt to complete toileting with MOD I. Short Term Goal 4: pt to complete bathing with MOD I. Short Term Goal 5: Pt to complete HEP with independence. Short Term Goal 6: Pt to complete safe ADL transfers with MOD I. Therapy Time   Individual Co-treatment   Time In  1003   Time Out  1029   Minutes  26     This note serves as a DC summary in the event of pt discharge.     Maryjo Carrel, OT

## 2023-11-22 NOTE — FLOWSHEET NOTE
11/22/23 1310   Assessment   Charting Type Shift assessment   Psychosocial   Psychosocial (WDL) WDL   Neurological   Neuro (WDL) WDL   Edward Coma Scale   Eye Opening 4   Best Verbal Response 5   Best Motor Response 6   Edward Coma Scale Score 15   HEENT (Head, Ears, Eyes, Nose, & Throat)   HEENT (WDL) X   Right Eye Glasses   Left Eye Glasses   Respiratory   Respiratory (WDL) WDL   Cardiac   Cardiac (WDL) WDL   Gastrointestinal   Abdominal (WDL) WDL   Last BM (including prior to admit) 11/20/23   Genitourinary   Genitourinary (WDL) X   Dysuria (Pain/Burning w/Urination) No   Urine Urgency   Urine Urgency Yes   Urine Assessment   Urinary Status Incontinent briefs   Peripheral Vascular   Peripheral Vascular (WDL) WDL   Skin Integumentary    Skin Integumentary (WDL) X   Skin Color Pale   Skin Condition/Temp Dry; Warm   Skin Integrity Ecchymosis   Location scattered   Skin Fold Management Yes   Assessed This Shift Dry   Skin Integrity Site 2   Skin Integrity Location 2 Incision (see LDA)   Location 2 back   Preventative Dressing Yes   Date Applied 11/22/23   Assessed this shift Yes   Care Plan - Skin/Tissue Integrity Goals   Skin Integrity Remains Intact Monitor for areas of redness and/or skin breakdown   Musculoskeletal   Musculoskeletal (WDL) X   RUE Weakness   LUE Weakness   RL Extremity Weakness   LL Extremity Weakness   Wound 11/20/23 Knee Right scabbed   Date First Assessed/Time First Assessed: 11/20/23 1325   Present on Original Admission: Yes  Primary Wound Type: Skin Tear  Location: Knee  Wound Location Orientation: Right  Wound Description (Comments): scabbed   Wound Etiology Skin Tear   Dressing/Treatment Open to air   Wound Assessment Dry   Drainage Amount None (dry)   Wound 11/20/23 Knee Left scabbed   Date First Assessed/Time First Assessed: 11/20/23 1326   Present on Original Admission: Yes  Primary Wound Type: Skin Tear  Location: Knee  Wound Location Orientation: Left  Wound Description (Comments): scabbed   Wound Etiology Skin Tear   Dressing/Treatment Open to air   Wound Assessment Dry   Drainage Amount None (dry)   Incision 11/20/23 Back Lower;Medial   Date First Assessed/Time First Assessed: 11/20/23 1324   Present on Original Admission: Yes  Incision Approximate Age at First Assessment (Weeks): 4 weeks  Location: Back  Incision Location Orientation: Lower;Medial  Incision Description (Comments): n... Dressing/Treatment Tegaderm/transparent film dressing;Gauze dressing/dressing sponge   Margins Approximated   Drainage Amount None (dry)   Deja-incision Assessment Intact   Care Plan - Discharge Planning Goals   Discharge to home or other facility with appropriate resources Identify barriers to discharge with patient and caregiver; Identify discharge learning needs (meds, wound care, etc)     Pt transferred to swing bed. No change in patient's condition since morning assessment.

## 2023-11-22 NOTE — FLOWSHEET NOTE
11/20/23 1304   Assessment   Charting Type Admission   Psychosocial   Psychosocial (WDL) WDL   Neurological   Neuro (WDL) WDL   Edward Coma Scale   Eye Opening 4   Best Verbal Response 5   Best Motor Response 6   Edward Coma Scale Score 15   HEENT (Head, Ears, Eyes, Nose, & Throat)   HEENT (WDL) X   Right Eye Glasses   Left Eye Glasses   Respiratory   Respiratory (WDL) WDL   Cardiac   Cardiac (WDL) WDL   Gastrointestinal   Last BM (including prior to admit) 11/20/23   Genitourinary   Genitourinary (WDL) X   Dysuria (Pain/Burning w/Urination) No   Urine Urgency   Urine Urgency Yes   Urine Assessment   Urinary Status Incontinent   Urinary Incontinence Present   Genitalia   Female Genitalia Other (Comment)  (itching)   Peripheral Vascular   Peripheral Vascular (WDL) WDL   Dual Clinician Skin Assessment   Dual Skin Assessment (4 Eyes) X   Second Clinical  (First and Last Name) Iam Skinner RN   Skin Integumentary    Skin Integumentary (WDL) X   Skin Color Pale   Skin Condition/Temp Dry; Warm   Skin Integrity Ecchymosis; Incision (see LDA); Redness   Location scattered   Skin Fold Management Yes   Nails X   Multiple Skin Integrity Sites Yes   Assessed This Shift Red   Nail Condition Discolored  (right big toe)   Skin Integrity Site 2   Skin Integrity Location 2 Incision (see LDA)   Location 2 back   Preventative Dressing No   Assessed this shift Yes   Care Plan - Skin/Tissue Integrity Goals   Skin Integrity Remains Intact Monitor for areas of redness and/or skin breakdown   Musculoskeletal   Musculoskeletal (WDL) X   RL Extremity Weakness   LL Extremity Weakness   Wound 11/20/23 Knee Right scabbed   Date First Assessed/Time First Assessed: 11/20/23 1325   Present on Original Admission: Yes  Primary Wound Type: Skin Tear  Location: Knee  Wound Location Orientation: Right  Wound Description (Comments): scabbed   Wound Image    Wound Etiology Skin Tear   Dressing/Treatment Open to air   Wound Assessment Dry   Drainage
11/22/23 0834   Assessment   Charting Type Shift assessment   Psychosocial   Psychosocial (WDL) WDL   Neurological   Neuro (WDL) WDL   Edward Coma Scale   Eye Opening 4   Best Verbal Response 5   Best Motor Response 6   Edward Coma Scale Score 15   HEENT (Head, Ears, Eyes, Nose, & Throat)   HEENT (WDL) X   Right Eye Glasses   Left Eye Glasses   Respiratory   Respiratory (WDL) WDL   Cardiac   Cardiac (WDL) WDL   Gastrointestinal   Abdominal (WDL) WDL   Last BM (including prior to admit) 11/20/23   Genitourinary   Genitourinary (WDL) X   Dysuria (Pain/Burning w/Urination) No   Urine Urgency   Urine Urgency Yes   Urine Assessment   Urinary Status Incontinent briefs   Peripheral Vascular   Peripheral Vascular (WDL) WDL   Skin Integumentary    Skin Integumentary (WDL) X   Skin Color Pale   Skin Condition/Temp Dry; Warm   Skin Integrity Ecchymosis   Location scattered   Skin Fold Management Yes   Nails X   Multiple Skin Integrity Sites Yes   Assessed This Shift Dry   Nail Condition Discolored   Skin Integrity Site 2   Skin Integrity Location 2 Incision (see LDA)   Location 2 back   Preventative Dressing Yes   Date Applied 11/22/23   Assessed this shift Yes   Musculoskeletal   Musculoskeletal (WDL) X   RUE Weakness   LUE Weakness   RL Extremity Weakness   LL Extremity Weakness   Wound 11/20/23 Knee Right scabbed   Date First Assessed/Time First Assessed: 11/20/23 1325   Present on Original Admission: Yes  Primary Wound Type: Skin Tear  Location: Knee  Wound Location Orientation: Right  Wound Description (Comments): scabbed   Wound Etiology Skin Tear   Dressing/Treatment Open to air   Wound Assessment Dry   Drainage Amount None (dry)   Wound 11/20/23 Knee Left scabbed   Date First Assessed/Time First Assessed: 11/20/23 1326   Present on Original Admission: Yes  Primary Wound Type: Skin Tear  Location: Knee  Wound Location Orientation: Left  Wound Description (Comments): scabbed   Wound Etiology Skin Tear
Location Orientation: Left  Wound Description (Comments): scabbed   Wound Etiology Skin Tear   Dressing/Treatment Open to air   Wound Assessment Dry   Drainage Amount None (dry)   Incision 11/20/23 Back Lower;Medial   Date First Assessed/Time First Assessed: 11/20/23 1324   Present on Original Admission: Yes  Incision Approximate Age at First Assessment (Weeks): 4 weeks  Location: Back  Incision Location Orientation: Lower;Medial  Incision Description (Comments): n... Dressing/Treatment Non-adherent; Other (comment)  (paper tape)   Margins Approximated   Drainage Amount None (dry)   Deja-incision Assessment Intact   Care Plan - Discharge Planning Goals   Discharge to home or other facility with appropriate resources Identify barriers to discharge with patient and caregiver; Identify discharge learning needs (meds, wound care, etc)     Pt is alert and oriented. Pt is resting in bed and appears to have no acute distress. Pt currently on room air. Pt's lung sounds are clear. Pt encouraged to cough and deep breathe. Pt complains of slight numbness in ble on a regular basis. Pt call bell and bedside table within reach.

## 2023-11-22 NOTE — CARE COORDINATION
Pt would benefit from [facility] consult and [facility].  CM to follow. 11/22/23 1521   Housing   What is your living situation today? I have a steady place to live. Utilities   In the past 12 months has the electric, gas, oil, or water company threatened to shut off service in your home? No   Employment   Do you want help finding or keeping work or a job? I do not need or want help   Income   How hard is it for you to pay for the very basics like food, housing, medical care, and heating? Not hard   Food   Within the past 12 months, you worried that your food would run out before you got the money to buy more. Never true   Physical Activity   In the last 30 days, other than the activities you did for work, on average, how many days per week did you engage in moderate exercise (like walking fast, running, jogging, dancing, swimming, biking, or other similar activities)? 0   Education   Do you speak a language other than English at home? No   Transportation   In the past 12 months, has lack of reliable transportation kept you from medical appointments, meetings, work or from getting things needed for daily living? Yes   Over the Past 2 Weeks, how often have you been bothered by any of the following problems? Little interest or pleasure in doing things 1   Feeling down, depressed, or hopeless 1   Substance Use   How many times in the past 12 months have you had 5 or more drinks a day (males) or 4 or more drinks in a day (females)? One drink is 12 ounces of beer, 5 ounces of wine, or 1.5 ounces of 80-proof spirits. Never   Safety   How often does anyone, including family and friends, physically hurt you? Never   Disabilities   Because of a physical, mental, or emotional condition, do you have serious difficulty concentrating, remembering, or making decisions? (5 years or older) No   Basic Daily Needs   Think about the place you live. Do you have problems with any of the following?

## 2023-11-22 NOTE — CARE COORDINATION
Case Management Assessment  Initial Evaluation    Date/Time of Evaluation: 11/22/2023 3:14 PM  Assessment Completed by: Delores Rodriguez RN    If patient is discharged prior to next notation, then this note serves as note for discharge by case management. Patient Name: Brook Ramos                   YOB: 1960  Diagnosis: Weakness [R53.1]  Declining functional status [R53.81]                   Date / Time: 11/22/2023  1:06 PM    Patient Admission Status: SKILLED IP   Readmission Risk (Low < 19, Mod (19-27), High > 27): No data recorded  Current PCP: NICKOLAS Lewis  PCP verified by CM? Yes    Chart Reviewed: Yes      History Provided by: Patient, Medical Record  Patient Orientation: Alert and Oriented    Patient Cognition: Alert    Hospitalization in the last 30 days (Readmission):  Yes  - inpt stay      Advance Directives:      Code Status: Full Code   Patient's Primary Decision Maker is: Legal Next of Kin    Primary Decision MakerSgarrick Mandel - [phone]    Discharge Planning:    Patient lives with: Children (son) Type of Home: Apartment  Primary Care Giver: Self  Patient Support Systems include: Children   Current Financial resources: Medicare, Medicaid  Current community resources:  outpt IV   Current services prior to admission: Home Infusion            Current DME:  Shower Chair, Bedside Commode, Walker, Other (Comment) (rollator, lift chair)            Type of Home Care services:  OT, PT, Nursing Services    ADLS  Prior functional level: Independent in ADLs/IADLs  Current functional level: Assistance with the following:, Bathing, Dressing, Toileting, Cooking, Housework, Shopping, Mobility      Family can provide assistance at DC: Yes  Would you like Case Management to discuss the discharge plan with any other family members/significant others, and if so, who?  No  Plans to Return to Present Housing: Yes  Other Identified Issues/Barriers to RETURNING to current housing:

## 2023-11-23 PROBLEM — I82.401 ACUTE DEEP VEIN THROMBOSIS (DVT) OF RIGHT LOWER EXTREMITY (HCC): Status: ACTIVE | Noted: 2023-11-23

## 2023-11-23 LAB
ALBUMIN SERPL-MCNC: 3.4 G/DL (ref 3.4–4.8)
ALBUMIN/GLOB SERPL: 1.5 {RATIO} (ref 0.8–2)
ALP SERPL-CCNC: 183 U/L (ref 25–100)
ALT SERPL-CCNC: 58 U/L (ref 4–36)
ANION GAP SERPL CALCULATED.3IONS-SCNC: 8 MMOL/L (ref 3–16)
AST SERPL-CCNC: 34 U/L (ref 8–33)
BASOPHILS # BLD: 0 K/UL (ref 0–0.1)
BASOPHILS NFR BLD: 0.4 %
BILIRUB SERPL-MCNC: 0.3 MG/DL (ref 0.3–1.2)
BUN SERPL-MCNC: 19 MG/DL (ref 6–20)
CALCIUM SERPL-MCNC: 9.4 MG/DL (ref 8.5–10.5)
CHLORIDE SERPL-SCNC: 103 MMOL/L (ref 98–107)
CO2 SERPL-SCNC: 26 MMOL/L (ref 20–30)
CREAT SERPL-MCNC: 0.9 MG/DL (ref 0.4–1.2)
EOSINOPHIL # BLD: 0 K/UL (ref 0–0.4)
EOSINOPHIL NFR BLD: 0 %
ERYTHROCYTE [DISTWIDTH] IN BLOOD BY AUTOMATED COUNT: 12.2 % (ref 11–16)
GFR SERPLBLD CREATININE-BSD FMLA CKD-EPI: >60 ML/MIN/{1.73_M2}
GLOBULIN SER CALC-MCNC: 2.3 G/DL
GLUCOSE BLD-MCNC: 191 MG/DL (ref 74–106)
GLUCOSE BLD-MCNC: 202 MG/DL (ref 74–106)
GLUCOSE BLD-MCNC: 292 MG/DL (ref 74–106)
GLUCOSE BLD-MCNC: 339 MG/DL (ref 74–106)
GLUCOSE SERPL-MCNC: 200 MG/DL (ref 74–106)
HCT VFR BLD AUTO: 44.1 % (ref 37–47)
HGB BLD-MCNC: 14.4 G/DL (ref 11.5–16.5)
IMM GRANULOCYTES # BLD: 0 K/UL
IMM GRANULOCYTES NFR BLD: 0.4 % (ref 0–5)
LYMPHOCYTES # BLD: 1 K/UL (ref 1.5–4)
LYMPHOCYTES NFR BLD: 12.5 %
MAGNESIUM SERPL-MCNC: 2.3 MG/DL (ref 1.7–2.4)
MCH RBC QN AUTO: 29.1 PG (ref 27–32)
MCHC RBC AUTO-ENTMCNC: 32.7 G/DL (ref 31–35)
MCV RBC AUTO: 89.1 FL (ref 80–100)
MONOCYTES # BLD: 0.1 K/UL (ref 0.2–0.8)
MONOCYTES NFR BLD: 1.4 %
NEUTROPHILS # BLD: 7.1 K/UL (ref 2–7.5)
NEUTS SEG NFR BLD: 85.3 %
PERFORMED ON: ABNORMAL
PLATELET # BLD AUTO: 259 K/UL (ref 150–400)
PMV BLD AUTO: 10.8 FL (ref 6–10)
POTASSIUM SERPL-SCNC: 4.9 MMOL/L (ref 3.4–5.1)
PROT SERPL-MCNC: 5.7 G/DL (ref 6.4–8.3)
RBC # BLD AUTO: 4.95 M/UL (ref 3.8–5.8)
SODIUM SERPL-SCNC: 137 MMOL/L (ref 136–145)
WBC # BLD AUTO: 8.4 K/UL (ref 4–11)

## 2023-11-23 PROCEDURE — 6370000000 HC RX 637 (ALT 250 FOR IP): Performed by: PHYSICIAN ASSISTANT

## 2023-11-23 PROCEDURE — 80053 COMPREHEN METABOLIC PANEL: CPT

## 2023-11-23 PROCEDURE — 2500000003 HC RX 250 WO HCPCS: Performed by: PHYSICIAN ASSISTANT

## 2023-11-23 PROCEDURE — 36415 COLL VENOUS BLD VENIPUNCTURE: CPT

## 2023-11-23 PROCEDURE — 85025 COMPLETE CBC W/AUTO DIFF WBC: CPT

## 2023-11-23 PROCEDURE — 2580000003 HC RX 258: Performed by: PHYSICIAN ASSISTANT

## 2023-11-23 PROCEDURE — 1200000002 HC SEMI PRIVATE SWING BED

## 2023-11-23 PROCEDURE — 86480 TB TEST CELL IMMUN MEASURE: CPT

## 2023-11-23 PROCEDURE — 6360000002 HC RX W HCPCS: Performed by: PHYSICIAN ASSISTANT

## 2023-11-23 PROCEDURE — 83735 ASSAY OF MAGNESIUM: CPT

## 2023-11-23 RX ORDER — OXYCODONE AND ACETAMINOPHEN 10; 325 MG/1; MG/1
1 TABLET ORAL EVERY 4 HOURS PRN
Status: DISCONTINUED | OUTPATIENT
Start: 2023-11-23 | End: 2023-11-30 | Stop reason: HOSPADM

## 2023-11-23 RX ADMIN — HYDROCORTISONE: 1 CREAM TOPICAL at 21:09

## 2023-11-23 RX ADMIN — HYDROCORTISONE: 1 CREAM TOPICAL at 08:33

## 2023-11-23 RX ADMIN — FOLIC ACID 1 MG: 1 TABLET ORAL at 08:18

## 2023-11-23 RX ADMIN — DAPTOMYCIN 600 MG: 350 INJECTION, POWDER, LYOPHILIZED, FOR SOLUTION INTRAVENOUS at 12:47

## 2023-11-23 RX ADMIN — INSULIN LISPRO 3 UNITS: 100 INJECTION, SOLUTION INTRAVENOUS; SUBCUTANEOUS at 17:21

## 2023-11-23 RX ADMIN — CYCLOBENZAPRINE 10 MG: 10 TABLET, FILM COATED ORAL at 06:06

## 2023-11-23 RX ADMIN — APIXABAN 10 MG: 5 TABLET, FILM COATED ORAL at 21:06

## 2023-11-23 RX ADMIN — TRAZODONE HYDROCHLORIDE 50 MG: 50 TABLET ORAL at 21:07

## 2023-11-23 RX ADMIN — MICONAZOLE NITRATE: 2 POWDER TOPICAL at 21:09

## 2023-11-23 RX ADMIN — PREGABALIN 100 MG: 50 CAPSULE ORAL at 08:17

## 2023-11-23 RX ADMIN — PILOCARPINE HYDROCHLORIDE 5 MG: 5 TABLET, FILM COATED ORAL at 21:06

## 2023-11-23 RX ADMIN — LOSARTAN POTASSIUM 25 MG: 25 TABLET, FILM COATED ORAL at 08:17

## 2023-11-23 RX ADMIN — PREGABALIN 100 MG: 50 CAPSULE ORAL at 13:46

## 2023-11-23 RX ADMIN — INSULIN GLARGINE 50 UNITS: 100 INJECTION, SOLUTION SUBCUTANEOUS at 21:04

## 2023-11-23 RX ADMIN — MICONAZOLE NITRATE: 2 POWDER TOPICAL at 08:21

## 2023-11-23 RX ADMIN — PILOCARPINE HYDROCHLORIDE 5 MG: 5 TABLET, FILM COATED ORAL at 08:18

## 2023-11-23 RX ADMIN — INSULIN LISPRO 1 UNITS: 100 INJECTION, SOLUTION INTRAVENOUS; SUBCUTANEOUS at 08:22

## 2023-11-23 RX ADMIN — OXYCODONE HYDROCHLORIDE AND ACETAMINOPHEN 1 TABLET: 7.5; 325 TABLET ORAL at 06:05

## 2023-11-23 RX ADMIN — PILOCARPINE HYDROCHLORIDE 5 MG: 5 TABLET, FILM COATED ORAL at 12:40

## 2023-11-23 RX ADMIN — LEVOTHYROXINE SODIUM 150 MCG: 0.07 TABLET ORAL at 06:05

## 2023-11-23 RX ADMIN — OXYCODONE AND ACETAMINOPHEN 1 TABLET: 10; 325 TABLET ORAL at 21:07

## 2023-11-23 RX ADMIN — APIXABAN 5 MG: 5 TABLET, FILM COATED ORAL at 08:18

## 2023-11-23 RX ADMIN — DICLOFENAC SODIUM 2 G: 10 GEL TOPICAL at 08:33

## 2023-11-23 RX ADMIN — PREGABALIN 100 MG: 50 CAPSULE ORAL at 21:07

## 2023-11-23 RX ADMIN — INSULIN GLARGINE 50 UNITS: 100 INJECTION, SOLUTION SUBCUTANEOUS at 08:25

## 2023-11-23 RX ADMIN — OXYCODONE AND ACETAMINOPHEN 1 TABLET: 10; 325 TABLET ORAL at 12:51

## 2023-11-23 RX ADMIN — PILOCARPINE HYDROCHLORIDE 5 MG: 5 TABLET, FILM COATED ORAL at 17:21

## 2023-11-23 RX ADMIN — CYCLOBENZAPRINE 10 MG: 10 TABLET, FILM COATED ORAL at 12:51

## 2023-11-23 RX ADMIN — FLUOXETINE 40 MG: 20 CAPSULE ORAL at 21:07

## 2023-11-23 RX ADMIN — EMPAGLIFLOZIN 10 MG: 10 TABLET, FILM COATED ORAL at 08:18

## 2023-11-23 ASSESSMENT — PAIN SCALES - GENERAL
PAINLEVEL_OUTOF10: 10
PAINLEVEL_OUTOF10: 0
PAINLEVEL_OUTOF10: 10
PAINLEVEL_OUTOF10: 10
PAINLEVEL_OUTOF10: 8

## 2023-11-23 ASSESSMENT — PAIN DESCRIPTION - ORIENTATION
ORIENTATION: RIGHT

## 2023-11-23 ASSESSMENT — PAIN DESCRIPTION - DESCRIPTORS
DESCRIPTORS: ACHING
DESCRIPTORS: ACHING

## 2023-11-23 ASSESSMENT — PAIN DESCRIPTION - LOCATION: LOCATION: LEG

## 2023-11-23 NOTE — FLOWSHEET NOTE
11/23/23 0835   Assessment   Charting Type Shift assessment   Psychosocial   Psychosocial (WDL) WDL   Neurological   Neuro (WDL) WDL   Edward Coma Scale   Eye Opening 4   Best Verbal Response 5   Best Motor Response 6   Edward Coma Scale Score 15   HEENT (Head, Ears, Eyes, Nose, & Throat)   HEENT (WDL) X   Right Eye Glasses   Left Eye Glasses   Respiratory   Respiratory (WDL) WDL   Cardiac   Cardiac (WDL) WDL   Gastrointestinal   Abdominal (WDL) WDL   Genitourinary   Genitourinary (WDL) X   Dysuria (Pain/Burning w/Urination) No   Urine Urgency   Urine Urgency Yes   Urine Assessment   Urinary Status Voiding   Urinary Incontinence Absent   Peripheral Vascular   Peripheral Vascular (WDL) WDL   Skin Integumentary    Skin Integumentary (WDL) X   Musculoskeletal   Musculoskeletal (WDL) X   RUE Weakness   LUE Weakness   RL Extremity Weakness   LL Extremity Weakness   Wound 11/20/23 Knee Right scabbed   Date First Assessed/Time First Assessed: 11/20/23 1325   Present on Original Admission: Yes  Primary Wound Type: Skin Tear  Location: Knee  Wound Location Orientation: Right  Wound Description (Comments): scabbed   Wound Etiology Skin Tear   Dressing/Treatment Open to air   Wound Assessment Dry   Drainage Amount None (dry)   Wound 11/20/23 Knee Left scabbed   Date First Assessed/Time First Assessed: 11/20/23 1326   Present on Original Admission: Yes  Primary Wound Type: Skin Tear  Location: Knee  Wound Location Orientation: Left  Wound Description (Comments): scabbed   Wound Etiology Skin Tear   Dressing/Treatment Open to air   Wound Assessment Dry   Drainage Amount None (dry)   Incision 11/20/23 Back Lower;Medial   Date First Assessed/Time First Assessed: 11/20/23 1324   Present on Original Admission: Yes  Incision Approximate Age at First Assessment (Weeks): 4 weeks  Location: Back  Incision Location Orientation: Lower;Medial  Incision Description (Comments): n...    Dressing/Treatment Gauze dressing/dressing sponge;Non-adherent;Tape/Soft cloth adhesive tape   Margins Approximated   Drainage Amount None (dry)   Deja-incision Assessment Intact

## 2023-11-23 NOTE — PROGRESS NOTES
Patient continuing to report RLE pain on 11/22 and has history of Factory V Leiden. BLE derek dup obtained and patient with acute RLE DVT. After discussing with her she did have interrupted therapy for at least 10 days in OCtober for laminectomy and 10 days in November for I&D per her report. Otherwise she has been compliant. She states she does not follow with a hematologist and her PCP manages her care. I was able to speak to heme onc on call at [facility] Dr. Tavares Barrientos about the case. Per Dr. Aubrie Cardozo she wouldn't consider this eliquis failure since we have known periods of interrupted therapy. She recommends loading dose eliquis 10 bid for 7 days and then resume 5 bid as previously doing. Discussed with patient that she would benefit from heme onc as outpatient and she expresses understanding. Per pcp referral made in October.      Milan Olivas PA-C

## 2023-11-23 NOTE — PROGRESS NOTES
Called [facility] to obtain a hematologist who is on call today  for  for the patient. Was transferred from [facility] to the call center and was notified Grace Gao is on call for Hematology today and will call back.

## 2023-11-23 NOTE — PLAN OF CARE
Problem: Discharge Planning  Goal: Discharge to home or other facility with appropriate resources  Outcome: Progressing     Problem: Safety - Adult  Goal: Free from fall injury  Outcome: Progressing     Problem: Nutrition Deficit:  Goal: Optimize nutritional status  Recent Flowsheet Documentation  Taken 11/22/2023 1545 by Sangita Alfaro RD  Nutrient intake appropriate for improving, restoring, or maintaining nutritional needs:   Monitor oral intake, labs, and treatment plans   Recommend appropriate diets, oral nutritional supplements, and vitamin/mineral supplements

## 2023-11-24 LAB
GLUCOSE BLD-MCNC: 107 MG/DL (ref 74–106)
GLUCOSE BLD-MCNC: 146 MG/DL (ref 74–106)
GLUCOSE BLD-MCNC: 217 MG/DL (ref 74–106)
GLUCOSE BLD-MCNC: 97 MG/DL (ref 74–106)
PERFORMED ON: ABNORMAL
PERFORMED ON: NORMAL

## 2023-11-24 PROCEDURE — 6360000002 HC RX W HCPCS: Performed by: PHYSICIAN ASSISTANT

## 2023-11-24 PROCEDURE — 1200000002 HC SEMI PRIVATE SWING BED

## 2023-11-24 PROCEDURE — 2580000003 HC RX 258: Performed by: PHYSICIAN ASSISTANT

## 2023-11-24 PROCEDURE — 6370000000 HC RX 637 (ALT 250 FOR IP): Performed by: PHYSICIAN ASSISTANT

## 2023-11-24 PROCEDURE — 97116 GAIT TRAINING THERAPY: CPT

## 2023-11-24 PROCEDURE — 92610 EVALUATE SWALLOWING FUNCTION: CPT

## 2023-11-24 PROCEDURE — 97161 PT EVAL LOW COMPLEX 20 MIN: CPT

## 2023-11-24 RX ADMIN — OXYCODONE AND ACETAMINOPHEN 1 TABLET: 10; 325 TABLET ORAL at 18:40

## 2023-11-24 RX ADMIN — APIXABAN 10 MG: 5 TABLET, FILM COATED ORAL at 20:33

## 2023-11-24 RX ADMIN — PREGABALIN 100 MG: 50 CAPSULE ORAL at 15:03

## 2023-11-24 RX ADMIN — INSULIN LISPRO 1 UNITS: 100 INJECTION, SOLUTION INTRAVENOUS; SUBCUTANEOUS at 17:14

## 2023-11-24 RX ADMIN — FOLIC ACID 1 MG: 1 TABLET ORAL at 08:03

## 2023-11-24 RX ADMIN — MICONAZOLE NITRATE: 2 POWDER TOPICAL at 08:10

## 2023-11-24 RX ADMIN — FLUOXETINE 40 MG: 20 CAPSULE ORAL at 20:33

## 2023-11-24 RX ADMIN — LOSARTAN POTASSIUM 25 MG: 25 TABLET, FILM COATED ORAL at 08:04

## 2023-11-24 RX ADMIN — INSULIN GLARGINE 50 UNITS: 100 INJECTION, SOLUTION SUBCUTANEOUS at 08:10

## 2023-11-24 RX ADMIN — EMPAGLIFLOZIN 10 MG: 10 TABLET, FILM COATED ORAL at 08:03

## 2023-11-24 RX ADMIN — PREGABALIN 100 MG: 50 CAPSULE ORAL at 08:03

## 2023-11-24 RX ADMIN — HYDROCORTISONE: 1 CREAM TOPICAL at 20:35

## 2023-11-24 RX ADMIN — APIXABAN 10 MG: 5 TABLET, FILM COATED ORAL at 08:04

## 2023-11-24 RX ADMIN — TRAZODONE HYDROCHLORIDE 50 MG: 50 TABLET ORAL at 20:33

## 2023-11-24 RX ADMIN — PILOCARPINE HYDROCHLORIDE 5 MG: 5 TABLET, FILM COATED ORAL at 08:03

## 2023-11-24 RX ADMIN — DAPTOMYCIN 600 MG: 350 INJECTION, POWDER, LYOPHILIZED, FOR SOLUTION INTRAVENOUS at 11:46

## 2023-11-24 RX ADMIN — LEVOTHYROXINE SODIUM 150 MCG: 0.07 TABLET ORAL at 06:17

## 2023-11-24 RX ADMIN — PILOCARPINE HYDROCHLORIDE 5 MG: 5 TABLET, FILM COATED ORAL at 15:03

## 2023-11-24 RX ADMIN — PILOCARPINE HYDROCHLORIDE 5 MG: 5 TABLET, FILM COATED ORAL at 17:11

## 2023-11-24 RX ADMIN — MICONAZOLE NITRATE: 2 POWDER TOPICAL at 20:35

## 2023-11-24 RX ADMIN — HYDROCORTISONE: 1 CREAM TOPICAL at 08:09

## 2023-11-24 RX ADMIN — PREGABALIN 100 MG: 50 CAPSULE ORAL at 20:32

## 2023-11-24 RX ADMIN — INSULIN GLARGINE 50 UNITS: 100 INJECTION, SOLUTION SUBCUTANEOUS at 20:36

## 2023-11-24 RX ADMIN — PILOCARPINE HYDROCHLORIDE 5 MG: 5 TABLET, FILM COATED ORAL at 20:33

## 2023-11-24 ASSESSMENT — PAIN SCALES - GENERAL: PAINLEVEL_OUTOF10: 10

## 2023-11-24 ASSESSMENT — PAIN DESCRIPTION - LOCATION: LOCATION: LEG

## 2023-11-24 NOTE — PLAN OF CARE
Problem: Discharge Planning  Goal: Discharge to home or other facility with appropriate resources  Outcome: Progressing     Problem: Safety - Adult  Goal: Free from fall injury  Outcome: Progressing     Problem: Chronic Conditions and Co-morbidities  Goal: Patient's chronic conditions and co-morbidity symptoms are monitored and maintained or improved  11/23/2023 2005 by Elodia Headley RN  Outcome: Progressing  11/23/2023 1406 by Bishop Murphy RN  Outcome: Progressing     Problem: Nutrition Deficit:  Goal: Optimize nutritional status  Outcome: Progressing

## 2023-11-24 NOTE — PROGRESS NOTES
SLP ALL NOTES  Facility/Department: [facility] SURG   CLINICAL BEDSIDE SWALLOW EVALUATION    NAME: Blayne Barraza  : 1960  MRN: [id number]    ADMISSION DATE: 2023  ADMITTING DIAGNOSIS: has Generalized weakness; History of rheumatoid arthritis; Right hand weakness; Infection of skin due to methicillin resistant Staphylococcus aureus (MRSA); History of lumbar laminectomy; Right leg paresthesias; Factor V Leiden ([address]); Type 2 diabetes mellitus ([address]); History of deep vein thrombosis; Left foot drop; Declining functional status; and Acute deep vein thrombosis (DVT) of right lower extremity (HCC) on their problem list.  ONSET DATE: 2023    Recent Chest Xray/CT of Chest:N/A    Date of Eval: 2023  Evaluating Therapist: ASHER Angel    Current Diet level:  Current Diet : Soft and Bite-Sized  Current Liquid Diet : Thin    Primary Complaint  Patient complains of weakness. Pain:  Pain Assessment  Pain Assessment: None - Denies Pain  Pain Level: 8  Pain Location: Leg  Pain Orientation: Right  Pain Descriptors: Aching    Reason for Referral  Blayne Barraza was referred for a bedside swallow evaluation to assess the efficiency of her swallow function, identify signs and symptoms of aspiration and make recommendations regarding safe dietary consistencies, effective compensatory strategies, and safe eating environment. Impression  Dysphagia Diagnosis: Mild oral stage dysphagia  Dysphagia Impression : Mild oral phase dysphagia. Dysphagia Outcome Severity Scale: Level 5: Mild dysphagia- Distant supervision. May need one diet consistency restricted     Treatment Plan  Requires SLP Intervention: Yes  Duration of Treatment: 2 weeks  D/C Recommendations: To be determined     Recommended Diet and Intervention  Diet Solids Recommendation: Soft & Bite Sized  Liquid Consistency Recommendation:  Thin  Recommended Form of Meds: PO  Recommendations: Dysphagia treatment  Therapeutic Interventions: Bolus control exercises;Oral motor exercises;Diet tolerance monitoring;Patient/Family education;Pharyngeal exercises; Laryngeal exercises; Therapeutic PO trials with SLP;Oral care    Compensatory Swallowing Strategies  Compensatory Swallowing Strategies : Alternate solids and liquids;Small bites/sips; Lingual sweep;Upright as possible for all oral intake;Remain upright for 30-45 minutes after meals; External pacing; Set up assist;Eat/Feed slowly    Treatment/Goals  Short-term Goals  Timeframe for Short-term Goals: 1 Week  Goal 1: Patient will consume safest and least restrictive diet with no overt s/sx of aspiration 90% of the time with min cues  Goal 2: Patient/caregiver will demonstrate use/understanding of compensatory strategies/aspiration precaution guidelines with 90% acc with min cues. Goal 3: Patient will complete OMEs x3x10 with 90% acc with min cues. Long-term Goals  Timeframe for Long-term Goals: 2 weeks  Goal 1: Patient will consume safest and least restrictive diet without overt s/sx of dysphagia to improve overall quality of life. General  Chart Reviewed: Yes  Subjective  Subjective: Patient upright in bed close to 90 degrees, pleasant and agreeable to ST eval.  Behavior/Cognition: Alert; Cooperative;Pleasant mood  Respiratory Status: Room air  O2 Device: None (Room air)  Communication Observation: Functional  Follows Directions: Simple  Dentition: Some missing teeth  Patient Positioning: Upright in bed  Baseline Vocal Quality: Normal  Volitional Cough: Strong  Prior Dysphagia History: Prior hx of mild oral stage dysphagia on 11/10/2023 with diet recommendation of soft and bite size texture and thin liquids. Consistencies Administered: Regular; Soft and Bite-Sized; Thin - straw    Vision/Hearing  Vision: Wears glasses for reading  Hearing: [facility]    Oral Motor Deficits  Labial: Decreased rate  Dentition: Natural;Limited  Oral Hygiene: Moist;Clean  Oral Hygiene Comments: WFL  Lingual: Decreased rate;Decreased

## 2023-11-24 NOTE — PLAN OF CARE
Problem: Discharge Planning  Goal: Discharge to home or other facility with appropriate resources  11/24/2023 0831 by Rajinder Cerda RN  Outcome: Progressing  11/23/2023 2005 by Jacinta Gasca RN  Outcome: Progressing     Problem: Safety - Adult  Goal: Free from fall injury  11/24/2023 0831 by Rajinder Cerda RN  Outcome: Progressing  11/23/2023 2005 by Jacinta Gasca RN  Outcome: Progressing     Problem: Chronic Conditions and Co-morbidities  Goal: Patient's chronic conditions and co-morbidity symptoms are monitored and maintained or improved  11/24/2023 0831 by Rajinder Cerda RN  Outcome: Progressing  11/23/2023 2005 by Jacinta Gasca RN  Outcome: Progressing     Problem: Nutrition Deficit:  Goal: Optimize nutritional status  11/23/2023 2005 by Jacinta Gasca RN  Outcome: Progressing Reason for call:  Patient reporting a symptom    Symptom or request: Nose bleeds.    Duration (how long have symptoms been present): for a week .    Have you been treated for this before? Yes    Additional comments: Patient had nose bleed this morning that lasted for an hour. Patient would like to come in today, willing to travel .     Phone Number patient can be reached at:  Home number on file [phone] (home)    Best Time:  any    Can we leave a detailed message on this number:  YES    Call taken on 1/27/2017 at 9:16 AM by Regina Garcia

## 2023-11-24 NOTE — PROGRESS NOTES
Physical Therapy  Facility/Department: [facility] SURG  Physical Therapy Swingbed Initial Assessment    Name: Dinesh Nelson  : 1960  MRN: [id number]  Date of Service: 2023    Discharge Recommendations:  Continue to assess pending progress          Patient Diagnosis(es): There were no encounter diagnoses. 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 Past Surgical History:  has a past surgical history that includes Thyroidectomy; Cholecystectomy; Tubal ligation; Colonoscopy; and Breast biopsy (Right). Assessment   Body Structures, Functions, Activity Limitations Requiring Skilled Therapeutic Intervention: Decreased functional mobility ; Decreased tolerance to work activity; Decreased strength;Decreased safe awareness;Decreased endurance;Decreased balance; Increased pain  Assessment: PT swingbed eval completed on this patient admitted to hospital with primary diagnosis of generalized weakness. She is received lying in bed on RA. NAD but with c/o 9/10 R LE pain. Pleasant and cooperative. She presents with L ankle strength of 0/5 and L LE hip and knee strength of 2/5. R LE strength 3+/5. R hand weakness and muscle atrophy noted. She is able to perform sup to sit with mod I. Sit to stand, transfers and able to ambulate 175'' with RW and CGA x 2 and w/c follow. L foot drop, narrow COLEEN and quick fatigue with ambulation. Patient returned to room and left [facility] per her request. Patient presents with functional mobility deficits but is expected to benefit from continued skilled PT intervention to improve her overall functional mobility status prior to D/c.   Therapy Prognosis: Good  Decision Making: Low Complexity  Requires PT Cognition   Orientation  Overall Orientation Status: Within Functional Limits  Cognition  Overall Cognitive Status: WFL     Objective   Pulse: 76  Heart Rate Source: Monitor  BP: (!) 146/84  MAP (Calculated): 105  Respirations: 16  SpO2: 96 %  O2 Device: None (Room air)     Observation/Palpation  Observation: Patient received lying in bed on RA. NAD but with c/o 9/10 R LE pain. Pleasant and cooperative. Gross Assessment  AROM: Generally decreased, functional (L foot drop; L hip and knee limited d/t decreased strength; R hand limited d/t weakness)  PROM: Within functional limits  Strength: Generally decreased, functional (R hand and L LE 2/5; R LE 3+/5)  Coordination: Generally decreased, functional                    Bed mobility  Rolling to Right: Stand by assistance  Supine to Sit: Stand by assistance  Sit to Supine: Stand by assistance  Scooting: Stand by assistance  Transfers  Sit to Stand: Contact guard assistance;2 Person Assistance  Stand to Sit: Contact guard assistance;2 Person Assistance  Bed to Chair: Contact guard assistance;2 Person Assistance  Ambulation  Surface: Level tile  Device: Rolling Walker  Assistance: Contact guard assistance;2 Person assistance  Gait Deviations: Slow Divina; Increased COLEEN; Decreased step length;Decreased step height  Distance: 175'  Comments: L foot drop     Balance  Posture: Good  Sitting - Static: Good  Sitting - Dynamic: Good  Standing - Static: Good;-  Standing - Dynamic: Good;-                Goals  Short Term Goals  Time Frame for Short Term Goals: 14 days  Short Term Goal 1: Patient will perform all bed mobility with no more than mod I. Short Term Goal 2: Patient will perform sit to stand and transfers with RW and no more than SBA. Short Term Goal 3: Patient will ambulate 100'x2 with RW and no more than CGA. Short Term Goal 4: Patient will perform B LE ther ex x 15 reps.        Education  Patient Education  Education Given To: Patient  Education Provided: Role of

## 2023-11-24 NOTE — FLOWSHEET NOTE
11/24/23 0833   Assessment   Charting Type Shift assessment   Psychosocial   Psychosocial (WDL) WDL   Neurological   Neuro (WDL) WDL   Edward Coma Scale   Eye Opening 4   Best Verbal Response 5   Best Motor Response 6   Edward Coma Scale Score 15   HEENT (Head, Ears, Eyes, Nose, & Throat)   HEENT (WDL) WDL   Right Eye Glasses   Left Eye Glasses   Respiratory   Respiratory (WDL) WDL   Cardiac   Cardiac (WDL) WDL   Gastrointestinal   Abdominal (WDL) WDL   Genitourinary   Genitourinary (WDL) WDL   Dysuria (Pain/Burning w/Urination) No   Urine Urgency   Urine Urgency Yes   Peripheral Vascular   Peripheral Vascular (WDL) WDL   Skin Integumentary    Skin Integumentary (WDL) X   Skin Color Pale   Skin Condition/Temp Dry; Warm   Skin Integrity Ecchymosis   Location scattered   Skin Fold Management Yes   Nails X   Nail Condition Discolored   Multiple Skin Integrity Sites Yes   Skin Integrity Site 2   Skin Integrity Location 2 Incision (see LDA)   Location 2 back   Preventative Dressing Yes   Assessed this shift Yes   Musculoskeletal   Musculoskeletal (WDL) X   RUE Weakness   LUE Weakness   RL Extremity Weakness   LL Extremity Weakness   Wound 11/20/23 Knee Right scabbed   Date First Assessed/Time First Assessed: 11/20/23 1325   Present on Original Admission: Yes  Primary Wound Type: Skin Tear  Location: Knee  Wound Location Orientation: Right  Wound Description (Comments): scabbed   Wound Etiology Skin Tear   Dressing/Treatment Open to air   Wound Assessment Dry   Drainage Amount None (dry)   Wound 11/20/23 Knee Left scabbed   Date First Assessed/Time First Assessed: 11/20/23 1326   Present on Original Admission: Yes  Primary Wound Type: Skin Tear  Location: Knee  Wound Location Orientation: Left  Wound Description (Comments): scabbed   Wound Etiology Skin Tear   Dressing/Treatment Open to air   Wound Assessment Dry   Drainage Amount None (dry)   Incision 11/20/23 Back Lower;Medial   Date First Assessed/Time First

## 2023-11-25 LAB
GLUCOSE BLD-MCNC: 120 MG/DL (ref 74–106)
GLUCOSE BLD-MCNC: 136 MG/DL (ref 74–106)
GLUCOSE BLD-MCNC: 206 MG/DL (ref 74–106)
GLUCOSE BLD-MCNC: 253 MG/DL (ref 74–106)
GLUCOSE BLD-MCNC: 70 MG/DL (ref 74–106)
PERFORMED ON: ABNORMAL

## 2023-11-25 PROCEDURE — 6370000000 HC RX 637 (ALT 250 FOR IP): Performed by: INTERNAL MEDICINE

## 2023-11-25 PROCEDURE — 6370000000 HC RX 637 (ALT 250 FOR IP): Performed by: PHYSICIAN ASSISTANT

## 2023-11-25 PROCEDURE — 1200000002 HC SEMI PRIVATE SWING BED

## 2023-11-25 PROCEDURE — 2580000003 HC RX 258: Performed by: PHYSICIAN ASSISTANT

## 2023-11-25 PROCEDURE — 6360000002 HC RX W HCPCS: Performed by: PHYSICIAN ASSISTANT

## 2023-11-25 RX ORDER — INSULIN GLARGINE 100 [IU]/ML
45 INJECTION, SOLUTION SUBCUTANEOUS 2 TIMES DAILY
Status: DISCONTINUED | OUTPATIENT
Start: 2023-11-25 | End: 2023-11-26

## 2023-11-25 RX ADMIN — OXYCODONE AND ACETAMINOPHEN 1 TABLET: 10; 325 TABLET ORAL at 21:18

## 2023-11-25 RX ADMIN — PILOCARPINE HYDROCHLORIDE 5 MG: 5 TABLET, FILM COATED ORAL at 12:33

## 2023-11-25 RX ADMIN — PREGABALIN 100 MG: 50 CAPSULE ORAL at 12:33

## 2023-11-25 RX ADMIN — OXYCODONE AND ACETAMINOPHEN 1 TABLET: 10; 325 TABLET ORAL at 13:57

## 2023-11-25 RX ADMIN — MICONAZOLE NITRATE: 2 POWDER TOPICAL at 08:58

## 2023-11-25 RX ADMIN — APIXABAN 10 MG: 5 TABLET, FILM COATED ORAL at 21:18

## 2023-11-25 RX ADMIN — INSULIN GLARGINE 45 UNITS: 100 INJECTION, SOLUTION SUBCUTANEOUS at 21:20

## 2023-11-25 RX ADMIN — EMPAGLIFLOZIN 10 MG: 10 TABLET, FILM COATED ORAL at 08:54

## 2023-11-25 RX ADMIN — INSULIN LISPRO 2 UNITS: 100 INJECTION, SOLUTION INTRAVENOUS; SUBCUTANEOUS at 17:05

## 2023-11-25 RX ADMIN — PREGABALIN 100 MG: 50 CAPSULE ORAL at 21:18

## 2023-11-25 RX ADMIN — FOLIC ACID 1 MG: 1 TABLET ORAL at 08:54

## 2023-11-25 RX ADMIN — LEVOTHYROXINE SODIUM 150 MCG: 0.07 TABLET ORAL at 06:40

## 2023-11-25 RX ADMIN — DAPTOMYCIN 600 MG: 350 INJECTION, POWDER, LYOPHILIZED, FOR SOLUTION INTRAVENOUS at 12:36

## 2023-11-25 RX ADMIN — MICONAZOLE NITRATE: 2 POWDER TOPICAL at 21:23

## 2023-11-25 RX ADMIN — PREGABALIN 100 MG: 50 CAPSULE ORAL at 08:54

## 2023-11-25 RX ADMIN — INSULIN GLARGINE 45 UNITS: 100 INJECTION, SOLUTION SUBCUTANEOUS at 09:02

## 2023-11-25 RX ADMIN — FLUOXETINE 40 MG: 20 CAPSULE ORAL at 21:18

## 2023-11-25 RX ADMIN — PILOCARPINE HYDROCHLORIDE 5 MG: 5 TABLET, FILM COATED ORAL at 08:55

## 2023-11-25 RX ADMIN — TRAZODONE HYDROCHLORIDE 50 MG: 50 TABLET ORAL at 21:18

## 2023-11-25 RX ADMIN — HYDROCORTISONE: 1 CREAM TOPICAL at 08:58

## 2023-11-25 RX ADMIN — PILOCARPINE HYDROCHLORIDE 5 MG: 5 TABLET, FILM COATED ORAL at 21:18

## 2023-11-25 RX ADMIN — PILOCARPINE HYDROCHLORIDE 5 MG: 5 TABLET, FILM COATED ORAL at 17:03

## 2023-11-25 RX ADMIN — LOSARTAN POTASSIUM 25 MG: 25 TABLET, FILM COATED ORAL at 08:54

## 2023-11-25 RX ADMIN — APIXABAN 10 MG: 5 TABLET, FILM COATED ORAL at 08:54

## 2023-11-25 RX ADMIN — HYDROCORTISONE: 1 CREAM TOPICAL at 21:23

## 2023-11-25 ASSESSMENT — PAIN SCALES - GENERAL
PAINLEVEL_OUTOF10: 10
PAINLEVEL_OUTOF10: 7

## 2023-11-25 ASSESSMENT — PAIN DESCRIPTION - LOCATION
LOCATION: LEG
LOCATION: LEG

## 2023-11-25 ASSESSMENT — PAIN DESCRIPTION - ORIENTATION: ORIENTATION: RIGHT

## 2023-11-25 ASSESSMENT — PAIN DESCRIPTION - DESCRIPTORS: DESCRIPTORS: ACHING

## 2023-11-25 NOTE — FLOWSHEET NOTE
11/25/23 1010   Assessment   Charting Type Shift assessment   Psychosocial   Psychosocial (WDL) WDL   Neurological   Neuro (WDL) WDL   Edward Coma Scale   Eye Opening 4   Best Verbal Response 5   Best Motor Response 6   Edward Coma Scale Score 15   HEENT (Head, Ears, Eyes, Nose, & Throat)   HEENT (WDL) WDL   Right Eye Glasses   Left Eye Glasses   Respiratory   Respiratory (WDL) WDL   Cardiac   Cardiac (WDL) WDL   Gastrointestinal   Abdominal (WDL) WDL   Genitourinary   Genitourinary (WDL) WDL   Dysuria (Pain/Burning w/Urination) No   Urine Urgency   Urine Urgency Yes   Peripheral Vascular   Peripheral Vascular (WDL) WDL   Dual Clinician Skin Assessment   Dual Skin Assessment (4 Eyes) X   Skin Integumentary    Skin Integumentary (WDL) X   Skin Color Pale   Skin Condition/Temp Dry; Warm   Skin Integrity Ecchymosis   Location scattered   Skin Fold Management Yes   Assessed This Shift Dry   Skin Integrity Site 2   Skin Integrity Location 2 Incision (see LDA)   Location 2 back   Preventative Dressing Yes   Date Applied 11/26/23   Musculoskeletal   Musculoskeletal (WDL) X   RUE Weakness   LUE Weakness   RL Extremity Weakness   LL Extremity Weakness   Wound 11/20/23 Knee Right scabbed   Date First Assessed/Time First Assessed: 11/20/23 1325   Present on Original Admission: Yes  Primary Wound Type: Skin Tear  Location: Knee  Wound Location Orientation: Right  Wound Description (Comments): scabbed   Wound Etiology Skin Tear   Dressing/Treatment Open to air   Wound Assessment Dry   Drainage Amount None (dry)   Wound 11/20/23 Knee Left scabbed   Date First Assessed/Time First Assessed: 11/20/23 1326   Present on Original Admission: Yes  Primary Wound Type: Skin Tear  Location: Knee  Wound Location Orientation: Left  Wound Description (Comments): scabbed   Wound Etiology Skin Tear   Dressing/Treatment Open to air   Wound Assessment Dry   Drainage Amount None (dry)   Incision 11/20/23 Back Lower;Medial   Date First

## 2023-11-25 NOTE — PLAN OF CARE
Problem: Discharge Planning  Goal: Discharge to home or other facility with appropriate resources  11/25/2023 1010 by Brianna Calle RN  Outcome: Progressing  11/24/2023 2258 by Truong Smith RN  Outcome: Progressing     Problem: Safety - Adult  Goal: Free from fall injury  11/24/2023 2258 by Truong Smith RN  Outcome: Progressing     Problem: Chronic Conditions and Co-morbidities  Goal: Patient's chronic conditions and co-morbidity symptoms are monitored and maintained or improved  11/25/2023 1010 by Brianna Calle RN  Outcome: Progressing  11/24/2023 2258 by Truong Smith RN  Outcome: Progressing     Problem: Nutrition Deficit:  Goal: Optimize nutritional status  11/24/2023 2258 by Truong Smith RN  Outcome: Progressing

## 2023-11-26 LAB
GAMMA INTERFERON BACKGROUND BLD IA-ACNC: 0.01 IU/ML
GLUCOSE BLD-MCNC: 108 MG/DL (ref 74–106)
GLUCOSE BLD-MCNC: 110 MG/DL (ref 74–106)
GLUCOSE BLD-MCNC: 136 MG/DL (ref 74–106)
GLUCOSE BLD-MCNC: 165 MG/DL (ref 74–106)
GLUCOSE BLD-MCNC: 67 MG/DL (ref 74–106)
MITOGEN IGNF BCKGRD COR BLD-ACNC: >10 IU/ML
PERFORMED ON: ABNORMAL
QUANTI TB GOLD PLUS: NEGATIVE
QUANTI TB1 MINUS NIL: 0.03 IU/ML (ref 0–0.34)
QUANTI TB2 MINUS NIL: 0.02 IU/ML (ref 0–0.34)

## 2023-11-26 PROCEDURE — 6360000002 HC RX W HCPCS: Performed by: PHYSICIAN ASSISTANT

## 2023-11-26 PROCEDURE — 6370000000 HC RX 637 (ALT 250 FOR IP): Performed by: INTERNAL MEDICINE

## 2023-11-26 PROCEDURE — 6370000000 HC RX 637 (ALT 250 FOR IP): Performed by: PHYSICIAN ASSISTANT

## 2023-11-26 PROCEDURE — 2580000003 HC RX 258: Performed by: PHYSICIAN ASSISTANT

## 2023-11-26 PROCEDURE — 1200000002 HC SEMI PRIVATE SWING BED

## 2023-11-26 RX ORDER — INSULIN GLARGINE 100 [IU]/ML
40 INJECTION, SOLUTION SUBCUTANEOUS 2 TIMES DAILY
Status: DISCONTINUED | OUTPATIENT
Start: 2023-11-26 | End: 2023-11-27

## 2023-11-26 RX ORDER — DAPTOMYCIN 50 MG/ML
INJECTION, POWDER, LYOPHILIZED, FOR SOLUTION INTRAVENOUS
Status: DISCONTINUED
Start: 2023-11-26 | End: 2023-11-26 | Stop reason: ALTCHOICE

## 2023-11-26 RX ORDER — SODIUM CHLORIDE 9 MG/ML
INJECTION, SOLUTION INTRAVENOUS
Status: DISPENSED
Start: 2023-11-26 | End: 2023-11-26

## 2023-11-26 RX ADMIN — OXYCODONE AND ACETAMINOPHEN 1 TABLET: 10; 325 TABLET ORAL at 17:57

## 2023-11-26 RX ADMIN — PREGABALIN 100 MG: 50 CAPSULE ORAL at 08:17

## 2023-11-26 RX ADMIN — PILOCARPINE HYDROCHLORIDE 5 MG: 5 TABLET, FILM COATED ORAL at 12:37

## 2023-11-26 RX ADMIN — HYDROCORTISONE: 1 CREAM TOPICAL at 20:34

## 2023-11-26 RX ADMIN — FLUOXETINE 40 MG: 20 CAPSULE ORAL at 20:32

## 2023-11-26 RX ADMIN — DAPTOMYCIN 600 MG: 350 INJECTION, POWDER, LYOPHILIZED, FOR SOLUTION INTRAVENOUS at 12:37

## 2023-11-26 RX ADMIN — OXYCODONE AND ACETAMINOPHEN 1 TABLET: 10; 325 TABLET ORAL at 08:19

## 2023-11-26 RX ADMIN — LEVOTHYROXINE SODIUM 150 MCG: 0.07 TABLET ORAL at 06:13

## 2023-11-26 RX ADMIN — FOLIC ACID 1 MG: 1 TABLET ORAL at 08:17

## 2023-11-26 RX ADMIN — APIXABAN 10 MG: 5 TABLET, FILM COATED ORAL at 20:32

## 2023-11-26 RX ADMIN — TRAZODONE HYDROCHLORIDE 50 MG: 50 TABLET ORAL at 20:32

## 2023-11-26 RX ADMIN — PILOCARPINE HYDROCHLORIDE 5 MG: 5 TABLET, FILM COATED ORAL at 20:32

## 2023-11-26 RX ADMIN — HYDROCORTISONE: 1 CREAM TOPICAL at 08:22

## 2023-11-26 RX ADMIN — MICONAZOLE NITRATE: 2 POWDER TOPICAL at 08:22

## 2023-11-26 RX ADMIN — PREGABALIN 100 MG: 50 CAPSULE ORAL at 12:37

## 2023-11-26 RX ADMIN — MICONAZOLE NITRATE: 2 POWDER TOPICAL at 20:34

## 2023-11-26 RX ADMIN — PILOCARPINE HYDROCHLORIDE 5 MG: 5 TABLET, FILM COATED ORAL at 17:02

## 2023-11-26 RX ADMIN — EMPAGLIFLOZIN 10 MG: 10 TABLET, FILM COATED ORAL at 08:18

## 2023-11-26 RX ADMIN — PILOCARPINE HYDROCHLORIDE 5 MG: 5 TABLET, FILM COATED ORAL at 08:17

## 2023-11-26 RX ADMIN — INSULIN GLARGINE 40 UNITS: 100 INJECTION, SOLUTION SUBCUTANEOUS at 20:32

## 2023-11-26 RX ADMIN — LOSARTAN POTASSIUM 25 MG: 25 TABLET, FILM COATED ORAL at 08:17

## 2023-11-26 RX ADMIN — PREGABALIN 100 MG: 50 CAPSULE ORAL at 20:32

## 2023-11-26 RX ADMIN — INSULIN GLARGINE 40 UNITS: 100 INJECTION, SOLUTION SUBCUTANEOUS at 08:23

## 2023-11-26 RX ADMIN — APIXABAN 10 MG: 5 TABLET, FILM COATED ORAL at 08:18

## 2023-11-26 ASSESSMENT — PAIN SCALES - GENERAL: PAINLEVEL_OUTOF10: 10

## 2023-11-26 ASSESSMENT — PAIN DESCRIPTION - LOCATION: LOCATION: LEG

## 2023-11-26 NOTE — PLAN OF CARE
Problem: Discharge Planning  Goal: Discharge to home or other facility with appropriate resources  11/25/2023 2232 by Nelsy Ford RN  Outcome: Progressing  11/25/2023 1010 by Abdoul Escalera RN  Outcome: Progressing     Problem: Safety - Adult  Goal: Free from fall injury  Outcome: Progressing     Problem: Chronic Conditions and Co-morbidities  Goal: Patient's chronic conditions and co-morbidity symptoms are monitored and maintained or improved  11/25/2023 2232 by Nelsy Ford RN  Outcome: Progressing  11/25/2023 1010 by Abdoul Escalera RN  Outcome: Progressing     Problem: Nutrition Deficit:  Goal: Optimize nutritional status  Outcome: Progressing     Problem: Skin/Tissue Integrity  Goal: Absence of new skin breakdown  Description: 1. Monitor for areas of redness and/or skin breakdown  2. Assess vascular access sites hourly  3. Every 4-6 hours minimum:  Change oxygen saturation probe site  4. Every 4-6 hours:  If on nasal continuous positive airway pressure, respiratory therapy assess nares and determine need for appliance change or resting period.   Outcome: Progressing     Problem: ABCDS Injury Assessment  Goal: Absence of physical injury  Outcome: Progressing     Problem: Pain  Goal: Verbalizes/displays adequate comfort level or baseline comfort level  Outcome: Progressing

## 2023-11-26 NOTE — PLAN OF CARE
Problem: Discharge Planning  Goal: Discharge to home or other facility with appropriate resources  11/26/2023 0850 by Carolyn Gallegos RN  Outcome: Progressing  11/25/2023 2232 by Ama Abrams RN  Outcome: Progressing     Problem: Safety - Adult  Goal: Free from fall injury  11/26/2023 0850 by Carolyn Gallegos RN  Outcome: Progressing  11/25/2023 2232 by Ama Abrams RN  Outcome: Progressing     Problem: Chronic Conditions and Co-morbidities  Goal: Patient's chronic conditions and co-morbidity symptoms are monitored and maintained or improved  11/26/2023 0850 by Carolyn Gallegos RN  Outcome: Progressing  11/25/2023 2232 by Ama Abrams RN  Outcome: Progressing

## 2023-11-26 NOTE — FLOWSHEET NOTE
11/26/23 0800   Assessment   Charting Type Shift assessment   Psychosocial   Psychosocial (WDL) WDL   Neurological   Neuro (WDL) WDL   Edward Coma Scale   Eye Opening 4   Best Verbal Response 5   Best Motor Response 6   Victor Coma Scale Score 15   HEENT (Head, Ears, Eyes, Nose, & Throat)   HEENT (WDL) WDL   Right Eye Glasses   Left Eye Glasses   Respiratory   Respiratory (WDL) WDL   Cardiac   Cardiac (WDL) WDL   Gastrointestinal   Abdominal (WDL) WDL   Genitourinary   Genitourinary (WDL) WDL   Dysuria (Pain/Burning w/Urination) No   Urine Urgency   Urine Urgency Yes   Peripheral Vascular   Peripheral Vascular (WDL) WDL   Skin Integumentary    Skin Integumentary (WDL) X   Skin Color Pale   Skin Condition/Temp Dry; Warm   Skin Integrity Ecchymosis   Location scattered   Skin Fold Management Yes   Assessed This Shift Dry   Skin Integrity Site 2   Skin Integrity Location 2 Incision (see LDA)   Location 2 back   Preventative Dressing Yes   Date Applied 11/26/23   Assessed this shift Yes   Musculoskeletal   Musculoskeletal (WDL) X   RUE Weakness   LUE Weakness   RL Extremity Weakness   LL Extremity Weakness   Wound 11/20/23 Knee Right scabbed   Date First Assessed/Time First Assessed: 11/20/23 1325   Present on Original Admission: Yes  Primary Wound Type: Skin Tear  Location: Knee  Wound Location Orientation: Right  Wound Description (Comments): scabbed   Wound Etiology Skin Tear   Dressing/Treatment Open to air   Wound Assessment Dry   Drainage Amount None (dry)   Wound 11/20/23 Knee Left scabbed   Date First Assessed/Time First Assessed: 11/20/23 1326   Present on Original Admission: Yes  Primary Wound Type: Skin Tear  Location: Knee  Wound Location Orientation: Left  Wound Description (Comments): scabbed   Wound Etiology Skin Tear   Dressing/Treatment Open to air   Wound Assessment Dry   Drainage Amount None (dry)   Incision 11/20/23 Back Lower;Medial   Date First Assessed/Time First Assessed: 11/20/23 1324

## 2023-11-27 LAB
ALBUMIN SERPL-MCNC: 3.1 G/DL (ref 3.4–4.8)
ALBUMIN/GLOB SERPL: 1.6 {RATIO} (ref 0.8–2)
ALP SERPL-CCNC: 151 U/L (ref 25–100)
ALT SERPL-CCNC: 62 U/L (ref 4–36)
ANION GAP SERPL CALCULATED.3IONS-SCNC: 7 MMOL/L (ref 3–16)
AST SERPL-CCNC: 40 U/L (ref 8–33)
BILIRUB SERPL-MCNC: 0.3 MG/DL (ref 0.3–1.2)
BUN SERPL-MCNC: 16 MG/DL (ref 6–20)
CALCIUM SERPL-MCNC: 8.9 MG/DL (ref 8.5–10.5)
CHLORIDE SERPL-SCNC: 102 MMOL/L (ref 98–107)
CK SERPL-CCNC: 26 U/L (ref 26–174)
CO2 SERPL-SCNC: 25 MMOL/L (ref 20–30)
CREAT SERPL-MCNC: 0.7 MG/DL (ref 0.4–1.2)
ERYTHROCYTE [DISTWIDTH] IN BLOOD BY AUTOMATED COUNT: 12.7 % (ref 11–16)
GFR SERPLBLD CREATININE-BSD FMLA CKD-EPI: >60 ML/MIN/{1.73_M2}
GLOBULIN SER CALC-MCNC: 2 G/DL
GLUCOSE BLD-MCNC: 121 MG/DL (ref 74–106)
GLUCOSE BLD-MCNC: 129 MG/DL (ref 74–106)
GLUCOSE BLD-MCNC: 156 MG/DL (ref 74–106)
GLUCOSE BLD-MCNC: 228 MG/DL (ref 74–106)
GLUCOSE BLD-MCNC: 65 MG/DL (ref 74–106)
GLUCOSE SERPL-MCNC: 68 MG/DL (ref 74–106)
HCT VFR BLD AUTO: 43.8 % (ref 37–47)
HGB BLD-MCNC: 13.9 G/DL (ref 11.5–16.5)
MCH RBC QN AUTO: 28.5 PG (ref 27–32)
MCHC RBC AUTO-ENTMCNC: 31.7 G/DL (ref 31–35)
MCV RBC AUTO: 89.9 FL (ref 80–100)
PERFORMED ON: ABNORMAL
PLATELET # BLD AUTO: 205 K/UL (ref 150–400)
PMV BLD AUTO: 10.6 FL (ref 6–10)
POTASSIUM SERPL-SCNC: 4.1 MMOL/L (ref 3.4–5.1)
PROT SERPL-MCNC: 5.1 G/DL (ref 6.4–8.3)
RBC # BLD AUTO: 4.87 M/UL (ref 3.8–5.8)
SODIUM SERPL-SCNC: 134 MMOL/L (ref 136–145)
WBC # BLD AUTO: 7.6 K/UL (ref 4–11)

## 2023-11-27 PROCEDURE — 6370000000 HC RX 637 (ALT 250 FOR IP): Performed by: PHYSICIAN ASSISTANT

## 2023-11-27 PROCEDURE — 99309 SBSQ NF CARE MODERATE MDM 30: CPT | Performed by: INTERNAL MEDICINE

## 2023-11-27 PROCEDURE — 85027 COMPLETE CBC AUTOMATED: CPT

## 2023-11-27 PROCEDURE — 97530 THERAPEUTIC ACTIVITIES: CPT

## 2023-11-27 PROCEDURE — 97110 THERAPEUTIC EXERCISES: CPT

## 2023-11-27 PROCEDURE — 2500000003 HC RX 250 WO HCPCS: Performed by: PHYSICIAN ASSISTANT

## 2023-11-27 PROCEDURE — 97116 GAIT TRAINING THERAPY: CPT

## 2023-11-27 PROCEDURE — 2580000003 HC RX 258: Performed by: PHYSICIAN ASSISTANT

## 2023-11-27 PROCEDURE — 6360000002 HC RX W HCPCS: Performed by: PHYSICIAN ASSISTANT

## 2023-11-27 PROCEDURE — 97165 OT EVAL LOW COMPLEX 30 MIN: CPT

## 2023-11-27 PROCEDURE — 80053 COMPREHEN METABOLIC PANEL: CPT

## 2023-11-27 PROCEDURE — 1200000002 HC SEMI PRIVATE SWING BED

## 2023-11-27 PROCEDURE — 82550 ASSAY OF CK (CPK): CPT

## 2023-11-27 PROCEDURE — 36415 COLL VENOUS BLD VENIPUNCTURE: CPT

## 2023-11-27 PROCEDURE — 97535 SELF CARE MNGMENT TRAINING: CPT

## 2023-11-27 RX ORDER — INSULIN GLARGINE 100 [IU]/ML
30 INJECTION, SOLUTION SUBCUTANEOUS 2 TIMES DAILY
Status: DISCONTINUED | OUTPATIENT
Start: 2023-11-27 | End: 2023-11-28

## 2023-11-27 RX ADMIN — FLUOXETINE 40 MG: 20 CAPSULE ORAL at 20:02

## 2023-11-27 RX ADMIN — FOLIC ACID 1 MG: 1 TABLET ORAL at 08:29

## 2023-11-27 RX ADMIN — APIXABAN 10 MG: 5 TABLET, FILM COATED ORAL at 08:29

## 2023-11-27 RX ADMIN — PILOCARPINE HYDROCHLORIDE 5 MG: 5 TABLET, FILM COATED ORAL at 08:29

## 2023-11-27 RX ADMIN — LOSARTAN POTASSIUM 25 MG: 25 TABLET, FILM COATED ORAL at 08:29

## 2023-11-27 RX ADMIN — EMPAGLIFLOZIN 10 MG: 10 TABLET, FILM COATED ORAL at 08:29

## 2023-11-27 RX ADMIN — DICLOFENAC SODIUM 2 G: 10 GEL TOPICAL at 12:40

## 2023-11-27 RX ADMIN — HYDROCORTISONE: 1 CREAM TOPICAL at 20:02

## 2023-11-27 RX ADMIN — OXYCODONE AND ACETAMINOPHEN 1 TABLET: 10; 325 TABLET ORAL at 18:19

## 2023-11-27 RX ADMIN — OXYCODONE AND ACETAMINOPHEN 1 TABLET: 10; 325 TABLET ORAL at 12:39

## 2023-11-27 RX ADMIN — PILOCARPINE HYDROCHLORIDE 5 MG: 5 TABLET, FILM COATED ORAL at 20:02

## 2023-11-27 RX ADMIN — DAPTOMYCIN 600 MG: 350 INJECTION, POWDER, LYOPHILIZED, FOR SOLUTION INTRAVENOUS at 11:39

## 2023-11-27 RX ADMIN — LEVOTHYROXINE SODIUM 150 MCG: 0.07 TABLET ORAL at 05:43

## 2023-11-27 RX ADMIN — INSULIN GLARGINE 30 UNITS: 100 INJECTION, SOLUTION SUBCUTANEOUS at 20:03

## 2023-11-27 RX ADMIN — PILOCARPINE HYDROCHLORIDE 5 MG: 5 TABLET, FILM COATED ORAL at 17:27

## 2023-11-27 RX ADMIN — PREGABALIN 100 MG: 50 CAPSULE ORAL at 08:28

## 2023-11-27 RX ADMIN — TRAZODONE HYDROCHLORIDE 50 MG: 50 TABLET ORAL at 20:01

## 2023-11-27 RX ADMIN — PILOCARPINE HYDROCHLORIDE 5 MG: 5 TABLET, FILM COATED ORAL at 11:30

## 2023-11-27 RX ADMIN — HYDROCORTISONE: 1 CREAM TOPICAL at 08:30

## 2023-11-27 RX ADMIN — APIXABAN 10 MG: 5 TABLET, FILM COATED ORAL at 20:01

## 2023-11-27 RX ADMIN — PREGABALIN 100 MG: 50 CAPSULE ORAL at 17:27

## 2023-11-27 RX ADMIN — MICONAZOLE NITRATE: 2 POWDER TOPICAL at 20:02

## 2023-11-27 RX ADMIN — PREGABALIN 100 MG: 50 CAPSULE ORAL at 20:01

## 2023-11-27 RX ADMIN — MICONAZOLE NITRATE: 2 POWDER TOPICAL at 08:30

## 2023-11-27 ASSESSMENT — PAIN DESCRIPTION - ORIENTATION
ORIENTATION: RIGHT
ORIENTATION: RIGHT

## 2023-11-27 ASSESSMENT — PAIN DESCRIPTION - LOCATION
LOCATION: LEG
LOCATION: HIP

## 2023-11-27 ASSESSMENT — PAIN SCALES - GENERAL
PAINLEVEL_OUTOF10: 10
PAINLEVEL_OUTOF10: 10

## 2023-11-27 NOTE — PROGRESS NOTES
in home; has difficulty with stairs and has had multiple falls)  Transfer Assistance: Independent  Active : No  Patient's  Info: Family assists with transportation       Objective   Pulse: 77  Heart Rate Source: Monitor  BP: 124/61  BP Location: Left upper arm  Patient Position: Lying right side  MAP (Calculated): 82  Respirations: 18  SpO2: 100 %  O2 Device: None (Room air)          Observation/Palpation  Observation: Patient received lying in bed on RA. NAD  R LE pain. Pleasant and cooperative. Safety Devices  Type of Devices: Left in chair;Call light within reach (daughter present)  Bed Mobility Training  Bed Mobility Training: No  Overall Level of Assistance: Modified independent  Balance  Sitting: Intact  Standing: With support  Transfer Training  Sit to Stand: Contact-guard assistance;Stand-by assistance  Stand to Sit: Stand-by assistance  Stand Pivot Transfers: Contact-guard assistance  Bed to Chair: Contact-guard assistance  Gait Training  Gait Training: Yes  Gait  Overall Level of Assistance: Contact-guard assistance  Distance (ft): 350 Feet  Assistive Device: Gait belt;Walker, rolling        ADL  Toileting: Stand by assistance     Activity Tolerance  Activity Tolerance: Patient tolerated treatment well  Bed mobility  Rolling to Right: Modified independent  Supine to Sit: Modified independent  Sit to Supine: Modified independent  Scooting: Modified independent  Transfers  Stand Pivot Transfers: Contact guard assistance  Sit to stand: Stand by assistance  Stand to sit: Contact guard assistance  Vision  Vision: Impaired  Vision Exceptions: Wears glasses for reading  Hearing  Hearing: Within functional limits  Cognition  Overall Cognitive Status: WFL  Orientation  Overall Orientation Status: Within Functional Limits               Resistive Exercises: Orange theraband x10 reps shoulder flexion/extension, elbow flexion/extension, horizontal ABD.   Motor Control/Coordination: Fine motor coordination focusing on orange theraputty with lateral key pinch, pincer pinch, lateral key, , hooks, digital extension x10 digital ADD        Goals  Short Term Goals  Time Frame for Short Term Goals: 2 weeks  Short Term Goal 1: Pt to complete dressing UB/LB with MOD I using AE as needed. Short Term Goal 2: Pt to complete ADL transfers with MOD I demo good safety. Short Term Goal 3: Pt to complete toileting with MOD I. Short Term Goal 4: pt to complete bathing with MOD I. Short Term Goal 5: Pt to complete HEP with independence. Short Term Goal 6: Pt to demo ability to stand at sink with no LOB completing hygiene/grooming with MOD I. Therapy Time   Individual Concurrent Group Co-treatment   Time In 5738         Time Out 0226         Minutes 40          This note serves as a DC summary in the event of pt discharge.       Bharti Bar, OTR/L

## 2023-11-27 NOTE — PROGRESS NOTES
Progress Note      Subjective:   Chief complaint:   Declining functional status    Interval History:   Patient seen and examined this morning. She reports feeling better. She was excited that she can now lift her legs. She complains of pain in her lateral distal right lower extremity. Blood glucose is noted to be low. Insulin regimen adjusted. Antibiotic end date 12/22. Patient considering going home for completion of her antibiotics if possible. Will discuss with case management. Constipation has resolved. Review of systems:   Constitutional:  Denies fever or chills. Positive for declining functional status. Eyes:  Denies change in visual acuity or discharge. HENT:  Denies nasal congestion or sore throat. Respiratory:  Denies cough or shortness of breath. Cardiovascular:  Denies chest pain, palpitation or swelling in LEs. GI:  Denies abdominal pain, nausea, vomiting, bloody stools or diarrhea. :  Denies dysuria or frequency. Musculoskeletal:  Denies back pain or joint pain. Positive for pain to distal lateral right lower extremity. Integument:  Denies rash or itching. Neurologic:  Denies headache, focal weakness or sensory changes. Endocrine:  Denies polyuria or polydipsia. Lymphatic:  Denies swollen glands or night sweats. Psychiatric:  Denies depression or anxiety. Past medical history, surgical history, family history and social history reviewed and unchanged compared to H&P earlier this admission.     Medications:   Scheduled Meds:   insulin glargine  30 Units SubCUTAneous BID    apixaban  10 mg Oral BID    Followed by    Stephanie Hickey ON 11/30/2023] apixaban  5 mg Oral BID    empagliflozin  10 mg Oral Daily    DAPTOmycin (CUBICIN) 600 mg in sodium chloride 0.9 % 50 mL IVPB  600 mg IntraVENous Q24H    FLUoxetine  40 mg Oral Nightly    levothyroxine  150 mcg Oral Daily    pilocarpine  5 mg Oral 4x Daily    pregabalin  100 mg Oral TID    traZODone  50 mg Oral Nightly    insulin lispro

## 2023-11-27 NOTE — FLOWSHEET NOTE
11/27/23 0830   Assessment   Charting Type Shift assessment   Psychosocial   Psychosocial (WDL) WDL   Neurological   Neuro (WDL) WDL   Edward Coma Scale   Eye Opening 4   Best Verbal Response 5   Best Motor Response 6   Edward Coma Scale Score 15   HEENT (Head, Ears, Eyes, Nose, & Throat)   HEENT (WDL) WDL   Right Eye Glasses   Left Eye Glasses   Respiratory   Respiratory (WDL) WDL   Cardiac   Cardiac (WDL) WDL   Gastrointestinal   Abdominal (WDL) WDL   Genitourinary   Genitourinary (WDL) WDL   Dysuria (Pain/Burning w/Urination) No   Urine Urgency   Urine Urgency Yes   Urine Assessment   Urine Color Yellow/straw   Urine Appearance Clear   Peripheral Vascular   Peripheral Vascular (WDL) WDL   Skin Integumentary    Skin Integumentary (WDL) X   Skin Color Pale   Skin Condition/Temp Warm;Dry   Skin Integrity Site 2   Skin Integrity Location 2 Incision (see LDA)   Location 2 back   Preventative Dressing No  (MARY)   Musculoskeletal   Musculoskeletal (WDL) X   RUE Weakness   LUE Weakness   RL Extremity Weakness   LL Extremity Weakness   Wound 11/20/23 Knee Right scabbed   Date First Assessed/Time First Assessed: 11/20/23 1325   Present on Original Admission: Yes  Primary Wound Type: Skin Tear  Location: Knee  Wound Location Orientation: Right  Wound Description (Comments): scabbed   Wound Etiology Skin Tear   Dressing/Treatment Open to air   Wound Assessment Dry   Drainage Amount None (dry)   Wound 11/20/23 Knee Left scabbed   Date First Assessed/Time First Assessed: 11/20/23 1326   Present on Original Admission: Yes  Primary Wound Type: Skin Tear  Location: Knee  Wound Location Orientation: Left  Wound Description (Comments): scabbed   Wound Etiology Skin Tear   Dressing/Treatment Open to air   Wound Assessment Dry   Drainage Amount None (dry)

## 2023-11-27 NOTE — PROGRESS NOTES
Offered spiritual care to patient. PT requested prayer, held prayer at bedside. Told pt to reach out if they needed anything further.

## 2023-11-27 NOTE — PROGRESS NOTES
Physical Therapy  Facility/Department: [facility]  Daily Treatment Note  NAME: Cheryle Albarran  : 1960  MRN: [id number]    Date of Service: 2023    Discharge Recommendations:  Continue to assess pending progress      Patient Diagnosis(es): There were no encounter diagnoses. Assessment   Assessment: Patient reports feeling much better today and better able to move her R leg. Patient coming out of the bathroom with RW and daughter's assistance when PTA arrived. Patient ambulated in the hallway 350 feet with RW and CGA. Patient with improved foot clearance during ambulation this date. Patient transferred to high-backed chair upon return to the room. Engaged patient in B LE therex in sitting for strengthening. Activity Tolerance: Patient tolerated treatment well     Plan    Physical Therapy Plan  General Plan: 3-5 times per week  Days Per Week: 5 Days  Current Treatment Recommendations: Strengthening;Balance training;Functional mobility training;Transfer training;Gait training; Endurance training;Pain management;Home exercise program;Patient/Caregiver education & training; Safety education & training;Equipment evaluation, education, & procurement; Therapeutic activities     Restrictions  Restrictions/Precautions  Restrictions/Precautions: Fall Risk, General Precautions  Required Braces or Orthoses?: No     Subjective    Subjective  Subjective: Patient presents sitting up in bed, NAD, daughter visiting  Pain: R LE posterior knee     Objective   Bed Mobility Training  Bed Mobility Training: No  Overall Level of Assistance: Modified independent  Balance  Sitting: Intact  Standing: With support  Transfer Training  Sit to Stand: Contact-guard assistance;Stand-by assistance  Stand to Sit: Stand-by assistance  Stand Pivot Transfers: Contact-guard assistance  Bed to Chair: Contact-guard assistance  Gait Training  Gait Training: Yes  Gait  Overall Level of Assistance: Contact-guard assistance  Distance (ft):

## 2023-11-27 NOTE — FLOWSHEET NOTE
11/26/23 2030   Assessment   Charting Type Shift assessment   Psychosocial   Psychosocial (WDL) WDL   Neurological   Neuro (WDL) WDL   Edward Coma Scale   Eye Opening 4   Best Verbal Response 5   Best Motor Response 6   Edward Coma Scale Score 15   HEENT (Head, Ears, Eyes, Nose, & Throat)   HEENT (WDL) WDL   Right Eye Glasses   Left Eye Glasses   Respiratory   Respiratory (WDL) WDL   Cardiac   Cardiac (WDL) WDL   Gastrointestinal   Abdominal (WDL) WDL   Genitourinary   Genitourinary (WDL) WDL   Dysuria (Pain/Burning w/Urination) No   Urine Urgency   Urine Urgency Yes   Peripheral Vascular   Peripheral Vascular (WDL) WDL   Skin Integumentary    Skin Integumentary (WDL) X   Skin Color Pale   Skin Condition/Temp Dry; Warm   Skin Integrity Ecchymosis   Location Scattered   Skin Fold Management Yes   Assessed This Shift Dry   Skin Integrity Site 2   Skin Integrity Location 2 Incision (see LDA)   Location 2 Back   Musculoskeletal   Musculoskeletal (WDL) X   RUE Weakness   LUE Weakness   RL Extremity Weakness   LL Extremity Weakness   Wound 11/20/23 Knee Right scabbed   Date First Assessed/Time First Assessed: 11/20/23 1325   Present on Original Admission: Yes  Primary Wound Type: Skin Tear  Location: Knee  Wound Location Orientation: Right  Wound Description (Comments): scabbed   Wound Etiology Skin Tear   Dressing/Treatment Open to air   Wound Assessment Dry   Drainage Amount None (dry)   Wound 11/20/23 Knee Left scabbed   Date First Assessed/Time First Assessed: 11/20/23 1326   Present on Original Admission: Yes  Primary Wound Type: Skin Tear  Location: Knee  Wound Location Orientation: Left  Wound Description (Comments): scabbed   Wound Etiology Skin Tear   Dressing/Treatment Open to air   Wound Assessment Dry   Drainage Amount None (dry)   Incision 11/20/23 Back Lower;Medial   Date First Assessed/Time First Assessed: 11/20/23 1324   Present on Original Admission: Yes  Incision Approximate Age at First Assessment

## 2023-11-27 NOTE — ACP (ADVANCE CARE PLANNING)
Advance Care Planning     General Advance Care Planning (ACP) Conversation    Date of Conversation: 11/27/2023  Conducted with: Patient with Decision Making Capacity    Healthcare Decision Maker:    Primary Decision Maker: Andreia Nunn - Child - [phone]  Click here to complete Healthcare Decision Makers including selection of the Healthcare Decision Maker Relationship (ie \"Primary\"). Today we documented Decision Maker(s) consistent with Legal Next of Kin hierarchy.     Content/Action Overview:  Has NO ACP documents/care preferences - information provided, considering goals and options  Reviewed DNR/DNI and patient elects Full Code (Attempt Resuscitation)  artificial nutrition, ventilation preferences, and resuscitation preferences      Length of Voluntary ACP Conversation in minutes:  <16 minutes (Non-Billable)    Myke Leon

## 2023-11-28 LAB
GLUCOSE BLD-MCNC: 154 MG/DL (ref 74–106)
GLUCOSE BLD-MCNC: 166 MG/DL (ref 74–106)
GLUCOSE BLD-MCNC: 198 MG/DL (ref 74–106)
GLUCOSE BLD-MCNC: 66 MG/DL (ref 74–106)
GLUCOSE BLD-MCNC: 79 MG/DL (ref 74–106)
PERFORMED ON: ABNORMAL
PERFORMED ON: NORMAL

## 2023-11-28 PROCEDURE — 2580000003 HC RX 258: Performed by: PHYSICIAN ASSISTANT

## 2023-11-28 PROCEDURE — 97803 MED NUTRITION INDIV SUBSEQ: CPT

## 2023-11-28 PROCEDURE — 97110 THERAPEUTIC EXERCISES: CPT

## 2023-11-28 PROCEDURE — 1200000002 HC SEMI PRIVATE SWING BED

## 2023-11-28 PROCEDURE — 97116 GAIT TRAINING THERAPY: CPT

## 2023-11-28 PROCEDURE — 97535 SELF CARE MNGMENT TRAINING: CPT

## 2023-11-28 PROCEDURE — 6370000000 HC RX 637 (ALT 250 FOR IP): Performed by: PHYSICIAN ASSISTANT

## 2023-11-28 PROCEDURE — 97530 THERAPEUTIC ACTIVITIES: CPT

## 2023-11-28 PROCEDURE — 6360000002 HC RX W HCPCS: Performed by: PHYSICIAN ASSISTANT

## 2023-11-28 PROCEDURE — 6370000000 HC RX 637 (ALT 250 FOR IP): Performed by: INTERNAL MEDICINE

## 2023-11-28 RX ORDER — ALOGLIPTIN 12.5 MG/1
25 TABLET, FILM COATED ORAL DAILY
Status: DISCONTINUED | OUTPATIENT
Start: 2023-11-28 | End: 2023-11-30 | Stop reason: HOSPADM

## 2023-11-28 RX ORDER — INSULIN GLARGINE 100 [IU]/ML
20 INJECTION, SOLUTION SUBCUTANEOUS 2 TIMES DAILY
Status: DISCONTINUED | OUTPATIENT
Start: 2023-11-28 | End: 2023-11-30 | Stop reason: HOSPADM

## 2023-11-28 RX ADMIN — PILOCARPINE HYDROCHLORIDE 5 MG: 5 TABLET, FILM COATED ORAL at 17:03

## 2023-11-28 RX ADMIN — OXYCODONE AND ACETAMINOPHEN 1 TABLET: 10; 325 TABLET ORAL at 02:11

## 2023-11-28 RX ADMIN — PILOCARPINE HYDROCHLORIDE 5 MG: 5 TABLET, FILM COATED ORAL at 12:56

## 2023-11-28 RX ADMIN — FOLIC ACID 1 MG: 1 TABLET ORAL at 07:59

## 2023-11-28 RX ADMIN — PREGABALIN 100 MG: 50 CAPSULE ORAL at 13:51

## 2023-11-28 RX ADMIN — INSULIN GLARGINE 20 UNITS: 100 INJECTION, SOLUTION SUBCUTANEOUS at 09:07

## 2023-11-28 RX ADMIN — DAPTOMYCIN 600 MG: 350 INJECTION, POWDER, LYOPHILIZED, FOR SOLUTION INTRAVENOUS at 11:25

## 2023-11-28 RX ADMIN — TRAZODONE HYDROCHLORIDE 50 MG: 50 TABLET ORAL at 20:38

## 2023-11-28 RX ADMIN — PANTOPRAZOLE SODIUM 40 MG: 40 TABLET, DELAYED RELEASE ORAL at 18:58

## 2023-11-28 RX ADMIN — LOSARTAN POTASSIUM 25 MG: 25 TABLET, FILM COATED ORAL at 07:59

## 2023-11-28 RX ADMIN — ALOGLIPTIN 25 MG: 12.5 TABLET, FILM COATED ORAL at 09:06

## 2023-11-28 RX ADMIN — CYCLOBENZAPRINE 10 MG: 10 TABLET, FILM COATED ORAL at 13:02

## 2023-11-28 RX ADMIN — EMPAGLIFLOZIN 10 MG: 10 TABLET, FILM COATED ORAL at 07:59

## 2023-11-28 RX ADMIN — OXYCODONE AND ACETAMINOPHEN 1 TABLET: 10; 325 TABLET ORAL at 12:56

## 2023-11-28 RX ADMIN — PILOCARPINE HYDROCHLORIDE 5 MG: 5 TABLET, FILM COATED ORAL at 07:59

## 2023-11-28 RX ADMIN — PREGABALIN 100 MG: 50 CAPSULE ORAL at 20:38

## 2023-11-28 RX ADMIN — APIXABAN 10 MG: 5 TABLET, FILM COATED ORAL at 07:58

## 2023-11-28 RX ADMIN — HYDROCORTISONE: 1 CREAM TOPICAL at 07:59

## 2023-11-28 RX ADMIN — LEVOTHYROXINE SODIUM 150 MCG: 0.07 TABLET ORAL at 05:53

## 2023-11-28 RX ADMIN — OXYCODONE AND ACETAMINOPHEN 1 TABLET: 10; 325 TABLET ORAL at 17:03

## 2023-11-28 RX ADMIN — APIXABAN 10 MG: 5 TABLET, FILM COATED ORAL at 20:38

## 2023-11-28 RX ADMIN — MICONAZOLE NITRATE: 2 POWDER TOPICAL at 20:41

## 2023-11-28 RX ADMIN — MICONAZOLE NITRATE: 2 POWDER TOPICAL at 08:00

## 2023-11-28 RX ADMIN — HYDROCORTISONE: 1 CREAM TOPICAL at 20:40

## 2023-11-28 RX ADMIN — OXYCODONE AND ACETAMINOPHEN 1 TABLET: 10; 325 TABLET ORAL at 08:02

## 2023-11-28 RX ADMIN — PILOCARPINE HYDROCHLORIDE 5 MG: 5 TABLET, FILM COATED ORAL at 20:38

## 2023-11-28 RX ADMIN — FLUOXETINE 40 MG: 20 CAPSULE ORAL at 20:38

## 2023-11-28 RX ADMIN — ONDANSETRON 4 MG: 2 INJECTION INTRAMUSCULAR; INTRAVENOUS at 18:58

## 2023-11-28 RX ADMIN — PREGABALIN 100 MG: 50 CAPSULE ORAL at 07:59

## 2023-11-28 RX ADMIN — OXYCODONE AND ACETAMINOPHEN 1 TABLET: 10; 325 TABLET ORAL at 20:38

## 2023-11-28 RX ADMIN — INSULIN GLARGINE 20 UNITS: 100 INJECTION, SOLUTION SUBCUTANEOUS at 20:39

## 2023-11-28 ASSESSMENT — PAIN DESCRIPTION - LOCATION
LOCATION: LEG
LOCATION: KNEE
LOCATION: LEG

## 2023-11-28 ASSESSMENT — PAIN SCALES - GENERAL
PAINLEVEL_OUTOF10: 10
PAINLEVEL_OUTOF10: 9

## 2023-11-28 ASSESSMENT — PAIN DESCRIPTION - ORIENTATION: ORIENTATION: RIGHT

## 2023-11-28 NOTE — PLAN OF CARE
Problem: Discharge Planning  Goal: Discharge to home or other facility with appropriate resources  Outcome: Progressing  Flowsheets (Taken 11/28/2023 0800)  Discharge to home or other facility with appropriate resources: Identify barriers to discharge with patient and caregiver     Problem: Safety - Adult  Goal: Free from fall injury  11/28/2023 0854 by Bruce Beach LPN  Outcome: Progressing  11/27/2023 2056 by Chauncey Malhotra LPN  Outcome: Progressing     Problem: Chronic Conditions and Co-morbidities  Goal: Patient's chronic conditions and co-morbidity symptoms are monitored and maintained or improved  Outcome: Progressing  Flowsheets (Taken 11/28/2023 0800)  Care Plan - Patient's Chronic Conditions and Co-Morbidity Symptoms are Monitored and Maintained or Improved: Monitor and assess patient's chronic conditions and comorbid symptoms for stability, deterioration, or improvement     Problem: Nutrition Deficit:  Goal: Optimize nutritional status  Outcome: Progressing     Problem: Skin/Tissue Integrity  Goal: Absence of new skin breakdown  Description: 1. Monitor for areas of redness and/or skin breakdown  2. Assess vascular access sites hourly  3. Every 4-6 hours minimum:  Change oxygen saturation probe site  4. Every 4-6 hours:  If on nasal continuous positive airway pressure, respiratory therapy assess nares and determine need for appliance change or resting period.   Outcome: Progressing     Problem: ABCDS Injury Assessment  Goal: Absence of physical injury  Outcome: Progressing     Problem: Pain  Goal: Verbalizes/displays adequate comfort level or baseline comfort level  Outcome: Progressing

## 2023-11-28 NOTE — PROGRESS NOTES
assistance  Toilet Transfer: Contact-guard assistance  Gait Training  Gait Training: Yes  Gait  Overall Level of Assistance: Contact-guard assistance  Distance (ft): 350 Feet  Assistive Device: Gait belt;Walker, rolling     ADL  Grooming: Setup  UE Bathing: Supervision  LE Bathing: Supervision  UE Dressing: Setup  LE Dressing: Contact guard assistance;Stand by assistance; Adaptive equipment; Increased time to complete  Toileting: Stand by assistance        Safety Devices  Type of Devices: Left in chair;Call light within reach     Goals  Short Term Goals  Time Frame for Short Term Goals: 2 weeks  Short Term Goal 1: Pt to complete dressing UB/LB with MOD I using AE as needed. Short Term Goal 2: Pt to complete ADL transfers with MOD I demo good safety. Short Term Goal 3: Pt to complete toileting with MOD I. Short Term Goal 4: pt to complete bathing with MOD I. Short Term Goal 5: Pt to complete HEP with independence. Short Term Goal 6: Pt to demo ability to stand at sink with no LOB completing hygiene/grooming with MOD I. Therapy Time   Individual Concurrent Group Co-treatment   Time In 1918         Time Out 1038         Minutes 48          This note serves as a DC summary in the event of pt discharge.     Bharti Bar, OTR/L

## 2023-11-28 NOTE — FLOWSHEET NOTE
11/27/23 2000   Assessment   Charting Type Shift assessment   Psychosocial   Psychosocial (WDL) WDL   Neurological   Neuro (WDL) WDL   Edward Coma Scale   Eye Opening 4   Best Verbal Response 5   Best Motor Response 6   Edward Coma Scale Score 15   HEENT (Head, Ears, Eyes, Nose, & Throat)   HEENT (WDL) WDL   Right Eye Glasses   Left Eye Glasses   Respiratory   Respiratory (WDL) WDL   Cardiac   Cardiac (WDL) WDL   Gastrointestinal   Abdominal (WDL) WDL   Genitourinary   Genitourinary (WDL) WDL   Dysuria (Pain/Burning w/Urination) No   Urine Urgency   Urine Urgency Yes   Urine Assessment   Urine Color Yellow/straw   Urine Appearance Clear   Peripheral Vascular   Peripheral Vascular (WDL) WDL   Skin Integumentary    Skin Integumentary (WDL) X   Skin Color Pale   Skin Condition/Temp Dry; Warm   Skin Integrity Ecchymosis   Location Scattered   Skin Fold Management Yes   Assessed This Shift Dry   Skin Integrity Site 2   Skin Integrity Location 2 Incision (see LDA)   Location 2 Back   Musculoskeletal   Musculoskeletal (WDL) X   RUE Weakness   LUE Weakness   RL Extremity Weakness   LL Extremity Weakness   Wound 11/20/23 Knee Right scabbed   Date First Assessed/Time First Assessed: 11/20/23 1325   Present on Original Admission: Yes  Primary Wound Type: Skin Tear  Location: Knee  Wound Location Orientation: Right  Wound Description (Comments): scabbed   Wound Etiology Skin Tear   Dressing/Treatment Open to air   Wound Assessment Dry   Drainage Amount None (dry)   Wound 11/20/23 Knee Left scabbed   Date First Assessed/Time First Assessed: 11/20/23 1326   Present on Original Admission: Yes  Primary Wound Type: Skin Tear  Location: Knee  Wound Location Orientation: Left  Wound Description (Comments): scabbed   Wound Etiology Skin Tear   Dressing/Treatment Open to air   Wound Assessment Dry   Drainage Amount None (dry)   Incision 11/20/23 Back Lower;Medial   Date First Assessed/Time First Assessed: 11/20/23 1324   Present on

## 2023-11-28 NOTE — PROGRESS NOTES
Physical Therapy  Facility/Department: [facility]  Daily Treatment Note  NAME: Mary Jacques  : 1960  MRN: [id number]    Date of Service: 2023    Discharge Recommendations:  Continue to assess pending progress      Patient Diagnosis(es): There were no encounter diagnoses. Assessment   Assessment: Cotreated with OT. Patient reports not feeling as well as yesterday but still ok. Reports pain behind R knee from blood clot area. Patient stood with SBA and ambulated 350 feet with RW and CGA. Patient transferred to the shower stool to complete showering and personal care. Transferred to bedside chair to finish personal care and don pullup and socks. Engaged patient in B LE therex in sitting and provided her with written and illustrated copy of HEP. Activity Tolerance: Patient tolerated treatment well     Plan    Physical Therapy Plan  General Plan: 3-5 times per week  Days Per Week: 5 Days  Current Treatment Recommendations: Strengthening;Balance training;Functional mobility training;Transfer training;Gait training; Endurance training;Pain management;Home exercise program;Patient/Caregiver education & training; Safety education & training;Equipment evaluation, education, & procurement; Therapeutic activities     Restrictions  Restrictions/Precautions  Restrictions/Precautions: Fall Risk, General Precautions  Required Braces or Orthoses?: No     Subjective    Subjective  Subjective: Patient presents sitting up in bed, NAD. Reports she doesn't feel as well as yesterday but still doing ok.   Pain: R posterior knee     Objective   Bed Mobility Training  Bed Mobility Training: No  Overall Level of Assistance: Modified independent  Balance  Sitting: Intact  Transfer Training  Transfer Training: Yes  Overall Level of Assistance: Contact-guard assistance;Stand-by assistance  Sit to Stand: Stand-by assistance  Stand to Sit: Stand-by assistance  Stand Pivot Transfers: Contact-guard assistance  Bed to Chair:

## 2023-11-28 NOTE — PROGRESS NOTES
Comprehensive Nutrition Assessment    Type and Reason for Visit:   (Follow-up)    Nutrition Recommendations/Plan:   Continue current diet as medically appropriate and tolerated. Continue to encourage PO intakes as appropriate. Avg of 65-89% x 9 meals. Continue Shawn BID and Glucerna once daily. RD to follow pt and available PRN. Malnutrition Assessment:  Malnutrition Status: At risk for malnutrition (Comment) (r/t report of unintentional weight loss and decreased intakes.) (11/22/23 1550)    Context:  Acute Illness     Findings of the 6 clinical characteristics of malnutrition:  Energy Intake:  No significant decrease in energy intake  Weight Loss:   (4% in ~ 6 months.)     Body Fat Loss:  No significant body fat loss     Muscle Mass Loss:  No significant muscle mass loss    Fluid Accumulation:  No significant fluid accumulation     Strength:  Not Performed    Nutrition Assessment:    Pt is at low-moderate risk for ongoing nutritional compromise r/t good intakes and wounds. Nutrition Related Findings:    No new weights since 11-22. Medications: insulin, NaCl, folic acid. Labs: Na+ 134, glu , alb 3.1, alk phos 151, ALT 62, AST 40. No edema at this time. Wound Type: Multiple, Skin Tears       Current Nutrition Intake & Therapies:    Average Meal Intake: 51-75%, % (65-89% x 9)     ADULT DIET; Dysphagia - Soft and Bite Sized; 3 carb choices (45 gm/meal)  ADULT ORAL NUTRITION SUPPLEMENT; Lunch; Diabetic Oral Supplement  ADULT ORAL NUTRITION SUPPLEMENT; Breakfast, Dinner; Wound Healing Oral Supplement    Anthropometric Measures:  Height: 167.6 cm (5' 6\")  Ideal Body Weight (IBW): 130 lbs (59 kg)       Current Body Weight: 97.5 kg (215 lb), 165.4 % IBW. Weight Source: Standing Scale  Current BMI (kg/m2): 34.7        Weight Adjustment For: No Adjustment                 BMI Categories: Obese Class 1 (BMI 30.0-34. 9)    Estimated Daily Nutrient Needs:  Energy Requirements Based On: Formula  Weight Used for Energy Requirements: Current  Energy (kcal/day): 0785-8695 ([place] x 1.3-1.4 AF; wounds)  Weight Used for Protein Requirements: Ideal  Protein (g/day): 118 (2 g/kg IBW, CKD and wounds)  Method Used for Fluid Requirements: 1 ml/kcal  Fluid (ml/day): 7583-3640    Nutrition Diagnosis:   Increased nutrient needs related to increase demand for energy/nutrients as evidenced by wounds    Nutrition Interventions:   Food and/or Nutrient Delivery: Continue Current Diet, Continue Oral Nutrition Supplement  Nutrition Education/Counseling: Education not indicated  Coordination of Nutrition Care: Continue to monitor while inpatient, Interdisciplinary Rounds       Goals:  Previous Goal Met: No Progress toward Goal(s)  Goals: Meet at least 75% of estimated needs       Nutrition Monitoring and Evaluation:   Behavioral-Environmental Outcomes: None Identified  Food/Nutrient Intake Outcomes: Food and Nutrient Intake, Supplement Intake  Physical Signs/Symptoms Outcomes: Biochemical Data, Nutrition Focused Physical Findings, Skin, Weight, Chewing or Swallowing    Discharge Planning:    Continue Oral Nutrition Supplement     Jose Renee RD

## 2023-11-28 NOTE — FLOWSHEET NOTE
11/28/23 0800   Assessment   Charting Type Shift assessment   Psychosocial   Psychosocial (WDL) WDL   Neurological   Neuro (WDL) WDL   Edward Coma Scale   Eye Opening 4   Best Verbal Response 5   Best Motor Response 6   Edward Coma Scale Score 15   HEENT (Head, Ears, Eyes, Nose, & Throat)   HEENT (WDL) WDL   Right Eye Glasses   Left Eye Glasses   Respiratory   Respiratory (WDL) WDL   Cardiac   Cardiac (WDL) WDL   Gastrointestinal   Abdominal (WDL) WDL   Last BM (including prior to admit) 11/26/23   Genitourinary   Genitourinary (WDL) WDL   Urine Urgency   Urine Urgency Yes   Peripheral Vascular   Peripheral Vascular (WDL) WDL   RLE Neurovascular Assessment   Capillary Refill Less than/Equal to 3 seconds   Color Appropriate for Ethnicity   Temperature Warm   R Pedal Pulse +2   LLE Neurovascular Assessment   Capillary Refill Less than/Equal to 3 seconds   Color Appropriate for Ethnicity   Temperature Warm   L Pedal Pulse +2   Skin Integumentary    Skin Integumentary (WDL) X   Skin Color Pale   Skin Condition/Temp Warm;Dry   Skin Integrity Ecchymosis   Location scattered   Skin Fold Management Yes   Assessed This Shift Dry   Nails X   Nail Condition Discolored   Multiple Skin Integrity Sites Yes   Skin Integrity Site 2   Skin Integrity Location 2 Incision (see LDA)   Location 2 back   Preventative Dressing Yes   Date Applied 11/28/23   Assessed this shift Yes   Wound Prevention and Protection Methods   Location of Wound Prevention Back   Dressing Present  Yes;Changed   Wound Offloading (Prevention Methods) Turning;Repositioning;Pillows   Care Plan - Skin/Tissue Integrity Goals   Skin Integrity Remains Intact Monitor for areas of redness and/or skin breakdown   Musculoskeletal   Musculoskeletal (WDL) X   RUE Weakness   LUE Weakness   RL Extremity Weakness   LL Extremity Weakness   Wound 11/20/23 Knee Right scabbed   Date First Assessed/Time First Assessed: 11/20/23 1325   Present on Original Admission: Yes  Primary

## 2023-11-29 ENCOUNTER — APPOINTMENT (OUTPATIENT)
Dept: GENERAL RADIOLOGY | Facility: HOSPITAL | Age: 63
DRG: 948 | End: 2023-11-29
Attending: INTERNAL MEDICINE
Payer: MEDICARE

## 2023-11-29 LAB
GLUCOSE BLD-MCNC: 116 MG/DL (ref 74–106)
GLUCOSE BLD-MCNC: 140 MG/DL (ref 74–106)
GLUCOSE BLD-MCNC: 172 MG/DL (ref 74–106)
GLUCOSE BLD-MCNC: 188 MG/DL (ref 74–106)
PERFORMED ON: ABNORMAL

## 2023-11-29 PROCEDURE — 2580000003 HC RX 258: Performed by: PHYSICIAN ASSISTANT

## 2023-11-29 PROCEDURE — 97535 SELF CARE MNGMENT TRAINING: CPT

## 2023-11-29 PROCEDURE — 73503 X-RAY EXAM HIP UNI 4/> VIEWS: CPT

## 2023-11-29 PROCEDURE — 97530 THERAPEUTIC ACTIVITIES: CPT

## 2023-11-29 PROCEDURE — 97116 GAIT TRAINING THERAPY: CPT

## 2023-11-29 PROCEDURE — 1200000002 HC SEMI PRIVATE SWING BED

## 2023-11-29 PROCEDURE — 6370000000 HC RX 637 (ALT 250 FOR IP): Performed by: INTERNAL MEDICINE

## 2023-11-29 PROCEDURE — 6370000000 HC RX 637 (ALT 250 FOR IP): Performed by: PHYSICIAN ASSISTANT

## 2023-11-29 PROCEDURE — 73502 X-RAY EXAM HIP UNI 2-3 VIEWS: CPT

## 2023-11-29 PROCEDURE — 6360000002 HC RX W HCPCS: Performed by: PHYSICIAN ASSISTANT

## 2023-11-29 RX ORDER — LIDOCAINE 4 G/G
1 PATCH TOPICAL DAILY
Status: DISCONTINUED | OUTPATIENT
Start: 2023-11-29 | End: 2023-11-30 | Stop reason: HOSPADM

## 2023-11-29 RX ORDER — LACTOBACILLUS RHAMNOSUS GG 10B CELL
1 CAPSULE ORAL
Status: DISCONTINUED | OUTPATIENT
Start: 2023-11-29 | End: 2023-11-30 | Stop reason: HOSPADM

## 2023-11-29 RX ADMIN — FOLIC ACID 1 MG: 1 TABLET ORAL at 08:23

## 2023-11-29 RX ADMIN — CYCLOBENZAPRINE 10 MG: 10 TABLET, FILM COATED ORAL at 11:27

## 2023-11-29 RX ADMIN — TRAZODONE HYDROCHLORIDE 50 MG: 50 TABLET ORAL at 21:32

## 2023-11-29 RX ADMIN — EMPAGLIFLOZIN 10 MG: 10 TABLET, FILM COATED ORAL at 08:23

## 2023-11-29 RX ADMIN — PILOCARPINE HYDROCHLORIDE 5 MG: 5 TABLET, FILM COATED ORAL at 17:22

## 2023-11-29 RX ADMIN — MICONAZOLE NITRATE: 2 POWDER TOPICAL at 08:27

## 2023-11-29 RX ADMIN — PILOCARPINE HYDROCHLORIDE 5 MG: 5 TABLET, FILM COATED ORAL at 12:37

## 2023-11-29 RX ADMIN — APIXABAN 10 MG: 5 TABLET, FILM COATED ORAL at 21:32

## 2023-11-29 RX ADMIN — OXYCODONE AND ACETAMINOPHEN 1 TABLET: 10; 325 TABLET ORAL at 18:51

## 2023-11-29 RX ADMIN — CYCLOBENZAPRINE 10 MG: 10 TABLET, FILM COATED ORAL at 18:51

## 2023-11-29 RX ADMIN — PILOCARPINE HYDROCHLORIDE 5 MG: 5 TABLET, FILM COATED ORAL at 08:22

## 2023-11-29 RX ADMIN — OXYCODONE AND ACETAMINOPHEN 1 TABLET: 10; 325 TABLET ORAL at 11:22

## 2023-11-29 RX ADMIN — LOSARTAN POTASSIUM 25 MG: 25 TABLET, FILM COATED ORAL at 08:23

## 2023-11-29 RX ADMIN — ALOGLIPTIN 25 MG: 12.5 TABLET, FILM COATED ORAL at 08:22

## 2023-11-29 RX ADMIN — APIXABAN 10 MG: 5 TABLET, FILM COATED ORAL at 08:23

## 2023-11-29 RX ADMIN — LEVOTHYROXINE SODIUM 150 MCG: 0.07 TABLET ORAL at 06:10

## 2023-11-29 RX ADMIN — INSULIN GLARGINE 20 UNITS: 100 INJECTION, SOLUTION SUBCUTANEOUS at 08:28

## 2023-11-29 RX ADMIN — PREGABALIN 100 MG: 50 CAPSULE ORAL at 21:32

## 2023-11-29 RX ADMIN — PILOCARPINE HYDROCHLORIDE 5 MG: 5 TABLET, FILM COATED ORAL at 21:31

## 2023-11-29 RX ADMIN — Medication 1 CAPSULE: at 14:24

## 2023-11-29 RX ADMIN — DAPTOMYCIN 600 MG: 350 INJECTION, POWDER, LYOPHILIZED, FOR SOLUTION INTRAVENOUS at 11:35

## 2023-11-29 RX ADMIN — HYDROCORTISONE: 1 CREAM TOPICAL at 08:23

## 2023-11-29 RX ADMIN — PREGABALIN 100 MG: 50 CAPSULE ORAL at 13:46

## 2023-11-29 RX ADMIN — INSULIN GLARGINE 20 UNITS: 100 INJECTION, SOLUTION SUBCUTANEOUS at 21:37

## 2023-11-29 RX ADMIN — PREGABALIN 100 MG: 50 CAPSULE ORAL at 08:22

## 2023-11-29 RX ADMIN — FLUOXETINE 40 MG: 20 CAPSULE ORAL at 21:31

## 2023-11-29 ASSESSMENT — PAIN DESCRIPTION - ORIENTATION
ORIENTATION: RIGHT

## 2023-11-29 ASSESSMENT — PAIN SCALES - GENERAL
PAINLEVEL_OUTOF10: 10

## 2023-11-29 ASSESSMENT — PAIN DESCRIPTION - DESCRIPTORS
DESCRIPTORS: ACHING;OTHER (COMMENT)
DESCRIPTORS: ACHING
DESCRIPTORS: ACHING

## 2023-11-29 ASSESSMENT — PAIN DESCRIPTION - LOCATION
LOCATION: LEG;KNEE;ANKLE
LOCATION: KNEE
LOCATION: KNEE;HIP

## 2023-11-29 NOTE — FLOWSHEET NOTE
11/28/23 2059   Assessment   Charting Type Shift assessment   Psychosocial   Psychosocial (WDL) WDL   Neurological   Neuro (WDL) WDL   Edward Coma Scale   Eye Opening 4   Best Verbal Response 5   Best Motor Response 6   [place] Coma Scale Score 15   HEENT (Head, Ears, Eyes, Nose, & Throat)   HEENT (WDL) WDL   Right Eye Glasses   Left Eye Glasses   Respiratory   Respiratory (WDL) WDL   Cardiac   Cardiac (WDL) WDL   Gastrointestinal   Abdominal (WDL) WDL   Genitourinary   Genitourinary (WDL) WDL   Urine Urgency   Urine Urgency Yes   Peripheral Vascular   Peripheral Vascular (WDL) WDL   RLE Neurovascular Assessment   Capillary Refill Less than/Equal to 3 seconds   Color Appropriate for Ethnicity   Temperature Warm   R Pedal Pulse +2   LLE Neurovascular Assessment   Capillary Refill Less than/Equal to 3 seconds   Color Appropriate for Ethnicity   Temperature Warm   L Pedal Pulse +2   Skin Integumentary    Skin Integumentary (WDL) X   Skin Color Pale   Skin Condition/Temp Dry; Warm   Skin Integrity Ecchymosis   Location Scattered   Skin Fold Management Yes   Skin Integrity Site 2   Skin Integrity Location 2 Incision (see LDA)   Location 2 Back   Preventative Dressing Yes   Date Applied 11/28/23   Wound Prevention and Protection Methods   Location of Wound Prevention Back   Dressing Present  Intact, not due to be changed   Musculoskeletal   Musculoskeletal (WDL) X   RUE Weakness   LUE Weakness   RL Extremity Weakness   LL Extremity Weakness   Wound 11/20/23 Knee Right scabbed   Date First Assessed/Time First Assessed: 11/20/23 1325   Present on Original Admission: Yes  Primary Wound Type: Skin Tear  Location: Knee  Wound Location Orientation: Right  Wound Description (Comments): scabbed   Wound Etiology Skin Tear   Dressing/Treatment Open to air   Wound Assessment Dry   Drainage Amount None (dry)   Wound 11/20/23 Knee Left scabbed   Date First Assessed/Time First Assessed: 11/20/23 1326   Present on Original Admission:

## 2023-11-29 NOTE — PROGRESS NOTES
Occupational Therapy  Facility/Department: [facility]  Daily Treatment Note  NAME: Josafat Britt  : 1960  MRN: [id number]    Date of Service: 2023    Discharge Recommendations:  Continue to assess pending progress     Patient Diagnosis(es): There were no encounter diagnoses. Assessment    Assessment: Pt received in bed on this date. Pt agreeableto therapy services. Pt reports she has been completing ther ex in room during the day. Pt completed bed mobility with MOD I. Pt transferred to standing with SBA. Pt ambulated to bathroom using RW with SBA. Pt completed toileting tasks with SUP. pt stood at sink with SUP and washed hands. Pt ambulated back to EOB. Pt donned brief seated at EOB with SUP. Pt demo significant improvement in independence with ADL's and mobility since admission. Pt  strength retested today. RUE average 26.3 LUE 50.9 lbs. Pt presents with 17.5lb improvement in  strength since testing one week ago. Pt continued compliance with strengthening and coordination training has demonstrated strides in pt . Pt completed fine motor coordination activity with a focus on in hand manipulation skills, pincer grasp, twisting. Pt tolerated well. Pt did have difficulty snapping pegs requiring increased time to complete task. Pt transferred to supine with MOD I.   Activity Tolerance: Patient tolerated treatment well;Patient limited by fatigue     Subjective   Subjective  Subjective: I done my exercises this morning I am feeling so much better  Pain: R posterior knee     Objective    Vitals     Bed Mobility Training  Bed Mobility Training: No  Overall Level of Assistance: Modified independent  Balance  Sitting: Intact  Standing: With support  Transfer Training  Overall Level of Assistance: Stand-by assistance  Sit to Stand: Modified independent  Stand to Sit: Modified independent  Stand Pivot Transfers: Stand-by assistance  Gait Training  Gait Training: Yes  Gait  Overall Level of

## 2023-11-29 NOTE — PROGRESS NOTES
Dressing change done to PICC line-Pt request du e to redness and itching- Aseptic technique- pt tolerated well will monitor

## 2023-11-29 NOTE — PLAN OF CARE
Problem: Chronic Conditions and Co-morbidities  Goal: Patient's chronic conditions and co-morbidity symptoms are monitored and maintained or improved  Outcome: Progressing     Problem: Safety - Adult  Goal: Free from fall injury  11/29/2023 1029 by Sunday Gosselin, RN  Outcome: Progressing  11/28/2023 2058 by Gallo Santoyo LPN  Outcome: Progressing

## 2023-11-29 NOTE — PROGRESS NOTES
Pt. Complaining of nausea after drinking orange drink at dinner - pt given prn IV zofran and prn po protonix- pt tolerated well- will continue to monitor

## 2023-11-29 NOTE — FLOWSHEET NOTE
11/29/23 0830   Assessment   Charting Type Shift assessment   Psychosocial   Psychosocial (WDL) WDL   Neurological   Neuro (WDL) WDL   Swallow Screening   Is the patient unable to remain alert for testing? No   Is the patient on a modified diet (thickened liquids) due to pre-existing dysphagia? No   Is there presence of existing enteral tube feeding via the stomach or nose? No   Is there presence of head-of-bed restrictions (less than 30 degrees)? No   Is there presence of tracheotomy tube? No   Is the patient ordered nothing-by-mouth status?  No   3 oz Water Swallow Screen Pass   [place] Coma Scale   Eye Opening 4   Best Verbal Response 5   Best Motor Response 6   Edward Coma Scale Score 15   NIHSS Stroke Scale   NIHSS Stroke Scale Assessed No   HEENT (Head, Ears, Eyes, Nose, & Throat)   HEENT (WDL) WDL   Right Eye Glasses   Left Eye Glasses   Teeth Missing teeth   Respiratory   Respiratory (WDL) WDL   Breath Sounds   Right Middle Lobe Clear   Right Lower Lobe Expiratory wheezes   Left Upper Lobe Clear   Left Lower Lobe Clear   Cardiac   Cardiac (WDL) WDL   Gastrointestinal   Abdominal (WDL) WDL   Last BM (including prior to admit) 11/28/23   Abdomen Inspection Soft;Rounded   RUQ Bowel Sounds Active   LUQ Bowel Sounds Active   RLQ Bowel Sounds Active   LLQ Bowel Sounds Active   Genitourinary   Genitourinary (WDL) WDL   Urine Urgency   Urine Urgency Yes   Peripheral Vascular   Peripheral Vascular (WDL) X   Edema Right lower extremity   RLE Edema +1   RLE Neurovascular Assessment   Capillary Refill Less than/Equal to 3 seconds   Color Appropriate for Ethnicity   Temperature Other (comment)  (warm redness noted)   R Pedal Pulse +2   LLE Neurovascular Assessment   Capillary Refill Less than/Equal to 3 seconds   Color Appropriate for Ethnicity   Temperature Warm   L Pedal Pulse +2   Skin Integumentary    Skin Integumentary (WDL) X   Skin Color Pale   Skin Condition/Temp Warm   Skin Integrity Ecchymosis   Location abdomen arms   Skin Fold Management Yes   Assessed This Shift Red   Multiple Skin Integrity Sites Yes   Treatment Pharmaceutical   Skin Integrity Site 2   Skin Integrity Location 2 Wound (see LDA)   Location 2 back   Preventative Dressing Yes   Date Applied 11/29/23   Assessed this shift Yes   Dressing Site   (lower back)   Skin Integrity Site 3   Skin Integrity Location 3 Redness    Location 3 BLE   Preventative Dressing No   Assessed this shift Yes   Musculoskeletal   Musculoskeletal (WDL) X   RUE Weakness   LUE Weakness   RL Extremity Weakness   LL Extremity Weakness   Wound 11/20/23 Knee Right scabbed   Date First Assessed/Time First Assessed: 11/20/23 1325   Present on Original Admission: Yes  Primary Wound Type: Skin Tear  Location: Knee  Wound Location Orientation: Right  Wound Description (Comments): scabbed   Wound Etiology Skin Tear   Dressing/Treatment Open to air   Wound Assessment Dry   Drainage Amount None (dry)   Wound 11/20/23 Knee Left scabbed   Date First Assessed/Time First Assessed: 11/20/23 1326   Present on Original Admission: Yes  Primary Wound Type: Skin Tear  Location: Knee  Wound Location Orientation: Left  Wound Description (Comments): scabbed   Wound Etiology Skin Tear   Dressing/Treatment Open to air   Wound Assessment Dry   Drainage Amount None (dry)   Incision 11/20/23 Back Lower;Medial   Date First Assessed/Time First Assessed: 11/20/23 1324   Present on Original Admission: Yes  Incision Approximate Age at First Assessment (Weeks): 4 weeks  Location: Back  Incision Location Orientation: Lower;Medial  Incision Description (Comments): n... Dressing Status Clean;Dry; Intact   Dressing Change Due 11/29/23   Dressing/Treatment Non-adherent;Tape/Soft cloth adhesive tape   Pt.  Alert times 4- Am assessment completed-breathing equal and unlabored- no complaints of pain dressing done to lower back  per order- Pt tolerated well - Pt  up in chair pt told to call out with any needs will monitor

## 2023-11-29 NOTE — CARE COORDINATION
Swing Bed Interdisciplinary Care Plan Conference Report    Patients name:Flory Hong  Date of Conference: 11/29/2023    Interdisciplinary rounding completed. Activities reviewed with OT. The Following Information was discussed and agreed upon with the patient and/or Caregivers as listed below. Names of Team Members and Caregivers present for meeting:  : Andrew Gloria RN  Nursing: Maty Graf, RN  Therapy: Kevin Alvares, PT; FAUSTINO Hay  Dietician: Akin Meyers      Nutrition:  Current Body Weight:   Wt Readings from Last 1 Encounters:   11/29/23 100.8 kg (222 lb 3 oz)     Weight Change: In: 26 [P.O.:474]  Out: -   Current Diet order:ADULT DIET; Dysphagia - Soft and Bite Sized; 3 carb choices (45 gm/meal)  ADULT ORAL NUTRITION SUPPLEMENT; Lunch; Diabetic Oral Supplement  ADULT ORAL NUTRITION SUPPLEMENT; Dinner; Other Oral Supplement; Gelatein SF  Intakes: 51-75%, % (65-89% x 9)  Goal: PO >75% meals and supplements    Physical/Occupational Therapy:  Current ADL Status: ADL  Grooming: Setup  UE Bathing: Supervision  LE Bathing: Supervision  UE Dressing: Setup  LE Dressing: Supervision  Toileting: Supervision  Transfers:   Transfers  Sit to Stand: Contact guard assistance, 2 Person Assistance  Stand to Sit: Contact guard assistance, 2 Person Assistance  Bed to Chair: Contact guard assistance, 2 Person Assistance  Mobility/Ambulation:   Ambulation  Surface: Level tile  Device: Rolling Walker  Assistance: Contact guard assistance, 2 Person assistance  Gait Deviations: Slow Divina, Increased COLEEN, Decreased step length, Decreased step height  Distance: 175'  Comments: L foot drop    Short Term Goals  Time Frame for Short Term Goals: 2 weeks  Short Term Goal 1: Pt to complete dressing UB/LB with MOD I using AE as needed. Short Term Goal 2: Pt to complete ADL transfers with MOD I demo good safety. Short Term Goal 3: Pt to complete toileting with MOD I. Short Term Goal 4: pt to complete bathing with MOD I.   Short

## 2023-11-29 NOTE — PROGRESS NOTES
Physical Therapy  Facility/Department: [facility]  Daily Treatment Note  NAME: Pushpa Vargas  : 1960  MRN: [id number]    Date of Service: 2023    Discharge Recommendations:  Continue to assess pending progress      Patient Diagnosis(es): There were no encounter diagnoses. Assessment   Assessment: Patient presents up with RW coming out of the bathroom. Patient's infectious disease doctor's office called and wants to see her next Friday at 10:40 in [place]. Nsg. notified. Patient ambulated in the hallway 400 feet with RW and SBA. Patient fatigued afterward and requested to lie back down. Transitioned to supine with Mod I. Patient reports sitting up in the chair this morning and doing her leg exercises. Activity Tolerance: Patient tolerated treatment well;Patient limited by fatigue     Plan    Physical Therapy Plan  General Plan: 3-5 times per week  Days Per Week: 5 Days  Current Treatment Recommendations: Strengthening;Balance training;Functional mobility training;Transfer training;Gait training; Endurance training;Pain management;Home exercise program;Patient/Caregiver education & training; Safety education & training;Equipment evaluation, education, & procurement; Therapeutic activities     Restrictions  Restrictions/Precautions  Restrictions/Precautions: Fall Risk, General Precautions  Required Braces or Orthoses?: No     Subjective    Subjective  Subjective: Patient presents up with RW coming out of the bathroom. States she sat up in the chair for quite a while this morning and has done her leg exercises.   Pain: R posterior knee     Objective   Bed Mobility Training  Bed Mobility Training: No  Overall Level of Assistance: Modified independent  Balance  Sitting: Intact  Standing: With support  Transfer Training  Overall Level of Assistance: Stand-by assistance  Sit to Stand: Modified independent  Stand to Sit: Modified independent  Stand Pivot Transfers: Stand-by assistance  Gait

## 2023-11-30 VITALS
TEMPERATURE: 98.1 F | SYSTOLIC BLOOD PRESSURE: 111 MMHG | WEIGHT: 222.19 LBS | HEART RATE: 69 BPM | BODY MASS INDEX: 35.71 KG/M2 | HEIGHT: 66 IN | RESPIRATION RATE: 18 BRPM | DIASTOLIC BLOOD PRESSURE: 59 MMHG | OXYGEN SATURATION: 97 %

## 2023-11-30 LAB
GLUCOSE BLD-MCNC: 107 MG/DL (ref 74–106)
GLUCOSE BLD-MCNC: 86 MG/DL (ref 74–106)
PERFORMED ON: ABNORMAL
PERFORMED ON: NORMAL

## 2023-11-30 PROCEDURE — 97116 GAIT TRAINING THERAPY: CPT

## 2023-11-30 PROCEDURE — 6370000000 HC RX 637 (ALT 250 FOR IP): Performed by: PHYSICIAN ASSISTANT

## 2023-11-30 PROCEDURE — 6360000002 HC RX W HCPCS: Performed by: PHYSICIAN ASSISTANT

## 2023-11-30 PROCEDURE — 99316 NF DSCHRG MGMT 30 MIN+: CPT | Performed by: INTERNAL MEDICINE

## 2023-11-30 PROCEDURE — 6370000000 HC RX 637 (ALT 250 FOR IP): Performed by: INTERNAL MEDICINE

## 2023-11-30 PROCEDURE — 2580000003 HC RX 258: Performed by: PHYSICIAN ASSISTANT

## 2023-11-30 RX ORDER — LIDOCAINE 4 G/G
1 PATCH TOPICAL DAILY
Qty: 30 EACH | Refills: 0 | Status: SHIPPED | OUTPATIENT
Start: 2023-12-01

## 2023-11-30 RX ORDER — FOLIC ACID 1 MG/1
1 TABLET ORAL DAILY
Qty: 30 TABLET | Refills: 3 | Status: SHIPPED | OUTPATIENT
Start: 2023-12-01

## 2023-11-30 RX ORDER — ALBUTEROL SULFATE 90 UG/1
2 AEROSOL, METERED RESPIRATORY (INHALATION) EVERY 4 HOURS PRN
Qty: 1 EACH | Refills: 1 | Status: SHIPPED | OUTPATIENT
Start: 2023-11-30 | End: 2023-12-30

## 2023-11-30 RX ORDER — LACTOBACILLUS RHAMNOSUS GG 10B CELL
1 CAPSULE ORAL
Qty: 30 CAPSULE | Refills: 0 | Status: SHIPPED | OUTPATIENT
Start: 2023-12-01

## 2023-11-30 RX ORDER — LOSARTAN POTASSIUM 25 MG/1
25 TABLET ORAL DAILY
COMMUNITY

## 2023-11-30 RX ORDER — INSULIN GLARGINE 100 [IU]/ML
20 INJECTION, SOLUTION SUBCUTANEOUS 2 TIMES DAILY
Qty: 15 ADJUSTABLE DOSE PRE-FILLED PEN SYRINGE | Refills: 1 | Status: SHIPPED | OUTPATIENT
Start: 2023-11-30

## 2023-11-30 RX ADMIN — PREGABALIN 100 MG: 50 CAPSULE ORAL at 12:48

## 2023-11-30 RX ADMIN — DICLOFENAC SODIUM 2 G: 10 GEL TOPICAL at 08:42

## 2023-11-30 RX ADMIN — OXYCODONE AND ACETAMINOPHEN 1 TABLET: 10; 325 TABLET ORAL at 00:20

## 2023-11-30 RX ADMIN — HYDROCORTISONE: 1 CREAM TOPICAL at 08:43

## 2023-11-30 RX ADMIN — INSULIN GLARGINE 20 UNITS: 100 INJECTION, SOLUTION SUBCUTANEOUS at 08:44

## 2023-11-30 RX ADMIN — FOLIC ACID 1 MG: 1 TABLET ORAL at 08:42

## 2023-11-30 RX ADMIN — PILOCARPINE HYDROCHLORIDE 5 MG: 5 TABLET, FILM COATED ORAL at 08:42

## 2023-11-30 RX ADMIN — APIXABAN 10 MG: 5 TABLET, FILM COATED ORAL at 08:41

## 2023-11-30 RX ADMIN — LEVOTHYROXINE SODIUM 150 MCG: 0.07 TABLET ORAL at 06:05

## 2023-11-30 RX ADMIN — Medication 1 CAPSULE: at 08:42

## 2023-11-30 RX ADMIN — LOSARTAN POTASSIUM 25 MG: 25 TABLET, FILM COATED ORAL at 08:41

## 2023-11-30 RX ADMIN — DAPTOMYCIN 600 MG: 350 INJECTION, POWDER, LYOPHILIZED, FOR SOLUTION INTRAVENOUS at 11:34

## 2023-11-30 RX ADMIN — PREGABALIN 100 MG: 50 CAPSULE ORAL at 08:41

## 2023-11-30 RX ADMIN — EMPAGLIFLOZIN 10 MG: 10 TABLET, FILM COATED ORAL at 08:42

## 2023-11-30 RX ADMIN — PILOCARPINE HYDROCHLORIDE 5 MG: 5 TABLET, FILM COATED ORAL at 12:48

## 2023-11-30 RX ADMIN — ALOGLIPTIN 25 MG: 12.5 TABLET, FILM COATED ORAL at 08:41

## 2023-11-30 RX ADMIN — CYCLOBENZAPRINE 10 MG: 10 TABLET, FILM COATED ORAL at 06:04

## 2023-11-30 RX ADMIN — MICONAZOLE NITRATE: 2 POWDER TOPICAL at 08:44

## 2023-11-30 ASSESSMENT — PAIN DESCRIPTION - LOCATION
LOCATION: HIP;LEG
LOCATION: HIP;LEG

## 2023-11-30 ASSESSMENT — PAIN DESCRIPTION - DESCRIPTORS
DESCRIPTORS: ACHING;THROBBING
DESCRIPTORS: ACHING;THROBBING

## 2023-11-30 ASSESSMENT — PAIN SCALES - GENERAL
PAINLEVEL_OUTOF10: 10
PAINLEVEL_OUTOF10: 10

## 2023-11-30 ASSESSMENT — PAIN DESCRIPTION - ORIENTATION
ORIENTATION: RIGHT
ORIENTATION: RIGHT

## 2023-11-30 NOTE — CARE COORDINATION
11/30/23. Spoke with Nighat-Hill PA about pt's irbesartan. Per provider Nighat-Hill pt will be changed from irbesartan 300mg po daily to losartan 25mg po daily upon dc as pt has been stable with the lower losartan 25mg dose during admission. Notified nurse Xenia Galeano so she can communicate this change with the pt. Updated pt's home medication list to show that irbesartan has been dc'ed and losartan 25mg has been started. Script for losartan 25mg po daily provided via verbal order from Nighat-Hill PA to [id number]. Additionally, pt is to hold lipitor and leflunomide while on Daptomycin per provider Nighat-Hill PA. Nurse Xenia Galeano communicated this information to the pt.     Leonid Alejandre, PharmD

## 2023-11-30 NOTE — PROGRESS NOTES
Pt is stable and discharged at this time. Pt educated on new medications, next dose time and on pharmacy  or delivery. Pt educated on monitoring bp at home and change in bp meds and new rx to  at pharmacy. Pt educated on holding lipitor and leflunomide until IV antibiotics are completed. Pt educated on follow up appointments. Pt verbalized understanding and had no further questions. Pt taken by wheelchair to parking lot and left via BMT for transport to appointment in [facility] and then home.  Patient left with hospital walker and will have family return it to the hospital.

## 2023-11-30 NOTE — PROGRESS NOTES
CLINICAL PHARMACY NOTE: MEDS TO BEDS    Total # of Prescriptions Filled: 7   The following medications were delivered to the patient:  Current Discharge Medication List        START taking these medications    Details   lactobacillus (CULTURELLE) capsule Take 1 capsule by mouth daily (with breakfast)  Qty: 30 capsule, Refills: 0      lidocaine 4 % external patch Place 1 patch onto the skin daily  Qty: 30 each, Refills: 0      miconazole (MICOTIN) 2 % powder Apply topically 2 times daily. Qty: 45 g, Refills: 1      folic acid (FOLVITE) 1 MG tablet Take 1 tablet by mouth daily  Qty: 30 tablet, Refills: 3         Lantus solostar 100u/ml   Inject 20 units into the skin twice daily Qty: 15ml Refill: 0  BD Pen Needles 32g x 4mm  Use as directed twice a day Lantus Qty: 100 Refill: 0  Albuterol Sulfate  AERS  Inhale 2 puffs by mouth every 4 hours as needed for wheezing or shortness of breath Qty: 36 gm Refill: 0      Additional Documentation: The following changes were made with the approval of Lynda De León  -Acidophilus capsules dispensed in place of Culturelle capsules  -Nyamyc [id number]/gm powder 60 gm dispensed in place of Miconazole 2% powder 45gm  (change made for insurance purposes)    Qty of 20 lidocaine patches dispensed. Medications were delivered to patient's room and signed for by patient.

## 2023-11-30 NOTE — CARE COORDINATION
Case Management Assessment Discharge Note    Date / Time of Note:  11/30/23 10:45 AM  Discharge Note Completed by: Veronica Paulnio RN      Patient Name: Catrina Lechuga   YOB: 1960  Diagnosis: Weakness [R53.1]  Declining functional status [R53.81]   Date / Time: 11/22/2023  1:06 PM    Current PCP: NICKOLAS Escobar  Clinic patient: Yes    Hospitalization in the last 30 days: Yes - OBS    Advance Directives:  Code Status: Full Code    Financial:  Payor: Abimael Noel / Plan: Tu Jauregui / Product Type: *No Product type* /      Pharmacy:    [facility]  [phone] Theo Gamino [phone]  [address] [place] [phone]  Phone: [phone] Fax: [phone]      Assistance purchasing medications?: Potential Assistance Purchasing Medications: Yes  Assistance provided by Case Management: None at this time    Does patient want to participate in local refill/ meds to beds program?: Yes    Meds To Beds General Rules:  1. Can ONLY be done Monday- Friday between 8:30am-5pm  2. Prescription(s) must be in pharmacy by 3pm to be filled same day  3. Copy of patient's insurance/ prescription drug card and patient face sheet must be sent along with the prescription(s)  4. Cost of Rx cannot be added to hospital bill. If financial assistance is needed, please contact unit  or ;  or  CANNOT provide pharmacy voucher for patients co-pays  5.  Patients can then  the prescription on their way out of the hospital at discharge, or pharmacy can deliver to the bedside if staff is available. (payment due at time of pick-up or delivery - cash, check, or card accepted)     Able to afford home medications/ co-pay costs: Yes    DISCHARGE Disposition: Home with [address] [facility]: [facility]       Transportation:  Transportation PLAN for discharge:  transport van    Mode of Transport: BMT

## 2023-11-30 NOTE — FLOWSHEET NOTE
11/30/23 0841   Assessment   Charting Type Shift assessment   Psychosocial   Psychosocial (WDL) WDL   Neurological   Neuro (WDL) WDL   Edward Coma Scale   Eye Opening 4   Best Verbal Response 5   Best Motor Response 6   Edward Coma Scale Score 15   HEENT (Head, Ears, Eyes, Nose, & Throat)   HEENT (WDL) WDL   Right Eye Glasses   Left Eye Glasses   Teeth Missing teeth   Respiratory   Respiratory (WDL) WDL   Breath Sounds   Right Middle Lobe Clear   Right Lower Lobe Clear;Diminished   Left Upper Lobe Clear   Left Lower Lobe Clear;Diminished   Cardiac   Cardiac (WDL) WDL   Gastrointestinal   Abdominal (WDL) WDL   Last BM (including prior to admit) 11/29/23   Abdomen Inspection Soft;Rounded   RUQ Bowel Sounds Active   LUQ Bowel Sounds Active   RLQ Bowel Sounds Active   LLQ Bowel Sounds Active   Genitourinary   Genitourinary (WDL) WDL   Urine Urgency   Urine Urgency Yes   Urine Assessment   Urinary Status Voiding   Peripheral Vascular   Peripheral Vascular (WDL) X   Edema Right lower extremity   RLE Edema None   RLE Neurovascular Assessment   Capillary Refill Less than/Equal to 3 seconds   Color Appropriate for Ethnicity   Temperature Warm   R Pedal Pulse +2   LLE Neurovascular Assessment   Capillary Refill Less than/Equal to 3 seconds   Color Appropriate for Ethnicity   Temperature Warm   L Pedal Pulse +2   Skin Integumentary    Skin Integumentary (WDL) X   Skin Color Pale   Skin Condition/Temp Dry; Warm   Skin Integrity Ecchymosis   Location BUE   Skin Fold Management Yes   Nails X   Nail Condition Discolored   Multiple Skin Integrity Sites Yes   Treatment Pharmaceutical   Skin Integrity Site 2   Skin Integrity Location 2 Wound (see LDA)   Location 2 back   Preventative Dressing Yes   Date Applied 11/30/23   Assessed this shift Yes   Skin Integrity Site 3   Skin Integrity Location 3 Redness    Location 3 BLE   Preventative Dressing No   Assessed this shift Yes   Care Plan - Skin/Tissue Integrity Goals   Skin

## 2023-11-30 NOTE — PLAN OF CARE
Problem: Discharge Planning  Goal: Discharge to home or other facility with appropriate resources  11/30/2023 1305 by Bandar Duncan RN  Outcome: Adequate for Discharge  11/30/2023 1007 by Bandar Duncan RN  Outcome: Progressing  Flowsheets (Taken 11/30/2023 3325)  Discharge to home or other facility with appropriate resources:   Identify barriers to discharge with patient and caregiver   Identify discharge learning needs (meds, wound care, etc)  11/30/2023 0125 by Servando Garcia RN  Outcome: Progressing     Problem: Safety - Adult  Goal: Free from fall injury  11/30/2023 1305 by Bandar Duncan RN  Outcome: Adequate for Discharge  11/30/2023 1007 by Bandar Duncan RN  Outcome: Progressing  11/30/2023 0125 by Servando Garcia RN  Outcome: Progressing     Problem: Chronic Conditions and Co-morbidities  Goal: Patient's chronic conditions and co-morbidity symptoms are monitored and maintained or improved  11/30/2023 1305 by Bandar Duncan RN  Outcome: Adequate for Discharge  11/30/2023 1007 by Bandar Duncan RN  Outcome: Progressing     Problem: Nutrition Deficit:  Goal: Optimize nutritional status  11/30/2023 1305 by Bandar Duncan RN  Outcome: Adequate for Discharge  11/30/2023 1007 by Bandar Duncan RN  Outcome: Progressing     Problem: Skin/Tissue Integrity  Goal: Absence of new skin breakdown  Description: 1. Monitor for areas of redness and/or skin breakdown  2. Assess vascular access sites hourly  3. Every 4-6 hours minimum:  Change oxygen saturation probe site  4. Every 4-6 hours:  If on nasal continuous positive airway pressure, respiratory therapy assess nares and determine need for appliance change or resting period.   11/30/2023 1305 by Bandar Duncan RN  Outcome: Adequate for Discharge  11/30/2023 1007 by Bandar Duncan RN  Outcome: Progressing     Problem: ABCDS Injury Assessment  Goal: Absence of physical injury  11/30/2023 1305 by Bandar Duncan RN  Outcome: Adequate for Discharge  11/30/2023 1007

## 2023-11-30 NOTE — PROGRESS NOTES
Physical Therapy  Facility/Department: [facility]  Daily Treatment Note  NAME: Jatin Zafar  : 1960  MRN: [id number]    Date of Service: 2023    Discharge Recommendations:  Continue to assess pending progress      Patient Diagnosis(es): There were no encounter diagnoses. Assessment   Assessment: Patient Mod I with bed mobility. Stood with Mod I and ambulated 400 feet with RW and SBA/supervision. Patient reports taking a shower this morning and doing her exercises. States she's tired and wants to lie back down. Patient has ortho appointment at 2pm today. Activity Tolerance: Patient tolerated treatment well;Patient limited by fatigue     Plan    Physical Therapy Plan  General Plan: 3-5 times per week  Current Treatment Recommendations: Strengthening;Balance training;Functional mobility training;Transfer training;Gait training; Endurance training;Pain management;Home exercise program;Patient/Caregiver education & training; Safety education & training;Equipment evaluation, education, & procurement; Therapeutic activities     Restrictions  Restrictions/Precautions  Restrictions/Precautions: Fall Risk, General Precautions  Required Braces or Orthoses?: No     Subjective    Subjective  Subjective: Patient presents awake in bed, NAD. States she's had a shower this morning and done some exercise.  Reports having an ortho appointment today at 2pm.     Objective   Bed Mobility Training  Bed Mobility Training: No  Overall Level of Assistance: Modified independent  Balance  Sitting: Intact  Standing: With support  Transfer Training  Transfer Training: Yes  Overall Level of Assistance: Stand-by assistance;Supervision  Sit to Stand: Modified independent  Stand to Sit: Modified independent  Stand Pivot Transfers: Stand-by assistance;Supervision  Gait Training  Gait Training: Yes  Gait  Overall Level of Assistance: Stand-by assistance;Supervision  Distance (ft): 400 Feet  Assistive Device: Gait belt;Walker,

## 2023-11-30 NOTE — PLAN OF CARE
Problem: Discharge Planning  Goal: Discharge to home or other facility with appropriate resources  11/30/2023 1007 by Vanna Lozano RN  Outcome: Progressing  Flowsheets (Taken 11/30/2023 8423)  Discharge to home or other facility with appropriate resources:   Identify barriers to discharge with patient and caregiver   Identify discharge learning needs (meds, wound care, etc)  11/30/2023 0125 by Radha Perez RN  Outcome: Progressing     Problem: Safety - Adult  Goal: Free from fall injury  11/30/2023 1007 by Vanna Lozano RN  Outcome: Progressing  11/30/2023 0125 by Radha Perez RN  Outcome: Progressing     Problem: Chronic Conditions and Co-morbidities  Goal: Patient's chronic conditions and co-morbidity symptoms are monitored and maintained or improved  Outcome: Progressing     Problem: Nutrition Deficit:  Goal: Optimize nutritional status  Outcome: Progressing     Problem: Skin/Tissue Integrity  Goal: Absence of new skin breakdown  Description: 1. Monitor for areas of redness and/or skin breakdown  2. Assess vascular access sites hourly  3. Every 4-6 hours minimum:  Change oxygen saturation probe site  4. Every 4-6 hours:  If on nasal continuous positive airway pressure, respiratory therapy assess nares and determine need for appliance change or resting period.   Outcome: Progressing     Problem: ABCDS Injury Assessment  Goal: Absence of physical injury  Outcome: Progressing     Problem: Pain  Goal: Verbalizes/displays adequate comfort level or baseline comfort level  Outcome: Progressing

## 2023-12-01 DIAGNOSIS — M54.16 LUMBAR BACK PAIN WITH RADICULOPATHY AFFECTING LEFT LOWER EXTREMITY: ICD-10-CM

## 2023-12-01 RX ORDER — CYCLOBENZAPRINE HCL 10 MG
10 TABLET ORAL 3 TIMES DAILY PRN
Qty: 30 TABLET | Refills: 0 | Status: SHIPPED | OUTPATIENT
Start: 2023-12-01

## 2023-12-01 NOTE — CARE COORDINATION
Patient states she is doing better. A nurse had just left her home helping with medicines. She stated her medicines were explained to her. She said she needs a bench for her bath tub because she does not feel safe getting in and out of her bath. After speaking with Jessy Sanchez, I told patient her insurance will not cover purchasing a bath or shower seat. Patient was reminded of her appointment with PCP 12/11/2023 at 9:00 AM. She stated she was very happy with her care while in the hospital. She had no further questions at the time.

## 2023-12-04 ENCOUNTER — CARE COORDINATION (OUTPATIENT)
Dept: PRIMARY CARE CLINIC | Age: 63
End: 2023-12-04

## 2023-12-04 ENCOUNTER — HOSPITAL ENCOUNTER (OUTPATIENT)
Facility: HOSPITAL | Age: 63
Discharge: HOME | End: 2023-12-04
Payer: MEDICARE

## 2023-12-04 ENCOUNTER — TELEPHONE (OUTPATIENT)
Dept: INTERNAL MEDICINE | Age: 63
End: 2023-12-04

## 2023-12-04 LAB
ALBUMIN SERPL-MCNC: 3.6 G/DL (ref 3.4–4.8)
ALBUMIN/GLOB SERPL: 2 {RATIO} (ref 0.8–2)
ALP SERPL-CCNC: 146 U/L (ref 25–100)
ALT SERPL-CCNC: 27 U/L (ref 4–36)
ANION GAP SERPL CALCULATED.3IONS-SCNC: 13 MMOL/L (ref 3–16)
AST SERPL-CCNC: 16 U/L (ref 8–33)
BILIRUB SERPL-MCNC: 0.4 MG/DL (ref 0.3–1.2)
BUN SERPL-MCNC: 15 MG/DL (ref 6–20)
CALCIUM SERPL-MCNC: 8.6 MG/DL (ref 8.5–10.5)
CHLORIDE SERPL-SCNC: 105 MMOL/L (ref 98–107)
CK SERPL-CCNC: 66 U/L (ref 26–174)
CO2 SERPL-SCNC: 22 MMOL/L (ref 20–30)
CREAT SERPL-MCNC: 0.7 MG/DL (ref 0.4–1.2)
CRP SERPL-MCNC: 12.4 MG/L (ref 0–5.1)
ERYTHROCYTE [DISTWIDTH] IN BLOOD BY AUTOMATED COUNT: 13.1 % (ref 11–16)
ERYTHROCYTE [SEDIMENTATION RATE] IN BLOOD BY WESTERGREN METHOD: 26 MM/HR (ref 0–30)
GFR SERPLBLD CREATININE-BSD FMLA CKD-EPI: >60 ML/MIN/{1.73_M2}
GLOBULIN SER CALC-MCNC: 1.8 G/DL
GLUCOSE SERPL-MCNC: 180 MG/DL (ref 74–106)
HCT VFR BLD AUTO: 42.5 % (ref 37–47)
HGB BLD-MCNC: 13.7 G/DL (ref 11.5–16.5)
MCH RBC QN AUTO: 28.8 PG (ref 27–32)
MCHC RBC AUTO-ENTMCNC: 32.2 G/DL (ref 31–35)
MCV RBC AUTO: 89.5 FL (ref 80–100)
PLATELET # BLD AUTO: 273 K/UL (ref 150–400)
PMV BLD AUTO: 11.3 FL (ref 6–10)
POTASSIUM SERPL-SCNC: 4.3 MMOL/L (ref 3.4–5.1)
PROT SERPL-MCNC: 5.4 G/DL (ref 6.4–8.3)
RBC # BLD AUTO: 4.75 M/UL (ref 3.8–5.8)
SODIUM SERPL-SCNC: 140 MMOL/L (ref 136–145)
WBC # BLD AUTO: 11.7 K/UL (ref 4–11)

## 2023-12-04 PROCEDURE — 36415 COLL VENOUS BLD VENIPUNCTURE: CPT

## 2023-12-04 PROCEDURE — 85652 RBC SED RATE AUTOMATED: CPT

## 2023-12-04 PROCEDURE — 80053 COMPREHEN METABOLIC PANEL: CPT

## 2023-12-04 PROCEDURE — 82550 ASSAY OF CK (CPK): CPT

## 2023-12-04 PROCEDURE — 85027 COMPLETE CBC AUTOMATED: CPT

## 2023-12-04 PROCEDURE — 86140 C-REACTIVE PROTEIN: CPT

## 2023-12-04 NOTE — TELEPHONE ENCOUNTER
Aj Calles spoke with patient and was told that she is experiencing UTI symptoms with burning with urination. She is also c/o right hip pain that is keeping her up at night. She was just discharged from rehab and is currently taking Daptomycin and therefore cannot take any of her arthritic medication until she is finished.  She is out of Percocet that she has taken in the past.

## 2023-12-04 NOTE — CARE COORDINATION
Care Transitions Initial Follow Up Call    Call within 2 business days of discharge: Yes    Patient Current Location:  Home: [address] [place] [address]  [id number] [facility]    Care Transition Nurse contacted the patient by telephone to perform post hospital discharge assessment. Verified name and  with patient as identifiers. Provided introduction to self, and explanation of the Care Transition Nurse role. Patient: Abdulaziz Bhatia Patient : 1960   MRN: [id number]  Reason for Admission: Rehab, DFS, fall risk   Discharge Date: 23 RARS: Readmission Risk Score: 6.5      Last Discharge Facility       Date Complaint Diagnosis Description Type Department Provider    23   Admission (Discharged) Lona Carbone MD            Was this an external facility discharge? No Discharge Facility: [facility]    Challenges to be reviewed by the provider   Additional needs identified to be addressed with provider: Yes  medications-Needs pain med  UTI sx               Method of communication with provider: face to face. Helio Mcleod tells me that she is having urinary frequency and very small amounts since yesterday. She is also complaining that right hip pain is very bad and keeping her from sleeping. She states that the pain patch doesn't help and she is unable to sleep due to this. She does not have any percocet left to take. I asked if she could come in to the office and she tells me that she doesn't have anyone to bring her and it is very difficult for her to get out of the house with her hip pain. She is also off of her RA medications while she is taking antibiotics until 23. Her home health nurse is suppose to come today for dressing change. She is doing her IV antibiotics. I let her know I would talk to her PCP about these things. Care Transition Nurse reviewed discharge instructions with patient who verbalized understanding.  The patient was given an opportunity to ask questions and does not

## 2023-12-05 ENCOUNTER — CARE COORDINATION (OUTPATIENT)
Dept: PRIMARY CARE CLINIC | Age: 63
End: 2023-12-05

## 2023-12-05 DIAGNOSIS — M54.16 LUMBAR BACK PAIN WITH RADICULOPATHY AFFECTING LEFT LOWER EXTREMITY: Primary | ICD-10-CM

## 2023-12-05 DIAGNOSIS — N30.01 ACUTE CYSTITIS WITH HEMATURIA: ICD-10-CM

## 2023-12-05 RX ORDER — NITROFURANTOIN 25; 75 MG/1; MG/1
100 CAPSULE ORAL 2 TIMES DAILY
Qty: 20 CAPSULE | Refills: 0 | Status: SHIPPED | OUTPATIENT
Start: 2023-12-05 | End: 2023-12-15

## 2023-12-05 RX ORDER — HYDROCODONE BITARTRATE AND ACETAMINOPHEN 10; 325 MG/1; MG/1
1 TABLET ORAL EVERY 6 HOURS PRN
Qty: 90 TABLET | Refills: 0 | Status: SHIPPED | OUTPATIENT
Start: 2023-12-05 | End: 2024-01-04

## 2023-12-05 NOTE — CARE COORDINATION
Wendi Almonte, RN  Manager Care Coordination  [phone]     I called Letitia Corjimy this morning for an update. She tells me that at time she tried to get to the bathroom but has no control over urine. Other times she cannot urinate although she feels she needs too. It burns \"very bad\" when she goes. She also noted what looked like blood to her in her depends. I let her know I would update her PCP and call her back this afternoon.

## 2023-12-05 NOTE — CARE COORDINATION
Joseph Rendon, RN  Manager Care Coordination  [phone]     I spoke with Jerald Prieto and [facility]. She will have an antibiotic for urinary infection symptoms as well as pain medication for her back. The hydrocodone is 10 mg and I explained that she can break in half and try 5 mg first.  I also explained it is only to take if needed. She needs to be at her appointment on 12/11/23. Jerald Prieto verbalized understanding.

## 2023-12-08 ENCOUNTER — CARE COORDINATION (OUTPATIENT)
Dept: PRIMARY CARE CLINIC | Age: 63
End: 2023-12-08

## 2023-12-08 NOTE — CARE COORDINATION
Alfonso Singh, RN  Manager Care Coordination  [phone]     I called David Snow to fu on her UTI sx and pain. Message left with contact information.

## 2023-12-11 ENCOUNTER — HOSPITAL ENCOUNTER (OUTPATIENT)
Facility: HOSPITAL | Age: 63
Discharge: HOME | End: 2023-12-11
Payer: MEDICARE

## 2023-12-11 DIAGNOSIS — I10 PRIMARY HYPERTENSION: ICD-10-CM

## 2023-12-11 DIAGNOSIS — E11.59 TYPE 2 DIABETES MELLITUS WITH OTHER CIRCULATORY COMPLICATION, WITH LONG-TERM CURRENT USE OF INSULIN (HCC): ICD-10-CM

## 2023-12-11 DIAGNOSIS — E55.9 VITAMIN D DEFICIENCY: ICD-10-CM

## 2023-12-11 DIAGNOSIS — E03.9 ACQUIRED HYPOTHYROIDISM: ICD-10-CM

## 2023-12-11 DIAGNOSIS — Z79.4 TYPE 2 DIABETES MELLITUS WITH OTHER CIRCULATORY COMPLICATION, WITH LONG-TERM CURRENT USE OF INSULIN (HCC): ICD-10-CM

## 2023-12-11 DIAGNOSIS — Z13.29 THYROID DISORDER SCREEN: ICD-10-CM

## 2023-12-11 LAB
25(OH)D3 SERPL-MCNC: 46.1 NG/ML (ref 32–100)
ALBUMIN SERPL-MCNC: 3.7 G/DL (ref 3.4–4.8)
ALBUMIN/GLOB SERPL: 1.8 {RATIO} (ref 0.8–2)
ALP SERPL-CCNC: 169 U/L (ref 25–100)
ALT SERPL-CCNC: 29 U/L (ref 4–36)
ANION GAP SERPL CALCULATED.3IONS-SCNC: 9 MMOL/L (ref 3–16)
AST SERPL-CCNC: 18 U/L (ref 8–33)
BILIRUB SERPL-MCNC: <0.2 MG/DL (ref 0.3–1.2)
BUN SERPL-MCNC: 14 MG/DL (ref 6–20)
CALCIUM SERPL-MCNC: 9.5 MG/DL (ref 8.5–10.5)
CHLORIDE SERPL-SCNC: 103 MMOL/L (ref 98–107)
CHOLEST SERPL-MCNC: 247 MG/DL (ref 0–200)
CO2 SERPL-SCNC: 29 MMOL/L (ref 20–30)
CREAT SERPL-MCNC: 0.9 MG/DL (ref 0.4–1.2)
ERYTHROCYTE [DISTWIDTH] IN BLOOD BY AUTOMATED COUNT: 13.1 % (ref 11–16)
FOLATE SERPL-MCNC: >20 NG/ML
GFR SERPLBLD CREATININE-BSD FMLA CKD-EPI: >60 ML/MIN/{1.73_M2}
GLOBULIN SER CALC-MCNC: 2.1 G/DL
GLUCOSE SERPL-MCNC: 182 MG/DL (ref 74–106)
HBA1C MFR BLD: 7.7 %
HCT VFR BLD AUTO: 44 % (ref 37–47)
HDLC SERPL-MCNC: 40 MG/DL (ref 40–60)
HGB BLD-MCNC: 14.4 G/DL (ref 11.5–16.5)
LDLC SERPL CALC-MCNC: 175 MG/DL
MCH RBC QN AUTO: 29.2 PG (ref 27–32)
MCHC RBC AUTO-ENTMCNC: 32.7 G/DL (ref 31–35)
MCV RBC AUTO: 89.2 FL (ref 80–100)
PLATELET # BLD AUTO: 308 K/UL (ref 150–400)
PMV BLD AUTO: 11.3 FL (ref 6–10)
POTASSIUM SERPL-SCNC: 4.9 MMOL/L (ref 3.4–5.1)
PROT SERPL-MCNC: 5.8 G/DL (ref 6.4–8.3)
RBC # BLD AUTO: 4.93 M/UL (ref 3.8–5.8)
SODIUM SERPL-SCNC: 141 MMOL/L (ref 136–145)
TRIGL SERPL-MCNC: 162 MG/DL (ref 0–249)
TSH SERPL DL<=0.005 MIU/L-ACNC: 0.88 UIU/ML (ref 0.27–4.2)
VIT B12 SERPL-MCNC: 409 PG/ML (ref 211–911)
VLDLC SERPL CALC-MCNC: 32 MG/DL
WBC # BLD AUTO: 9.8 K/UL (ref 4–11)

## 2023-12-11 PROCEDURE — 80053 COMPREHEN METABOLIC PANEL: CPT

## 2023-12-11 PROCEDURE — 82746 ASSAY OF FOLIC ACID SERUM: CPT

## 2023-12-11 PROCEDURE — 84443 ASSAY THYROID STIM HORMONE: CPT

## 2023-12-11 PROCEDURE — 82607 VITAMIN B-12: CPT

## 2023-12-11 PROCEDURE — 83036 HEMOGLOBIN GLYCOSYLATED A1C: CPT

## 2023-12-11 PROCEDURE — 80061 LIPID PANEL: CPT

## 2023-12-11 PROCEDURE — 82306 VITAMIN D 25 HYDROXY: CPT

## 2023-12-11 PROCEDURE — 85027 COMPLETE CBC AUTOMATED: CPT

## 2023-12-12 ENCOUNTER — HOSPITAL ENCOUNTER (OUTPATIENT)
Facility: HOSPITAL | Age: 63
Discharge: HOME | End: 2023-12-12
Payer: MEDICARE

## 2023-12-12 LAB
ALBUMIN SERPL-MCNC: 3.2 G/DL (ref 3.4–4.8)
ALBUMIN/GLOB SERPL: 1.7 {RATIO} (ref 0.8–2)
ALP SERPL-CCNC: 150 U/L (ref 25–100)
ALT SERPL-CCNC: 22 U/L (ref 4–36)
ANION GAP SERPL CALCULATED.3IONS-SCNC: 7 MMOL/L (ref 3–16)
AST SERPL-CCNC: 15 U/L (ref 8–33)
BILIRUB SERPL-MCNC: <0.2 MG/DL (ref 0.3–1.2)
BUN SERPL-MCNC: 18 MG/DL (ref 6–20)
CALCIUM SERPL-MCNC: 8.9 MG/DL (ref 8.5–10.5)
CHLORIDE SERPL-SCNC: 103 MMOL/L (ref 98–107)
CK SERPL-CCNC: 34 U/L (ref 26–174)
CO2 SERPL-SCNC: 28 MMOL/L (ref 20–30)
CREAT SERPL-MCNC: 0.8 MG/DL (ref 0.4–1.2)
CRP SERPL-MCNC: 3.6 MG/L (ref 0–5.1)
ERYTHROCYTE [DISTWIDTH] IN BLOOD BY AUTOMATED COUNT: 13.2 % (ref 11–16)
ERYTHROCYTE [SEDIMENTATION RATE] IN BLOOD BY WESTERGREN METHOD: 22 MM/HR (ref 0–30)
GFR SERPLBLD CREATININE-BSD FMLA CKD-EPI: >60 ML/MIN/{1.73_M2}
GLOBULIN SER CALC-MCNC: 1.9 G/DL
GLUCOSE SERPL-MCNC: 185 MG/DL (ref 74–106)
HCT VFR BLD AUTO: 40.3 % (ref 37–47)
HGB BLD-MCNC: 12.9 G/DL (ref 11.5–16.5)
MCH RBC QN AUTO: 28.9 PG (ref 27–32)
MCHC RBC AUTO-ENTMCNC: 32 G/DL (ref 31–35)
MCV RBC AUTO: 90.2 FL (ref 80–100)
PLATELET # BLD AUTO: 280 K/UL (ref 150–400)
PMV BLD AUTO: 11.2 FL (ref 6–10)
POTASSIUM SERPL-SCNC: 4.6 MMOL/L (ref 3.4–5.1)
PROT SERPL-MCNC: 5.1 G/DL (ref 6.4–8.3)
RBC # BLD AUTO: 4.47 M/UL (ref 3.8–5.8)
SODIUM SERPL-SCNC: 138 MMOL/L (ref 136–145)
WBC # BLD AUTO: 9.8 K/UL (ref 4–11)

## 2023-12-12 PROCEDURE — 80053 COMPREHEN METABOLIC PANEL: CPT

## 2023-12-12 PROCEDURE — 85652 RBC SED RATE AUTOMATED: CPT

## 2023-12-12 PROCEDURE — 36415 COLL VENOUS BLD VENIPUNCTURE: CPT

## 2023-12-12 PROCEDURE — 86140 C-REACTIVE PROTEIN: CPT

## 2023-12-12 PROCEDURE — 82550 ASSAY OF CK (CPK): CPT

## 2023-12-12 PROCEDURE — 85027 COMPLETE CBC AUTOMATED: CPT

## 2023-12-22 ENCOUNTER — TELEPHONE (OUTPATIENT)
Dept: PRIMARY CARE CLINIC | Age: 63
End: 2023-12-22

## 2023-12-22 NOTE — TELEPHONE ENCOUNTER
Ileana called from [facility] to notify provider that insurance denied patient's prescription for Shower Chair and Raised Toilet Seat

## 2023-12-28 RX ORDER — TRAZODONE HYDROCHLORIDE 50 MG/1
TABLET ORAL
Qty: 30 TABLET | Refills: 1 | Status: SHIPPED | OUTPATIENT
Start: 2023-12-28

## 2023-12-28 RX ORDER — CYCLOBENZAPRINE HCL 5 MG
5 TABLET ORAL 3 TIMES DAILY PRN
Qty: 90 TABLET | Refills: 0 | Status: SHIPPED | OUTPATIENT
Start: 2023-12-28

## 2023-12-28 RX ORDER — LOSARTAN POTASSIUM 100 MG/1
100 TABLET ORAL DAILY
Qty: 90 TABLET | OUTPATIENT
Start: 2023-12-28

## 2024-01-08 RX ORDER — LACTOBACILLUS RHAMNOSUS GG 10B CELL
1 CAPSULE ORAL
Qty: 90 CAPSULE | Refills: 1 | Status: SHIPPED | OUTPATIENT
Start: 2024-01-08

## 2024-01-08 RX ORDER — LOSARTAN POTASSIUM 25 MG/1
25 TABLET ORAL DAILY
Qty: 90 TABLET | Refills: 1 | Status: SHIPPED | OUTPATIENT
Start: 2024-01-08

## 2024-01-31 DIAGNOSIS — M54.16 LUMBAR BACK PAIN WITH RADICULOPATHY AFFECTING LEFT LOWER EXTREMITY: Primary | ICD-10-CM

## 2024-02-01 RX ORDER — PREGABALIN 100 MG/1
100 CAPSULE ORAL 3 TIMES DAILY
Qty: 90 CAPSULE | Refills: 2 | Status: SHIPPED | OUTPATIENT
Start: 2024-02-01 | End: 2024-05-01

## 2024-02-07 ENCOUNTER — TELEPHONE (OUTPATIENT)
Dept: PRIMARY CARE CLINIC | Age: 64
End: 2024-02-07

## 2024-02-07 NOTE — TELEPHONE ENCOUNTER
Megan with home health called stating that pt has tooth abscess \"tooth/gum pain,facial swelling\" Has appt Friday with dentist, but asking if you can send antibx

## 2024-02-08 DIAGNOSIS — K04.7 TOOTH ABSCESS: Primary | ICD-10-CM

## 2024-02-08 RX ORDER — AMOXICILLIN 875 MG/1
875 TABLET, COATED ORAL 2 TIMES DAILY
Qty: 20 TABLET | Refills: 0 | Status: CANCELLED | OUTPATIENT
Start: 2024-02-08 | End: 2024-02-18

## 2024-02-19 ENCOUNTER — TELEPHONE (OUTPATIENT)
Dept: PRIMARY CARE CLINIC | Age: 64
End: 2024-02-19

## 2024-02-19 DIAGNOSIS — Z79.4 TYPE 2 DIABETES MELLITUS WITH OTHER CIRCULATORY COMPLICATION, WITH LONG-TERM CURRENT USE OF INSULIN (HCC): ICD-10-CM

## 2024-02-19 DIAGNOSIS — E11.59 TYPE 2 DIABETES MELLITUS WITH OTHER CIRCULATORY COMPLICATION, WITH LONG-TERM CURRENT USE OF INSULIN (HCC): ICD-10-CM

## 2024-02-19 RX ORDER — DULAGLUTIDE 1.5 MG/.5ML
INJECTION, SOLUTION SUBCUTANEOUS
Qty: 2 ML | Refills: 3 | Status: SHIPPED
Start: 2024-02-19 | End: 2024-02-21 | Stop reason: ALTCHOICE

## 2024-02-19 RX ORDER — FOLIC ACID 1 MG/1
1 TABLET ORAL DAILY
Qty: 30 TABLET | Refills: 3 | Status: SHIPPED | OUTPATIENT
Start: 2024-02-19

## 2024-02-19 RX ORDER — DAPAGLIFLOZIN 5 MG/1
5 TABLET, FILM COATED ORAL EVERY MORNING
Qty: 30 TABLET | Refills: 5 | Status: SHIPPED | OUTPATIENT
Start: 2024-02-19

## 2024-02-19 RX ORDER — LACTOBACILLUS RHAMNOSUS GG 10B CELL
1 CAPSULE ORAL
Qty: 90 CAPSULE | Refills: 1 | Status: SHIPPED | OUTPATIENT
Start: 2024-02-19

## 2024-02-21 RX ORDER — LEVOTHYROXINE SODIUM 0.15 MG/1
150 TABLET ORAL DAILY
Qty: 90 TABLET | Refills: 1 | Status: SHIPPED | OUTPATIENT
Start: 2024-02-21

## 2024-02-21 RX ORDER — DULAGLUTIDE 3 MG/.5ML
3 INJECTION, SOLUTION SUBCUTANEOUS WEEKLY
Qty: 2 ML | Refills: 3 | Status: SHIPPED | OUTPATIENT
Start: 2024-02-21

## 2024-03-14 ENCOUNTER — HOSPITAL ENCOUNTER (OUTPATIENT)
Facility: HOSPITAL | Age: 64
Discharge: HOME | End: 2024-03-14
Payer: MEDICARE

## 2024-03-14 ENCOUNTER — OFFICE VISIT (OUTPATIENT)
Dept: PRIMARY CARE CLINIC | Age: 64
End: 2024-03-14
Payer: MEDICARE

## 2024-03-14 VITALS
BODY MASS INDEX: 33.27 KG/M2 | DIASTOLIC BLOOD PRESSURE: 62 MMHG | RESPIRATION RATE: 16 BRPM | WEIGHT: 207 LBS | SYSTOLIC BLOOD PRESSURE: 100 MMHG | HEIGHT: 66 IN | OXYGEN SATURATION: 98 % | TEMPERATURE: 97.4 F | HEART RATE: 78 BPM

## 2024-03-14 DIAGNOSIS — E03.9 ACQUIRED HYPOTHYROIDISM: ICD-10-CM

## 2024-03-14 DIAGNOSIS — Z79.4 TYPE 2 DIABETES MELLITUS WITH OTHER CIRCULATORY COMPLICATION, WITH LONG-TERM CURRENT USE OF INSULIN (HCC): Primary | ICD-10-CM

## 2024-03-14 DIAGNOSIS — Z79.4 TYPE 2 DIABETES MELLITUS WITH OTHER CIRCULATORY COMPLICATION, WITH LONG-TERM CURRENT USE OF INSULIN (HCC): ICD-10-CM

## 2024-03-14 DIAGNOSIS — E55.9 VITAMIN D DEFICIENCY: ICD-10-CM

## 2024-03-14 DIAGNOSIS — R53.81 DEBILITY: ICD-10-CM

## 2024-03-14 DIAGNOSIS — E11.59 TYPE 2 DIABETES MELLITUS WITH OTHER CIRCULATORY COMPLICATION, WITH LONG-TERM CURRENT USE OF INSULIN (HCC): ICD-10-CM

## 2024-03-14 DIAGNOSIS — I10 PRIMARY HYPERTENSION: ICD-10-CM

## 2024-03-14 DIAGNOSIS — M54.16 LUMBAR BACK PAIN WITH RADICULOPATHY AFFECTING LEFT LOWER EXTREMITY: ICD-10-CM

## 2024-03-14 DIAGNOSIS — E11.59 TYPE 2 DIABETES MELLITUS WITH OTHER CIRCULATORY COMPLICATION, WITH LONG-TERM CURRENT USE OF INSULIN (HCC): Primary | ICD-10-CM

## 2024-03-14 DIAGNOSIS — R53.1 RIGHT SIDED WEAKNESS: ICD-10-CM

## 2024-03-14 LAB
25(OH)D3 SERPL-MCNC: 39.6 NG/ML (ref 32–100)
ALBUMIN SERPL-MCNC: 3.8 G/DL (ref 3.4–4.8)
ALBUMIN/GLOB SERPL: 1.8 {RATIO} (ref 0.8–2)
ALP SERPL-CCNC: 121 U/L (ref 25–100)
ALT SERPL-CCNC: 20 U/L (ref 4–36)
ANION GAP SERPL CALCULATED.3IONS-SCNC: 10 MMOL/L (ref 3–16)
AST SERPL-CCNC: 17 U/L (ref 8–33)
BILIRUB SERPL-MCNC: 0.6 MG/DL (ref 0.3–1.2)
BUN SERPL-MCNC: 14 MG/DL (ref 6–20)
CALCIUM SERPL-MCNC: 9.4 MG/DL (ref 8.5–10.5)
CHLORIDE SERPL-SCNC: 106 MMOL/L (ref 98–107)
CHOLEST SERPL-MCNC: 269 MG/DL (ref 0–200)
CO2 SERPL-SCNC: 27 MMOL/L (ref 20–30)
CREAT SERPL-MCNC: 0.8 MG/DL (ref 0.4–1.2)
ERYTHROCYTE [DISTWIDTH] IN BLOOD BY AUTOMATED COUNT: 13.5 % (ref 11–16)
FOLATE SERPL-MCNC: >20 NG/ML
GFR SERPLBLD CREATININE-BSD FMLA CKD-EPI: >60 ML/MIN/{1.73_M2}
GLOBULIN SER CALC-MCNC: 2.1 G/DL
GLUCOSE SERPL-MCNC: 94 MG/DL (ref 74–106)
HBA1C MFR BLD: 7.4 %
HCT VFR BLD AUTO: 45.2 % (ref 37–47)
HDLC SERPL-MCNC: 42 MG/DL (ref 40–60)
HGB BLD-MCNC: 14.8 G/DL (ref 11.5–16.5)
LDLC SERPL CALC-MCNC: 202 MG/DL
MCH RBC QN AUTO: 28.9 PG (ref 27–32)
MCHC RBC AUTO-ENTMCNC: 32.7 G/DL (ref 31–35)
MCV RBC AUTO: 88.3 FL (ref 80–100)
PLATELET # BLD AUTO: 240 K/UL (ref 150–400)
PMV BLD AUTO: 10.9 FL (ref 6–10)
POTASSIUM SERPL-SCNC: 4.2 MMOL/L (ref 3.4–5.1)
PROT SERPL-MCNC: 5.9 G/DL (ref 6.4–8.3)
RBC # BLD AUTO: 5.12 M/UL (ref 3.8–5.8)
SODIUM SERPL-SCNC: 143 MMOL/L (ref 136–145)
TRIGL SERPL-MCNC: 125 MG/DL (ref 0–249)
TSH SERPL DL<=0.005 MIU/L-ACNC: 0.56 UIU/ML (ref 0.27–4.2)
VIT B12 SERPL-MCNC: 304 PG/ML (ref 211–911)
VLDLC SERPL CALC-MCNC: 25 MG/DL
WBC # BLD AUTO: 6.6 K/UL (ref 4–11)

## 2024-03-14 PROCEDURE — 83036 HEMOGLOBIN GLYCOSYLATED A1C: CPT

## 2024-03-14 PROCEDURE — 80061 LIPID PANEL: CPT

## 2024-03-14 PROCEDURE — 3078F DIAST BP <80 MM HG: CPT | Performed by: NURSE PRACTITIONER

## 2024-03-14 PROCEDURE — 3051F HG A1C>EQUAL 7.0%<8.0%: CPT | Performed by: NURSE PRACTITIONER

## 2024-03-14 PROCEDURE — 82306 VITAMIN D 25 HYDROXY: CPT

## 2024-03-14 PROCEDURE — 80053 COMPREHEN METABOLIC PANEL: CPT

## 2024-03-14 PROCEDURE — 82746 ASSAY OF FOLIC ACID SERUM: CPT

## 2024-03-14 PROCEDURE — 84443 ASSAY THYROID STIM HORMONE: CPT

## 2024-03-14 PROCEDURE — 99214 OFFICE O/P EST MOD 30 MIN: CPT | Performed by: NURSE PRACTITIONER

## 2024-03-14 PROCEDURE — 82607 VITAMIN B-12: CPT

## 2024-03-14 PROCEDURE — 3074F SYST BP LT 130 MM HG: CPT | Performed by: NURSE PRACTITIONER

## 2024-03-14 PROCEDURE — 85027 COMPLETE CBC AUTOMATED: CPT

## 2024-03-14 PROCEDURE — 1123F ACP DISCUSS/DSCN MKR DOCD: CPT | Performed by: NURSE PRACTITIONER

## 2024-03-14 RX ORDER — ONDANSETRON 4 MG/1
4 TABLET, ORALLY DISINTEGRATING ORAL 3 TIMES DAILY PRN
Qty: 21 TABLET | Refills: 0 | Status: SHIPPED | OUTPATIENT
Start: 2024-03-14

## 2024-03-14 RX ORDER — FUROSEMIDE 20 MG/1
10 TABLET ORAL DAILY PRN
Qty: 60 TABLET | Refills: 2 | Status: SHIPPED | OUTPATIENT
Start: 2024-03-14

## 2024-03-14 RX ORDER — CYCLOBENZAPRINE HCL 10 MG
10 TABLET ORAL 3 TIMES DAILY PRN
Qty: 30 TABLET | Refills: 0 | Status: SHIPPED | OUTPATIENT
Start: 2024-03-14

## 2024-03-14 ASSESSMENT — ENCOUNTER SYMPTOMS
RESPIRATORY NEGATIVE: 1
GASTROINTESTINAL NEGATIVE: 1
ALLERGIC/IMMUNOLOGIC NEGATIVE: 1
EYES NEGATIVE: 1

## 2024-03-14 NOTE — PROGRESS NOTES
Chief Complaint   Patient presents with    Diabetes    Hypertension       Have you seen any other physician or provider since your last visit no    Have you had any other diagnostic tests since your last visit? no    Have you changed or stopped any medications since your last visit? Yes stopped Trazodone and Lipitor      I have recommended that this patient have a immunization for pneumonia and sigmoidoscopy but she due to refusal reason: not comfortable with test   I have discussed the risks and benefits of this examination with her. The patient verbalizes understanding.  Provider will be informed of refusal.      Diabetic retinal exam completed this year? Yes                       * If yes please have patient sign a records release to obtain record to update Health Maintenance                       * If no, please order referral for patient to be scheduled   SUBJECTIVE:    Patient ID:Sue Lynn Collett is a 63 y.o. female.    Chief Complaint   Patient presents with    Diabetes    Hypertension     HPI:  Patient has had hypertension for several years. She has been compliant with taking medications, without side effects from it. She has been following a low-sodium, is active and never exercises.  Weight is decreasing steadily, compared to last visit. Her blood pressure is stable 100/62 at this time.    Patient has had diabetes for the past several years.  She has been compliant with the medications and denies any side effects from it. She has been monitoring fingersticks on a daily basis.  Her fingerstick range is between 210-240 and 60 in the mornings. She denies any hypoglycemic symptoms. She has been following a diabetic diet and has been active.  Her last eye exam was less than a year ago.  She is using oral meds and Insulin.     She is walking with a walker and has a lot of weakness with foot drop on the right side leg.  She is really struggling.  No feeling in her foot on the right.    She says in December she

## 2024-03-15 ENCOUNTER — TELEPHONE (OUTPATIENT)
Dept: PRIMARY CARE CLINIC | Age: 64
End: 2024-03-15

## 2024-03-15 RX ORDER — LEFLUNOMIDE 20 MG/1
20 TABLET ORAL DAILY
Qty: 90 TABLET | Refills: 1 | Status: SHIPPED | OUTPATIENT
Start: 2024-03-15

## 2024-03-15 RX ORDER — PILOCARPINE HYDROCHLORIDE 5 MG/1
5 TABLET, FILM COATED ORAL 4 TIMES DAILY
Qty: 120 TABLET | Refills: 0 | Status: SHIPPED | OUTPATIENT
Start: 2024-03-15

## 2024-03-15 RX ORDER — ERGOCALCIFEROL 1.25 MG/1
CAPSULE ORAL
Qty: 4 CAPSULE | Refills: 1 | Status: SHIPPED | OUTPATIENT
Start: 2024-03-15

## 2024-03-15 RX ORDER — ERGOCALCIFEROL 1.25 MG/1
CAPSULE ORAL
Qty: 4 CAPSULE | Refills: 1 | Status: SHIPPED | OUTPATIENT
Start: 2024-03-15 | End: 2024-03-15 | Stop reason: SDUPTHER

## 2024-03-28 RX ORDER — FLUOXETINE HYDROCHLORIDE 40 MG/1
40 CAPSULE ORAL NIGHTLY
Qty: 90 CAPSULE | Refills: 1 | Status: SHIPPED | OUTPATIENT
Start: 2024-03-28

## 2024-03-28 RX ORDER — APIXABAN 5 MG/1
5 TABLET, FILM COATED ORAL 2 TIMES DAILY
Qty: 60 TABLET | Refills: 2 | Status: SHIPPED | OUTPATIENT
Start: 2024-03-28

## 2024-03-29 ENCOUNTER — TELEPHONE (OUTPATIENT)
Dept: PRIMARY CARE CLINIC | Age: 64
End: 2024-03-29

## 2024-03-29 NOTE — TELEPHONE ENCOUNTER
Patient was given lidocaine patches in the hospital and her insurance paid for them. She ask if she could get a new prescription per Anastasia at [facility]. I didn't see a prescription to be able to pend it.

## 2024-04-04 ENCOUNTER — OFFICE VISIT (OUTPATIENT)
Dept: PRIMARY CARE CLINIC | Age: 64
End: 2024-04-04
Payer: MEDICARE

## 2024-04-04 VITALS — TEMPERATURE: 98.1 F | HEART RATE: 85 BPM | OXYGEN SATURATION: 99 %

## 2024-04-04 DIAGNOSIS — R09.81 NASAL CONGESTION: ICD-10-CM

## 2024-04-04 DIAGNOSIS — R05.1 ACUTE COUGH: ICD-10-CM

## 2024-04-04 DIAGNOSIS — R09.81 HEAD CONGESTION: Primary | ICD-10-CM

## 2024-04-04 LAB
INFLUENZA A ANTIBODY: NORMAL
INFLUENZA B ANTIBODY: NORMAL
Lab: NORMAL
QC PASS/FAIL: NORMAL
SARS-COV-2 RDRP RESP QL NAA+PROBE: NEGATIVE

## 2024-04-04 PROCEDURE — 87635 SARS-COV-2 COVID-19 AMP PRB: CPT | Performed by: PHYSICIAN ASSISTANT

## 2024-04-04 PROCEDURE — 99213 OFFICE O/P EST LOW 20 MIN: CPT | Performed by: PHYSICIAN ASSISTANT

## 2024-04-04 PROCEDURE — 1123F ACP DISCUSS/DSCN MKR DOCD: CPT | Performed by: PHYSICIAN ASSISTANT

## 2024-04-04 PROCEDURE — 87804 INFLUENZA ASSAY W/OPTIC: CPT | Performed by: PHYSICIAN ASSISTANT

## 2024-04-04 RX ORDER — DEXTROMETHORPHAN HYDROBROMIDE AND PROMETHAZINE HYDROCHLORIDE 15; 6.25 MG/5ML; MG/5ML
5 SYRUP ORAL 4 TIMES DAILY PRN
Qty: 180 ML | Refills: 0 | Status: SHIPPED | OUTPATIENT
Start: 2024-04-04 | End: 2024-04-11

## 2024-04-04 RX ORDER — AMOXICILLIN 875 MG/1
875 TABLET, COATED ORAL 2 TIMES DAILY
Qty: 14 TABLET | Refills: 0 | Status: SHIPPED | OUTPATIENT
Start: 2024-04-04 | End: 2024-04-11

## 2024-04-04 ASSESSMENT — ENCOUNTER SYMPTOMS
GASTROINTESTINAL NEGATIVE: 1
SORE THROAT: 1

## 2024-04-04 ASSESSMENT — PATIENT HEALTH QUESTIONNAIRE - PHQ9: DEPRESSION UNABLE TO ASSESS: URGENT/EMERGENT SITUATION

## 2024-04-04 NOTE — PROGRESS NOTES
Subjective:     Sue Lynn Collett is a 63 y.o. female.    Chief Complaint   Patient presents with    Congestion     X 2 weeks, stopped up head, right ear pain. Patient states she has a lot of pressure in her teeth and head. Neg for fever. Patient has been using flonase and an inhaler. She thinks she may need an allergy med also.            HPI    Pt here with c/o 2 weeks of stuffy/runny nose, ST, ear pain on right, dizziness, cough. No cp, worsening/new SOB. Has been taking no otc meds except flonase.     LMP - postmenopausal       Current Outpatient Medications:     amoxicillin (AMOXIL) 875 MG tablet, Take 1 tablet by mouth 2 times daily for 7 days, Disp: 14 tablet, Rfl: 0    promethazine-dextromethorphan (PROMETHAZINE-DM) 6.25-15 MG/5ML syrup, Take 5 mLs by mouth 4 times daily as needed for Cough, Disp: 180 mL, Rfl: 0    FLUoxetine (PROZAC) 40 MG capsule, Take 1 capsule by mouth at bedtime, Disp: 90 capsule, Rfl: 1    ELIQUIS 5 MG TABS tablet, Take 1 tablet by mouth 2 times daily, Disp: 60 tablet, Rfl: 2    pilocarpine (SALAGEN) 5 MG tablet, Take 1 tablet by mouth 4 times daily, Disp: 120 tablet, Rfl: 0    ergocalciferol (ERGOCALCIFEROL) 1.25 MG (86018 UT) capsule, 1 capsule weekly, Disp: 4 capsule, Rfl: 1    leflunomide (ARAVA) 20 MG tablet, Take 1 tablet by mouth daily, Disp: 90 tablet, Rfl: 1    cyclobenzaprine (FLEXERIL) 10 MG tablet, Take 1 tablet by mouth 3 times daily as needed for Muscle spasms, Disp: 30 tablet, Rfl: 0    diclofenac sodium (VOLTAREN) 1 % GEL, APPLY 2 GRAMS TOPICALLY FOUR TIMES A DAY AS NEEDED *MAX 32 GRAMS/DAY*, Disp: 350 g, Rfl: 2    furosemide (LASIX) 20 MG tablet, Take 0.5 tablets by mouth daily as needed (swelling), Disp: 60 tablet, Rfl: 2    miconazole (MICOTIN) 2 % powder, Apply topically 2 times daily., Disp: 45 g, Rfl: 1    ondansetron (ZOFRAN-ODT) 4 MG disintegrating tablet, Take 1 tablet by mouth 3 times daily as needed for Nausea or Vomiting, Disp: 21 tablet, Rfl: 0    Continuous

## 2024-04-04 NOTE — PROGRESS NOTES
Chief Complaint   Patient presents with    Congestion     X 2 weeks, stopped up head, right ear pain. Patient states she has a lot of pressure in her teeth and head. Neg for fever. Patient has been using flonase and an inhaler. She thinks she may need an allergy med also.

## 2024-04-09 RX ORDER — LOSARTAN POTASSIUM 25 MG/1
25 TABLET ORAL DAILY
Qty: 30 TABLET | Refills: 0 | OUTPATIENT
Start: 2024-04-09

## 2024-04-10 RX ORDER — LOSARTAN POTASSIUM 25 MG/1
25 TABLET ORAL DAILY
Qty: 30 TABLET | Refills: 3 | Status: SHIPPED | OUTPATIENT
Start: 2024-04-10

## 2024-04-22 ENCOUNTER — OFFICE VISIT (OUTPATIENT)
Dept: PRIMARY CARE CLINIC | Age: 64
End: 2024-04-22
Payer: MEDICARE

## 2024-04-22 ENCOUNTER — HOSPITAL ENCOUNTER (OUTPATIENT)
Dept: GENERAL RADIOLOGY | Facility: HOSPITAL | Age: 64
Discharge: HOME | End: 2024-04-22
Payer: MEDICARE

## 2024-04-22 ENCOUNTER — HOSPITAL ENCOUNTER (OUTPATIENT)
Dept: NURSING | Facility: HOSPITAL | Age: 64
Setting detail: INFUSION SERIES
Discharge: HOME | End: 2024-04-24
Payer: MEDICARE

## 2024-04-22 ENCOUNTER — HOSPITAL ENCOUNTER (OUTPATIENT)
Facility: HOSPITAL | Age: 64
Discharge: HOME | End: 2024-04-22
Payer: MEDICARE

## 2024-04-22 VITALS
SYSTOLIC BLOOD PRESSURE: 114 MMHG | OXYGEN SATURATION: 98 % | HEART RATE: 72 BPM | RESPIRATION RATE: 18 BRPM | DIASTOLIC BLOOD PRESSURE: 70 MMHG | TEMPERATURE: 97.9 F

## 2024-04-22 VITALS
WEIGHT: 204.8 LBS | SYSTOLIC BLOOD PRESSURE: 84 MMHG | OXYGEN SATURATION: 98 % | HEART RATE: 87 BPM | DIASTOLIC BLOOD PRESSURE: 63 MMHG | BODY MASS INDEX: 33.07 KG/M2

## 2024-04-22 DIAGNOSIS — E86.0 DEHYDRATION: ICD-10-CM

## 2024-04-22 DIAGNOSIS — E86.0 DEHYDRATION: Primary | ICD-10-CM

## 2024-04-22 DIAGNOSIS — E03.9 ACQUIRED HYPOTHYROIDISM: ICD-10-CM

## 2024-04-22 DIAGNOSIS — R05.1 ACUTE COUGH: ICD-10-CM

## 2024-04-22 DIAGNOSIS — E86.1 HYPOTENSION DUE TO HYPOVOLEMIA: Primary | ICD-10-CM

## 2024-04-22 DIAGNOSIS — E86.1 HYPOTENSION DUE TO HYPOVOLEMIA: ICD-10-CM

## 2024-04-22 LAB
ALBUMIN SERPL-MCNC: 4.1 G/DL (ref 3.4–4.8)
ALBUMIN/GLOB SERPL: 2.2 {RATIO} (ref 0.8–2)
ALP SERPL-CCNC: 146 U/L (ref 25–100)
ALT SERPL-CCNC: 20 U/L (ref 4–36)
ANION GAP SERPL CALCULATED.3IONS-SCNC: 15 MMOL/L (ref 3–16)
AST SERPL-CCNC: 20 U/L (ref 8–33)
BILIRUB SERPL-MCNC: 0.6 MG/DL (ref 0.3–1.2)
BUN SERPL-MCNC: 19 MG/DL (ref 6–20)
CALCIUM SERPL-MCNC: 9.2 MG/DL (ref 8.5–10.5)
CHLORIDE SERPL-SCNC: 97 MMOL/L (ref 98–107)
CO2 SERPL-SCNC: 25 MMOL/L (ref 20–30)
CREAT SERPL-MCNC: 1.1 MG/DL (ref 0.4–1.2)
ERYTHROCYTE [DISTWIDTH] IN BLOOD BY AUTOMATED COUNT: 13.2 % (ref 11–16)
GFR SERPLBLD CREATININE-BSD FMLA CKD-EPI: 56 ML/MIN/{1.73_M2}
GLOBULIN SER CALC-MCNC: 1.9 G/DL
GLUCOSE SERPL-MCNC: 141 MG/DL (ref 74–106)
HCT VFR BLD AUTO: 47.4 % (ref 37–47)
HGB BLD-MCNC: 15.2 G/DL (ref 11.5–16.5)
MCH RBC QN AUTO: 28.7 PG (ref 27–32)
MCHC RBC AUTO-ENTMCNC: 32.1 G/DL (ref 31–35)
MCV RBC AUTO: 89.4 FL (ref 80–100)
PLATELET # BLD AUTO: 281 K/UL (ref 150–400)
PMV BLD AUTO: 11.2 FL (ref 6–10)
POTASSIUM SERPL-SCNC: 4.5 MMOL/L (ref 3.4–5.1)
PROT SERPL-MCNC: 6 G/DL (ref 6.4–8.3)
RBC # BLD AUTO: 5.3 M/UL (ref 3.8–5.8)
SODIUM SERPL-SCNC: 137 MMOL/L (ref 136–145)
TSH SERPL DL<=0.005 MIU/L-ACNC: 4.92 UIU/ML (ref 0.27–4.2)
WBC # BLD AUTO: 9.2 K/UL (ref 4–11)

## 2024-04-22 PROCEDURE — 71046 X-RAY EXAM CHEST 2 VIEWS: CPT

## 2024-04-22 PROCEDURE — 80053 COMPREHEN METABOLIC PANEL: CPT

## 2024-04-22 PROCEDURE — 96360 HYDRATION IV INFUSION INIT: CPT | Performed by: NURSE PRACTITIONER

## 2024-04-22 PROCEDURE — 36415 COLL VENOUS BLD VENIPUNCTURE: CPT

## 2024-04-22 PROCEDURE — 84443 ASSAY THYROID STIM HORMONE: CPT

## 2024-04-22 PROCEDURE — 1123F ACP DISCUSS/DSCN MKR DOCD: CPT | Performed by: NURSE PRACTITIONER

## 2024-04-22 PROCEDURE — 99214 OFFICE O/P EST MOD 30 MIN: CPT | Performed by: NURSE PRACTITIONER

## 2024-04-22 PROCEDURE — 85027 COMPLETE CBC AUTOMATED: CPT

## 2024-04-22 PROCEDURE — 2580000003 HC RX 258: Performed by: NURSE PRACTITIONER

## 2024-04-22 RX ORDER — FAMOTIDINE 10 MG/ML
20 INJECTION, SOLUTION INTRAVENOUS
OUTPATIENT
Start: 2024-04-22

## 2024-04-22 RX ORDER — DIPHENHYDRAMINE HYDROCHLORIDE 50 MG/ML
50 INJECTION INTRAMUSCULAR; INTRAVENOUS
OUTPATIENT
Start: 2024-04-22

## 2024-04-22 RX ORDER — 0.9 % SODIUM CHLORIDE 0.9 %
1000 INTRAVENOUS SOLUTION INTRAVENOUS ONCE
Status: CANCELLED | OUTPATIENT
Start: 2024-04-22 | End: 2024-04-22

## 2024-04-22 RX ORDER — ACETAMINOPHEN 325 MG/1
650 TABLET ORAL
OUTPATIENT
Start: 2024-04-22

## 2024-04-22 RX ORDER — 0.9 % SODIUM CHLORIDE 0.9 %
1000 INTRAVENOUS SOLUTION INTRAVENOUS ONCE
Status: COMPLETED | OUTPATIENT
Start: 2024-04-22 | End: 2024-04-22

## 2024-04-22 RX ORDER — HEPARIN SODIUM (PORCINE) LOCK FLUSH IV SOLN 100 UNIT/ML 100 UNIT/ML
500 SOLUTION INTRAVENOUS PRN
OUTPATIENT
Start: 2024-04-22

## 2024-04-22 RX ORDER — SODIUM CHLORIDE 9 MG/ML
5-250 INJECTION, SOLUTION INTRAVENOUS PRN
OUTPATIENT
Start: 2024-04-22

## 2024-04-22 RX ORDER — ONDANSETRON 2 MG/ML
8 INJECTION INTRAMUSCULAR; INTRAVENOUS
OUTPATIENT
Start: 2024-04-22

## 2024-04-22 RX ORDER — HEPARIN 100 UNIT/ML
500 SYRINGE INTRAVENOUS PRN
OUTPATIENT
Start: 2024-04-22

## 2024-04-22 RX ORDER — ALBUTEROL SULFATE 90 UG/1
4 AEROSOL, METERED RESPIRATORY (INHALATION) PRN
OUTPATIENT
Start: 2024-04-22

## 2024-04-22 RX ORDER — SODIUM CHLORIDE 9 MG/ML
INJECTION, SOLUTION INTRAVENOUS CONTINUOUS
OUTPATIENT
Start: 2024-04-22

## 2024-04-22 RX ORDER — HYDROCODONE BITARTRATE AND ACETAMINOPHEN 5; 325 MG/1; MG/1
1 TABLET ORAL EVERY 8 HOURS PRN
COMMUNITY

## 2024-04-22 RX ORDER — EPINEPHRINE 1 MG/ML
0.3 INJECTION, SOLUTION INTRAMUSCULAR; SUBCUTANEOUS PRN
OUTPATIENT
Start: 2024-04-22

## 2024-04-22 RX ORDER — EPINEPHRINE 1 MG/ML
0.3 INJECTION, SOLUTION, CONCENTRATE INTRAVENOUS PRN
OUTPATIENT
Start: 2024-04-22

## 2024-04-22 RX ORDER — SODIUM CHLORIDE 0.9 % (FLUSH) 0.9 %
5-40 SYRINGE (ML) INJECTION PRN
OUTPATIENT
Start: 2024-04-22

## 2024-04-22 RX ORDER — LIDOCAINE 4 G/G
PATCH TOPICAL
COMMUNITY

## 2024-04-22 RX ADMIN — SODIUM CHLORIDE 1000 ML: 9 INJECTION, SOLUTION INTRAVENOUS at 16:14

## 2024-04-22 ASSESSMENT — ENCOUNTER SYMPTOMS
SHORTNESS OF BREATH: 0
WHEEZING: 0
VOMITING: 0
COUGH: 1
NAUSEA: 0
EYE DISCHARGE: 0
ABDOMINAL PAIN: 0
RHINORRHEA: 1
BACK PAIN: 0
SORE THROAT: 1
SINUS PRESSURE: 0

## 2024-04-22 NOTE — PROGRESS NOTES
11:49 AM    BILITOT 0.6 03/14/2024 11:49 AM    ALT 20 03/14/2024 11:49 AM    AST 17 03/14/2024 11:49 AM       Hemoglobin A1C (%)   Date Value   03/14/2024 7.4 (H)     LDL Calculated (mg/dL)   Date Value   03/14/2024 202 (H)         Lab Results   Component Value Date/Time    WBC 6.6 03/14/2024 11:49 AM    NEUTROABS 7.1 11/23/2023 04:27 AM    HGB 14.8 03/14/2024 11:49 AM    HCT 45.2 03/14/2024 11:49 AM    MCV 88.3 03/14/2024 11:49 AM     03/14/2024 11:49 AM       Lab Results   Component Value Date    TSH 0.56 03/14/2024         ASSESSMENT/PLAN:     1. Acute cough  Will treat depending on results.     - XR CHEST STANDARD (2 VW); Future    2. Dehydration  Sent for iv 1 liter fluid replacement today   - CBC; Future  - Comprehensive Metabolic Panel; Future    3. Hypotension due to hypovolemia  Instructed to hold the cozaar will instruct about the lasix based on labs.  For now continut 1/2 tab every other day.   - CBC; Future  - Comprehensive Metabolic Panel; Future    4. Acquired hypothyroidism  Synthroid 150 mcg dialy check level.   - TSH; Future        No orders of the defined types were placed in this encounter.     Danielle KELSEY MA am scribing for and in the presence of NICKOLAS Sheriff on this date of 4/22/2024 at 1:44pm.

## 2024-05-03 ENCOUNTER — COMMUNITY OUTREACH (OUTPATIENT)
Dept: PRIMARY CARE CLINIC | Age: 64
End: 2024-05-03

## 2024-05-03 NOTE — PROGRESS NOTES
Patient's HM shows they are overdue for Colorectal Screening.   Care Everywhere and  files searched.  No results to attach to order nor HM updated. Ov states : Patient had a cologuard test last year that was normal no records found will send fax.

## 2024-05-13 DIAGNOSIS — M54.16 LUMBAR BACK PAIN WITH RADICULOPATHY AFFECTING LEFT LOWER EXTREMITY: ICD-10-CM

## 2024-05-13 RX ORDER — PREGABALIN 100 MG/1
100 CAPSULE ORAL 3 TIMES DAILY
Qty: 90 CAPSULE | Refills: 1 | Status: SHIPPED | OUTPATIENT
Start: 2024-05-13 | End: 2024-06-12

## 2024-05-21 RX ORDER — PILOCARPINE HYDROCHLORIDE 5 MG/1
5 TABLET, FILM COATED ORAL 4 TIMES DAILY
Qty: 120 TABLET | Refills: 0 | Status: SHIPPED | OUTPATIENT
Start: 2024-05-21

## 2024-05-22 ENCOUNTER — OFFICE VISIT (OUTPATIENT)
Dept: PRIMARY CARE CLINIC | Age: 64
End: 2024-05-22
Payer: MEDICARE

## 2024-05-22 VITALS
HEART RATE: 86 BPM | SYSTOLIC BLOOD PRESSURE: 133 MMHG | BODY MASS INDEX: 33.11 KG/M2 | DIASTOLIC BLOOD PRESSURE: 80 MMHG | OXYGEN SATURATION: 99 % | HEIGHT: 66 IN | WEIGHT: 206 LBS | TEMPERATURE: 97 F | RESPIRATION RATE: 14 BRPM

## 2024-05-22 DIAGNOSIS — E11.59 TYPE 2 DIABETES MELLITUS WITH OTHER CIRCULATORY COMPLICATION, WITH LONG-TERM CURRENT USE OF INSULIN (HCC): ICD-10-CM

## 2024-05-22 DIAGNOSIS — Z79.4 TYPE 2 DIABETES MELLITUS WITH OTHER CIRCULATORY COMPLICATION, WITH LONG-TERM CURRENT USE OF INSULIN (HCC): ICD-10-CM

## 2024-05-22 DIAGNOSIS — R53.1 RIGHT SIDED WEAKNESS: ICD-10-CM

## 2024-05-22 DIAGNOSIS — I10 PRIMARY HYPERTENSION: Primary | ICD-10-CM

## 2024-05-22 PROCEDURE — 1123F ACP DISCUSS/DSCN MKR DOCD: CPT | Performed by: NURSE PRACTITIONER

## 2024-05-22 PROCEDURE — 3079F DIAST BP 80-89 MM HG: CPT | Performed by: NURSE PRACTITIONER

## 2024-05-22 PROCEDURE — 3075F SYST BP GE 130 - 139MM HG: CPT | Performed by: NURSE PRACTITIONER

## 2024-05-22 PROCEDURE — 99214 OFFICE O/P EST MOD 30 MIN: CPT | Performed by: NURSE PRACTITIONER

## 2024-05-22 PROCEDURE — 3051F HG A1C>EQUAL 7.0%<8.0%: CPT | Performed by: NURSE PRACTITIONER

## 2024-05-22 RX ORDER — ONDANSETRON 4 MG/1
4 TABLET, ORALLY DISINTEGRATING ORAL 3 TIMES DAILY PRN
Qty: 21 TABLET | Refills: 0 | Status: SHIPPED | OUTPATIENT
Start: 2024-05-22

## 2024-05-22 RX ORDER — ALBUTEROL SULFATE 90 UG/1
2 AEROSOL, METERED RESPIRATORY (INHALATION) EVERY 4 HOURS PRN
Qty: 1 EACH | Refills: 1 | Status: SHIPPED | OUTPATIENT
Start: 2024-05-22 | End: 2024-06-21

## 2024-05-22 RX ORDER — DULAGLUTIDE 3 MG/.5ML
3 INJECTION, SOLUTION SUBCUTANEOUS WEEKLY
Qty: 2 ML | Refills: 3 | Status: SHIPPED | OUTPATIENT
Start: 2024-05-22

## 2024-05-22 RX ORDER — INSULIN GLARGINE 100 [IU]/ML
60 INJECTION, SOLUTION SUBCUTANEOUS 2 TIMES DAILY
Qty: 15 ADJUSTABLE DOSE PRE-FILLED PEN SYRINGE | Refills: 2 | Status: SHIPPED | OUTPATIENT
Start: 2024-05-22

## 2024-05-22 RX ORDER — ATORVASTATIN CALCIUM 10 MG/1
10 TABLET, FILM COATED ORAL NIGHTLY
Qty: 90 TABLET | Refills: 1 | Status: SHIPPED | OUTPATIENT
Start: 2024-05-22

## 2024-05-22 SDOH — ECONOMIC STABILITY: FOOD INSECURITY: WITHIN THE PAST 12 MONTHS, THE FOOD YOU BOUGHT JUST DIDN'T LAST AND YOU DIDN'T HAVE MONEY TO GET MORE.: NEVER TRUE

## 2024-05-22 SDOH — ECONOMIC STABILITY: FOOD INSECURITY: WITHIN THE PAST 12 MONTHS, YOU WORRIED THAT YOUR FOOD WOULD RUN OUT BEFORE YOU GOT MONEY TO BUY MORE.: NEVER TRUE

## 2024-05-22 SDOH — ECONOMIC STABILITY: INCOME INSECURITY: HOW HARD IS IT FOR YOU TO PAY FOR THE VERY BASICS LIKE FOOD, HOUSING, MEDICAL CARE, AND HEATING?: NOT HARD AT ALL

## 2024-05-22 ASSESSMENT — PATIENT HEALTH QUESTIONNAIRE - PHQ9
4. FEELING TIRED OR HAVING LITTLE ENERGY: SEVERAL DAYS
7. TROUBLE CONCENTRATING ON THINGS, SUCH AS READING THE NEWSPAPER OR WATCHING TELEVISION: NEARLY EVERY DAY
6. FEELING BAD ABOUT YOURSELF - OR THAT YOU ARE A FAILURE OR HAVE LET YOURSELF OR YOUR FAMILY DOWN: NOT AT ALL
3. TROUBLE FALLING OR STAYING ASLEEP: SEVERAL DAYS
SUM OF ALL RESPONSES TO PHQ QUESTIONS 1-9: 7
2. FEELING DOWN, DEPRESSED OR HOPELESS: SEVERAL DAYS
SUM OF ALL RESPONSES TO PHQ9 QUESTIONS 1 & 2: 1
10. IF YOU CHECKED OFF ANY PROBLEMS, HOW DIFFICULT HAVE THESE PROBLEMS MADE IT FOR YOU TO DO YOUR WORK, TAKE CARE OF THINGS AT HOME, OR GET ALONG WITH OTHER PEOPLE: VERY DIFFICULT
1. LITTLE INTEREST OR PLEASURE IN DOING THINGS: NOT AT ALL
9. THOUGHTS THAT YOU WOULD BE BETTER OFF DEAD, OR OF HURTING YOURSELF: NOT AT ALL
SUM OF ALL RESPONSES TO PHQ QUESTIONS 1-9: 7
8. MOVING OR SPEAKING SO SLOWLY THAT OTHER PEOPLE COULD HAVE NOTICED. OR THE OPPOSITE, BEING SO FIGETY OR RESTLESS THAT YOU HAVE BEEN MOVING AROUND A LOT MORE THAN USUAL: NOT AT ALL
5. POOR APPETITE OR OVEREATING: SEVERAL DAYS

## 2024-06-03 ENCOUNTER — OFFICE VISIT (OUTPATIENT)
Dept: PRIMARY CARE CLINIC | Age: 64
End: 2024-06-03
Payer: MEDICARE

## 2024-06-03 VITALS
HEART RATE: 95 BPM | OXYGEN SATURATION: 97 % | SYSTOLIC BLOOD PRESSURE: 120 MMHG | WEIGHT: 205 LBS | BODY MASS INDEX: 33.1 KG/M2 | DIASTOLIC BLOOD PRESSURE: 77 MMHG | TEMPERATURE: 97.2 F

## 2024-06-03 DIAGNOSIS — R11.0 NAUSEA: ICD-10-CM

## 2024-06-03 DIAGNOSIS — J02.9 ACUTE PHARYNGITIS, UNSPECIFIED ETIOLOGY: Primary | ICD-10-CM

## 2024-06-03 PROCEDURE — 87804 INFLUENZA ASSAY W/OPTIC: CPT | Performed by: NURSE PRACTITIONER

## 2024-06-03 PROCEDURE — 99213 OFFICE O/P EST LOW 20 MIN: CPT | Performed by: NURSE PRACTITIONER

## 2024-06-03 PROCEDURE — 1123F ACP DISCUSS/DSCN MKR DOCD: CPT | Performed by: NURSE PRACTITIONER

## 2024-06-03 PROCEDURE — 87635 SARS-COV-2 COVID-19 AMP PRB: CPT | Performed by: NURSE PRACTITIONER

## 2024-06-03 RX ORDER — PROMETHAZINE HYDROCHLORIDE 12.5 MG/1
12.5 TABLET ORAL 3 TIMES DAILY PRN
Qty: 30 TABLET | Refills: 0 | Status: SHIPPED | OUTPATIENT
Start: 2024-06-03

## 2024-06-03 RX ORDER — AMOXICILLIN AND CLAVULANATE POTASSIUM 875; 125 MG/1; MG/1
1 TABLET, FILM COATED ORAL 2 TIMES DAILY
Qty: 20 TABLET | Refills: 0 | Status: SHIPPED | OUTPATIENT
Start: 2024-06-03 | End: 2024-06-13

## 2024-06-03 NOTE — PROGRESS NOTES
Chief Complaint   Patient presents with    Nausea     Patient states she feels like she wants to vomit when she moves.     Pharyngitis     Swollen lymph node right side x 1 day. Patient has been more congested than normal and taking benadryl for her symptoms.

## 2024-06-03 NOTE — PROGRESS NOTES
SUBJECTIVE:    Patient ID: Sue Lynn Collett is a 63 y.o.female.    Chief Complaint   Patient presents with    Nausea     Patient states she feels like she wants to vomit when she moves.     Pharyngitis     Swollen lymph node right side x 1 day. Patient has been more congested than normal and taking benadryl for her symptoms.            HPI:    Pt c/o sore throat few days. Other S&S Right lymph node swelling 1 day, fatigue, nausea, congestion. OTC benadryl little helpful. Unknown sick contacts. No fever, chills, body aches, headaches. No relieving or worsening factors. No other treatment regimens.     Patient's medications, allergies, past medical, surgical, social and family histories were reviewed and updated as appropriate in electronic medical record.        Outpatient Medications Marked as Taking for the 6/3/24 encounter (Office Visit) with Gabriella Harris APRN - CNP   Medication Sig Dispense Refill    albuterol sulfate HFA (PROVENTIL HFA) 108 (90 Base) MCG/ACT inhaler Inhale 2 puffs into the lungs every 4 hours as needed for Wheezing or Shortness of Breath With spacer (and mask if indicated). Thanks. 1 each 1    atorvastatin (LIPITOR) 10 MG tablet Take 1 tablet by mouth nightly 90 tablet 1    Dulaglutide (TRULICITY) 3 MG/0.5ML SOPN Inject 3 mg into the skin once a week 2 mL 3    insulin glargine (LANTUS SOLOSTAR) 100 UNIT/ML injection pen Inject 60 Units into the skin 2 times daily 60 bid 15 Adjustable Dose Pre-filled Pen Syringe 2    ondansetron (ZOFRAN-ODT) 4 MG disintegrating tablet Take 1 tablet by mouth 3 times daily as needed for Nausea or Vomiting 21 tablet 0    pilocarpine (SALAGEN) 5 MG tablet TAKE ONE TABLET BY MOUTH FOUR TIMES A  tablet 0    pregabalin (LYRICA) 100 MG capsule Take 1 capsule by mouth in the morning, at noon, and at bedtime for 30 days. Max Daily Amount: 300 mg 90 capsule 1    lidocaine 4 % external patch       HYDROcodone-acetaminophen (NORCO) 5-325 MG per tablet Take 1

## 2024-06-12 RX ORDER — PROMETHAZINE HYDROCHLORIDE 12.5 MG/1
TABLET ORAL
Qty: 30 TABLET | Refills: 0 | OUTPATIENT
Start: 2024-06-12

## 2024-06-12 ASSESSMENT — ENCOUNTER SYMPTOMS
ABDOMINAL PAIN: 0
NAUSEA: 1
SORE THROAT: 1
VOMITING: 0

## 2024-06-13 RX ORDER — PROMETHAZINE HYDROCHLORIDE 12.5 MG/1
TABLET ORAL
Qty: 30 TABLET | Refills: 0 | OUTPATIENT
Start: 2024-06-13

## 2024-06-17 ENCOUNTER — HOSPITAL ENCOUNTER (OUTPATIENT)
Facility: HOSPITAL | Age: 64
Discharge: HOME | End: 2024-06-17
Payer: MEDICARE

## 2024-06-17 ENCOUNTER — OFFICE VISIT (OUTPATIENT)
Dept: PRIMARY CARE CLINIC | Age: 64
End: 2024-06-17
Payer: MEDICARE

## 2024-06-17 VITALS
OXYGEN SATURATION: 96 % | DIASTOLIC BLOOD PRESSURE: 82 MMHG | BODY MASS INDEX: 32.62 KG/M2 | WEIGHT: 202 LBS | SYSTOLIC BLOOD PRESSURE: 127 MMHG | HEART RATE: 88 BPM

## 2024-06-17 DIAGNOSIS — Z79.4 TYPE 2 DIABETES MELLITUS WITH OTHER CIRCULATORY COMPLICATION, WITH LONG-TERM CURRENT USE OF INSULIN (HCC): ICD-10-CM

## 2024-06-17 DIAGNOSIS — I10 PRIMARY HYPERTENSION: ICD-10-CM

## 2024-06-17 DIAGNOSIS — H10.9 BACTERIAL CONJUNCTIVITIS: Primary | ICD-10-CM

## 2024-06-17 DIAGNOSIS — M62.561 ATROPHY OF MUSCLE OF RIGHT LOWER LEG: ICD-10-CM

## 2024-06-17 DIAGNOSIS — E11.59 TYPE 2 DIABETES MELLITUS WITH OTHER CIRCULATORY COMPLICATION, WITH LONG-TERM CURRENT USE OF INSULIN (HCC): ICD-10-CM

## 2024-06-17 DIAGNOSIS — R29.898 RIGHT LEG WEAKNESS: ICD-10-CM

## 2024-06-17 DIAGNOSIS — M54.16 LUMBAR BACK PAIN WITH RADICULOPATHY AFFECTING LEFT LOWER EXTREMITY: ICD-10-CM

## 2024-06-17 DIAGNOSIS — Z01.818 PRE-OP EXAM: ICD-10-CM

## 2024-06-17 LAB
ALBUMIN SERPL-MCNC: 4 G/DL (ref 3.4–4.8)
ALBUMIN/GLOB SERPL: 1.8 {RATIO} (ref 0.8–2)
ALP SERPL-CCNC: 142 U/L (ref 25–100)
ALT SERPL-CCNC: 16 U/L (ref 4–36)
ANION GAP SERPL CALCULATED.3IONS-SCNC: 11 MMOL/L (ref 3–16)
AST SERPL-CCNC: 13 U/L (ref 8–33)
BILIRUB SERPL-MCNC: 0.4 MG/DL (ref 0.3–1.2)
BUN SERPL-MCNC: 13 MG/DL (ref 6–20)
CALCIUM SERPL-MCNC: 9.8 MG/DL (ref 8.5–10.5)
CHLORIDE SERPL-SCNC: 102 MMOL/L (ref 98–107)
CO2 SERPL-SCNC: 27 MMOL/L (ref 20–30)
CREAT SERPL-MCNC: 0.8 MG/DL (ref 0.4–1.2)
ERYTHROCYTE [DISTWIDTH] IN BLOOD BY AUTOMATED COUNT: 13.6 % (ref 11–16)
GFR SERPLBLD CREATININE-BSD FMLA CKD-EPI: 81 ML/MIN/{1.73_M2}
GLOBULIN SER CALC-MCNC: 2.2 G/DL
GLUCOSE SERPL-MCNC: 168 MG/DL (ref 74–106)
HBA1C MFR BLD: 7.4 %
HCT VFR BLD AUTO: 48.1 % (ref 37–47)
HGB BLD-MCNC: 15.3 G/DL (ref 11.5–16.5)
MCH RBC QN AUTO: 28.5 PG (ref 27–32)
MCHC RBC AUTO-ENTMCNC: 31.8 G/DL (ref 31–35)
MCV RBC AUTO: 89.6 FL (ref 80–100)
PLATELET # BLD AUTO: 253 K/UL (ref 150–400)
PMV BLD AUTO: 11.7 FL (ref 6–10)
POTASSIUM SERPL-SCNC: 4.9 MMOL/L (ref 3.4–5.1)
PROT SERPL-MCNC: 6.2 G/DL (ref 6.4–8.3)
RBC # BLD AUTO: 5.37 M/UL (ref 3.8–5.8)
SODIUM SERPL-SCNC: 140 MMOL/L (ref 136–145)
WBC # BLD AUTO: 9.8 K/UL (ref 4–11)

## 2024-06-17 PROCEDURE — 3079F DIAST BP 80-89 MM HG: CPT | Performed by: NURSE PRACTITIONER

## 2024-06-17 PROCEDURE — 3074F SYST BP LT 130 MM HG: CPT | Performed by: NURSE PRACTITIONER

## 2024-06-17 PROCEDURE — 83036 HEMOGLOBIN GLYCOSYLATED A1C: CPT

## 2024-06-17 PROCEDURE — 1123F ACP DISCUSS/DSCN MKR DOCD: CPT | Performed by: NURSE PRACTITIONER

## 2024-06-17 PROCEDURE — 85027 COMPLETE CBC AUTOMATED: CPT

## 2024-06-17 PROCEDURE — 99214 OFFICE O/P EST MOD 30 MIN: CPT | Performed by: NURSE PRACTITIONER

## 2024-06-17 PROCEDURE — 3051F HG A1C>EQUAL 7.0%<8.0%: CPT | Performed by: NURSE PRACTITIONER

## 2024-06-17 PROCEDURE — 80053 COMPREHEN METABOLIC PANEL: CPT

## 2024-06-17 RX ORDER — FOLIC ACID 1 MG/1
1 TABLET ORAL DAILY
Qty: 30 TABLET | Refills: 3 | Status: SHIPPED | OUTPATIENT
Start: 2024-06-17

## 2024-06-17 RX ORDER — POLYMYXIN B SULFATE AND TRIMETHOPRIM 1; 10000 MG/ML; [USP'U]/ML
1 SOLUTION OPHTHALMIC EVERY 4 HOURS
Qty: 3 ML | Refills: 0 | Status: SHIPPED | OUTPATIENT
Start: 2024-06-17 | End: 2024-06-27

## 2024-06-17 RX ORDER — ATORVASTATIN CALCIUM 10 MG/1
10 TABLET, FILM COATED ORAL NIGHTLY
Qty: 90 TABLET | Refills: 1 | Status: SHIPPED | OUTPATIENT
Start: 2024-06-17

## 2024-06-17 RX ORDER — PANTOPRAZOLE SODIUM 40 MG/1
40 TABLET, DELAYED RELEASE ORAL DAILY
Qty: 90 TABLET | Refills: 1 | Status: SHIPPED | OUTPATIENT
Start: 2024-06-17

## 2024-06-17 RX ORDER — LACTOBACILLUS RHAMNOSUS GG 10B CELL
1 CAPSULE ORAL
Qty: 90 CAPSULE | Refills: 1 | Status: SHIPPED | OUTPATIENT
Start: 2024-06-17

## 2024-06-17 RX ORDER — APIXABAN 5 MG/1
5 TABLET, FILM COATED ORAL 2 TIMES DAILY
Qty: 60 TABLET | Refills: 2 | Status: SHIPPED | OUTPATIENT
Start: 2024-06-17

## 2024-06-17 RX ORDER — PROMETHAZINE HYDROCHLORIDE 12.5 MG/1
12.5 TABLET ORAL 3 TIMES DAILY PRN
Qty: 30 TABLET | Refills: 0 | Status: SHIPPED | OUTPATIENT
Start: 2024-06-17

## 2024-06-17 ASSESSMENT — ENCOUNTER SYMPTOMS
BACK PAIN: 0
SINUS PRESSURE: 0
SHORTNESS OF BREATH: 0
SORE THROAT: 0
VOMITING: 0
EYE DISCHARGE: 0
COUGH: 0
WHEEZING: 0
NAUSEA: 0
EYE REDNESS: 1
ABDOMINAL PAIN: 0

## 2024-06-17 NOTE — PROGRESS NOTES
Chief Complaint   Patient presents with    3 Month Follow-Up    Diabetes    Hypertension    Hyperlipidemia       Have you seen any other physician or provider since your last visit yes -  and Gabriella Harris    Have you had any other diagnostic tests since your last visit? no    Have you changed or stopped any medications since your last visit? no     Diabetic retinal exam completed this year? Yes                       * If yes please have patient sign a records release to obtain record to update Health Maintenance                       * If no, please order referral for patient to be scheduled     SUBJECTIVE:    Patient ID: Sue Lynn Collett is a 63 y.o.female.    Chief Complaint   Patient presents with    3 Month Follow-Up    Diabetes    Hypertension    Hyperlipidemia         HPI:  Patient has had diabetes for the past several years.  She has been compliant with the medications and denies any side effects from it. She has been monitoring fingersticks on a daily basis.  Her fingerstick range is between . She denies any hypoglycemic symptoms. She has been following a diabetic diet and has been active.  Her last eye exam was less than a year ago.  She is using oral meds and Insulin.     Patient has had hypertension and hyperlipidemia for several years. She has been compliant with taking medications, without side effects from it. She has been following a low-sodium, is active and rarely exercises.  Weight is down 4 pounds, compared to last visit. Her blood pressure is stable at this time.    She is going to have ulnar nerve cubital release surgery.  She needs pre op.    She has trouble with right hand  and strength.  She has right leg weakness walking with a walker.   She has never regained the mobility of her right leg after her back surgery.    Patient's medications, allergies, past medical, surgical, social and family histories were reviewed and updated as appropriate in electronic medical record.

## 2024-07-11 DIAGNOSIS — M54.16 LUMBAR BACK PAIN WITH RADICULOPATHY AFFECTING LEFT LOWER EXTREMITY: ICD-10-CM

## 2024-07-11 NOTE — TELEPHONE ENCOUNTER
Royal last reviewed by you 12/5/23  Appt 8/16/24  
Alert and oriented, no focal deficits, no motor or sensory deficits.

## 2024-07-12 RX ORDER — PREGABALIN 100 MG/1
CAPSULE ORAL
Qty: 90 CAPSULE | Refills: 1 | Status: SHIPPED | OUTPATIENT
Start: 2024-07-12 | End: 2024-08-11

## 2024-07-25 ENCOUNTER — PRE-ADMISSION TESTING (OUTPATIENT)
Dept: PREADMISSION TESTING | Facility: HOSPITAL | Age: 64
End: 2024-07-25
Payer: MEDICARE

## 2024-07-25 VITALS — BODY MASS INDEX: 31.63 KG/M2 | HEIGHT: 66 IN | WEIGHT: 196.8 LBS

## 2024-07-25 LAB
ANION GAP SERPL CALCULATED.3IONS-SCNC: 17 MMOL/L (ref 5–15)
BACTERIA UR QL AUTO: ABNORMAL /HPF
BILIRUB UR QL STRIP: NEGATIVE
BUN SERPL-MCNC: 14 MG/DL (ref 8–23)
BUN/CREAT SERPL: 15.4 (ref 7–25)
CALCIUM SPEC-SCNC: 9.2 MG/DL (ref 8.6–10.5)
CHLORIDE SERPL-SCNC: 102 MMOL/L (ref 98–107)
CLARITY UR: ABNORMAL
CO2 SERPL-SCNC: 24 MMOL/L (ref 22–29)
COLOR UR: YELLOW
CREAT SERPL-MCNC: 0.91 MG/DL (ref 0.57–1)
DEPRECATED RDW RBC AUTO: 43.4 FL (ref 37–54)
EGFRCR SERPLBLD CKD-EPI 2021: 70.2 ML/MIN/1.73
ERYTHROCYTE [DISTWIDTH] IN BLOOD BY AUTOMATED COUNT: 13.5 % (ref 12.3–15.4)
GLUCOSE SERPL-MCNC: 163 MG/DL (ref 65–99)
GLUCOSE UR STRIP-MCNC: ABNORMAL MG/DL
HCT VFR BLD AUTO: 46.4 % (ref 34–46.6)
HGB BLD-MCNC: 15.2 G/DL (ref 12–15.9)
HGB UR QL STRIP.AUTO: ABNORMAL
HYALINE CASTS UR QL AUTO: ABNORMAL /LPF
KETONES UR QL STRIP: NEGATIVE
LEUKOCYTE ESTERASE UR QL STRIP.AUTO: NEGATIVE
MCH RBC QN AUTO: 29 PG (ref 26.6–33)
MCHC RBC AUTO-ENTMCNC: 32.8 G/DL (ref 31.5–35.7)
MCV RBC AUTO: 88.5 FL (ref 79–97)
NITRITE UR QL STRIP: NEGATIVE
PH UR STRIP.AUTO: <=5 [PH] (ref 5–8)
PLATELET # BLD AUTO: 239 10*3/MM3 (ref 140–450)
PMV BLD AUTO: 12.1 FL (ref 6–12)
POTASSIUM SERPL-SCNC: 4 MMOL/L (ref 3.5–5.2)
PROT UR QL STRIP: NEGATIVE
QT INTERVAL: 364 MS
QTC INTERVAL: 442 MS
RBC # BLD AUTO: 5.24 10*6/MM3 (ref 3.77–5.28)
RBC # UR STRIP: ABNORMAL /HPF
REF LAB TEST METHOD: ABNORMAL
SODIUM SERPL-SCNC: 143 MMOL/L (ref 136–145)
SP GR UR STRIP: >=1.03 (ref 1–1.03)
SQUAMOUS #/AREA URNS HPF: ABNORMAL /HPF
UROBILINOGEN UR QL STRIP: ABNORMAL
WBC # UR STRIP: ABNORMAL /HPF
WBC NRBC COR # BLD AUTO: 8.03 10*3/MM3 (ref 3.4–10.8)

## 2024-07-25 PROCEDURE — 85027 COMPLETE CBC AUTOMATED: CPT

## 2024-07-25 PROCEDURE — 93005 ELECTROCARDIOGRAM TRACING: CPT

## 2024-07-25 PROCEDURE — 80048 BASIC METABOLIC PNL TOTAL CA: CPT

## 2024-07-25 PROCEDURE — 36415 COLL VENOUS BLD VENIPUNCTURE: CPT

## 2024-07-25 PROCEDURE — 81001 URINALYSIS AUTO W/SCOPE: CPT

## 2024-07-25 RX ORDER — INSULIN GLARGINE 100 [IU]/ML
30 INJECTION, SOLUTION SUBCUTANEOUS 2 TIMES DAILY
COMMUNITY

## 2024-07-25 NOTE — DISCHARGE INSTRUCTIONS
Pre-Admission testing appointment completed today for patient's upcoming procedure here at [facility].    PAT PASS reviewed with patient and they verbalize understanding of the following:     Do not eat or drink anything after midnight the night before procedure unless otherwise instructed by physician/surgeon's office, this includes no gum, candy, mints, tobacco products or e-cigarettes.  Do not shave the area to be operated on at least 48 hours prior to procedure.  Do not wear makeup, lotion, hair products, or nail polish.  Do not wear any jewelry and remove all piercings.  Do not wear any adhesive if you wear dentures.  Do not wear contacts; bring in glasses if needed.  Only take medications on the morning of procedure as instructed by PAT nurse per anesthesia guidelines or as instructed by physician's office.  If you are on any blood thinners reach out to the physician/surgeon's office for instructions on when/if they will need to be stopped prior to procedure.  Bring in picture ID and insurance card, advanced directive copies if applicable, CPAP/BIPAP/Inhalers if indicated morning of procedure, leave any other valuables at home.  Ensure you have arranged for someone to drive you home the day of your procedure and someone to care for you at home afterwards. It is recommended that you do not drive, drink alcohol, or make any major legal decisions for at least 24 hours after your procedure is complete.  Chlorhexadine sponge along with instruction/information sheet given to patient. Readybath wipes given in lieu of CHG if patient has CHG allergy. Instructed patient to date, time, and initial the verification sheet once skin prep has been completed, and to return to [facility] the day of the procedure.     Introduction to anesthesia video watched by patient during appointment and anesthesia FAQ tip sheet given to patient.    Instructions and information given to patient about parking, hospital entrance, and  registration location.

## 2024-07-25 NOTE — PAT
"Called anesthesia phone and spoke with Aj Lloyd CRNA.  Informed that pt has an upcoming procedure on 8/1/24 with Dr. Mcneill.  Reviewed pt's PMH and preliminary EKG done in PAT today which reads:  \"Normal sinus rhythm  Left axis deviation  Minimal voltage criteria for LVH, may be normal variant  Anterior infarct , age undetermined  Abnormal ECG  When compared with ECG of 09-NOV-2021 09:45,  No significant change was found\"  Pt denies chest pain.  Last saw Dr. Selby (cardiology) on 4/29/21.  Aj stated that pt may proceed as scheduled with cubital tunnel release surgery.    "

## 2024-08-01 ENCOUNTER — ANESTHESIA EVENT (OUTPATIENT)
Dept: PERIOP | Facility: HOSPITAL | Age: 64
End: 2024-08-01
Payer: MEDICARE

## 2024-08-01 ENCOUNTER — ANESTHESIA (OUTPATIENT)
Dept: PERIOP | Facility: HOSPITAL | Age: 64
End: 2024-08-01
Payer: MEDICARE

## 2024-08-01 ENCOUNTER — HOSPITAL ENCOUNTER (OUTPATIENT)
Facility: HOSPITAL | Age: 64
Setting detail: HOSPITAL OUTPATIENT SURGERY
Discharge: HOME | End: 2024-08-01
Attending: ORTHOPAEDIC SURGERY | Admitting: ORTHOPAEDIC SURGERY
Payer: MEDICARE

## 2024-08-01 VITALS
TEMPERATURE: 97.8 F | SYSTOLIC BLOOD PRESSURE: 132 MMHG | DIASTOLIC BLOOD PRESSURE: 73 MMHG | OXYGEN SATURATION: 98 % | RESPIRATION RATE: 16 BRPM | HEART RATE: 67 BPM

## 2024-08-01 DIAGNOSIS — G56.21 CUBITAL TUNNEL SYNDROME ON RIGHT: Primary | ICD-10-CM

## 2024-08-01 LAB — GLUCOSE BLDC GLUCOMTR-MCNC: 145 MG/DL (ref 70–130)

## 2024-08-01 PROCEDURE — 25010000002 LIDOCAINE 1 % SOLUTION: Performed by: ORTHOPAEDIC SURGERY

## 2024-08-01 PROCEDURE — 25010000002 MIDAZOLAM PER 1MG: Performed by: NURSE ANESTHETIST, CERTIFIED REGISTERED

## 2024-08-01 PROCEDURE — 25010000002 PROPOFOL 10 MG/ML EMULSION: Performed by: NURSE ANESTHETIST, CERTIFIED REGISTERED

## 2024-08-01 PROCEDURE — 25810000003 LACTATED RINGERS PER 1000 ML: Performed by: ORTHOPAEDIC SURGERY

## 2024-08-01 PROCEDURE — 25010000002 FENTANYL CITRATE (PF) 50 MCG/ML SOLUTION PREFILLED SYRINGE: Performed by: NURSE ANESTHETIST, CERTIFIED REGISTERED

## 2024-08-01 PROCEDURE — 25810000003 SODIUM CHLORIDE 0.9 % SOLUTION 250 ML FLEX CONT: Performed by: ORTHOPAEDIC SURGERY

## 2024-08-01 PROCEDURE — 25010000002 VANCOMYCIN HCL 1.25 G RECONSTITUTED SOLUTION 1 EACH VIAL: Performed by: ORTHOPAEDIC SURGERY

## 2024-08-01 PROCEDURE — 82948 REAGENT STRIP/BLOOD GLUCOSE: CPT

## 2024-08-01 PROCEDURE — 25010000002 ONDANSETRON PER 1 MG: Performed by: NURSE ANESTHETIST, CERTIFIED REGISTERED

## 2024-08-01 DEVICE — DEV CONTRL TISS STRATAFIX SPIRAL MNCRYL UD 3/0 PLS 60CM: Type: IMPLANTABLE DEVICE | Site: ARM | Status: FUNCTIONAL

## 2024-08-01 RX ORDER — ONDANSETRON 2 MG/ML
INJECTION INTRAMUSCULAR; INTRAVENOUS AS NEEDED
Status: DISCONTINUED | OUTPATIENT
Start: 2024-08-01 | End: 2024-08-01 | Stop reason: SURG

## 2024-08-01 RX ORDER — MIDAZOLAM HYDROCHLORIDE 2 MG/2ML
INJECTION, SOLUTION INTRAMUSCULAR; INTRAVENOUS AS NEEDED
Status: DISCONTINUED | OUTPATIENT
Start: 2024-08-01 | End: 2024-08-01 | Stop reason: SURG

## 2024-08-01 RX ORDER — LIDOCAINE HYDROCHLORIDE 10 MG/ML
INJECTION, SOLUTION INFILTRATION; PERINEURAL AS NEEDED
Status: DISCONTINUED | OUTPATIENT
Start: 2024-08-01 | End: 2024-08-01 | Stop reason: HOSPADM

## 2024-08-01 RX ORDER — PILOCARPINE HYDROCHLORIDE 5 MG/1
5 TABLET, FILM COATED ORAL 4 TIMES DAILY
Qty: 120 TABLET | Refills: 0 | Status: SHIPPED | OUTPATIENT
Start: 2024-08-01

## 2024-08-01 RX ORDER — FENTANYL CITRATE 50 UG/ML
INJECTION, SOLUTION INTRAMUSCULAR; INTRAVENOUS AS NEEDED
Status: DISCONTINUED | OUTPATIENT
Start: 2024-08-01 | End: 2024-08-01 | Stop reason: SURG

## 2024-08-01 RX ORDER — MAGNESIUM HYDROXIDE 1200 MG/15ML
LIQUID ORAL AS NEEDED
Status: DISCONTINUED | OUTPATIENT
Start: 2024-08-01 | End: 2024-08-01 | Stop reason: HOSPADM

## 2024-08-01 RX ORDER — OXYCODONE HYDROCHLORIDE AND ACETAMINOPHEN 5; 325 MG/1; MG/1
1 TABLET ORAL EVERY 4 HOURS PRN
Qty: 60 TABLET | Refills: 0 | Status: SHIPPED | OUTPATIENT
Start: 2024-08-01

## 2024-08-01 RX ORDER — PROPOFOL 10 MG/ML
VIAL (ML) INTRAVENOUS AS NEEDED
Status: DISCONTINUED | OUTPATIENT
Start: 2024-08-01 | End: 2024-08-01 | Stop reason: SURG

## 2024-08-01 RX ORDER — SODIUM CHLORIDE, SODIUM LACTATE, POTASSIUM CHLORIDE, CALCIUM CHLORIDE 600; 310; 30; 20 MG/100ML; MG/100ML; MG/100ML; MG/100ML
1000 INJECTION, SOLUTION INTRAVENOUS CONTINUOUS
Status: DISCONTINUED | OUTPATIENT
Start: 2024-08-01 | End: 2024-08-01 | Stop reason: HOSPADM

## 2024-08-01 RX ORDER — LEFLUNOMIDE 20 MG/1
20 TABLET ORAL DAILY
Qty: 90 TABLET | Refills: 1 | Status: SHIPPED | OUTPATIENT
Start: 2024-08-01

## 2024-08-01 RX ADMIN — MIDAZOLAM HYDROCHLORIDE 2 MG: 1 INJECTION, SOLUTION INTRAMUSCULAR; INTRAVENOUS at 13:44

## 2024-08-01 RX ADMIN — PROPOFOL 120 MCG/KG/MIN: 10 INJECTION, EMULSION INTRAVENOUS at 13:47

## 2024-08-01 RX ADMIN — ONDANSETRON 4 MG: 2 INJECTION INTRAMUSCULAR; INTRAVENOUS at 13:44

## 2024-08-01 RX ADMIN — SODIUM CHLORIDE, POTASSIUM CHLORIDE, SODIUM LACTATE AND CALCIUM CHLORIDE 1000 ML: 600; 310; 30; 20 INJECTION, SOLUTION INTRAVENOUS at 11:09

## 2024-08-01 RX ADMIN — FENTANYL CITRATE 50 MCG: 50 INJECTION, SOLUTION INTRAMUSCULAR; INTRAVENOUS at 13:46

## 2024-08-01 RX ADMIN — SODIUM CHLORIDE 1250 MG: 9 INJECTION, SOLUTION INTRAVENOUS at 11:10

## 2024-08-01 RX ADMIN — LIDOCAINE HYDROCHLORIDE 60 MG: 20 INJECTION, SOLUTION INTRAVENOUS at 13:46

## 2024-08-01 RX ADMIN — PROPOFOL 50 MG: 10 INJECTION, EMULSION INTRAVENOUS at 13:46

## 2024-08-01 NOTE — OP NOTE
CUBITAL TUNNEL RELEASE  Procedure Report    Patient Name:  Sue L Collett  YOB: 1960    Date of Surgery:  8/1/2024     Indications:  cubital tunnel syndrome    Pre-op Diagnosis:   R cubital tunnel syndrome       Post-Op Diagnosis Codes:   same    Procedure/CPT® Codes:      Procedure(s):  CUBITAL TUNNEL RELEASE RIGHT              Staff:  Surgeon(s):  Jung Mcneill MD         Anesthesia: General    Estimated Blood Loss: minimal    Implants:    Implant Name Type Inv. Item Serial No.  Lot No. LRB No. Used Action   DEV CONTRL TISS STRATAFIX SPIRAL MNCRYL UD 3/0 PLS 60CM - [id number] Implant DEV CONTRL TISS STRATAFIX SPIRAL MNCRYL UD 3/0 PLS 60CM  [employer]  DIV [employer] [id number] Right 2 Implanted       Specimen:          None        Findings: see full dictation    Complications: none    Description of Procedure:     The patient was identified in the preoperative holding area and taken to the operating suite under the care of myself and anesthesia staff.  Patient was placed supine on his bed where general was provided.  An LMA was placed.  The left upper extremity was prepped and draped in a standard sterile fashion.  We performed a timeout to ensure the correct patient procedure and side was being done.  Then turned our attention towards incision after preoperative antibiotics were delivered     A 4 cm incision was made between the medial epicondyle and the olecranon tip.  This was done after tourniquet was inflated to 250 mmHg.  Soft tissue dissection was carried down to the level of the fascia.  There is significant amount of triceps fascia reflection that inserted all inserted onto the roof the cubital tunnel.  This was released with tenotomy scissors.  This allowed us to visualize the underlying ulnar nerve.  We then released the overlying fascia of the cubital tunnel also known as George's ligament.  This was released throughout the entire length of the cubital tunnel.   There was a significant amount of adherent fibrous tissue compressing the nerve that was also released within the cubital tunnel.  We then released the forearm fascia overlying the distal aspect of the cubital tunnel.  There was no compression of the ulnar nerve throughout the entire length to cubital tunnel.  We ranged the elbow through full range of motion extension and flexion and there was no subluxation of the ulnar nerve.  It wasn't felt that transposition was not required.  We then copiously irrigated the wound.  We closed in a layered fashion with 2-0 Vicryl suture used for subcutaneous tissue and running Prolene and Dermabond glue used for the skin.  A sterile gauze, webrill, ACE wrap dressing was provided.                                   Jung Mcneill MD     Date: 8/1/2024  Time: 15:19 EDT

## 2024-08-01 NOTE — ANESTHESIA PREPROCEDURE EVALUATION
Anesthesia Evaluation     Patient summary reviewed and Nursing notes reviewed   history of anesthetic complications:  PONV  NPO Solid Status: > 8 hours  NPO Liquid Status: > 8 hours           Airway   Mallampati: I  TM distance: >3 FB  Neck ROM: full  no difficulty expected and Possible difficult intubation  Dental - normal exam     Pulmonary    (+) COPD, asthma,shortness of breath, sleep apnea (Non compliant), decreased breath sounds  (-) not a smoker  Cardiovascular - normal exam    PT is on anticoagulation therapy    (+) hypertension 2 medications or greater, PVD, DVT (Clotting factor deficiency. History of multiple DVTs) current, hyperlipidemia    ROS comment: Off Coumadin 02/11/2019    Neuro/Psych  (+) CVA (Right side weakness) residual symptoms, headaches, numbness, psychiatric history Anxiety and Depression  GI/Hepatic/Renal/Endo    (+) obesity, morbid obesity, GERD, PUD, renal disease- CRI, diabetes mellitus (Glucose 228) using insulin, thyroid problem hypothyroidism    Musculoskeletal     (+) back pain, chronic pain  Abdominal   (+) obese    Abdomen: soft.  Bowel sounds: normal.   Substance History      OB/GYN          Other   arthritis,   history of cancer active    ROS/Med Hx Other: Lupus    Right sided neuropathy from knee to hip since last surgery                Anesthesia Plan    ASA 4     general     intravenous induction     Anesthetic plan, risks, benefits, and alternatives have been provided, discussed and informed consent has been obtained with: patient.

## 2024-08-01 NOTE — ANESTHESIA POSTPROCEDURE EVALUATION
Patient: Sue L Collett    Procedure Summary       Date: 08/01/24 Room / Location: [facility] OR  /  [facility]    Anesthesia Start: 1342 Anesthesia Stop: 1444    Procedure: CUBITAL TUNNEL RELEASE RIGHT (Right) Diagnosis:     Surgeons: Jung Mcneill MD Provider: Aj Crowder CRNA    Anesthesia Type: general ASA Status: 4            Anesthesia Type: general    Vitals  No vitals data found for the desired time range.          Post Anesthesia Care and Evaluation    Patient location during evaluation: bedside  Patient participation: complete - patient participated  Level of consciousness: awake and alert  Pain score: 0  Pain management: satisfactory to patient    Airway patency: patent  Anesthetic complications: No anesthetic complications  PONV Status: none  Cardiovascular status: acceptable and stable  Respiratory status: acceptable  Hydration status: acceptable    Comments: Vitals signs as noted in nursing documentation as per protocol.

## 2024-08-01 NOTE — H&P
"The patient's original H&P from our office was reviewed.  It is filed under \"DC summary\" in the EMR    There have been no changes to her history, and she remains a candidate for the proposed surgical procedure.   "

## 2024-08-02 ENCOUNTER — TELEPHONE (OUTPATIENT)
Dept: PRIMARY CARE CLINIC | Age: 64
End: 2024-08-02

## 2024-08-13 ENCOUNTER — HOSPITAL ENCOUNTER (OUTPATIENT)
Facility: HOSPITAL | Age: 64
Discharge: HOME | End: 2024-08-13
Payer: MEDICARE

## 2024-08-13 ENCOUNTER — OFFICE VISIT (OUTPATIENT)
Age: 64
End: 2024-08-13
Payer: MEDICARE

## 2024-08-13 ENCOUNTER — HOSPITAL ENCOUNTER (OUTPATIENT)
Dept: CT IMAGING | Facility: HOSPITAL | Age: 64
Discharge: HOME | End: 2024-08-13
Attending: INTERNAL MEDICINE
Payer: MEDICARE

## 2024-08-13 ENCOUNTER — OFFICE VISIT (OUTPATIENT)
Dept: PRIMARY CARE CLINIC | Age: 64
End: 2024-08-13

## 2024-08-13 VITALS
OXYGEN SATURATION: 97 % | DIASTOLIC BLOOD PRESSURE: 84 MMHG | HEART RATE: 86 BPM | TEMPERATURE: 97.1 F | SYSTOLIC BLOOD PRESSURE: 124 MMHG | BODY MASS INDEX: 32.46 KG/M2 | WEIGHT: 201 LBS

## 2024-08-13 VITALS
DIASTOLIC BLOOD PRESSURE: 68 MMHG | WEIGHT: 203.6 LBS | OXYGEN SATURATION: 98 % | TEMPERATURE: 97.3 F | HEIGHT: 66 IN | HEART RATE: 82 BPM | RESPIRATION RATE: 14 BRPM | BODY MASS INDEX: 32.72 KG/M2 | SYSTOLIC BLOOD PRESSURE: 114 MMHG

## 2024-08-13 DIAGNOSIS — R22.1 NECK SWELLING: ICD-10-CM

## 2024-08-13 DIAGNOSIS — R29.898 WEAKNESS OF RIGHT LEG: ICD-10-CM

## 2024-08-13 DIAGNOSIS — R26.9 GAIT DISTURBANCE: ICD-10-CM

## 2024-08-13 DIAGNOSIS — R22.0 LEFT FACIAL SWELLING: ICD-10-CM

## 2024-08-13 DIAGNOSIS — R26.89 STEPPAGE GAIT: Primary | ICD-10-CM

## 2024-08-13 DIAGNOSIS — R22.1 NECK SWELLING: Primary | ICD-10-CM

## 2024-08-13 LAB
ALBUMIN SERPL-MCNC: 3.8 G/DL (ref 3.4–4.8)
ALBUMIN/GLOB SERPL: 1.5 {RATIO} (ref 0.8–2)
ALP SERPL-CCNC: 172 U/L (ref 25–100)
ALT SERPL-CCNC: 20 U/L (ref 4–36)
ANION GAP SERPL CALCULATED.3IONS-SCNC: 14 MMOL/L (ref 3–16)
AST SERPL-CCNC: 20 U/L (ref 8–33)
BASOPHILS # BLD: 0.1 K/UL (ref 0–0.1)
BASOPHILS NFR BLD: 1.4 %
BILIRUB SERPL-MCNC: 0.5 MG/DL (ref 0.3–1.2)
BUN SERPL-MCNC: 13 MG/DL (ref 6–20)
CALCIUM SERPL-MCNC: 9 MG/DL (ref 8.5–10.5)
CHLORIDE SERPL-SCNC: 103 MMOL/L (ref 98–107)
CO2 SERPL-SCNC: 25 MMOL/L (ref 20–30)
CREAT SERPL-MCNC: 0.8 MG/DL (ref 0.4–1.2)
CRP SERPL-MCNC: 8.9 MG/L (ref 0–5.1)
EOSINOPHIL # BLD: 0.5 K/UL (ref 0–0.4)
EOSINOPHIL NFR BLD: 5.2 %
ERYTHROCYTE [DISTWIDTH] IN BLOOD BY AUTOMATED COUNT: 13.8 % (ref 11–16)
ERYTHROCYTE [SEDIMENTATION RATE] IN BLOOD BY WESTERGREN METHOD: 6 MM/HR (ref 0–30)
GFR SERPLBLD CREATININE-BSD FMLA CKD-EPI: 81 ML/MIN/{1.73_M2}
GLOBULIN SER CALC-MCNC: 2.6 G/DL
GLUCOSE SERPL-MCNC: 134 MG/DL (ref 74–106)
HCT VFR BLD AUTO: 48.5 % (ref 37–47)
HGB BLD-MCNC: 15.5 G/DL (ref 11.5–16.5)
IMM GRANULOCYTES # BLD: 0 K/UL
IMM GRANULOCYTES NFR BLD: 0.4 % (ref 0–5)
LYMPHOCYTES # BLD: 2.4 K/UL (ref 1.5–4)
LYMPHOCYTES NFR BLD: 24.4 %
MCH RBC QN AUTO: 29.4 PG (ref 27–32)
MCHC RBC AUTO-ENTMCNC: 32 G/DL (ref 31–35)
MCV RBC AUTO: 91.9 FL (ref 80–100)
MONOCYTES # BLD: 0.8 K/UL (ref 0.2–0.8)
MONOCYTES NFR BLD: 8.4 %
NEUTROPHILS # BLD: 5.9 K/UL (ref 2–7.5)
NEUTS SEG NFR BLD: 60.2 %
PLATELET # BLD AUTO: 294 K/UL (ref 150–400)
PMV BLD AUTO: 11 FL (ref 6–10)
POTASSIUM SERPL-SCNC: 4.8 MMOL/L (ref 3.4–5.1)
PROCALCITONIN SERPL IA-MCNC: 0.14 NG/ML (ref 0–0.15)
PROT SERPL-MCNC: 6.4 G/DL (ref 6.4–8.3)
RBC # BLD AUTO: 5.28 M/UL (ref 3.8–5.8)
SODIUM SERPL-SCNC: 142 MMOL/L (ref 136–145)
WBC # BLD AUTO: 9.8 K/UL (ref 4–11)

## 2024-08-13 PROCEDURE — 86140 C-REACTIVE PROTEIN: CPT

## 2024-08-13 PROCEDURE — 1159F MED LIST DOCD IN RCRD: CPT | Performed by: NURSE PRACTITIONER

## 2024-08-13 PROCEDURE — 84145 PROCALCITONIN (PCT): CPT

## 2024-08-13 PROCEDURE — 99204 OFFICE O/P NEW MOD 45 MIN: CPT | Performed by: NURSE PRACTITIONER

## 2024-08-13 PROCEDURE — 1160F RVW MEDS BY RX/DR IN RCRD: CPT | Performed by: NURSE PRACTITIONER

## 2024-08-13 PROCEDURE — 6360000004 HC RX CONTRAST MEDICATION: Performed by: INTERNAL MEDICINE

## 2024-08-13 PROCEDURE — 80053 COMPREHEN METABOLIC PANEL: CPT

## 2024-08-13 PROCEDURE — 85025 COMPLETE CBC W/AUTO DIFF WBC: CPT

## 2024-08-13 PROCEDURE — 85652 RBC SED RATE AUTOMATED: CPT

## 2024-08-13 PROCEDURE — 70487 CT MAXILLOFACIAL W/DYE: CPT

## 2024-08-13 PROCEDURE — 70491 CT SOFT TISSUE NECK W/DYE: CPT

## 2024-08-13 RX ORDER — CYCLOBENZAPRINE HCL 5 MG
1 TABLET ORAL 3 TIMES DAILY PRN
COMMUNITY

## 2024-08-13 RX ORDER — FUROSEMIDE 20 MG/1
TABLET ORAL
COMMUNITY
Start: 2024-03-14

## 2024-08-13 RX ORDER — PREGABALIN 100 MG/1
100 CAPSULE ORAL
COMMUNITY
Start: 2024-07-12

## 2024-08-13 RX ORDER — DAPAGLIFLOZIN 5 MG/1
TABLET, FILM COATED ORAL
COMMUNITY
Start: 2024-02-19

## 2024-08-13 RX ORDER — DULAGLUTIDE 3 MG/.5ML
3 INJECTION, SOLUTION SUBCUTANEOUS WEEKLY
Qty: 2 ML | Refills: 3 | Status: CANCELLED | OUTPATIENT
Start: 2024-08-13

## 2024-08-13 RX ADMIN — IOPAMIDOL 100 ML: 755 INJECTION, SOLUTION INTRAVENOUS at 16:05

## 2024-08-13 ASSESSMENT — ENCOUNTER SYMPTOMS
EYES NEGATIVE: 1
SHORTNESS OF BREATH: 0
GASTROINTESTINAL NEGATIVE: 1
FACIAL SWELLING: 1
SORE THROAT: 0
RESPIRATORY NEGATIVE: 1
TROUBLE SWALLOWING: 1
ALLERGIC/IMMUNOLOGIC NEGATIVE: 1

## 2024-08-13 NOTE — PROGRESS NOTES
New Patient Office Visit      Patient Name: Sue L Collett  : 1960   MRN: [id number]     Referring Physician: Stephany De Los Santos AP*    Chief Complaint:    Chief Complaint   Patient presents with   • Establish Care     Weakness in right leg- this has been going on for a few years. She had two back surgeries Oct 2023.       History of Present Illness: Sue L Collett is a 63 y.o. female who is here today to establish care with Neurology.  She has never been seen before in Neurology. She was referred to us from PCP (RODRIGUEZ) for right leg weakness and gait disturbance. Gradual worsening of weakness with onset  following back surgery ([facility]) to correct similar symptoms of the LLE. She has been taking Lyrica 100 mg BID and this is managed by Rheumatology. She is also an established pt of Pain Mgmt (Daniel) and Orthopedics ([facility]).She ambulates with walker. She has been having weakness to her RLE. She is open to PT/OT if they are bale to come to the house. Her goal: to be able to put my underwear on by myself. Ambulating stairs with difficulty and has 14 steps at home. No BB incontinence or saddle anesthesia     Recent Imaging:    MRI Cyberknife Lumbar Spine WO  + Postsurgical changes from L3-L4 and L4-L5 laminectomies. Decreased prominence of the posterior epidural fat. Mild residual central canal stenosis at the L4-L5 level. Persistent right-sided facet arthrosis and central disc protrusion at L4-L5   contributing to severe right subarticular zone narrowing. Sterility indeterminant postsurgical fluid collections in the posterior subcutaneous tissues and the left paraspinal region at the laminectomy levels. There is some mass effect associated with paraspinal collection at the L3-L4 level which results in moderate  left-sided subarticular zone narrowing. Normal marrow signal. No evidence of osteomyelitis/discitiis   MRI Cyberknife Cspine WO  + Mild multilevel cervical spondylosis with multilevel  disc bulges/protrusions. Moderate central canal stenosis at the C5-C6 level with mild cord compression. There is also mild to moderate bilateral neuroforaminal narrowing at this level.   MRI Brain WO 11/23 + There is mild cerebral volume loss with associated ventricular dilatation. No mass effect or midline shift is seen. Mild periventricular white matter FLAIR hyperintensities likely represent sequelae of chronic small vessel ischemic disease. There are chronic right corona radiata and caudate lacunar infarcts.   EMG/NCS 08/23 + Severe ongoing axonal pathology affecting the left L4 and S1 nerve roots. There is 78% CMAP amplitude reduction in the tibialis anterior and 81% reduction in the femoral motor nerves. Underlying sensorimotor axonal and demyelinating polyneuropathy, most evident in the BLE    Pertinent Medical History: DM2, S/P thyroidectomy, Thyroid CA, Chronic LBP with sciatica, CVA, Anxiety, OA, Asthma, Depression, COPD, DDD, HL, GERD, HTN, Lupus, Seizure, JELLY, RA, Factor V    Subjective      Review of Systems:   Review of Systems   HENT: Negative.     Eyes: Negative.    Respiratory: Negative.     Cardiovascular: Negative.    Gastrointestinal: Negative.    Endocrine: Negative.    Genitourinary: Negative.    Musculoskeletal:  Positive for back pain and gait problem.   Skin: Negative.    Allergic/Immunologic: Negative.    Neurological:  Positive for weakness.   Hematological: Negative.    Psychiatric/Behavioral: Negative.     All other systems reviewed and are negative.      Past Medical History:   Past Medical History:   Diagnosis Date   • Allergic    • Anxiety    • Arthritis    • Asthma 11/09/2021    Reported as history - reported last use of inhaler apx 1 month ago   • Bruises easily    • Bulging lumbar disc    • Cancer     THYROID CANCER (MALIGNANCY)    • Chronic kidney disease    • Colon polyp    • Constipation    • COPD (chronic obstructive pulmonary disease)    • DDD (degenerative disc disease),  "lumbosacral    • Depression    • Diabetes mellitus    • Disease of thyroid gland    • Diverticulitis    • DVT of leg (deep venous thrombosis) 10/2023    X 6- patient reported last DVT was early part of 2021   • Dysphagia 11/09/2021    Reported history prior to thyroid nodule were removed - no problem at present time reported   • Elevated cholesterol    • Factor V deficiency    • GERD (gastroesophageal reflux disease)    • H/O cardiovascular stress test     Patient reported apx 2016 and wnl's at the time it was done   • Headache    • History of blood clots    • History of echocardiogram    • History of pneumonia 2015   • History of transfusion     No reaction to transfusion    • Hypertension    • Lupus     patient reported \"a mild case\"    • Nausea    • Numbness in right leg     FROM HIP TO JUST ABOVE THE KNEE   • Piercing     EARS ONLY   • PONV (postoperative nausea and vomiting)    • PVD (peripheral vascular disease)    • Seizure 2019    x2:   • Sleep apnea     Patient reported had sleep study and was told that she has mild sleep apnea but that a CPAP was not warranted   • SOB (shortness of breath) on exertion    • Stroke 2008    MILD RIGHT SIDED WEAKNESS - reported she has difficulty holding heavy object with RUE   • Ulcer, stomach peptic    • Wears glasses     Rx glasses to read PRN       Past Surgical History:   Past Surgical History:   Procedure Laterality Date   • BREAST SURGERY Right     LUMPECTOMY    • COLONOSCOPY      WITH POLYP REMOVAL    • CUBITAL TUNNEL RELEASE Right 8/1/2024    Procedure: CUBITAL TUNNEL RELEASE RIGHT;  Surgeon: Jung Mcneill MD;  Location: [facility];  Service: Orthopedics;  Laterality: Right;   • D & C HYSTEROSCOPY N/A 11/16/2021    Procedure: DILATATION AND CURETTAGE WITH DIAGNOSTIC HYSTEROSCOPY;  Surgeon: Karen Melo MD;  Location: [facility];  Service: Obstetrics/Gynecology;  Laterality: N/A;   • DILATATION AND CURETTAGE     • ENDOSCOPY     • ERCP     • FINE NEEDLE ASPIRATION  " 2018    THYROID    • GALLBLADDER SURGERY     • HYSTEROSCOPY     • MOUTH SURGERY      Reported 2 lower wisdom teeth extracted and other oral extractions   • SKIN BIOPSY      HAND    • THYROIDECTOMY Right 12/21/2018    Procedure: Right thyroid lobectomy with isthmusectomy;  Surgeon: Janeth Tan MD;  Location: [facility] OR;  Service: General   • THYROIDECTOMY N/A 2/18/2019    Procedure: THYROIDECTOMY, COMPLETION;  Surgeon: Janeth Tan MD;  Location: [facility] OR;  Service: General   • TUBAL ABDOMINAL LIGATION         Family History:   Family History   Problem Relation Age of Onset   • Heart disease Father    • Heart attack Father    • Arthritis Father    • Stroke Mother    • Hypertension Mother    • Hyperlipidemia Mother    • Kidney disease Mother    • Arthritis Mother    • Cancer Mother    • Cancer Sister    • Diabetes Other    • Thyroid cancer Other    • Stroke Other        Social History:   Social History     Socioeconomic History   • Marital status:    Tobacco Use   • Smoking status: Never   • Smokeless tobacco: Never   • Tobacco comments:     Reported exposure to second hand smoke    Vaping Use   • Vaping status: Never Used   Substance and Sexual Activity   • Alcohol use: No   • Drug use: No   • Sexual activity: Defer       Medications:     Current Outpatient Medications:   •  acetaminophen (TYLENOL) 500 MG tablet, Take 2 tablets by mouth Every 8 (Eight) Hours As Needed for Mild Pain or Moderate Pain ., Disp: 60 tablet, Rfl: 0  •  ADVAIR DISKUS 250-50 MCG/DOSE DISKUS, Inhale 1 puff 2 (Two) Times a Day., Disp: , Rfl: 0  •  apixaban (ELIQUIS) 5 MG tablet tablet, Take 1 tablet by mouth 2 (Two) Times a Day., Disp: , Rfl:   •  atorvastatin (LIPITOR) 10 MG tablet, Take 1 tablet by mouth every night at bedtime., Disp: , Rfl:   •  cyclobenzaprine (FLEXERIL) 5 MG tablet, Take 1 tablet by mouth 3 (Three) Times a Day As Needed., Disp: , Rfl:   •  Farxiga 5 MG tablet tablet, 5 mg by oral route., Disp: , Rfl:   •   FLUoxetine (PROzac) 40 MG capsule, Take 1 capsule by mouth every night at bedtime., Disp: , Rfl:   •  fluticasone (FLONASE) 50 MCG/ACT nasal spray, 1 spray into the nostril(s) as directed by provider Daily., Disp: , Rfl: 0  •  furosemide (LASIX) 20 MG tablet, , Disp: , Rfl:   •  insulin glargine (LANTUS, SEMGLEE) 100 UNIT/ML injection, Inject 30 Units under the skin into the appropriate area as directed 2 (Two) Times a Day., Disp: , Rfl:   •  levothyroxine (SYNTHROID, LEVOTHROID) 150 MCG tablet, Take 1 tablet by mouth Daily., Disp: , Rfl:   •  oxyCODONE-acetaminophen (PERCOCET) 5-325 MG per tablet, Take 1 tablet by mouth Every 4 (Four) Hours As Needed for Moderate Pain or Severe Pain., Disp: 60 tablet, Rfl: 0  •  pantoprazole (PROTONIX) 20 MG EC tablet, Take 1 tablet by mouth Daily., Disp: , Rfl:   •  pregabalin (LYRICA) 100 MG capsule, 1 capsule., Disp: , Rfl:   •  Probiotic Product (PROBIOTIC DAILY PO), Take  by mouth Daily., Disp: , Rfl:   •  Trulicity 1.5 MG/0.5ML solution pen-injector, Inject 1.5 mg under the skin into the appropriate area as directed 1 (One) Time Per Week. Last dose was 7/16/24, Disp: , Rfl:   •  vitamin D (ERGOCALCIFEROL) 83871 units capsule capsule, Take 1 capsule by mouth 1 (One) Time Per Week., Disp: , Rfl:   •  cephalexin (KEFLEX) 500 MG capsule, Take 1 capsule by mouth 3 (Three) Times a Day. (Patient not taking: Reported on 7/25/2024), Disp: 21 capsule, Rfl: 0  •  Eliquis 5 MG tablet tablet, Take 1 tablet by mouth Every 12 (Twelve) Hours. (Patient not taking: Reported on 8/13/2024), Disp: , Rfl:   •  hydroCHLOROthiazide (HYDRODIURIL) 12.5 MG tablet, Take 1 tablet by mouth Daily As Needed. (Patient not taking: Reported on 8/13/2024), Disp: , Rfl:   •  Insulin Glargine (BASAGLAR KWIKPEN) 100 UNIT/ML injection pen, Inject 30 Units under the skin into the appropriate area as directed 2 (Two) Times a Day. (Patient not taking: Reported on 8/13/2024), Disp: , Rfl:   •  irbesartan (AVAPRO) 300  "MG tablet, Take 1 tablet by mouth Daily. (Patient not taking: Reported on 7/25/2024), Disp: , Rfl:   •  polyethylene glycol (MIRALAX) packet, Take 17 g by mouth Daily As Needed (constipation). (Patient not taking: Reported on 8/13/2024), Disp: , Rfl:   •  pregabalin (LYRICA) 100 MG capsule, Take 1 capsule by mouth 2 (Two) Times a Day., Disp: 90 capsule, Rfl: 11  •  RESTASIS 0.05 % ophthalmic emulsion, Administer 1 drop to both eyes Every 12 (Twelve) Hours. (Patient not taking: Reported on 7/25/2024), Disp: , Rfl:     Allergies:   Allergies   Allergen Reactions   • Prednisone Other (See Comments)     ACUTE KIDNEY FAILURE   • Adhesive Tape Other (See Comments)     Patient reported causes skin to blister and tear.  Patient reported she is able to tolerate paper tape with no adverse reactions.     • Azithromycin Hives   • Erythromycin Hives   • Other Nausea And Vomiting     PT. REPORTS THAT SHE IS \"ALLERGIC TO ALL INSULINS EXCEPT LANTUS\"   • Red Dye Hives   • Sulfa Antibiotics Hives       Objective     Physical Exam:  Vital Signs:   Vitals:    08/13/24 1427   BP: 114/68   BP Location: Left arm   Patient Position: Sitting   Cuff Size: Adult   Pulse: 82   Resp: 14   Temp: 97.3 °F (36.3 °C)   TempSrc: Infrared   SpO2: 98%   Weight: 92.4 kg (203 lb 9.6 oz)   Height: 167.6 cm (65.98\")   PainSc:   6   PainLoc: Leg     Body mass index is 32.88 kg/m².     Physical Exam  Vitals and nursing note reviewed.   Constitutional:       General: She is not in acute distress.     Appearance: Normal appearance.   HENT:      Head: Normocephalic.      Nose: Nose normal.      Mouth/Throat:      Mouth: Mucous membranes are moist.      Pharynx: Oropharynx is clear.   Eyes:      Extraocular Movements: Extraocular movements intact.      Conjunctiva/sclera: Conjunctivae normal.   Musculoskeletal:      Cervical back: Normal range of motion and neck supple.   Skin:     General: Skin is warm and dry.      Capillary Refill: Capillary refill takes less " than 2 seconds.   Neurological:      Mental Status: She is alert and oriented to person, place, and time.      Cranial Nerves: Cranial nerves 2-12 are intact.      Motor: Weakness present.      Coordination: Romberg Test abnormal.      Gait: Gait abnormal.      Deep Tendon Reflexes:      Reflex Scores:       Patellar reflexes are 2+ on the right side and 2+ on the left side.  Psychiatric:         Mood and Affect: Mood normal.         Speech: Speech normal.         Behavior: Behavior normal.         Neurologic Exam     Mental Status   Oriented to person, place, and time.   Speech: speech is normal   Level of consciousness: alert  Knowledge: good.     Cranial Nerves   Cranial nerves II through XII intact.     Motor Exam   Muscle bulk: normal  Overall muscle tone: normal  Right arm tone: decreased  Left arm tone: normal  Right leg tone: decreased  Left leg tone: normal    Strength   Right biceps: 3/5  Left biceps: 4/5  Right triceps: 3/5  Left triceps: 4/5  Right quadriceps: 3/5  Left quadriceps: 4/5  Right hamstring: 3/5  Left hamstrin/5    Sensory Exam   Light touch normal.     Gait, Coordination, and Reflexes     Gait  Gait: wide-based (steggage gait with walker)    Coordination   Romberg: positive    Tremor   Resting tremor: absent    Reflexes   Right patellar: 2+  Left patellar: 2+      PHQ-9 Total Score:       Assessment / Plan      Assessment/Plan:   Diagnoses and all orders for this visit:    1. Steppage gait (Primary)  -     Ambulatory Referral to Home Health (Outpatient)  -     MRI Lumbar Spine With & Without Contrast; Future    2. Weakness of right leg  -     Ambulatory Referral to Home Health (Outpatient)  -     MRI Lumbar Spine With & Without Contrast; Future    3. Gait disturbance  -     Ambulatory Referral to Home Health (Outpatient)  -     MRI Lumbar Spine With & Without Contrast; Future         Follow Up:   Return in about 3 months (around 2024), or if symptoms worsen or fail to  improve.    Anticipatory Guidance and Safety Reviewed  Patient Education Provided  HH Referral for PT/OT for Steppage Gait   MRI Lumbar Spine W/WO  Keep FU with Pain Mgmt  Keep FU with Orthopedics     RTC PRN or within 12 weeks or sooner with issues     CORINA Navarrete  [facility] Neurology and Sleep Medicine

## 2024-08-13 NOTE — PROGRESS NOTES
Chief Complaint   Patient presents with    Depression    Neck Pain     Patient states her neck is swollen and sore. She is concerned with it. Patient had her thyroid removed 4 years ago and stated her specialist was going to check her neck after the removal and she hasn't followed up with her.          Have you seen any other physician or provider since your last visit yes - Patient had surgery on her right elbow for carpal tunnel release since her last visit.     Have you had any other diagnostic tests since your last visit? no    Have you changed or stopped any medications since your last visit? no     Patient would also like to get a refill on her Trulicity.                
enlarged and tender.        Comments: Point tenderness to left mandibular region with palpable approximate 4 cm area of swelling     Nose: Nose normal.      Mouth/Throat:      Mouth: Mucous membranes are moist.   Eyes:      General: No scleral icterus.     Conjunctiva/sclera: Conjunctivae normal.      Pupils: Pupils are equal, round, and reactive to light.   Neck:      Comments: Swelling to the left submandibular region  Cardiovascular:      Rate and Rhythm: Normal rate and regular rhythm.      Pulses: Normal pulses.      Heart sounds: Normal heart sounds.   Pulmonary:      Effort: Pulmonary effort is normal. No respiratory distress.      Breath sounds: Normal breath sounds. No wheezing, rhonchi or rales.   Abdominal:      General: Bowel sounds are normal.      Palpations: Abdomen is soft.      Tenderness: There is no abdominal tenderness. There is no guarding.   Musculoskeletal:         General: No swelling or deformity. Normal range of motion.      Cervical back: Normal range of motion and neck supple.   Skin:     General: Skin is warm and dry.      Capillary Refill: Capillary refill takes less than 2 seconds.      Coloration: Skin is not jaundiced.      Findings: No rash.   Neurological:      General: No focal deficit present.      Mental Status: She is alert and oriented to person, place, and time. Mental status is at baseline.      Cranial Nerves: No cranial nerve deficit.   Psychiatric:         Mood and Affect: Mood normal.         Behavior: Behavior normal.         Thought Content: Thought content normal.         Judgment: Judgment normal.         ASSESSMENT/PLAN:     1. Neck swelling  -     CBC with Auto Differential; Future  -     CT MAXILLOFACIAL W CONTRAST; Future  -     Procalcitonin; Future  -     C-Reactive Protein; Future  -     Sedimentation Rate; Future  2. Left facial swelling  -     Comprehensive Metabolic Panel; Future  -     CBC with Auto Differential; Future  -     CT SOFT TISSUE NECK W 
/a

## 2024-08-14 ENCOUNTER — TELEPHONE (OUTPATIENT)
Dept: NEUROLOGY | Facility: CLINIC | Age: 64
End: 2024-08-14
Payer: MEDICARE

## 2024-08-14 DIAGNOSIS — Z79.4 TYPE 2 DIABETES MELLITUS WITH OTHER CIRCULATORY COMPLICATION, WITH LONG-TERM CURRENT USE OF INSULIN (HCC): ICD-10-CM

## 2024-08-14 DIAGNOSIS — E11.59 TYPE 2 DIABETES MELLITUS WITH OTHER CIRCULATORY COMPLICATION, WITH LONG-TERM CURRENT USE OF INSULIN (HCC): ICD-10-CM

## 2024-08-14 RX ORDER — ERGOCALCIFEROL 1.25 MG/1
CAPSULE ORAL
Qty: 4 CAPSULE | Refills: 1 | Status: SHIPPED | OUTPATIENT
Start: 2024-08-14

## 2024-08-14 RX ORDER — DULAGLUTIDE 3 MG/.5ML
INJECTION, SOLUTION SUBCUTANEOUS
Qty: 2 ML | Refills: 3 | Status: SHIPPED | OUTPATIENT
Start: 2024-08-14

## 2024-08-14 RX ORDER — LEVOTHYROXINE SODIUM 0.15 MG/1
150 TABLET ORAL DAILY
Qty: 90 TABLET | Refills: 1 | Status: SHIPPED | OUTPATIENT
Start: 2024-08-14

## 2024-08-14 RX ORDER — DAPAGLIFLOZIN 5 MG/1
5 TABLET, FILM COATED ORAL EVERY MORNING
Qty: 30 TABLET | Refills: 5 | Status: SHIPPED | OUTPATIENT
Start: 2024-08-14

## 2024-08-14 RX ORDER — INSULIN GLARGINE 100 [IU]/ML
INJECTION, SOLUTION SUBCUTANEOUS
Qty: 15 ML | Refills: 2 | Status: SHIPPED | OUTPATIENT
Start: 2024-08-14

## 2024-08-14 NOTE — TELEPHONE ENCOUNTER
Provider: CHETNA COBLENTZ-KNUTESON    Caller: MARYANN WITH [facility]     Phone Number: [phone]    Reason for Call: STATES THEY ARE UNABLE TO ACCEPT REFERRAL, THEY ARE AT CAPACITY. THANK YOU.

## 2024-08-22 ENCOUNTER — PATIENT ROUNDING (BHMG ONLY) (OUTPATIENT)
Dept: NEUROLOGY | Facility: CLINIC | Age: 64
End: 2024-08-22
Payer: MEDICARE

## 2024-08-22 NOTE — TELEPHONE ENCOUNTER
[facility] CALLED AND THEY HAVE HAD SOME DISCHARGES AND THEY ARE GOING TO GO ON MONDAY TO SEE PT.

## 2024-08-23 ENCOUNTER — OFFICE VISIT (OUTPATIENT)
Dept: PRIMARY CARE CLINIC | Age: 64
End: 2024-08-23
Payer: MEDICARE

## 2024-08-23 VITALS
HEART RATE: 57 BPM | SYSTOLIC BLOOD PRESSURE: 113 MMHG | OXYGEN SATURATION: 94 % | RESPIRATION RATE: 16 BRPM | DIASTOLIC BLOOD PRESSURE: 71 MMHG | HEIGHT: 66 IN | BODY MASS INDEX: 32.62 KG/M2 | WEIGHT: 203 LBS

## 2024-08-23 DIAGNOSIS — M54.16 LUMBAR BACK PAIN WITH RADICULOPATHY AFFECTING LEFT LOWER EXTREMITY: ICD-10-CM

## 2024-08-23 DIAGNOSIS — E03.9 ACQUIRED HYPOTHYROIDISM: ICD-10-CM

## 2024-08-23 DIAGNOSIS — Z79.4 TYPE 2 DIABETES MELLITUS WITH OTHER CIRCULATORY COMPLICATION, WITH LONG-TERM CURRENT USE OF INSULIN (HCC): Primary | ICD-10-CM

## 2024-08-23 DIAGNOSIS — E11.59 TYPE 2 DIABETES MELLITUS WITH OTHER CIRCULATORY COMPLICATION, WITH LONG-TERM CURRENT USE OF INSULIN (HCC): Primary | ICD-10-CM

## 2024-08-23 DIAGNOSIS — M62.561 ATROPHY OF MUSCLE OF RIGHT LOWER LEG: ICD-10-CM

## 2024-08-23 DIAGNOSIS — I10 PRIMARY HYPERTENSION: ICD-10-CM

## 2024-08-23 PROCEDURE — 3074F SYST BP LT 130 MM HG: CPT | Performed by: NURSE PRACTITIONER

## 2024-08-23 PROCEDURE — 3051F HG A1C>EQUAL 7.0%<8.0%: CPT | Performed by: NURSE PRACTITIONER

## 2024-08-23 PROCEDURE — 3078F DIAST BP <80 MM HG: CPT | Performed by: NURSE PRACTITIONER

## 2024-08-23 PROCEDURE — 1123F ACP DISCUSS/DSCN MKR DOCD: CPT | Performed by: NURSE PRACTITIONER

## 2024-08-23 PROCEDURE — 99214 OFFICE O/P EST MOD 30 MIN: CPT | Performed by: NURSE PRACTITIONER

## 2024-08-23 RX ORDER — ALBUTEROL SULFATE 90 UG/1
2 AEROSOL, METERED RESPIRATORY (INHALATION) EVERY 4 HOURS PRN
Qty: 3 EACH | Refills: 1 | Status: SHIPPED | OUTPATIENT
Start: 2024-08-23 | End: 2024-09-22

## 2024-08-23 RX ORDER — LEFLUNOMIDE 20 MG/1
20 TABLET ORAL DAILY
Qty: 90 TABLET | Refills: 1 | Status: SHIPPED | OUTPATIENT
Start: 2024-08-23

## 2024-08-23 RX ORDER — PROMETHAZINE HYDROCHLORIDE 12.5 MG/1
12.5 TABLET ORAL 3 TIMES DAILY PRN
Qty: 30 TABLET | Refills: 0 | Status: SHIPPED | OUTPATIENT
Start: 2024-08-23

## 2024-08-23 NOTE — PROGRESS NOTES
Chief Complaint   Patient presents with    Hypertension    Diabetes       Have you seen any other physician or provider since your last visit yes - Dr Ureña,Pain and spine,Dr Alvarado,Neurology    Have you had any other diagnostic tests since your last visit? yes - labs,CT maxillofacial,soft tissue neck    Have you changed or stopped any medications since your last visit? no     I have recommended that this patient have a sigmoidoscopy but she due to refusal reason: not comfortable with test   I have discussed the risks and benefits of this examination with her. The patient verbalizes understanding.  Provider will be informed of refusal.      Diabetic retinal exam completed this year? Yes                       * If yes please have patient sign a records release to obtain record to update Health Maintenance                       * If no, please order referral for patient to be scheduled   SUBJECTIVE:    Patient ID:Sue Lynn Collett is a 64 y.o. female.    Chief Complaint   Patient presents with    Hypertension    Diabetes     HPI:  Patient has had hypertension for several years. She has not been taking medications, without side effects from it. She has been following a low-sodium, is active and never exercises.  Weight is fluctuating a bit, compared to last visit. Her blood pressure is stable at this time.    Patient has had diabetes for the past several years.  She has been compliant with the medications and denies any side effects from it. She has been monitoring fingersticks on a daily basis.  Her fingerstick range is between 60-70 and 130-140. She denies any hypoglycemic symptoms. She has been following a diabetic diet and has not been active.  Her last eye exam was less than a year ago.  She is using oral meds and Insulin.     She under went cubital tunnel release surgery by Dr. Alvarado   her pain is better and she can straighten her hand better.  She is not in therapy.      She was told by Dr. Maxwell infectious

## 2024-08-23 NOTE — PATIENT INSTRUCTIONS
Keep a list of your medicines with you. List all of the prescription medicines, nonprescription medicines, supplements, natural remedies, and vitamins that you take. Tell your healthcare providers who treat you about all of the products you are taking. Your provider can provide you with a form to keep track of them. Just ask.  Follow the directions that come with your medicine, including information about food or alcohol. Make sure you know how and when to take your medicine. Do not take more or less than you are supposed to take.  Keep all medicines out of the reach of children.  Store medicines according to the directions on the label.  Monitor yourself. Learn to know how your body reacts to your new medicine and keep track of how it makes you feel before attempting (If your provider has allowed you to do so) to drive or go to work.   Seek emergency medical attention if you think you have used too much of this medicine. An overdose of any prescription medicine can be fatal. Overdose symptoms may include extreme drowsiness, muscle weakness, confusion, cold and clammy skin, pinpoint pupils, shallow breathing, slow heart rate, fainting, or coma.  Don't share prescription medicines with others, even when they seem to have the same symptoms. What may be good for you may be harmful to others.  If you are no longer taking a prescribed medication and you have pills left please take your pills out of their original containers. Mix crushed pills with an undesirable substance, such as cat litter or used coffee grounds. Put the mixture into a disposable container with a lid, such as an empty margarine tub, or into a sealable bag.  Cover up or remove any of your personal information on the empty containers by covering it with black permanent marker or duct tape.  Place the sealed container with the mixture, and the empty drug containers, in the trash.   If you use a medication that is in the form of a patch, dispose of used  patches by folding them in half so that the sticky sides meet, and then flushing them down a toilet. They should not be placed in the household trash where children or pets can find them.  If you have any questions, ask your provider or pharmacist for more information.   Be sure to keep all appointments for provider visits or tests.

## 2024-09-05 ENCOUNTER — HOSPITAL ENCOUNTER (OUTPATIENT)
Facility: HOSPITAL | Age: 64
Discharge: HOME | End: 2024-09-05
Payer: MEDICARE

## 2024-09-05 ENCOUNTER — PROCEDURE VISIT (OUTPATIENT)
Dept: PRIMARY CARE CLINIC | Age: 64
End: 2024-09-05

## 2024-09-05 VITALS — HEART RATE: 72 BPM | DIASTOLIC BLOOD PRESSURE: 60 MMHG | OXYGEN SATURATION: 96 % | SYSTOLIC BLOOD PRESSURE: 102 MMHG

## 2024-09-05 DIAGNOSIS — L02.91 ABSCESS: Primary | ICD-10-CM

## 2024-09-05 DIAGNOSIS — Z86.14 HISTORY OF MRSA INFECTION: ICD-10-CM

## 2024-09-05 DIAGNOSIS — M54.16 LUMBAR BACK PAIN WITH RADICULOPATHY AFFECTING LEFT LOWER EXTREMITY: ICD-10-CM

## 2024-09-05 PROCEDURE — 87075 CULTR BACTERIA EXCEPT BLOOD: CPT

## 2024-09-05 PROCEDURE — 87070 CULTURE OTHR SPECIMN AEROBIC: CPT

## 2024-09-05 RX ORDER — PILOCARPINE HYDROCHLORIDE 5 MG/1
5 TABLET, FILM COATED ORAL 4 TIMES DAILY
Qty: 120 TABLET | Refills: 0 | Status: SHIPPED | OUTPATIENT
Start: 2024-09-05

## 2024-09-05 RX ORDER — PEN NEEDLE, DIABETIC 32GX 5/32"
NEEDLE, DISPOSABLE MISCELLANEOUS
Qty: 100 EACH | Refills: 0 | Status: SHIPPED | OUTPATIENT
Start: 2024-09-05

## 2024-09-05 RX ORDER — DOXYCYCLINE HYCLATE 100 MG
100 TABLET ORAL 2 TIMES DAILY
Qty: 14 TABLET | Refills: 0 | Status: SHIPPED | OUTPATIENT
Start: 2024-09-05 | End: 2024-09-12

## 2024-09-05 RX ORDER — FLUOXETINE 40 MG/1
CAPSULE ORAL
Qty: 90 CAPSULE | Refills: 1 | Status: SHIPPED | OUTPATIENT
Start: 2024-09-05

## 2024-09-05 ASSESSMENT — ENCOUNTER SYMPTOMS
GASTROINTESTINAL NEGATIVE: 1
RESPIRATORY NEGATIVE: 1

## 2024-09-05 NOTE — PROGRESS NOTES
Chief Complaint   Patient presents with    Skin Problem     Pt has 2 boils on the left side of her buttock and was told by another physician they needed lanced because they were deep.        Have you seen any other physician or provider since your last visit no    Have you had any other diagnostic tests since your last visit? no    Have you changed or stopped any medications since your last visit? no     
mouth nightly, Disp: 90 tablet, Rfl: 1    ELIQUIS 5 MG TABS tablet, Take 1 tablet by mouth 2 times daily, Disp: 60 tablet, Rfl: 2    folic acid (FOLVITE) 1 MG tablet, Take 1 tablet by mouth daily, Disp: 30 tablet, Rfl: 3    lactobacillus (CULTURELLE) capsule, Take 1 capsule by mouth daily (with breakfast), Disp: 90 capsule, Rfl: 1    pantoprazole (PROTONIX) 40 MG tablet, Take 1 tablet by mouth daily, Disp: 90 tablet, Rfl: 1    ondansetron (ZOFRAN-ODT) 4 MG disintegrating tablet, Take 1 tablet by mouth 3 times daily as needed for Nausea or Vomiting, Disp: 21 tablet, Rfl: 0    lidocaine 4 % external patch, , Disp: , Rfl:     HYDROcodone-acetaminophen (NORCO) 5-325 MG per tablet, Take 1 tablet by mouth every 8 hours as needed., Disp: , Rfl:     cyclobenzaprine (FLEXERIL) 10 MG tablet, Take 1 tablet by mouth 3 times daily as needed for Muscle spasms, Disp: 30 tablet, Rfl: 0    diclofenac sodium (VOLTAREN) 1 % GEL, APPLY 2 GRAMS TOPICALLY FOUR TIMES A DAY AS NEEDED *MAX 32 GRAMS/DAY*, Disp: 350 g, Rfl: 2    furosemide (LASIX) 20 MG tablet, Take 0.5 tablets by mouth daily as needed (swelling), Disp: 60 tablet, Rfl: 2    miconazole (MICOTIN) 2 % powder, Apply topically 2 times daily., Disp: 45 g, Rfl: 1    Continuous Blood Gluc Sensor (FREESTYLE SOFI 2 SENSOR) MISC, 1 each by Does not apply route every 14 days, Disp: 2 each, Rfl: 5    NYAMYC 112405 UNIT/GM powder, Apply topically 2 times daily, Disp: , Rfl:     oxyCODONE-acetaminophen (PERCOCET) 7.5-325 MG per tablet, TAKE 1 TABLET BY MOUTH EVERY 4 TO 6 HOURS AS NEEDED FOR PAIN, Disp: , Rfl:     Continuous Blood Gluc Sensor (FREESTYLE SOFI 2 SENSOR) MISC, Q 14 days, Disp: 2 each, Rfl: 3    RESTASIS 0.05 % ophthalmic emulsion, INSTILL 1 DROP IN AFFECTED EYE(S) TWICE DAILY, Disp: , Rfl:     fluticasone (FLONASE) 50 MCG/ACT nasal spray, SHAKE LIQUID AND INSTILL TWO (2) SPRAYS IN EACH NOSTRIL EVERY DAY AS NEEDED, Disp: , Rfl:     EASY TOUCH PEN NEEDLES 31G X 5 MM MISC, USE

## 2024-09-06 RX ORDER — PREGABALIN 100 MG/1
100 CAPSULE ORAL 3 TIMES DAILY
Qty: 90 CAPSULE | Refills: 1 | Status: SHIPPED | OUTPATIENT
Start: 2024-09-06 | End: 2024-10-06

## 2024-09-08 LAB
BACTERIA SPEC AEROBE CULT: ABNORMAL
BACTERIA SPEC ANAEROBE CULT: ABNORMAL
GRAM STN SPEC: ABNORMAL
ORGANISM: ABNORMAL

## 2024-09-17 ENCOUNTER — HOSPITAL ENCOUNTER (OUTPATIENT)
Dept: MRI IMAGING | Facility: HOSPITAL | Age: 64
Discharge: HOME | End: 2024-09-17
Payer: MEDICARE

## 2024-09-17 ENCOUNTER — TELEPHONE (OUTPATIENT)
Dept: NEUROLOGY | Facility: CLINIC | Age: 64
End: 2024-09-17

## 2024-09-17 DIAGNOSIS — R26.89 STEPPAGE GAIT: ICD-10-CM

## 2024-09-17 DIAGNOSIS — R26.9 GAIT DISTURBANCE: ICD-10-CM

## 2024-09-17 DIAGNOSIS — R29.898 WEAKNESS OF RIGHT LEG: ICD-10-CM

## 2024-09-17 LAB
BUN SERPL-MCNC: 15 MG/DL (ref 6–20)
CREAT SERPL-MCNC: 0.8 MG/DL (ref 0.4–1.2)
GFR SERPLBLD CREATININE-BSD FMLA CKD-EPI: 81 ML/MIN/{1.73_M2}

## 2024-09-17 PROCEDURE — 82565 ASSAY OF CREATININE: CPT

## 2024-09-17 PROCEDURE — 72158 MRI LUMBAR SPINE W/O & W/DYE: CPT

## 2024-09-17 PROCEDURE — A9579 GAD-BASE MR CONTRAST NOS,1ML: HCPCS | Performed by: NURSE PRACTITIONER

## 2024-09-17 PROCEDURE — 36415 COLL VENOUS BLD VENIPUNCTURE: CPT

## 2024-09-17 PROCEDURE — 6360000004 HC RX CONTRAST MEDICATION: Performed by: NURSE PRACTITIONER

## 2024-09-17 PROCEDURE — 84520 ASSAY OF UREA NITROGEN: CPT

## 2024-09-17 RX ORDER — ATOGEPANT 60 MG/1
60 TABLET ORAL AS NEEDED
Qty: 6 TABLET | Refills: 0 | COMMUNITY
Start: 2024-09-17 | End: 2024-09-17

## 2024-09-17 RX ADMIN — GADOTERIDOL 20 ML: 279.3 INJECTION, SOLUTION INTRAVENOUS at 12:41

## 2024-09-17 NOTE — TELEPHONE ENCOUNTER
[facility] CALLING TO REQUEST VERBAL ORDER TO CONTINUE WORKING WITH PATIENT.    PH: [phone]  YOU CAN LEAVE SECURE MESSAGE ON THIS NUMBER.

## 2024-09-20 ENCOUNTER — TELEPHONE (OUTPATIENT)
Dept: NEUROLOGY | Facility: CLINIC | Age: 64
End: 2024-09-20

## 2024-09-25 ENCOUNTER — OFFICE VISIT (OUTPATIENT)
Dept: NEUROLOGY | Facility: CLINIC | Age: 64
End: 2024-09-25
Payer: MEDICARE

## 2024-09-25 VITALS
HEART RATE: 80 BPM | HEIGHT: 66 IN | OXYGEN SATURATION: 98 % | TEMPERATURE: 97.3 F | DIASTOLIC BLOOD PRESSURE: 62 MMHG | RESPIRATION RATE: 14 BRPM | SYSTOLIC BLOOD PRESSURE: 112 MMHG | BODY MASS INDEX: 32.98 KG/M2 | WEIGHT: 205.2 LBS

## 2024-09-25 DIAGNOSIS — R26.9 GAIT DISTURBANCE: ICD-10-CM

## 2024-09-25 DIAGNOSIS — R29.898 WEAKNESS OF RIGHT LEG: ICD-10-CM

## 2024-09-25 DIAGNOSIS — R26.89 STEPPAGE GAIT: Primary | ICD-10-CM

## 2024-09-25 PROCEDURE — 99214 OFFICE O/P EST MOD 30 MIN: CPT | Performed by: NURSE PRACTITIONER

## 2024-09-25 PROCEDURE — 1159F MED LIST DOCD IN RCRD: CPT | Performed by: NURSE PRACTITIONER

## 2024-09-25 PROCEDURE — 1160F RVW MEDS BY RX/DR IN RCRD: CPT | Performed by: NURSE PRACTITIONER

## 2024-10-03 ENCOUNTER — TELEPHONE (OUTPATIENT)
Dept: NEUROLOGY | Facility: CLINIC | Age: 64
End: 2024-10-03
Payer: MEDICARE

## 2024-10-03 NOTE — TELEPHONE ENCOUNTER
OCC CALLING, THEY STATE PT IS SCHEDULED TO DISCONTINUE THERAPY NEXT WEEK.  THEY WOULD LIKE TO RECERT HER TO CONTINUE WORKING ON RANGE OF MOTION.    ALSO, SHE WAS GIVEN A BACK BRACE BUT HS LOST WEIGHT AND THE BRACE DOES NOT FIT ANYMORE.  DOES PROVIDER WANT HER TO CONTINUE WITH BACK BRACE?    PLEASE ADVISE - [phone]    THANK YOU

## 2024-10-04 ENCOUNTER — HOSPITAL ENCOUNTER (OUTPATIENT)
Facility: HOSPITAL | Age: 64
Discharge: HOME | End: 2024-10-04
Payer: MEDICARE

## 2024-10-04 ENCOUNTER — OFFICE VISIT (OUTPATIENT)
Dept: PRIMARY CARE CLINIC | Age: 64
End: 2024-10-04
Payer: MEDICARE

## 2024-10-04 VITALS
RESPIRATION RATE: 16 BRPM | TEMPERATURE: 97.1 F | BODY MASS INDEX: 32.62 KG/M2 | HEIGHT: 66 IN | DIASTOLIC BLOOD PRESSURE: 77 MMHG | SYSTOLIC BLOOD PRESSURE: 134 MMHG | WEIGHT: 203 LBS | HEART RATE: 96 BPM | OXYGEN SATURATION: 98 %

## 2024-10-04 DIAGNOSIS — Z79.4 TYPE 2 DIABETES MELLITUS WITH OTHER CIRCULATORY COMPLICATION, WITH LONG-TERM CURRENT USE OF INSULIN (HCC): ICD-10-CM

## 2024-10-04 DIAGNOSIS — R93.89 ABNORMAL MRI: Primary | ICD-10-CM

## 2024-10-04 DIAGNOSIS — Z86.14 HISTORY OF MRSA INFECTION: ICD-10-CM

## 2024-10-04 DIAGNOSIS — R26.89 STEPPAGE GAIT: ICD-10-CM

## 2024-10-04 DIAGNOSIS — E11.59 TYPE 2 DIABETES MELLITUS WITH OTHER CIRCULATORY COMPLICATION, WITH LONG-TERM CURRENT USE OF INSULIN (HCC): ICD-10-CM

## 2024-10-04 DIAGNOSIS — E03.9 ACQUIRED HYPOTHYROIDISM: ICD-10-CM

## 2024-10-04 DIAGNOSIS — M54.16 LUMBAR BACK PAIN WITH RADICULOPATHY AFFECTING LEFT LOWER EXTREMITY: ICD-10-CM

## 2024-10-04 DIAGNOSIS — R11.0 NAUSEA: ICD-10-CM

## 2024-10-04 LAB
ALBUMIN SERPL-MCNC: 3.9 G/DL (ref 3.4–4.8)
ALBUMIN/GLOB SERPL: 1.8 {RATIO} (ref 0.8–2)
ALP SERPL-CCNC: 173 U/L (ref 25–100)
ALT SERPL-CCNC: 15 U/L (ref 4–36)
ANION GAP SERPL CALCULATED.3IONS-SCNC: 13 MMOL/L (ref 3–16)
AST SERPL-CCNC: 14 U/L (ref 8–33)
BILIRUB SERPL-MCNC: 0.4 MG/DL (ref 0.3–1.2)
BUN SERPL-MCNC: 12 MG/DL (ref 6–20)
CALCIUM SERPL-MCNC: 9.5 MG/DL (ref 8.5–10.5)
CHLORIDE SERPL-SCNC: 102 MMOL/L (ref 98–107)
CO2 SERPL-SCNC: 27 MMOL/L (ref 20–30)
CREAT SERPL-MCNC: 0.8 MG/DL (ref 0.4–1.2)
CREAT UR-MCNC: 78.6 MG/DL (ref 28–259)
GFR SERPLBLD CREATININE-BSD FMLA CKD-EPI: 81 ML/MIN/{1.73_M2}
GLOBULIN SER CALC-MCNC: 2.2 G/DL
GLUCOSE SERPL-MCNC: 195 MG/DL (ref 74–106)
HBA1C MFR BLD: 7.2 %
MICROALBUMIN UR DL<=1MG/L-MCNC: 1.3 MG/DL (ref 0–22)
MICROALBUMIN/CREAT UR: 16.5 MG/G (ref 0–30)
POTASSIUM SERPL-SCNC: 4.7 MMOL/L (ref 3.4–5.1)
PROT SERPL-MCNC: 6.1 G/DL (ref 6.4–8.3)
SODIUM SERPL-SCNC: 142 MMOL/L (ref 136–145)
TSH SERPL DL<=0.005 MIU/L-ACNC: 0.87 UIU/ML (ref 0.27–4.2)

## 2024-10-04 PROCEDURE — 1123F ACP DISCUSS/DSCN MKR DOCD: CPT | Performed by: NURSE PRACTITIONER

## 2024-10-04 PROCEDURE — 84443 ASSAY THYROID STIM HORMONE: CPT

## 2024-10-04 PROCEDURE — 80053 COMPREHEN METABOLIC PANEL: CPT

## 2024-10-04 PROCEDURE — 82570 ASSAY OF URINE CREATININE: CPT

## 2024-10-04 PROCEDURE — 82043 UR ALBUMIN QUANTITATIVE: CPT

## 2024-10-04 PROCEDURE — 3051F HG A1C>EQUAL 7.0%<8.0%: CPT | Performed by: NURSE PRACTITIONER

## 2024-10-04 PROCEDURE — 99213 OFFICE O/P EST LOW 20 MIN: CPT | Performed by: NURSE PRACTITIONER

## 2024-10-04 PROCEDURE — 83036 HEMOGLOBIN GLYCOSYLATED A1C: CPT

## 2024-10-04 RX ORDER — POLYMYXIN B SULFATE AND TRIMETHOPRIM 1; 10000 MG/ML; [USP'U]/ML
1 SOLUTION OPHTHALMIC
COMMUNITY

## 2024-10-04 RX ORDER — PILOCARPINE HYDROCHLORIDE 5 MG/1
5 TABLET, FILM COATED ORAL 4 TIMES DAILY
Qty: 120 TABLET | Refills: 0 | Status: SHIPPED | OUTPATIENT
Start: 2024-10-04

## 2024-10-04 RX ORDER — LIFITEGRAST 50 MG/ML
1 SOLUTION/ DROPS OPHTHALMIC 2 TIMES DAILY
COMMUNITY

## 2024-10-04 RX ORDER — APIXABAN 5 MG/1
5 TABLET, FILM COATED ORAL 2 TIMES DAILY
Qty: 60 TABLET | Refills: 2 | Status: SHIPPED | OUTPATIENT
Start: 2024-10-04

## 2024-10-04 RX ORDER — ERGOCALCIFEROL 1.25 MG/1
CAPSULE, LIQUID FILLED ORAL
Qty: 4 CAPSULE | Refills: 1 | Status: SHIPPED | OUTPATIENT
Start: 2024-10-04

## 2024-10-04 RX ORDER — PROMETHAZINE HYDROCHLORIDE 12.5 MG/1
12.5 TABLET ORAL 3 TIMES DAILY PRN
Qty: 30 TABLET | Refills: 0 | Status: SHIPPED | OUTPATIENT
Start: 2024-10-04

## 2024-10-04 RX ORDER — FOLIC ACID 1 MG/1
1000 TABLET ORAL DAILY
Qty: 30 TABLET | Refills: 3 | Status: SHIPPED | OUTPATIENT
Start: 2024-10-04

## 2024-10-04 RX ORDER — LEFLUNOMIDE 20 MG/1
20 TABLET ORAL DAILY
Qty: 90 TABLET | Refills: 1 | Status: CANCELLED | OUTPATIENT
Start: 2024-10-04

## 2024-10-04 NOTE — TELEPHONE ENCOUNTER
Patient called stating Nicole was going to send her an antibiotic for some boils she has that continue to come up. Informed patient that I would send Nicole the message. Patient stated that if she sent it after [facility] closed to please forward medication to [facility].

## 2024-10-04 NOTE — TELEPHONE ENCOUNTER
CALLED AND LEFT A VOICEMAIL FOR PATIENT. ASKED IF SHE WOULD LIKE TO CONTINUE PT OR WAIT UNTIL FOLLOW UP APPOINTMENT WITH JOHANNY TO REORDER PT.     HUB PLEASE RELAY QUESTION TO PATIENT. THANKS

## 2024-10-04 NOTE — TELEPHONE ENCOUNTER
CALLED AND LEFT A VOICEMAIL FOR [facility]. WE JUST FAXED AN ORDER OVER TO [facility], I AM NOT SURE WHAT THEY NEED. ASKED HER TO CALL OFFICE BACK AND ASK FOR JUSTINO.

## 2024-10-04 NOTE — PROGRESS NOTES
(10/4/2024) 0.8 - 2.0 Not in Time Range    Alkaline Phosphatase 173 (H) (10/4/2024) 25 - 100 U/L Not in Time Range    ALT 15 (10/4/2024) 4 - 36 U/L Not in Time Range    AST 14 (10/4/2024) 8 - 33 U/L Not in Time Range    BUN 12 (10/4/2024) 6 - 20 mg/dL 15 (9/17/2024) 6 - 20 mg/dL   Calcium 9.5 (10/4/2024) 8.5 - 10.5 mg/dL Not in Time Range    Chloride 102 (10/4/2024) 98 - 107 mmol/L Not in Time Range    CO2 27 (10/4/2024) 20 - 30 mmol/L Not in Time Range    Creatinine 0.8 (10/4/2024) 0.4 - 1.2 mg/dL 0.8 (9/17/2024) 0.4 - 1.2 mg/dL   Est, Glom Filt Rate 81 (10/4/2024) >59 81 (9/17/2024) >59   Globulin 2.2 (10/4/2024) Not Established g/dL Not in Time Range    Glucose 195 (H) (10/4/2024) 74 - 106 mg/dL Not in Time Range    Potassium 4.7 (10/4/2024) 3.4 - 5.1 mmol/L Not in Time Range    Sodium 142 (10/4/2024) 136 - 145 mmol/L Not in Time Range    Total Bilirubin 0.4 (10/4/2024) 0.3 - 1.2 mg/dL Not in Time Range        Hemoglobin A1C (%)   Date Value   10/04/2024 7.2 (H)         Lab Results   Component Value Date/Time    WBC 9.8 08/13/2024 12:25 PM    NEUTROABS 5.9 08/13/2024 12:25 PM    HGB 15.5 08/13/2024 12:25 PM    HCT 48.5 08/13/2024 12:25 PM    MCV 91.9 08/13/2024 12:25 PM     08/13/2024 12:25 PM       Lab Results   Component Value Date    TSH 0.87 10/04/2024         ASSESSMENT/PLAN:     1. Type 2 diabetes mellitus with other circulatory complication, with long-term current use of insulin (HCC)  Hemoglobin A1C   Date Value Ref Range Status   10/04/2024 7.2 (H) See comment % Final     Comment:     Comment:  Diagnosis of Diabetes: > or = 6.5%  Increased risk of diabetes (Prediabetes): 5.7-6.4%  Glycemic Control: Nonpregnant Adults: <7.0%                    Pregnant: <6.0%         Trulicity 3 mg injection weekly  Lantus 40 units bid injection   Farxiga 5 mg daily   - Microalbumin / creatinine urine ratio; Future    2. History of MRSA infection      3. Lumbar back pain with radiculopathy affecting left lower

## 2024-10-08 NOTE — TELEPHONE ENCOUNTER
Caller: [facility] DELROY - OT    Relationship:     Best call back number: [phone]    What is the best time to reach you: ANY    Who are you requesting to speak with (clinical staff, provider,  specific staff member): JUSTINO    Do you know the name of the person who called: JUSTINO    What was the call regarding: RETURNING CALL & WANTING TO GET VERBAL ORDERS. PLEASE REVIEW & ADVISE, THANK YOU.

## 2024-10-09 NOTE — TELEPHONE ENCOUNTER
Keshia with [facility] OT called and requested a verbal order for OT with pt. I gave her the verbal order and she said they would start around October 21, 2024.

## 2024-10-21 ENCOUNTER — TELEPHONE (OUTPATIENT)
Dept: NEUROLOGY | Facility: CLINIC | Age: 64
End: 2024-10-21

## 2024-10-21 NOTE — TELEPHONE ENCOUNTER
Provider: CHETNA PASCAL APRN    Caller: DELROY    Relationship to Patient: [facility] - OCCUPATIONAL THERAPY     Phone Number: [phone]    Reason for Call: CALLING TO GIVE AN UPDATE ON PATIENT.  STATED PATIENT IS SCHEDULED FOR A RECERTIFCATION THIS WEEK AND THEY ARE CHANGING IT TO A DISCHARGE.  STATED THE PATIENT HAS MISSED SEVERAL APPOINTMENTS IN THE LAST TWO WEEKS.      DOCUMENTING PER [facility] PROTOCOL

## 2024-10-23 ENCOUNTER — TELEPHONE (OUTPATIENT)
Dept: NEUROLOGY | Facility: CLINIC | Age: 64
End: 2024-10-23

## 2024-10-23 NOTE — TELEPHONE ENCOUNTER
Caller: OBDULIO    Relationship: Caregiver (non-relative)    Best call back number: [phone]    What was the call regarding: HOME HEALTH PROVIDER IS CALLING TO LET US KNOW SHE HAS COMPLETED HER OT AND HER AGENCY DISCHARGE AS OF TODAY. SHE HAS MET HER GOALS. PATIENT MAY NEED SURGERY TO FURTHER ADDRESS GAIT ISSUES.    Is it okay if the provider responds through MyChart: NO

## 2024-11-05 DIAGNOSIS — M54.16 LUMBAR BACK PAIN WITH RADICULOPATHY AFFECTING LEFT LOWER EXTREMITY: ICD-10-CM

## 2024-11-05 RX ORDER — PREGABALIN 100 MG/1
CAPSULE ORAL
Qty: 90 CAPSULE | Refills: 1 | Status: SHIPPED | OUTPATIENT
Start: 2024-11-05 | End: 2024-12-05

## 2024-11-21 RX ORDER — ATORVASTATIN CALCIUM 10 MG/1
10 TABLET, FILM COATED ORAL NIGHTLY
Qty: 90 TABLET | Refills: 1 | Status: SHIPPED | OUTPATIENT
Start: 2024-11-21

## 2024-11-21 RX ORDER — PILOCARPINE HYDROCHLORIDE 5 MG/1
5 TABLET, FILM COATED ORAL 4 TIMES DAILY
Qty: 120 TABLET | Refills: 0 | Status: SHIPPED | OUTPATIENT
Start: 2024-11-21

## 2024-12-09 DIAGNOSIS — R11.0 NAUSEA: ICD-10-CM

## 2024-12-09 RX ORDER — PROMETHAZINE HYDROCHLORIDE 12.5 MG/1
TABLET ORAL
Qty: 30 TABLET | Refills: 0 | Status: SHIPPED | OUTPATIENT
Start: 2024-12-09

## 2024-12-20 ENCOUNTER — OFFICE VISIT (OUTPATIENT)
Dept: NEUROLOGY | Facility: CLINIC | Age: 64
End: 2024-12-20
Payer: MEDICARE

## 2024-12-20 ENCOUNTER — TELEPHONE (OUTPATIENT)
Dept: NEUROLOGY | Facility: CLINIC | Age: 64
End: 2024-12-20

## 2024-12-20 VITALS
HEART RATE: 98 BPM | OXYGEN SATURATION: 100 % | SYSTOLIC BLOOD PRESSURE: 124 MMHG | BODY MASS INDEX: 33.14 KG/M2 | HEIGHT: 66 IN | RESPIRATION RATE: 14 BRPM | TEMPERATURE: 98.2 F | DIASTOLIC BLOOD PRESSURE: 86 MMHG

## 2024-12-20 DIAGNOSIS — R26.89 STEPPAGE GAIT: Primary | ICD-10-CM

## 2024-12-20 DIAGNOSIS — R26.9 GAIT DISTURBANCE: ICD-10-CM

## 2024-12-20 DIAGNOSIS — R29.898 WEAKNESS OF RIGHT LEG: ICD-10-CM

## 2024-12-20 DIAGNOSIS — G62.9 NEUROPATHY: ICD-10-CM

## 2024-12-20 RX ORDER — PREGABALIN 100 MG/1
100 CAPSULE ORAL 3 TIMES DAILY
Qty: 90 CAPSULE | Refills: 2 | Status: SHIPPED | OUTPATIENT
Start: 2024-12-20

## 2024-12-20 RX ORDER — INSULIN GLARGINE 100 [IU]/ML
INJECTION, SOLUTION SUBCUTANEOUS
Qty: 15 ML | Refills: 2 | Status: SHIPPED | OUTPATIENT
Start: 2024-12-20

## 2024-12-20 NOTE — PROGRESS NOTES
Follow Up Office Visit      Patient Name: Sue L Collett  : 1960   MRN: [id number]     Chief Complaint:    Chief Complaint   Patient presents with    Follow-up     Gait disturbance       History of Present Illness: Sue L Collett is a 64 y.o. female who is here today to follow up with neurology for steppage gait and weakness of RLE. She was last seen in clinic  (Jaquelin). She is an established pt of Orthopedics (RICE) and Pain Mgmt (Daniel). She has been taking Percocet 5/325 mg BID and this is managed by Pain Mgmt (Daniel); her next appt . She is also taking Lyrica 100 mg BID and this is managed by PCP (Magali); she would like neurology to take this script over. Ambulates with hot pink walker/Rolator. Completed PT/OT with  ([facility]) and did not find this to be helpful; she would like to take the winter off from therapy. She was told Orthopedics cannot help her anymore. Right hand dominate. Drops items from her right hand. She has to use underhanded motions for hand weakness. Has poor grasp to the right. Continues to have high step gait and LLE neuropathy and pain.     Recent Imaging:      MRI Lumbar Spine W/WO  + Previous left hemilaminectomy at L4-L5 and L3-L4. Enhancing scar tissue at the level of the laminectomy defect. No intradural enhancement is identified at these levels. Multilevel lumbar spondylosis as described, with mild central stenosis at L4-L5. Mild bilateral foraminal stenosis and bilateral lateral recess stenosis at L4-5. Mild to moderate bilateral foraminal stenosis at L3-L4.   MRI Cyberknife Lumbar Spine WO  + Postsurgical changes from L3-L4 and L4-L5 laminectomies. Decreased prominence of the posterior epidural fat. Mild residual central canal stenosis at the L4-L5 level. Persistent right-sided facet arthrosis and central disc protrusion at L4-L5   contributing to severe right subarticular zone narrowing. Sterility indeterminant postsurgical fluid collections  in the posterior subcutaneous tissues and the left paraspinal region at the laminectomy levels. There is some mass effect associated with paraspinal collection at the L3-L4 level which results in moderate  left-sided subarticular zone narrowing. Normal marrow signal. No evidence of osteomyelitis/discitiis   MRI Cyberknife Cspine WO 11/23 + Mild multilevel cervical spondylosis with multilevel disc bulges/protrusions. Moderate central canal stenosis at the C5-C6 level with mild cord compression. There is also mild to moderate bilateral neuroforaminal narrowing at this level.   MRI Brain WO 11/23 + There is mild cerebral volume loss with associated ventricular dilatation. No mass effect or midline shift is seen. Mild periventricular white matter FLAIR hyperintensities likely represent sequelae of chronic small vessel ischemic disease. There are chronic right corona radiata and caudate lacunar infarcts.   EMG/NCS 08/23 + Severe ongoing axonal pathology affecting the left L4 and S1 nerve roots. There is 78% CMAP amplitude reduction in the tibialis anterior and 81% reduction in the femoral motor nerves. Underlying sensorimotor axonal and demyelinating polyneuropathy, most evident in the BLE     Pertinent Medical History: DM2, S/P thyroidectomy, Thyroid CA, Chronic LBP with sciatica, CVA, Anxiety, OA, Asthma, Depression, COPD, DDD, HL, GERD, HTN, Lupus, Seizure, JELLY, RA, Factor V     Subjective      Review of Systems:   Review of Systems   HENT: Negative.     Eyes: Negative.    Respiratory: Negative.     Cardiovascular: Negative.    Gastrointestinal: Negative.    Endocrine: Negative.    Genitourinary: Negative.    Musculoskeletal:  Positive for back pain and gait problem.   Skin: Negative.    Allergic/Immunologic: Negative.    Neurological:  Positive for weakness and numbness.   Hematological: Negative.    Psychiatric/Behavioral: Negative.     All other systems reviewed and are negative.      I have reviewed and the  following portions of the patient's history were updated as appropriate: past family history, past medical history, past social history, past surgical history and problem list.    Medications:     Current Outpatient Medications:     acetaminophen (TYLENOL) 500 MG tablet, Take 2 tablets by mouth Every 8 (Eight) Hours As Needed for Mild Pain or Moderate Pain ., Disp: 60 tablet, Rfl: 0    ADVAIR DISKUS 250-50 MCG/DOSE DISKUS, Inhale 1 puff 2 (Two) Times a Day., Disp: , Rfl: 0    apixaban (ELIQUIS) 5 MG tablet tablet, Take 1 tablet by mouth 2 (Two) Times a Day., Disp: , Rfl:     atorvastatin (LIPITOR) 10 MG tablet, Take 1 tablet by mouth every night at bedtime., Disp: , Rfl:     cyclobenzaprine (FLEXERIL) 5 MG tablet, Take 1 tablet by mouth 3 (Three) Times a Day As Needed., Disp: , Rfl:     Farxiga 5 MG tablet tablet, 5 mg by oral route., Disp: , Rfl:     FLUoxetine (PROzac) 40 MG capsule, Take 1 capsule by mouth every night at bedtime., Disp: , Rfl:     fluticasone (FLONASE) 50 MCG/ACT nasal spray, Administer 1 spray into the nostril(s) as directed by provider Daily., Disp: , Rfl: 0    furosemide (LASIX) 20 MG tablet, , Disp: , Rfl:     Insulin Glargine (BASAGLAR KWIKPEN) 100 UNIT/ML injection pen, Inject 30 Units under the skin into the appropriate area as directed 2 (Two) Times a Day., Disp: , Rfl:     insulin glargine (LANTUS, SEMGLEE) 100 UNIT/ML injection, Inject 30 Units under the skin into the appropriate area as directed 2 (Two) Times a Day., Disp: , Rfl:     levothyroxine (SYNTHROID, LEVOTHROID) 150 MCG tablet, Take 1 tablet by mouth Daily., Disp: , Rfl:     oxyCODONE-acetaminophen (PERCOCET) 5-325 MG per tablet, Take 1 tablet by mouth Every 4 (Four) Hours As Needed for Moderate Pain or Severe Pain., Disp: 60 tablet, Rfl: 0    pantoprazole (PROTONIX) 20 MG EC tablet, Take 1 tablet by mouth Daily., Disp: , Rfl:     pregabalin (LYRICA) 100 MG capsule, Take 1 capsule by mouth 3 times a day., Disp: 90 capsule,  "Rfl: 2    Probiotic Product (PROBIOTIC DAILY PO), Take  by mouth Daily., Disp: , Rfl:     RESTASIS 0.05 % ophthalmic emulsion, Administer 1 drop to both eyes Every 12 (Twelve) Hours., Disp: , Rfl:     Trulicity 1.5 MG/0.5ML solution pen-injector, Inject 1.5 mg under the skin into the appropriate area as directed 1 (One) Time Per Week. Last dose was 7/16/24, Disp: , Rfl:     vitamin D (ERGOCALCIFEROL) 89655 units capsule capsule, Take 1 capsule by mouth 1 (One) Time Per Week., Disp: , Rfl:     cephalexin (KEFLEX) 500 MG capsule, Take 1 capsule by mouth 3 (Three) Times a Day. (Patient not taking: Reported on 12/20/2024), Disp: 21 capsule, Rfl: 0    Eliquis 5 MG tablet tablet, Take 1 tablet by mouth Every 12 (Twelve) Hours. (Patient not taking: Reported on 8/13/2024), Disp: , Rfl:     hydroCHLOROthiazide (HYDRODIURIL) 12.5 MG tablet, Take 1 tablet by mouth Daily As Needed. (Patient not taking: Reported on 12/20/2024), Disp: , Rfl:     irbesartan (AVAPRO) 300 MG tablet, Take 1 tablet by mouth Daily. (Patient not taking: Reported on 7/25/2024), Disp: , Rfl:     polyethylene glycol (MIRALAX) packet, Take 17 g by mouth Daily As Needed (constipation). (Patient not taking: Reported on 12/20/2024), Disp: , Rfl:     Allergies:   Allergies   Allergen Reactions    Prednisone Other (See Comments)     ACUTE KIDNEY FAILURE    Adhesive Tape Other (See Comments)     Patient reported causes skin to blister and tear.  Patient reported she is able to tolerate paper tape with no adverse reactions.      Azithromycin Hives    Erythromycin Hives    Other Nausea And Vomiting     PT. REPORTS THAT SHE IS \"ALLERGIC TO ALL INSULINS EXCEPT LANTUS\"    Red Dye #40 (Allura Red) Hives    Sulfa Antibiotics Hives       Objective     Physical Exam:  Vital Signs:   Vitals:    12/20/24 0855   BP: 124/86   BP Location: Right arm   Patient Position: Sitting   Cuff Size: Adult   Pulse: 98   Resp: 14   Temp: 98.2 °F (36.8 °C)   TempSrc: Infrared   SpO2: 100% " "  Height: 167.6 cm (65.98\")   PainSc:   9   PainLoc: Hand  Comment: right     Body mass index is 33.14 kg/m².    Physical Exam  Vitals and nursing note reviewed.   Constitutional:       General: She is not in acute distress.     Appearance: Normal appearance.   HENT:      Head: Normocephalic.      Nose: Nose normal.      Mouth/Throat:      Mouth: Mucous membranes are moist.      Pharynx: Oropharynx is clear.   Eyes:      General: Lids are normal.      Extraocular Movements: Extraocular movements intact.      Conjunctiva/sclera: Conjunctivae normal.      Pupils: Pupils are equal, round, and reactive to light.   Musculoskeletal:      Cervical back: Normal range of motion and neck supple.   Skin:     General: Skin is warm and dry.      Capillary Refill: Capillary refill takes less than 2 seconds.   Neurological:      Mental Status: She is oriented to person, place, and time.      Sensory: Sensory deficit present.      Motor: Weakness present.      Gait: Gait abnormal.   Psychiatric:         Mood and Affect: Mood normal.         Behavior: Behavior normal.         Neurological Exam  Mental Status  Awake, alert and oriented to person, place and time. Oriented to person, place, and time.    Cranial Nerves  CN II: Visual acuity is normal. Visual fields full to confrontation.  CN III, IV, VI: Extraocular movements intact bilaterally. Normal lids and orbits bilaterally. Pupils equal round and reactive to light bilaterally.  CN V: Facial sensation is normal.  CN VII: Full and symmetric facial movement.  CN IX, X: Palate elevates symmetrically. Normal gag reflex.  CN XI: Shoulder shrug strength is normal.  CN XII: Tongue midline without atrophy or fasciculations.    Motor  Normal muscle bulk throughout. No fasciculations present. Normal muscle tone. No abnormal involuntary movements.  LLE 3/5  RLE 4/5.    Sensory  Light touch is normal in upper and lower extremities.     Gait   Abnormal gait.  Wide based high step gait with " ned .       Assessment / Plan      Assessment/Plan:   Diagnoses and all orders for this visit:    1. Steppage gait (Primary)    2. Gait disturbance    3. Weakness of right leg    4. Neuropathy  -     pregabalin (LYRICA) 100 MG capsule; Take 1 capsule by mouth 3 times a day.  Dispense: 90 capsule; Refill: 2         Follow Up:   Return in about 3 months (around 3/20/2025), or if symptoms worsen or fail to improve.    Anticipatory guidance and safety reviewed  Patient education provided  Continue/increase Lyrica 100 mg TID   CDA- kenyatta NEALPER #[id number] reviewed and appropriate   Keep FU with Pain Mgmt for percocet mgmt  Discussed changing Fluoxetine to Duloxetine for additional pain control and she would like to hold at this time  Declined offer of referral to PT/OT with  and at [facility]; she would like to take the winter off      RTC PRN or within 12 weeks or sooner with issues     Argentina Francis, ROMANA, APRN, FNP-C  [facility] Neurology [facility] Sleep Medicine

## 2024-12-20 NOTE — TELEPHONE ENCOUNTER
Provider: KIMMY    Caller: JORDAN    Relationship to Patient: MITALI    Phone Number: [phone]    Reason for Call: PATIENTS LYRICA WAS SENT OVER  MG INSTEAD  MG. PATIENTS MITALI IS REQUESTING FOR IT TO BE RE-SENT  MG.

## 2024-12-26 ENCOUNTER — TELEPHONE (OUTPATIENT)
Dept: NEUROLOGY | Facility: CLINIC | Age: 64
End: 2024-12-26

## 2024-12-26 NOTE — TELEPHONE ENCOUNTER
PATIENT CALLING REGARDING MEDICATION  pregabalin (LYRICA) 100 MG capsule     SHE STATES THAT THIS IS BEING INCREASED  MG 3X DAY    PLEASE SEND NEW PRESCRIPTION TO [address] [phone] [facility] [phone] FX

## 2024-12-27 RX ORDER — PANTOPRAZOLE SODIUM 40 MG/1
40 TABLET, DELAYED RELEASE ORAL DAILY
Qty: 90 TABLET | Refills: 1 | Status: SHIPPED | OUTPATIENT
Start: 2024-12-27

## 2024-12-27 RX ORDER — PILOCARPINE HYDROCHLORIDE 5 MG/1
5 TABLET, FILM COATED ORAL 4 TIMES DAILY
Qty: 120 TABLET | Refills: 0 | Status: SHIPPED | OUTPATIENT
Start: 2024-12-27

## 2024-12-27 RX ORDER — ERGOCALCIFEROL 1.25 MG/1
CAPSULE, LIQUID FILLED ORAL
Qty: 4 CAPSULE | Refills: 1 | Status: SHIPPED | OUTPATIENT
Start: 2024-12-27

## 2025-01-28 RX ORDER — APIXABAN 5 MG/1
5 TABLET, FILM COATED ORAL 2 TIMES DAILY
Qty: 60 TABLET | Refills: 2 | Status: SHIPPED | OUTPATIENT
Start: 2025-01-28

## 2025-01-28 RX ORDER — DULAGLUTIDE 3 MG/.5ML
INJECTION, SOLUTION SUBCUTANEOUS
Qty: 2 ML | Refills: 3 | Status: SHIPPED | OUTPATIENT
Start: 2025-01-28

## 2025-01-28 RX ORDER — INSULIN GLARGINE 100 [IU]/ML
INJECTION, SOLUTION SUBCUTANEOUS
Qty: 15 ML | Refills: 2 | Status: SHIPPED | OUTPATIENT
Start: 2025-01-28

## 2025-02-12 ENCOUNTER — OFFICE VISIT (OUTPATIENT)
Age: 65
End: 2025-02-12

## 2025-02-12 VITALS
SYSTOLIC BLOOD PRESSURE: 113 MMHG | HEART RATE: 93 BPM | TEMPERATURE: 96.9 F | BODY MASS INDEX: 31.5 KG/M2 | OXYGEN SATURATION: 98 % | WEIGHT: 196 LBS | RESPIRATION RATE: 16 BRPM | HEIGHT: 66 IN | DIASTOLIC BLOOD PRESSURE: 71 MMHG

## 2025-02-12 DIAGNOSIS — R29.898 DECREASED GRIP STRENGTH OF RIGHT HAND: ICD-10-CM

## 2025-02-12 DIAGNOSIS — E11.59 TYPE 2 DIABETES MELLITUS WITH OTHER CIRCULATORY COMPLICATION, WITH LONG-TERM CURRENT USE OF INSULIN (HCC): Primary | ICD-10-CM

## 2025-02-12 DIAGNOSIS — Z79.4 TYPE 2 DIABETES MELLITUS WITH OTHER CIRCULATORY COMPLICATION, WITH LONG-TERM CURRENT USE OF INSULIN (HCC): Primary | ICD-10-CM

## 2025-02-12 DIAGNOSIS — G57.93 NEUROPATHY INVOLVING BOTH LOWER EXTREMITIES: ICD-10-CM

## 2025-02-12 DIAGNOSIS — Z12.31 ENCOUNTER FOR SCREENING MAMMOGRAM FOR BREAST CANCER: ICD-10-CM

## 2025-02-12 DIAGNOSIS — R26.89 STEPPAGE GAIT: ICD-10-CM

## 2025-02-12 DIAGNOSIS — R29.898 RIGHT ARM WEAKNESS: ICD-10-CM

## 2025-02-12 DIAGNOSIS — G56.21 ULNAR NEUROPATHY AT ELBOW, RIGHT: ICD-10-CM

## 2025-02-12 DIAGNOSIS — R11.0 NAUSEA: ICD-10-CM

## 2025-02-12 RX ORDER — PROMETHAZINE HYDROCHLORIDE 12.5 MG/1
12.5 TABLET ORAL 3 TIMES DAILY PRN
Qty: 30 TABLET | Refills: 0 | Status: SHIPPED | OUTPATIENT
Start: 2025-02-12

## 2025-02-12 RX ORDER — PREGABALIN 200 MG/1
200 CAPSULE ORAL 3 TIMES DAILY
Qty: 90 CAPSULE | Refills: 2 | Status: SHIPPED | OUTPATIENT
Start: 2025-02-12 | End: 2025-03-14

## 2025-02-12 RX ORDER — IBUPROFEN 800 MG/1
800 TABLET, FILM COATED ORAL EVERY 6 HOURS PRN
COMMUNITY
Start: 2024-11-18

## 2025-02-12 SDOH — ECONOMIC STABILITY: FOOD INSECURITY: WITHIN THE PAST 12 MONTHS, THE FOOD YOU BOUGHT JUST DIDN'T LAST AND YOU DIDN'T HAVE MONEY TO GET MORE.: NEVER TRUE

## 2025-02-12 SDOH — ECONOMIC STABILITY: FOOD INSECURITY: WITHIN THE PAST 12 MONTHS, YOU WORRIED THAT YOUR FOOD WOULD RUN OUT BEFORE YOU GOT MONEY TO BUY MORE.: NEVER TRUE

## 2025-02-12 ASSESSMENT — COLUMBIA-SUICIDE SEVERITY RATING SCALE - C-SSRS
6. HAVE YOU EVER DONE ANYTHING, STARTED TO DO ANYTHING, OR PREPARED TO DO ANYTHING TO END YOUR LIFE?: NO
1. WITHIN THE PAST MONTH, HAVE YOU WISHED YOU WERE DEAD OR WISHED YOU COULD GO TO SLEEP AND NOT WAKE UP?: YES
2. HAVE YOU ACTUALLY HAD ANY THOUGHTS OF KILLING YOURSELF?: NO

## 2025-02-12 ASSESSMENT — PATIENT HEALTH QUESTIONNAIRE - PHQ9
1. LITTLE INTEREST OR PLEASURE IN DOING THINGS: SEVERAL DAYS
SUM OF ALL RESPONSES TO PHQ QUESTIONS 1-9: 12
3. TROUBLE FALLING OR STAYING ASLEEP: NEARLY EVERY DAY
SUM OF ALL RESPONSES TO PHQ QUESTIONS 1-9: 13
4. FEELING TIRED OR HAVING LITTLE ENERGY: NEARLY EVERY DAY
5. POOR APPETITE OR OVEREATING: SEVERAL DAYS
SUM OF ALL RESPONSES TO PHQ QUESTIONS 1-9: 13
6. FEELING BAD ABOUT YOURSELF - OR THAT YOU ARE A FAILURE OR HAVE LET YOURSELF OR YOUR FAMILY DOWN: SEVERAL DAYS
9. THOUGHTS THAT YOU WOULD BE BETTER OFF DEAD, OR OF HURTING YOURSELF: SEVERAL DAYS
7. TROUBLE CONCENTRATING ON THINGS, SUCH AS READING THE NEWSPAPER OR WATCHING TELEVISION: SEVERAL DAYS
10. IF YOU CHECKED OFF ANY PROBLEMS, HOW DIFFICULT HAVE THESE PROBLEMS MADE IT FOR YOU TO DO YOUR WORK, TAKE CARE OF THINGS AT HOME, OR GET ALONG WITH OTHER PEOPLE: VERY DIFFICULT
SUM OF ALL RESPONSES TO PHQ QUESTIONS 1-9: 13
2. FEELING DOWN, DEPRESSED OR HOPELESS: SEVERAL DAYS
SUM OF ALL RESPONSES TO PHQ9 QUESTIONS 1 & 2: 2
8. MOVING OR SPEAKING SO SLOWLY THAT OTHER PEOPLE COULD HAVE NOTICED. OR THE OPPOSITE, BEING SO FIGETY OR RESTLESS THAT YOU HAVE BEEN MOVING AROUND A LOT MORE THAN USUAL: SEVERAL DAYS

## 2025-02-12 NOTE — PROGRESS NOTES
Right Upper Arm, Position: Sitting, Cuff Size: Medium Adult)   Pulse 93   Temp 96.9 °F (36.1 °C) (Temporal)   Resp 16   Ht 1.676 m (5' 5.98\")   Wt 88.9 kg (196 lb)   LMP 01/01/1993 (Approximate)   SpO2 98% Comment: room air  BMI 31.65 kg/m²    Physical Exam  Musculoskeletal:      Right hand: Tenderness present. Decreased range of motion. Decreased strength.      Thoracic back: Spasms and tenderness present.      Lumbar back: Tenderness present.      Comments: Walking with a rollator walker with steppage gate         No results found for requested labs within last 30 days.       Hemoglobin A1C (%)   Date Value   10/04/2024 7.2 (H)         Lab Results   Component Value Date/Time    WBC 9.8 08/13/2024 12:25 PM    NEUTROABS 5.9 08/13/2024 12:25 PM    HGB 15.5 08/13/2024 12:25 PM    HCT 48.5 08/13/2024 12:25 PM    MCV 91.9 08/13/2024 12:25 PM     08/13/2024 12:25 PM       Lab Results   Component Value Date    TSH 0.87 10/04/2024         ASSESSMENT/PLAN:   Assessment & Plan  1. Diabetes Mellitus.  Her A1c levels are within the normal range, indicating good control of her diabetes. She will continue her current medication regimen, including Trulicity 3 mg weekly.    2. Right Hand Pain.  She reports persistent pain and lack of  strength in her right hand following carpal tunnel surgery. A referral to Dr. Obando, a hand surgeon in [place], will be made for further evaluation and management.    3. Back Pain.  She experiences pain that radiates from her back to her legs and side. She will continue her current pain management regimen and follow up with the pain clinic as needed.    4. Vitamin B6 Deficiency.  She reports a deficiency in vitamin B6. She will continue taking B complex vitamins.    5. Health Maintenance.  A mammogram has been scheduled for routine screening.    Follow-up  The patient will follow up in 2 months.    PROCEDURE  The patient underwent carpal tunnel surgery on her right

## 2025-02-20 DIAGNOSIS — Z79.4 TYPE 2 DIABETES MELLITUS WITH OTHER CIRCULATORY COMPLICATION, WITH LONG-TERM CURRENT USE OF INSULIN (HCC): ICD-10-CM

## 2025-02-20 DIAGNOSIS — E11.59 TYPE 2 DIABETES MELLITUS WITH OTHER CIRCULATORY COMPLICATION, WITH LONG-TERM CURRENT USE OF INSULIN (HCC): ICD-10-CM

## 2025-02-20 RX ORDER — FOLIC ACID 1 MG/1
1000 TABLET ORAL DAILY
Qty: 30 TABLET | Refills: 3 | Status: SHIPPED | OUTPATIENT
Start: 2025-02-20

## 2025-02-20 RX ORDER — LEFLUNOMIDE 20 MG/1
20 TABLET ORAL DAILY
Qty: 90 TABLET | Refills: 1 | Status: SHIPPED | OUTPATIENT
Start: 2025-02-20

## 2025-02-20 RX ORDER — INSULIN GLARGINE 100 [IU]/ML
INJECTION, SOLUTION SUBCUTANEOUS
Qty: 15 ML | Refills: 2 | Status: SHIPPED | OUTPATIENT
Start: 2025-02-20

## 2025-02-20 RX ORDER — LEVOTHYROXINE SODIUM 150 UG/1
150 TABLET ORAL DAILY
Qty: 90 TABLET | Refills: 1 | Status: SHIPPED | OUTPATIENT
Start: 2025-02-20

## 2025-02-20 RX ORDER — ERGOCALCIFEROL 1.25 MG/1
CAPSULE, LIQUID FILLED ORAL
Qty: 4 CAPSULE | Refills: 1 | Status: SHIPPED | OUTPATIENT
Start: 2025-02-20

## 2025-02-20 RX ORDER — DAPAGLIFLOZIN 5 MG/1
5 TABLET, FILM COATED ORAL EVERY MORNING
Qty: 30 TABLET | Refills: 5 | Status: SHIPPED | OUTPATIENT
Start: 2025-02-20

## 2025-03-26 DIAGNOSIS — Z79.4 TYPE 2 DIABETES MELLITUS WITH OTHER CIRCULATORY COMPLICATION, WITH LONG-TERM CURRENT USE OF INSULIN: ICD-10-CM

## 2025-03-26 DIAGNOSIS — E11.59 TYPE 2 DIABETES MELLITUS WITH OTHER CIRCULATORY COMPLICATION, WITH LONG-TERM CURRENT USE OF INSULIN: ICD-10-CM

## 2025-03-26 RX ORDER — FLUOXETINE HYDROCHLORIDE 40 MG/1
CAPSULE ORAL
Qty: 90 CAPSULE | Refills: 1 | Status: SHIPPED | OUTPATIENT
Start: 2025-03-26

## 2025-04-11 ENCOUNTER — OFFICE VISIT (OUTPATIENT)
Age: 65
End: 2025-04-11

## 2025-04-11 ENCOUNTER — HOSPITAL ENCOUNTER (OUTPATIENT)
Facility: HOSPITAL | Age: 65
Discharge: HOME | End: 2025-04-11
Payer: MEDICARE

## 2025-04-11 VITALS
HEART RATE: 88 BPM | DIASTOLIC BLOOD PRESSURE: 68 MMHG | BODY MASS INDEX: 32.14 KG/M2 | HEIGHT: 66 IN | OXYGEN SATURATION: 98 % | RESPIRATION RATE: 20 BRPM | TEMPERATURE: 97.3 F | WEIGHT: 200 LBS | SYSTOLIC BLOOD PRESSURE: 102 MMHG

## 2025-04-11 DIAGNOSIS — R29.898 DECREASED GRIP STRENGTH OF RIGHT HAND: ICD-10-CM

## 2025-04-11 DIAGNOSIS — L02.415 CUTANEOUS ABSCESS OF RIGHT LOWER EXTREMITY: Primary | ICD-10-CM

## 2025-04-11 DIAGNOSIS — L73.2 HIDRADENITIS: ICD-10-CM

## 2025-04-11 DIAGNOSIS — E11.59 TYPE 2 DIABETES MELLITUS WITH OTHER CIRCULATORY COMPLICATION, WITH LONG-TERM CURRENT USE OF INSULIN: ICD-10-CM

## 2025-04-11 DIAGNOSIS — Z79.4 TYPE 2 DIABETES MELLITUS WITH OTHER CIRCULATORY COMPLICATION, WITH LONG-TERM CURRENT USE OF INSULIN: ICD-10-CM

## 2025-04-11 DIAGNOSIS — E03.9 ACQUIRED HYPOTHYROIDISM: ICD-10-CM

## 2025-04-11 DIAGNOSIS — Z12.31 ENCOUNTER FOR SCREENING MAMMOGRAM FOR BREAST CANCER: ICD-10-CM

## 2025-04-11 LAB
ALBUMIN SERPL-MCNC: 4 G/DL (ref 3.4–4.8)
ALBUMIN/GLOB SERPL: 1.5 {RATIO} (ref 0.8–2)
ALP SERPL-CCNC: 116 U/L (ref 25–100)
ALT SERPL-CCNC: 31 U/L (ref 4–36)
ANION GAP SERPL CALCULATED.3IONS-SCNC: 13 MMOL/L (ref 3–16)
AST SERPL-CCNC: 29 U/L (ref 8–33)
BILIRUB SERPL-MCNC: 0.3 MG/DL (ref 0.3–1.2)
BUN SERPL-MCNC: 13 MG/DL (ref 6–20)
CALCIUM SERPL-MCNC: 9.8 MG/DL (ref 8.3–10.6)
CHLORIDE SERPL-SCNC: 102 MMOL/L (ref 98–107)
CO2 SERPL-SCNC: 26 MMOL/L (ref 20–30)
CREAT SERPL-MCNC: 1.1 MG/DL (ref 0.4–1.2)
ERYTHROCYTE [DISTWIDTH] IN BLOOD BY AUTOMATED COUNT: 12.6 % (ref 11–16)
GFR SERPLBLD CREATININE-BSD FMLA CKD-EPI: 55 ML/MIN/{1.73_M2}
GLOBULIN SER CALC-MCNC: 2.7 G/DL
GLUCOSE SERPL-MCNC: 163 MG/DL (ref 74–106)
HBA1C MFR BLD: 7.5 %
HCT VFR BLD AUTO: 49 % (ref 37–47)
HGB BLD-MCNC: 15.9 G/DL (ref 11.5–16.5)
MCH RBC QN AUTO: 29.1 PG (ref 27–32)
MCHC RBC AUTO-ENTMCNC: 32.4 G/DL (ref 31–35)
MCV RBC AUTO: 89.6 FL (ref 80–100)
PLATELET # BLD AUTO: 278 K/UL (ref 150–400)
PMV BLD AUTO: 11.2 FL (ref 6–10)
POTASSIUM SERPL-SCNC: 4.4 MMOL/L (ref 3.4–5.1)
PROT SERPL-MCNC: 6.7 G/DL (ref 6.4–8.3)
RBC # BLD AUTO: 5.47 M/UL (ref 3.8–5.8)
SODIUM SERPL-SCNC: 141 MMOL/L (ref 136–145)
TSH SERPL DL<=0.005 MIU/L-ACNC: 27.5 UIU/ML (ref 0.27–4.2)
WBC # BLD AUTO: 9.4 K/UL (ref 4–11)

## 2025-04-11 PROCEDURE — 84443 ASSAY THYROID STIM HORMONE: CPT

## 2025-04-11 PROCEDURE — 83036 HEMOGLOBIN GLYCOSYLATED A1C: CPT

## 2025-04-11 PROCEDURE — 80053 COMPREHEN METABOLIC PANEL: CPT

## 2025-04-11 PROCEDURE — 85027 COMPLETE CBC AUTOMATED: CPT

## 2025-04-11 RX ORDER — LANOLIN ALCOHOL/MO/W.PET/CERES
50 CREAM (GRAM) TOPICAL DAILY
COMMUNITY

## 2025-04-11 RX ORDER — ALBUTEROL SULFATE 90 UG/1
2 INHALANT RESPIRATORY (INHALATION) EVERY 4 HOURS PRN
Qty: 3 EACH | Refills: 1 | Status: SHIPPED | OUTPATIENT
Start: 2025-04-11 | End: 2025-05-11

## 2025-04-11 NOTE — PROGRESS NOTES
Chief Complaint   Patient presents with    Diabetes    Hypertension       Have you seen any other physician or provider since your last visit no    Have you had any other diagnostic tests since your last visit? no    Have you changed or stopped any medications since your last visit? no     I have recommended that this patient have a sigmoidoscopy but she due to refusal reason: not comfortable with test   I have discussed the risks and benefits of this examination with her. The patient verbalizes understanding.  Provider will be informed of refusal.      Diabetic retinal exam completed this year? Yes                       * If yes please have patient sign a records release to obtain record to update Health Maintenance                       * If no, please order referral for patient to be scheduled   SUBJECTIVE:    Patient ID:Sue L Collett is a 64 y.o. female.    Chief Complaint   Patient presents with    Diabetes    Hypertension     HPI:  Patient is here to follow up on diabetes and blood pressure.   History of Present Illness  The patient presents for evaluation of diabetes mellitus, hand pain, and back pain.    Diabetes is currently under control, although a diminished appetite is reported. She adheres to her medication regimen despite experiencing gag reflexes with certain medications. The current treatment plan includes Lantus, administered twice daily at a dosage of 60 units, Farxiga, and Trulicity 3 mg, taken every Monday.    Persistent hand pain is reported, described as excruciating upon impact with objects. Medical records from Dr. Sharp's office have not been obtained, which are required by the hand surgeon for further evaluation. Additionally, numbness in the hands impedes the ability to bend or grasp objects.    The back condition remains unchanged since the initial surgery. Numbness in the feet is also reported, and a walker is used for mobility. A request for a letter to prevent the removal of her cat

## 2025-05-04 ENCOUNTER — RESULTS FOLLOW-UP (OUTPATIENT)
Age: 65
End: 2025-05-04

## 2025-05-06 DIAGNOSIS — R11.0 NAUSEA: ICD-10-CM

## 2025-05-06 RX ORDER — PROMETHAZINE HYDROCHLORIDE 12.5 MG/1
12.5 TABLET ORAL 3 TIMES DAILY PRN
Qty: 30 TABLET | Refills: 0 | Status: SHIPPED | OUTPATIENT
Start: 2025-05-06

## 2025-05-12 ENCOUNTER — OFFICE VISIT (OUTPATIENT)
Age: 65
End: 2025-05-12
Payer: MEDICARE

## 2025-05-12 VITALS
HEIGHT: 66 IN | OXYGEN SATURATION: 97 % | BODY MASS INDEX: 31.79 KG/M2 | SYSTOLIC BLOOD PRESSURE: 139 MMHG | WEIGHT: 197.8 LBS | DIASTOLIC BLOOD PRESSURE: 78 MMHG | RESPIRATION RATE: 16 BRPM | TEMPERATURE: 97.4 F | HEART RATE: 56 BPM

## 2025-05-12 DIAGNOSIS — Z00.00 MEDICARE ANNUAL WELLNESS VISIT, SUBSEQUENT: Primary | ICD-10-CM

## 2025-05-12 DIAGNOSIS — R11.0 NAUSEA: ICD-10-CM

## 2025-05-12 DIAGNOSIS — I10 PRIMARY HYPERTENSION: ICD-10-CM

## 2025-05-12 DIAGNOSIS — Z12.31 ENCOUNTER FOR SCREENING MAMMOGRAM FOR MALIGNANT NEOPLASM OF BREAST: ICD-10-CM

## 2025-05-12 DIAGNOSIS — E11.59 TYPE 2 DIABETES MELLITUS WITH OTHER CIRCULATORY COMPLICATION, WITH LONG-TERM CURRENT USE OF INSULIN (HCC): ICD-10-CM

## 2025-05-12 DIAGNOSIS — Z79.4 TYPE 2 DIABETES MELLITUS WITH OTHER CIRCULATORY COMPLICATION, WITH LONG-TERM CURRENT USE OF INSULIN (HCC): ICD-10-CM

## 2025-05-12 PROCEDURE — G0439 PPPS, SUBSEQ VISIT: HCPCS | Performed by: NURSE PRACTITIONER

## 2025-05-12 PROCEDURE — 3078F DIAST BP <80 MM HG: CPT | Performed by: NURSE PRACTITIONER

## 2025-05-12 PROCEDURE — 3075F SYST BP GE 130 - 139MM HG: CPT | Performed by: NURSE PRACTITIONER

## 2025-05-12 PROCEDURE — 1123F ACP DISCUSS/DSCN MKR DOCD: CPT | Performed by: NURSE PRACTITIONER

## 2025-05-12 PROCEDURE — 1159F MED LIST DOCD IN RCRD: CPT | Performed by: NURSE PRACTITIONER

## 2025-05-12 PROCEDURE — 3051F HG A1C>EQUAL 7.0%<8.0%: CPT | Performed by: NURSE PRACTITIONER

## 2025-05-12 RX ORDER — PROMETHAZINE HYDROCHLORIDE 12.5 MG/1
12.5 TABLET ORAL 3 TIMES DAILY PRN
Qty: 30 TABLET | Refills: 0 | Status: SHIPPED | OUTPATIENT
Start: 2025-05-12

## 2025-05-12 RX ORDER — ONDANSETRON 4 MG/1
4 TABLET, ORALLY DISINTEGRATING ORAL 3 TIMES DAILY PRN
Qty: 21 TABLET | Refills: 0 | Status: SHIPPED | OUTPATIENT
Start: 2025-05-12

## 2025-05-12 RX ORDER — INSULIN GLARGINE 100 [IU]/ML
60 INJECTION, SOLUTION SUBCUTANEOUS 2 TIMES DAILY
Qty: 15 ML | Refills: 2 | Status: SHIPPED | OUTPATIENT
Start: 2025-05-12

## 2025-05-12 RX ORDER — FUROSEMIDE 20 MG/1
10 TABLET ORAL DAILY PRN
Qty: 60 TABLET | Refills: 2 | Status: SHIPPED | OUTPATIENT
Start: 2025-05-12

## 2025-05-12 RX ORDER — PEN NEEDLE, DIABETIC 32GX 5/32"
NEEDLE, DISPOSABLE MISCELLANEOUS
Qty: 100 EACH | Refills: 0 | Status: SHIPPED | OUTPATIENT
Start: 2025-05-12

## 2025-05-12 RX ORDER — ATORVASTATIN CALCIUM 10 MG/1
10 TABLET, FILM COATED ORAL NIGHTLY
Qty: 90 TABLET | Refills: 1 | Status: SHIPPED | OUTPATIENT
Start: 2025-05-12

## 2025-05-12 RX ORDER — DULAGLUTIDE 3 MG/.5ML
INJECTION, SOLUTION SUBCUTANEOUS
Qty: 2 ML | Refills: 3 | Status: SHIPPED | OUTPATIENT
Start: 2025-05-12

## 2025-05-12 RX ORDER — APIXABAN 5 MG/1
5 TABLET, FILM COATED ORAL 2 TIMES DAILY
Qty: 60 TABLET | Refills: 2 | Status: SHIPPED | OUTPATIENT
Start: 2025-05-12

## 2025-05-12 RX ORDER — FOLIC ACID 1 MG/1
1000 TABLET ORAL DAILY
Qty: 30 TABLET | Refills: 3 | Status: SHIPPED | OUTPATIENT
Start: 2025-05-12

## 2025-05-12 RX ORDER — PANTOPRAZOLE SODIUM 40 MG/1
40 TABLET, DELAYED RELEASE ORAL DAILY
Qty: 90 TABLET | Refills: 1 | Status: SHIPPED | OUTPATIENT
Start: 2025-05-12

## 2025-05-12 ASSESSMENT — PATIENT HEALTH QUESTIONNAIRE - PHQ9
8. MOVING OR SPEAKING SO SLOWLY THAT OTHER PEOPLE COULD HAVE NOTICED. OR THE OPPOSITE, BEING SO FIGETY OR RESTLESS THAT YOU HAVE BEEN MOVING AROUND A LOT MORE THAN USUAL: NEARLY EVERY DAY
SUM OF ALL RESPONSES TO PHQ QUESTIONS 1-9: 11
SUM OF ALL RESPONSES TO PHQ QUESTIONS 1-9: 11
4. FEELING TIRED OR HAVING LITTLE ENERGY: MORE THAN HALF THE DAYS
SUM OF ALL RESPONSES TO PHQ QUESTIONS 1-9: 11
7. TROUBLE CONCENTRATING ON THINGS, SUCH AS READING THE NEWSPAPER OR WATCHING TELEVISION: MORE THAN HALF THE DAYS
SUM OF ALL RESPONSES TO PHQ QUESTIONS 1-9: 11
1. LITTLE INTEREST OR PLEASURE IN DOING THINGS: NOT AT ALL
6. FEELING BAD ABOUT YOURSELF - OR THAT YOU ARE A FAILURE OR HAVE LET YOURSELF OR YOUR FAMILY DOWN: SEVERAL DAYS
10. IF YOU CHECKED OFF ANY PROBLEMS, HOW DIFFICULT HAVE THESE PROBLEMS MADE IT FOR YOU TO DO YOUR WORK, TAKE CARE OF THINGS AT HOME, OR GET ALONG WITH OTHER PEOPLE: VERY DIFFICULT
9. THOUGHTS THAT YOU WOULD BE BETTER OFF DEAD, OR OF HURTING YOURSELF: NOT AT ALL
2. FEELING DOWN, DEPRESSED OR HOPELESS: NOT AT ALL
5. POOR APPETITE OR OVEREATING: SEVERAL DAYS
3. TROUBLE FALLING OR STAYING ASLEEP: MORE THAN HALF THE DAYS

## 2025-05-12 ASSESSMENT — LIFESTYLE VARIABLES: HOW OFTEN DO YOU HAVE A DRINK CONTAINING ALCOHOL: NEVER

## 2025-05-12 NOTE — PROGRESS NOTES
Chief Complaint   Patient presents with    Medicare AWV       Have you seen any other physician or provider since your last visit no    Have you had any other diagnostic tests since your last visit? yes - labs    Have you changed or stopped any medications since your last visit? no     I have recommended that this patient have a sigmoidoscopy but she due to refusal reason: not comfortable with test   I have discussed the risks and benefits of this examination with her. The patient verbalizes understanding.  Provider will be informed of refusal.

## 2025-05-12 NOTE — PROGRESS NOTES
Medicare Annual Wellness Visit    Sue L Collett is here for Medicare AWV    Assessment & Plan   Medicare annual wellness visit, subsequent  -     HILARIA DIGITAL SCREEN W OR WO CAD BILATERAL; Future  Type 2 diabetes mellitus with other circulatory complication, with long-term current use of insulin (HCC)  Primary hypertension  -     furosemide (LASIX) 20 MG tablet; Take 0.5 tablets by mouth daily as needed (swelling), Disp-60 tablet, R-2Normal  Nausea  -     promethazine (PHENERGAN) 12.5 MG tablet; Take 1 tablet by mouth 3 times daily as needed for Nausea, Disp-30 tablet, R-0Normal  Encounter for screening mammogram for malignant neoplasm of breast  -     HILARIA DIGITAL SCREEN W OR WO CAD BILATERAL; Future       No follow-ups on file.     Subjective   The following acute and/or chronic problems were also addressed today:  History of Present Illness  The patient presents for evaluation of a pilonidal cyst and breast pain.    She reports the presence of a pilonidal cyst, which is currently not exhibiting any signs of inflammation such as redness or pus. The cyst is tender to touch. Additionally, she mentions the development of small lesions on her legs that initially present as minor but enlarge over time.    She has been experiencing severe breast pain, which intensifies upon removal of her bra. This discomfort is attributed to weight loss, resulting in sagging breasts. Her last mammogram was conducted 3 years ago.    She has not consulted any specialists since her last visit. She acknowledges a decline in her hearing acuity. She requires assistance with daily activities such as dressing and medication management, which is provided by a caregiver who visits during the day and leaves at night. She utilizes a walker for mobility and recently experienced a fall while descending stairs, from which she was rescued by her nephew and daughter.        Patient's complete Health Risk Assessment and screening values have been reviewed

## 2025-05-29 DIAGNOSIS — G57.93 NEUROPATHY INVOLVING BOTH LOWER EXTREMITIES: ICD-10-CM

## 2025-06-02 RX ORDER — PREGABALIN 200 MG/1
CAPSULE ORAL
Qty: 90 CAPSULE | Refills: 2 | Status: SHIPPED | OUTPATIENT
Start: 2025-06-02 | End: 2025-07-02

## 2025-06-14 RX ORDER — PEN NEEDLE, DIABETIC 31 GX5/16"
NEEDLE, DISPOSABLE MISCELLANEOUS
Qty: 100 EACH | Refills: 1 | Status: SHIPPED | OUTPATIENT
Start: 2025-06-14

## 2025-07-14 ENCOUNTER — OFFICE VISIT (OUTPATIENT)
Age: 65
End: 2025-07-14
Payer: MEDICARE

## 2025-07-14 VITALS
BODY MASS INDEX: 34.72 KG/M2 | DIASTOLIC BLOOD PRESSURE: 77 MMHG | RESPIRATION RATE: 16 BRPM | WEIGHT: 216 LBS | HEIGHT: 66 IN | SYSTOLIC BLOOD PRESSURE: 128 MMHG | HEART RATE: 67 BPM | TEMPERATURE: 97.2 F | OXYGEN SATURATION: 97 %

## 2025-07-14 DIAGNOSIS — G57.93 NEUROPATHY INVOLVING BOTH LOWER EXTREMITIES: ICD-10-CM

## 2025-07-14 DIAGNOSIS — R74.8 ELEVATED ALKALINE PHOSPHATASE LEVEL: ICD-10-CM

## 2025-07-14 DIAGNOSIS — Z79.4 TYPE 2 DIABETES MELLITUS WITH OTHER CIRCULATORY COMPLICATION, WITH LONG-TERM CURRENT USE OF INSULIN (HCC): ICD-10-CM

## 2025-07-14 DIAGNOSIS — E55.9 VITAMIN D DEFICIENCY: ICD-10-CM

## 2025-07-14 DIAGNOSIS — E03.9 ACQUIRED HYPOTHYROIDISM: ICD-10-CM

## 2025-07-14 DIAGNOSIS — F33.2 SEVERE EPISODE OF RECURRENT MAJOR DEPRESSIVE DISORDER, WITHOUT PSYCHOTIC FEATURES (HCC): ICD-10-CM

## 2025-07-14 DIAGNOSIS — E11.59 TYPE 2 DIABETES MELLITUS WITH OTHER CIRCULATORY COMPLICATION, WITH LONG-TERM CURRENT USE OF INSULIN (HCC): ICD-10-CM

## 2025-07-14 DIAGNOSIS — R10.11 RUQ PAIN: Primary | ICD-10-CM

## 2025-07-14 DIAGNOSIS — R29.898 DECREASED GRIP STRENGTH OF RIGHT HAND: ICD-10-CM

## 2025-07-14 PROCEDURE — 99214 OFFICE O/P EST MOD 30 MIN: CPT | Performed by: NURSE PRACTITIONER

## 2025-07-14 PROCEDURE — 1159F MED LIST DOCD IN RCRD: CPT | Performed by: NURSE PRACTITIONER

## 2025-07-14 PROCEDURE — 3051F HG A1C>EQUAL 7.0%<8.0%: CPT | Performed by: NURSE PRACTITIONER

## 2025-07-14 PROCEDURE — 1123F ACP DISCUSS/DSCN MKR DOCD: CPT | Performed by: NURSE PRACTITIONER

## 2025-07-14 RX ORDER — FLUOXETINE HYDROCHLORIDE 40 MG/1
40 CAPSULE ORAL NIGHTLY
Qty: 90 CAPSULE | Refills: 1 | Status: SHIPPED | OUTPATIENT
Start: 2025-07-14 | End: 2025-07-16 | Stop reason: SDUPTHER

## 2025-07-14 RX ORDER — ERGOCALCIFEROL 1.25 MG/1
CAPSULE, LIQUID FILLED ORAL
Qty: 4 CAPSULE | Refills: 1 | Status: SHIPPED | OUTPATIENT
Start: 2025-07-14 | End: 2025-07-16 | Stop reason: SDUPTHER

## 2025-07-14 RX ORDER — PILOCARPINE HYDROCHLORIDE 5 MG/1
5 TABLET, FILM COATED ORAL 4 TIMES DAILY
Qty: 120 TABLET | Refills: 0 | Status: SHIPPED | OUTPATIENT
Start: 2025-07-14 | End: 2025-07-16 | Stop reason: SDUPTHER

## 2025-07-14 RX ORDER — DULAGLUTIDE 3 MG/.5ML
INJECTION, SOLUTION SUBCUTANEOUS
Qty: 2 ML | Refills: 3 | Status: SHIPPED | OUTPATIENT
Start: 2025-07-14 | End: 2025-07-16 | Stop reason: SDUPTHER

## 2025-07-14 RX ORDER — LIFITEGRAST 50 MG/ML
1 SOLUTION/ DROPS OPHTHALMIC 2 TIMES DAILY
Qty: 1 EACH | Refills: 0 | Status: SHIPPED | OUTPATIENT
Start: 2025-07-14 | End: 2025-07-16 | Stop reason: SDUPTHER

## 2025-07-14 RX ORDER — NYSTATIN 100000 [USP'U]/G
POWDER TOPICAL 2 TIMES DAILY
Qty: 60 G | Refills: 0 | Status: SHIPPED | OUTPATIENT
Start: 2025-07-14 | End: 2025-07-16 | Stop reason: SDUPTHER

## 2025-07-14 RX ORDER — FOLIC ACID 1 MG/1
1000 TABLET ORAL DAILY
Qty: 30 TABLET | Refills: 3 | Status: SHIPPED | OUTPATIENT
Start: 2025-07-14 | End: 2025-07-16 | Stop reason: SDUPTHER

## 2025-07-14 NOTE — PROGRESS NOTES
side pain).   Endocrine: Negative.    Genitourinary: Negative.    Musculoskeletal: Negative.    Skin: Negative.    Allergic/Immunologic: Negative.    Neurological: Negative.    Hematological: Negative.    Psychiatric/Behavioral: Negative.         OBJECTIVE:  /77 (BP Site: Right Upper Arm, Patient Position: Sitting, BP Cuff Size: Medium Adult)   Pulse 67   Temp 97.2 °F (36.2 °C) (Temporal)   Resp 16   Ht 1.676 m (5' 5.98\")   Wt 98 kg (216 lb)   LMP 01/01/1993 (Approximate)   SpO2 97% Comment: room air  BMI 34.88 kg/m²    Physical Exam  Vitals and nursing note reviewed.   Constitutional:       Appearance: She is well-developed.   HENT:      Head: Normocephalic and atraumatic.   Eyes:      Conjunctiva/sclera: Conjunctivae normal.      Pupils: Pupils are equal, round, and reactive to light.   Neck:      Thyroid: No thyromegaly.      Vascular: No JVD.   Cardiovascular:      Rate and Rhythm: Normal rate and regular rhythm.      Heart sounds: No murmur heard.     No friction rub. No gallop.   Pulmonary:      Effort: Pulmonary effort is normal. No respiratory distress.      Breath sounds: Normal breath sounds. No wheezing or rales.   Abdominal:      General: Bowel sounds are normal. There is no distension.      Palpations: Abdomen is soft.      Tenderness: There is abdominal tenderness in the right upper quadrant. There is no guarding.   Musculoskeletal:         General: No tenderness.      Cervical back: Normal range of motion and neck supple.   Skin:     General: Skin is warm and dry.      Findings: No rash.   Neurological:      Mental Status: She is alert and oriented to person, place, and time.   Psychiatric:         Judgment: Judgment normal.         No results found for requested labs within last 30 days.       Hemoglobin A1C (%)   Date Value   04/11/2025 7.5 (H)         Lab Results   Component Value Date/Time    WBC 9.4 04/11/2025 02:51 PM    NEUTROABS 5.9 08/13/2024 12:25 PM    HGB 15.9 04/11/2025

## 2025-07-16 DIAGNOSIS — E11.59 TYPE 2 DIABETES MELLITUS WITH OTHER CIRCULATORY COMPLICATION, WITH LONG-TERM CURRENT USE OF INSULIN (HCC): ICD-10-CM

## 2025-07-16 DIAGNOSIS — F33.2 SEVERE EPISODE OF RECURRENT MAJOR DEPRESSIVE DISORDER, WITHOUT PSYCHOTIC FEATURES (HCC): ICD-10-CM

## 2025-07-16 DIAGNOSIS — Z79.4 TYPE 2 DIABETES MELLITUS WITH OTHER CIRCULATORY COMPLICATION, WITH LONG-TERM CURRENT USE OF INSULIN (HCC): ICD-10-CM

## 2025-07-16 DIAGNOSIS — E55.9 VITAMIN D DEFICIENCY: ICD-10-CM

## 2025-07-16 RX ORDER — FLUOXETINE HYDROCHLORIDE 40 MG/1
40 CAPSULE ORAL NIGHTLY
Qty: 90 CAPSULE | Refills: 1 | Status: SHIPPED | OUTPATIENT
Start: 2025-07-16

## 2025-07-16 RX ORDER — PILOCARPINE HYDROCHLORIDE 5 MG/1
5 TABLET, FILM COATED ORAL 4 TIMES DAILY
Qty: 120 TABLET | Refills: 0 | Status: SHIPPED | OUTPATIENT
Start: 2025-07-16

## 2025-07-16 RX ORDER — FOLIC ACID 1 MG/1
1000 TABLET ORAL DAILY
Qty: 30 TABLET | Refills: 3 | Status: SHIPPED | OUTPATIENT
Start: 2025-07-16

## 2025-07-16 RX ORDER — NYSTATIN 100000 [USP'U]/G
POWDER TOPICAL 2 TIMES DAILY
Qty: 60 G | Refills: 0 | Status: SHIPPED | OUTPATIENT
Start: 2025-07-16

## 2025-07-16 RX ORDER — LIFITEGRAST 50 MG/ML
1 SOLUTION/ DROPS OPHTHALMIC 2 TIMES DAILY
Qty: 1 EACH | Refills: 0 | Status: SHIPPED | OUTPATIENT
Start: 2025-07-16

## 2025-07-16 RX ORDER — DULAGLUTIDE 3 MG/.5ML
INJECTION, SOLUTION SUBCUTANEOUS
Qty: 2 ML | Refills: 3 | Status: SHIPPED | OUTPATIENT
Start: 2025-07-16

## 2025-07-16 RX ORDER — ERGOCALCIFEROL 1.25 MG/1
CAPSULE, LIQUID FILLED ORAL
Qty: 4 CAPSULE | Refills: 1 | Status: SHIPPED | OUTPATIENT
Start: 2025-07-16

## 2025-07-21 ENCOUNTER — HOSPITAL ENCOUNTER (OUTPATIENT)
Dept: CT IMAGING | Facility: HOSPITAL | Age: 65
Discharge: HOME | End: 2025-07-21
Payer: MEDICARE

## 2025-07-21 DIAGNOSIS — R74.8 ELEVATED ALKALINE PHOSPHATASE LEVEL: ICD-10-CM

## 2025-07-21 DIAGNOSIS — R10.11 RUQ PAIN: ICD-10-CM

## 2025-07-21 PROCEDURE — 74176 CT ABD & PELVIS W/O CONTRAST: CPT

## 2025-07-22 RX ORDER — LEVOTHYROXINE SODIUM 150 UG/1
150 TABLET ORAL DAILY
Qty: 90 TABLET | Refills: 1 | Status: SHIPPED | OUTPATIENT
Start: 2025-07-22

## 2025-07-28 RX ORDER — PEN NEEDLE, DIABETIC 31 GX5/16"
NEEDLE, DISPOSABLE MISCELLANEOUS
Qty: 100 EACH | Refills: 2 | Status: SHIPPED | OUTPATIENT
Start: 2025-07-28

## 2025-08-13 DIAGNOSIS — G62.9 NEUROPATHY: ICD-10-CM

## 2025-08-13 RX ORDER — PREGABALIN 100 MG/1
100 CAPSULE ORAL 3 TIMES DAILY
Qty: 90 CAPSULE | Refills: 2 | OUTPATIENT
Start: 2025-08-13

## 2025-08-13 RX ORDER — ATORVASTATIN CALCIUM 10 MG/1
10 TABLET, FILM COATED ORAL NIGHTLY
Qty: 90 TABLET | Refills: 1 | Status: SHIPPED | OUTPATIENT
Start: 2025-08-13

## 2025-08-15 RX ORDER — LEFLUNOMIDE 20 MG/1
20 TABLET ORAL DAILY
Qty: 152 TABLET | Refills: 1 | Status: SHIPPED | OUTPATIENT
Start: 2025-08-15

## 2025-08-25 ENCOUNTER — OFFICE VISIT (OUTPATIENT)
Age: 65
End: 2025-08-25
Payer: MEDICARE

## 2025-08-25 VITALS
SYSTOLIC BLOOD PRESSURE: 136 MMHG | HEIGHT: 66 IN | DIASTOLIC BLOOD PRESSURE: 89 MMHG | WEIGHT: 219.7 LBS | OXYGEN SATURATION: 97 % | HEART RATE: 90 BPM | TEMPERATURE: 97.3 F | RESPIRATION RATE: 16 BRPM | BODY MASS INDEX: 35.31 KG/M2

## 2025-08-25 DIAGNOSIS — K76.9 LIVER LESION: Primary | ICD-10-CM

## 2025-08-25 DIAGNOSIS — E55.9 VITAMIN D DEFICIENCY: ICD-10-CM

## 2025-08-25 DIAGNOSIS — Z12.11 COLON CANCER SCREENING: ICD-10-CM

## 2025-08-25 DIAGNOSIS — B37.9 YEAST INFECTION: ICD-10-CM

## 2025-08-25 DIAGNOSIS — Z12.31 ENCOUNTER FOR SCREENING MAMMOGRAM FOR BREAST CANCER: ICD-10-CM

## 2025-08-25 DIAGNOSIS — E03.9 ACQUIRED HYPOTHYROIDISM: ICD-10-CM

## 2025-08-25 DIAGNOSIS — I10 PRIMARY HYPERTENSION: ICD-10-CM

## 2025-08-25 DIAGNOSIS — E11.59 TYPE 2 DIABETES MELLITUS WITH OTHER CIRCULATORY COMPLICATION, WITH LONG-TERM CURRENT USE OF INSULIN (HCC): ICD-10-CM

## 2025-08-25 DIAGNOSIS — Z79.4 TYPE 2 DIABETES MELLITUS WITH OTHER CIRCULATORY COMPLICATION, WITH LONG-TERM CURRENT USE OF INSULIN (HCC): ICD-10-CM

## 2025-08-25 DIAGNOSIS — Z79.4 TYPE 2 DIABETES MELLITUS WITHOUT COMPLICATION, WITH LONG-TERM CURRENT USE OF INSULIN (HCC): ICD-10-CM

## 2025-08-25 DIAGNOSIS — E11.9 TYPE 2 DIABETES MELLITUS WITHOUT COMPLICATION, WITH LONG-TERM CURRENT USE OF INSULIN (HCC): ICD-10-CM

## 2025-08-25 PROCEDURE — 1123F ACP DISCUSS/DSCN MKR DOCD: CPT | Performed by: NURSE PRACTITIONER

## 2025-08-25 PROCEDURE — 3075F SYST BP GE 130 - 139MM HG: CPT | Performed by: NURSE PRACTITIONER

## 2025-08-25 PROCEDURE — 3079F DIAST BP 80-89 MM HG: CPT | Performed by: NURSE PRACTITIONER

## 2025-08-25 PROCEDURE — 3051F HG A1C>EQUAL 7.0%<8.0%: CPT | Performed by: NURSE PRACTITIONER

## 2025-08-25 PROCEDURE — 99214 OFFICE O/P EST MOD 30 MIN: CPT | Performed by: NURSE PRACTITIONER

## 2025-08-25 PROCEDURE — 1159F MED LIST DOCD IN RCRD: CPT | Performed by: NURSE PRACTITIONER

## 2025-08-25 RX ORDER — NYSTATIN 100000 [USP'U]/G
POWDER TOPICAL 2 TIMES DAILY
Qty: 60 G | Refills: 0 | Status: SHIPPED | OUTPATIENT
Start: 2025-08-25

## 2025-08-25 RX ORDER — INSULIN GLARGINE 100 [IU]/ML
60 INJECTION, SOLUTION SUBCUTANEOUS 2 TIMES DAILY
Qty: 15 ML | Refills: 2 | Status: SHIPPED | OUTPATIENT
Start: 2025-08-25

## 2025-08-25 RX ORDER — ERGOCALCIFEROL 1.25 MG/1
CAPSULE, LIQUID FILLED ORAL
Qty: 4 CAPSULE | Refills: 1 | Status: SHIPPED | OUTPATIENT
Start: 2025-08-25

## 2025-08-28 ENCOUNTER — HOSPITAL ENCOUNTER (OUTPATIENT)
Dept: ULTRASOUND IMAGING | Facility: HOSPITAL | Age: 65
Discharge: HOME | End: 2025-08-28
Payer: MEDICARE

## 2025-08-28 DIAGNOSIS — K76.9 LIVER LESION: ICD-10-CM

## 2025-08-28 PROCEDURE — 76705 ECHO EXAM OF ABDOMEN: CPT

## 2025-09-02 ENCOUNTER — HOSPITAL ENCOUNTER (EMERGENCY)
Facility: HOSPITAL | Age: 65
Discharge: HOME | End: 2025-09-02
Attending: EMERGENCY MEDICINE
Payer: MEDICARE

## 2025-09-02 ENCOUNTER — APPOINTMENT (OUTPATIENT)
Dept: CT IMAGING | Facility: HOSPITAL | Age: 65
End: 2025-09-02
Attending: EMERGENCY MEDICINE
Payer: MEDICARE

## 2025-09-02 VITALS
TEMPERATURE: 99 F | BODY MASS INDEX: 35.2 KG/M2 | RESPIRATION RATE: 20 BRPM | HEIGHT: 66 IN | HEART RATE: 82 BPM | WEIGHT: 219 LBS | OXYGEN SATURATION: 98 % | DIASTOLIC BLOOD PRESSURE: 80 MMHG | SYSTOLIC BLOOD PRESSURE: 166 MMHG

## 2025-09-02 DIAGNOSIS — R31.0 GROSS HEMATURIA: Primary | ICD-10-CM

## 2025-09-02 DIAGNOSIS — E11.65 UNCONTROLLED TYPE 2 DIABETES MELLITUS WITH HYPERGLYCEMIA (HCC): ICD-10-CM

## 2025-09-02 LAB
ALBUMIN SERPL-MCNC: 3.8 G/DL (ref 3.4–4.8)
ALBUMIN/GLOB SERPL: 1.7 {RATIO} (ref 0.8–2)
ALP SERPL-CCNC: 159 U/L (ref 25–100)
ALT SERPL-CCNC: 41 U/L (ref 4–36)
ANION GAP SERPL CALCULATED.3IONS-SCNC: 14 MMOL/L (ref 3–16)
AST SERPL-CCNC: 32 U/L (ref 8–33)
BACTERIA URNS QL MICRO: ABNORMAL /HPF
BASOPHILS # BLD: 0.1 K/UL (ref 0–0.1)
BASOPHILS NFR BLD: 0.9 %
BILIRUB SERPL-MCNC: 0.4 MG/DL (ref 0.3–1.2)
BILIRUB UR QL STRIP.AUTO: ABNORMAL
BUN SERPL-MCNC: 13 MG/DL (ref 6–20)
CALCIUM SERPL-MCNC: 9.2 MG/DL (ref 8.3–10.6)
CHLORIDE SERPL-SCNC: 94 MMOL/L (ref 98–107)
CHP ED QC CHECK: NORMAL
CHP ED QC CHECK: NORMAL
CLARITY UR: ABNORMAL
CO2 SERPL-SCNC: 27 MMOL/L (ref 20–30)
COLOR UR: ABNORMAL
CREAT SERPL-MCNC: 1.1 MG/DL (ref 0.6–1.2)
EOSINOPHIL # BLD: 0.4 K/UL (ref 0–0.4)
EOSINOPHIL NFR BLD: 3.6 %
EPI CELLS #/AREA URNS HPF: ABNORMAL /HPF (ref 0–5)
ERYTHROCYTE [DISTWIDTH] IN BLOOD BY AUTOMATED COUNT: 12.9 % (ref 11–16)
GFR SERPLBLD CREATININE-BSD FMLA CKD-EPI: 55 ML/MIN/{1.73_M2}
GLOBULIN SER CALC-MCNC: 2.2 G/DL
GLUCOSE BLD-MCNC: 268 MG/DL
GLUCOSE BLD-MCNC: 268 MG/DL (ref 74–106)
GLUCOSE BLD-MCNC: 421 MG/DL
GLUCOSE BLD-MCNC: 421 MG/DL (ref 74–106)
GLUCOSE SERPL-MCNC: 531 MG/DL (ref 74–106)
GLUCOSE UR STRIP.AUTO-MCNC: ABNORMAL MG/DL
HCT VFR BLD AUTO: 45.4 % (ref 37–47)
HGB BLD-MCNC: 15.2 G/DL (ref 11.5–16.5)
HGB UR QL STRIP.AUTO: ABNORMAL
IMM GRANULOCYTES # BLD: 0 K/UL
IMM GRANULOCYTES NFR BLD: 0.3 % (ref 0–5)
KETONES UR STRIP.AUTO-MCNC: ABNORMAL MG/DL
LEUKOCYTE ESTERASE UR QL STRIP.AUTO: ABNORMAL
LIPASE SERPL-CCNC: 36 U/L (ref 5.6–51.3)
LYMPHOCYTES # BLD: 1.9 K/UL (ref 1.5–4)
LYMPHOCYTES NFR BLD: 18.4 %
MCH RBC QN AUTO: 29.1 PG (ref 27–32)
MCHC RBC AUTO-ENTMCNC: 33.5 G/DL (ref 31–35)
MCV RBC AUTO: 87 FL (ref 80–100)
MONOCYTES # BLD: 0.8 K/UL (ref 0.2–0.8)
MONOCYTES NFR BLD: 7.4 %
MUCOUS THREADS URNS QL MICRO: ABNORMAL /LPF
NEUTROPHILS # BLD: 7.2 K/UL (ref 2–7.5)
NEUTS SEG NFR BLD: 69.4 %
NITRITE UR QL STRIP.AUTO: ABNORMAL
PERFORMED ON: ABNORMAL
PERFORMED ON: ABNORMAL
PH UR STRIP.AUTO: ABNORMAL [PH] (ref 5–8)
PLATELET # BLD AUTO: 206 K/UL (ref 150–400)
PMV BLD AUTO: 11.5 FL (ref 6–10)
POTASSIUM SERPL-SCNC: 3.7 MMOL/L (ref 3.4–5.1)
PROT SERPL-MCNC: 6 G/DL (ref 6.4–8.3)
PROT UR STRIP.AUTO-MCNC: ABNORMAL MG/DL
RBC # BLD AUTO: 5.22 M/UL (ref 3.8–5.8)
RBC #/AREA URNS HPF: >100 /HPF (ref 0–4)
SODIUM SERPL-SCNC: 135 MMOL/L (ref 136–145)
SP GR UR STRIP.AUTO: 1.02 (ref 1–1.03)
UA COMPLETE W REFLEX CULTURE PNL UR: ABNORMAL
UA DIPSTICK W REFLEX MICRO PNL UR: YES
URN SPEC COLLECT METH UR: ABNORMAL
UROBILINOGEN UR STRIP-ACNC: ABNORMAL E.U./DL
WBC # BLD AUTO: 10.4 K/UL (ref 4–11)
WBC #/AREA URNS HPF: ABNORMAL /HPF (ref 0–5)

## 2025-09-02 PROCEDURE — 6360000002 HC RX W HCPCS: Performed by: EMERGENCY MEDICINE

## 2025-09-02 PROCEDURE — 6370000000 HC RX 637 (ALT 250 FOR IP): Performed by: EMERGENCY MEDICINE

## 2025-09-02 PROCEDURE — 83690 ASSAY OF LIPASE: CPT

## 2025-09-02 PROCEDURE — 96375 TX/PRO/DX INJ NEW DRUG ADDON: CPT

## 2025-09-02 PROCEDURE — 36415 COLL VENOUS BLD VENIPUNCTURE: CPT

## 2025-09-02 PROCEDURE — 74176 CT ABD & PELVIS W/O CONTRAST: CPT

## 2025-09-02 PROCEDURE — 85025 COMPLETE CBC W/AUTO DIFF WBC: CPT

## 2025-09-02 PROCEDURE — 2580000003 HC RX 258: Performed by: EMERGENCY MEDICINE

## 2025-09-02 PROCEDURE — 99284 EMERGENCY DEPT VISIT MOD MDM: CPT

## 2025-09-02 PROCEDURE — 81001 URINALYSIS AUTO W/SCOPE: CPT

## 2025-09-02 PROCEDURE — 80053 COMPREHEN METABOLIC PANEL: CPT

## 2025-09-02 PROCEDURE — 96365 THER/PROPH/DIAG IV INF INIT: CPT

## 2025-09-02 RX ORDER — CIPROFLOXACIN 500 MG/1
500 TABLET, FILM COATED ORAL 2 TIMES DAILY
Qty: 14 TABLET | Refills: 0 | Status: SHIPPED | OUTPATIENT
Start: 2025-09-02 | End: 2025-09-09

## 2025-09-02 RX ADMIN — HUMAN INSULIN 14 UNITS: 100 INJECTION, SOLUTION SUBCUTANEOUS at 23:08

## 2025-09-02 RX ADMIN — CEFTRIAXONE 1000 MG: 1 INJECTION, POWDER, FOR SOLUTION INTRAMUSCULAR; INTRAVENOUS at 23:10

## 2025-09-03 ASSESSMENT — ENCOUNTER SYMPTOMS
VOMITING: 0
ABDOMINAL PAIN: 0
RHINORRHEA: 0
SINUS PRESSURE: 0
EYE PAIN: 0
PHOTOPHOBIA: 0
WHEEZING: 0
CHEST TIGHTNESS: 0
SORE THROAT: 0
NAUSEA: 0
SHORTNESS OF BREATH: 0
BACK PAIN: 0
EYE REDNESS: 0
STRIDOR: 0
DIARRHEA: 0

## (undated) DEVICE — UNDYED BRAIDED (POLYGLACTIN 910), SYNTHETIC ABSORBABLE SUTURE: Brand: COATED VICRYL

## (undated) DEVICE — 3M™ STERI-STRIP™ REINFORCED ADHESIVE SKIN CLOSURES, R1547, 1/2 IN X 4 IN (12 MM X 100 MM), 6 STRIPS/ENVELOPE: Brand: 3M™ STERI-STRIP™

## (undated) DEVICE — DBD-PACK,EENT,SIRUS,PK II: Brand: MEDLINE

## (undated) DEVICE — MARKR SKIN W/RULR

## (undated) DEVICE — SUT VIC 3/0 RB1 27IN J215H

## (undated) DEVICE — SINGLE PORT MANIFOLD: Brand: NEPTUNE 2

## (undated) DEVICE — GLV SURG SENSICARE GREEN W/ALOE PF LF 6 STRL

## (undated) DEVICE — GLV SURG SENSICARE W/ALOE PF LF 8.5 STRL

## (undated) DEVICE — 1000 S-DRAPE TOWEL DRAPE 10/BX: Brand: STERI-DRAPE™

## (undated) DEVICE — DRSNG WND BORDR/ADHS NONADHR/GZ LF 4X4IN STRL

## (undated) DEVICE — INTENDED FOR TISSUE SEPARATION, AND OTHER PROCEDURES THAT REQUIRE A SHARP SURGICAL BLADE TO PUNCTURE OR CUT.: Brand: BARD-PARKER ® CARBON RIB-BACK BLADES

## (undated) DEVICE — TOWEL,OR,DSP,ST,NATURAL,DLX,4/PK,20PK/CS: Brand: MEDLINE

## (undated) DEVICE — VIOLET BRAIDED (POLYGLACTIN 910), SYNTHETIC ABSORBABLE SUTURE: Brand: COATED VICRYL

## (undated) DEVICE — RICH MINOR LITHOTOMY: Brand: MEDLINE INDUSTRIES, INC.

## (undated) DEVICE — GAUZE,SPONGE,4"X4",16PLY,XRAY,STRL,LF: Brand: MEDLINE

## (undated) DEVICE — BANDAGE,GAUZE,BULKEE II,4.5"X4.1YD,STRL: Brand: MEDLINE

## (undated) DEVICE — SUT SILK 4/0 SH CR8 18IN C014D

## (undated) DEVICE — PREMIUM WET SKIN PREP TRAY CHG: Brand: MEDLINE INDUSTRIES, INC.

## (undated) DEVICE — ADHS LIQ MASTISOL 2/3ML

## (undated) DEVICE — IRRIGATOR BULB ASEPTO 60CC STRL

## (undated) DEVICE — SUT SILK 0 TIES 30IN A306H

## (undated) DEVICE — SUT SILK PERMA HAND 4/0 12-30IN BRAID BLK A303H

## (undated) DEVICE — CVR HNDL LIGHT RIGID

## (undated) DEVICE — ST IRR CYSTO W/SPK 77IN LF

## (undated) DEVICE — SPNG GZ WOVN 4X4IN 12PLY 10/BX STRL

## (undated) DEVICE — ANTIBACTERIAL UNDYED BRAIDED (POLYGLACTIN 910), SYNTHETIC ABSORBABLE SUTURE: Brand: COATED VICRYL

## (undated) DEVICE — DRSNG WND GZ PAD BORDERED LF 4X5IN STRL

## (undated) DEVICE — SKIN AFFIX SURG ADHESIVE 72/CS 0.55ML: Brand: MEDLINE

## (undated) DEVICE — CUFF SCD HEMOFORCE SEQ CALF STD MD

## (undated) DEVICE — STRIP,CLOSURE,WOUND,MEDI-STRIP,1/2X4: Brand: MEDLINE

## (undated) DEVICE — SUT SILK 3/0 SH CR8 18IN C013D

## (undated) DEVICE — GLV SURG BIOGEL PI ULTRATOUCH G SZ7.5 LF

## (undated) DEVICE — GLV SURG BIOGEL M LTX PF 6 1/2

## (undated) DEVICE — GOWN,SIRUS,NON REINFRCD,LARGE,SET IN SL: Brand: MEDLINE

## (undated) DEVICE — SPNG LAP 18X18IN LF STRL PK/5

## (undated) DEVICE — GOWN,PREVENTION PLUS,XLARGE,STERILE: Brand: MEDLINE

## (undated) DEVICE — BNDG ELAS MATRX V/CLS 4IN 5YD LF

## (undated) DEVICE — TOWEL,OR,DSP,ST,BLUE,STD,4/PK,20PK/CS: Brand: MEDLINE

## (undated) DEVICE — GLV SURG SENSICARE PI LF PF 8 GRN STRL

## (undated) DEVICE — SOL IRR H2O BTL 1000ML STRL

## (undated) DEVICE — SUT GUT CHRM 2/0 SH 27IN G123H

## (undated) DEVICE — SUT SILK 3/0 TIES 18IN A184H

## (undated) DEVICE — PAD,NON-ADHERENT,3X8,STERILE,LF,1/PK: Brand: MEDLINE

## (undated) DEVICE — FLEXIBLE YANKAUER,MEDIUM TIP, NO VACUUM CONTROL: Brand: ARGYLE

## (undated) DEVICE — PK EXTREM UPPR 10

## (undated) DEVICE — SUT VIC 3/0 SH 27IN J416H

## (undated) DEVICE — GLV SURG ULTRATOUCH BIOGEL/COAT PF LF SZ6 STRL

## (undated) DEVICE — KITTNER SPONGE: Brand: DEROYAL

## (undated) DEVICE — SHEET,DRAPE,70X100,STERILE: Brand: MEDLINE

## (undated) DEVICE — APPL CHLORAPREP W/TINT 10.5ML ORNG

## (undated) DEVICE — COUNT NDL FOAM STRIP W/MAG 60CT

## (undated) DEVICE — PATIENT RETURN ELECTRODE, SINGLE-USE, CONTACT QUALITY MONITORING, ADULT, WITH 15 FT (4.5 M) CORD. FOR PATIENTS WEIGHING OVER 33LBS. (15KG): Brand: MEGADYNE

## (undated) DEVICE — PATIENT RETURN ELECTRODE, SINGLE-USE, CONTACT QUALITY MONITORING, ADULT, WITH 9FT CORD, FOR PATIENTS WEIGING OVER 33LBS. (15KG): Brand: MEGADYNE

## (undated) DEVICE — SOL NACL 0.9PCT 1000ML

## (undated) DEVICE — HARMONIC FOCUS SHEARS 9CM LENGTH + ADAPTIVE TISSUE TECHNOLOGY FOR USE WITH BLUE HAND PIECE ONLY: Brand: HARMONIC FOCUS

## (undated) DEVICE — GLV SURG SENSICARE W/ALOE PF LF 6.5 STRL